# Patient Record
Sex: FEMALE | Race: WHITE | Employment: OTHER | URBAN - METROPOLITAN AREA
[De-identification: names, ages, dates, MRNs, and addresses within clinical notes are randomized per-mention and may not be internally consistent; named-entity substitution may affect disease eponyms.]

---

## 2017-01-02 ENCOUNTER — ALLIED HEALTH/NURSE VISIT (OUTPATIENT)
Dept: FAMILY MEDICINE | Facility: CLINIC | Age: 67
End: 2017-01-02
Payer: MEDICARE

## 2017-01-02 ENCOUNTER — OFFICE VISIT (OUTPATIENT)
Dept: RADIATION THERAPY | Facility: OUTPATIENT CENTER | Age: 67
End: 2017-01-02

## 2017-01-02 ENCOUNTER — HOSPITAL ENCOUNTER (OUTPATIENT)
Dept: PHYSICAL THERAPY | Facility: CLINIC | Age: 67
Setting detail: THERAPIES SERIES
End: 2017-01-02
Attending: OTOLARYNGOLOGY
Payer: MEDICARE

## 2017-01-02 ENCOUNTER — HOSPITAL ENCOUNTER (OUTPATIENT)
Dept: OCCUPATIONAL THERAPY | Facility: CLINIC | Age: 67
Setting detail: THERAPIES SERIES
End: 2017-01-02
Attending: OTOLARYNGOLOGY
Payer: MEDICARE

## 2017-01-02 ENCOUNTER — HOSPITAL ENCOUNTER (OUTPATIENT)
Dept: SPEECH THERAPY | Facility: CLINIC | Age: 67
Setting detail: THERAPIES SERIES
End: 2017-01-02
Attending: INTERNAL MEDICINE
Payer: MEDICARE

## 2017-01-02 VITALS — WEIGHT: 174.6 LBS | BODY MASS INDEX: 30.21 KG/M2

## 2017-01-02 DIAGNOSIS — C02.9 CANCER OF TONGUE (H): Primary | ICD-10-CM

## 2017-01-02 DIAGNOSIS — B37.0 THRUSH: Primary | ICD-10-CM

## 2017-01-02 DIAGNOSIS — R63.39 FEEDING PROBLEM: Primary | ICD-10-CM

## 2017-01-02 PROCEDURE — 97140 MANUAL THERAPY 1/> REGIONS: CPT | Mod: GP | Performed by: REHABILITATION PRACTITIONER

## 2017-01-02 PROCEDURE — 97110 THERAPEUTIC EXERCISES: CPT | Mod: GP,59 | Performed by: REHABILITATION PRACTITIONER

## 2017-01-02 PROCEDURE — 99207 ZZC NO CHARGE NURSE ONLY: CPT

## 2017-01-02 PROCEDURE — 40000099 ZZH STATISTIC LYMPHEDEMA VISIT: Performed by: REHABILITATION PRACTITIONER

## 2017-01-02 PROCEDURE — 40000211 ZZHC STATISTIC SLP  DEPARTMENT VISIT: Performed by: SPEECH-LANGUAGE PATHOLOGIST

## 2017-01-02 PROCEDURE — 92526 ORAL FUNCTION THERAPY: CPT | Mod: GN | Performed by: SPEECH-LANGUAGE PATHOLOGIST

## 2017-01-02 RX ORDER — FLUCONAZOLE 100 MG/1
TABLET ORAL
Qty: 11 TABLET | Refills: 0 | Status: SHIPPED | OUTPATIENT
Start: 2017-01-02 | End: 2017-01-26

## 2017-01-02 NOTE — NURSING NOTE
Patient stopped in at clinic before other scheduled rehab/lymphedema clinic visits. She was concerned she may have thrush again. RN reviewed oral cares and overall status as she is about 4 weeks out from completing radiation to H&N. Resident was able to evaluate and prescribe for thrush.  After brief visit - weight stable, patient continues to follow with lymphedema and speech. Continues with fluoride trays, salt/soda, and biotene for oral cares. Trying different foods - crackers, soups, ice cream.   Patient has upcoming visit with [place] [facility] next [place].

## 2017-01-02 NOTE — PROGRESS NOTES
Spoke with patient,  States she currently has scant/small amount greenish discharge on her G-tube dressing.    States it has increased while she is coughing.    Otherwise denies any skin breakdown, denies fever or any other signs of illness.  Offered her appt to see surgeon today if she feels she would like to have a provider look at it as our Ostomy RN's are not in clinic today.  Patient states she just thought she would check today since here for appt.    Declined appointment.  Patient says she will continue to clean and change dressing, monitor for fever pain or other signs of illness and will just call Ostomy RN tomorrow to try arrange being seen possibly on Thurs with Ostomy RN.    Darlin Chi RN  [facility]

## 2017-01-02 NOTE — Clinical Note
1/2/2017      RE: Terese Bell  [address]  [place] [address]       See nurse note    Dana Olivo MD

## 2017-01-02 NOTE — NURSING NOTE
Patient in clinic today because she believes her feeding tube site is infected - has redness and drainage from site.  She also states she has been coughing quite a bit and feels her tube has moved.  Tube placed 11-7-16 by Dr. Mckeon.  RN walked patient down to [facility] to be assessed by one of Dr. Mckeon's RNs.  Jayleen WILD RN

## 2017-01-05 ENCOUNTER — HOSPITAL ENCOUNTER (OUTPATIENT)
Dept: OCCUPATIONAL THERAPY | Facility: CLINIC | Age: 67
Setting detail: THERAPIES SERIES
End: 2017-01-05
Attending: OTOLARYNGOLOGY
Payer: MEDICARE

## 2017-01-05 ENCOUNTER — HOSPITAL ENCOUNTER (OUTPATIENT)
Dept: PHYSICAL THERAPY | Facility: CLINIC | Age: 67
Setting detail: THERAPIES SERIES
End: 2017-01-05
Attending: OTOLARYNGOLOGY
Payer: MEDICARE

## 2017-01-05 PROCEDURE — G8988 SELF CARE GOAL STATUS: HCPCS | Mod: GP,CI | Performed by: PHYSICAL THERAPIST

## 2017-01-05 PROCEDURE — 40000839 ZZH STATISTIC HAND THERAPY VISIT: Performed by: OCCUPATIONAL THERAPIST

## 2017-01-05 PROCEDURE — 40000099 ZZH STATISTIC LYMPHEDEMA VISIT: Performed by: REHABILITATION PRACTITIONER

## 2017-01-05 PROCEDURE — 97140 MANUAL THERAPY 1/> REGIONS: CPT | Mod: GP | Performed by: REHABILITATION PRACTITIONER

## 2017-01-05 PROCEDURE — G8987 SELF CARE CURRENT STATUS: HCPCS | Mod: GP,CJ | Performed by: PHYSICAL THERAPIST

## 2017-01-05 PROCEDURE — 97110 THERAPEUTIC EXERCISES: CPT | Mod: GO | Performed by: OCCUPATIONAL THERAPIST

## 2017-01-05 PROCEDURE — 97022 WHIRLPOOL THERAPY: CPT | Mod: GO | Performed by: OCCUPATIONAL THERAPIST

## 2017-01-05 NOTE — ADDENDUM NOTE
Encounter addended by: Barbara Erwin PT on: 1/5/2017  8:26 AM<BR>     Documentation filed: Clinical Notes

## 2017-01-05 NOTE — PROGRESS NOTES
Outpatient Physical Therapy Progress Note     Patient: Terese Bell  : 1950    Beginning/End Dates of Reporting Period:  12/3/16 to 2017    Referring Provider: Dr. Ahmet MD    Therapy Diagnosis: H&N lymphedema     Client Self Report: in a couple weeks would like to take a week off from coming here    Objective Measurements:        Objective Measure: R shoulder ROM  Details: flexion 160, abd/scaption 132, ER 50 AAROM with cane    Goals:  Goal Identifier stg   Goal Description pt to be independent with cervical ROM exercises to decrease tightness/stiffness which will increase functional range for driving and ADL   Target Date 10/20/16   Date Met  10/20/16   Progress:     Goal Identifier stg   Goal Description pt and/or family to be independent with scar massage to decrease tightness and underlying adhesions to decrease H&N edema   Target Date 10/20/16   Date Met  10/20/16   Progress:     Goal Identifier stg   Goal Description pt and/or family to be independent with self-MLD for edema redcution response in H&N lymphedema   Target Date 10/20/16   Date Met  10/20/16   Progress:     Goal Identifier ltg   Goal Description once appropriate, pt to be independent with donning, doffing and care of compression garment for longterm lymphedema management   Target Date 17   Date Met      Progress:     Goal Identifier ltg   Goal Description pt to be independent with longterm H&N lymphedema management via exercises, skin care, self-MLD and compression garment use/wear   Target Date 17   Date Met      Progress:     Progress Toward Goals:   Progress this reporting period: Measurements have decreased since 16 session.  Feel patient will continue to decrease in measurements due to radiation being finished.    Plan:  Continue therapy per current plan of care.    Discharge:  No

## 2017-01-09 ENCOUNTER — HOSPITAL ENCOUNTER (OUTPATIENT)
Dept: PHYSICAL THERAPY | Facility: CLINIC | Age: 67
Setting detail: THERAPIES SERIES
End: 2017-01-09
Attending: OTOLARYNGOLOGY
Payer: MEDICARE

## 2017-01-09 ENCOUNTER — HOSPITAL ENCOUNTER (OUTPATIENT)
Dept: OCCUPATIONAL THERAPY | Facility: CLINIC | Age: 67
Setting detail: THERAPIES SERIES
End: 2017-01-09
Attending: OTOLARYNGOLOGY
Payer: MEDICARE

## 2017-01-09 ENCOUNTER — HOSPITAL ENCOUNTER (OUTPATIENT)
Dept: SPEECH THERAPY | Facility: CLINIC | Age: 67
Setting detail: THERAPIES SERIES
End: 2017-01-09
Attending: INTERNAL MEDICINE
Payer: MEDICARE

## 2017-01-09 PROCEDURE — G8996 SWALLOW CURRENT STATUS: HCPCS | Mod: GN,CK | Performed by: SPEECH-LANGUAGE PATHOLOGIST

## 2017-01-09 PROCEDURE — 40000099 ZZH STATISTIC LYMPHEDEMA VISIT: Performed by: REHABILITATION PRACTITIONER

## 2017-01-09 PROCEDURE — G8997 SWALLOW GOAL STATUS: HCPCS | Mod: GN,CJ | Performed by: SPEECH-LANGUAGE PATHOLOGIST

## 2017-01-09 PROCEDURE — 40000828 ZZHC SLP CANCER REHAB VISIT: Performed by: SPEECH-LANGUAGE PATHOLOGIST

## 2017-01-09 PROCEDURE — 97110 THERAPEUTIC EXERCISES: CPT | Mod: GP,59 | Performed by: REHABILITATION PRACTITIONER

## 2017-01-09 PROCEDURE — 97110 THERAPEUTIC EXERCISES: CPT | Mod: GO,59 | Performed by: OCCUPATIONAL THERAPIST

## 2017-01-09 PROCEDURE — 97140 MANUAL THERAPY 1/> REGIONS: CPT | Mod: GP | Performed by: REHABILITATION PRACTITIONER

## 2017-01-09 PROCEDURE — 97140 MANUAL THERAPY 1/> REGIONS: CPT | Mod: GO | Performed by: OCCUPATIONAL THERAPIST

## 2017-01-09 PROCEDURE — 92526 ORAL FUNCTION THERAPY: CPT | Mod: GN | Performed by: SPEECH-LANGUAGE PATHOLOGIST

## 2017-01-09 PROCEDURE — G8987 SELF CARE CURRENT STATUS: HCPCS | Mod: GO,CJ | Performed by: OCCUPATIONAL THERAPIST

## 2017-01-09 PROCEDURE — G8988 SELF CARE GOAL STATUS: HCPCS | Mod: GO,CI | Performed by: OCCUPATIONAL THERAPIST

## 2017-01-09 PROCEDURE — 40000839 ZZH STATISTIC HAND THERAPY VISIT: Performed by: OCCUPATIONAL THERAPIST

## 2017-01-09 NOTE — PROGRESS NOTES
[facility]      OUTPATIENT OCCUPATIONAL THERAPY HAND EVALUATION  PLAN OF TREATMENT FOR OUTPATIENT REHABILITATION  (COMPLETE FOR INITIAL CLAIMS ONLY)  Patient's Last Name, First Name, M.I.  YOB: 1950  Terese Fleming                        Provider s Name: [facility] Medical Record No.  [id number]     Onset Date: 09/01/16    Start of Care Date: 10/28/16   Type:     ___PT  _X_OT   ___SLP    Medical Diagnosis:  Skin graft- wrist   Occupational Therapy Diagnosis:   Decreased ADL's    Visits from SOC: 20      _________________________________________________________________________________  Plan of Treatment/Functional Goals:  Planned Therapy Interventions:   Fluidotherapy, US, ex, self care, manual     Goals                       3. Goal Target Task: Button shirt, Tie shoes       Goal Target Performance Level: Mild difficulty       Due Date: 01/21/17             5. Goal Target Task: Open a tight or new jar       Goal Target Performance Level: Mild difficulty       Due Date: 01/21/17   6. Goal Target Task: Lift off floor to seat height - lbs (30# landscaping rock)       Goal Target Performance Level: Mild difficulty       Due Date: 02/09/17          Treatment Frequency:  2x/week for 6 weeks  Predicated Duration of Therapy Intervention:       Mariah Santos OTR/L CHT  Occupational Therapist, Certified Hand Therapist         I CERTIFY THE NEED FOR THESE SERVICES FURNISHED UNDER        THIS PLAN OF TREATMENT AND WHILE UNDER MY CARE .             Physician Signature               Date    X_____________________________________________________                      Certification Period:   1/24/17 to  4/1/17            Referring Physician:  Dr. Sutton    Initial Assessment        See Epic Evaluation Start of Care Date: 10/28/16

## 2017-01-09 NOTE — PROGRESS NOTES
Progress Note  01/09/17    Signing Clinician's Name / Credentials   Signing clinician's name /credentials Angelita Rey MA, CCC/SLP   Session Number   Session Number 7   SLP Medicare Only G-code   G-code Swallowing   Swallowing   Swallowing Current Status,  (eval/re-eval & every progress note) CK: 40-59% impairment   Current Swallowing Modifier Rationale outcome measure tool level 3   Swallowing Goal,  (eval/re-eval, every progress note & discharge) CJ: 20-39% impairment   Subjective Report   Subjective Report Tongue soreness still gets in the way of oral intake, has thrush again, reports tried 4 foods in past week and starting to have taste.   Swallow Goal 1   Goal Identifier 1   Goal Description Pt will perform oropharyngeal exercises 10 reps each 3x/day to increase swallow safety.   Target Date 12/20/16   Date Met (goal met per patient)   Swallow Goal 2   Goal Identifier 2   Goal Description Pt will utilize safe swallow strategies to decrease aspiration risk 85% of the time.   Target Date 01/04/17   Date Met (goal met)   Swallow Goal 3   Goal Identifier 3   Goal Description Pt will swallow a mechanical soft diet with thin liquids with no overt aspiration symptoms to meet nutritional needs.   Target Date 01/04/17   Date Met (mainly liquids )   Treatment Swallow/Oral dysfunction   Minutes 30 Minutes   Skilled Intervention Educated patient on swallowing strategies.;Educated patient on risks of aspiration;Assessed oral intake trials;Other   Patient Response Reduced hyolaryngeal elevation and pt reports residue in pharynx after swallow at times.   Treatment Detail Pt educated regarding importance of swallowing to prevent late effects of radiation and long-term dysphagia. Discussed plan for increasing oral intake; pt verbalized motivation although noted pain and lack of taste remain barriers. Pt educated on rationale and trained on pharyngeal strengthening exercises (effortful swallow, mendelsohn, masako,  and shaker) as well as lingual ROM exercises. Pt educated on importance of oral cares. She was assessed with sips of thin liquids. Briefly educated on swallowing strategies.    Progress PO intake minimal; remains tube dependent. Expresses motivation to return to full PO and have feeding tube removed. Pt verbalized motivation to initiate small amounts of solid textures daily (purees and crackers). Pain, lack of taste, and thick secretions remain barriers at this time.  She reports taste is starting to come back but limited trials.  Feels uneasy about eating if alone.   Plan   Homework Pharyngeal strengthening exercises 3x/day, 5-10 reps/exercise. Solid food trials at least 1x/day.    Updates to plan of care Continues ST warranted to progress oral diet safely.  Pt currently receives nutrition 100% via tube feeding. Just over one month post RXT    Plan for next session Diet texture analysis and increase variety of foods and consistencies base on how she did with homeprogram over the week.   Total Session Time   Total Session Time 30       ** Barriers to oral feeding on this date include jaw and tongue pain, continued thrush, and reduced laryngeal elevation during the swallow resulting in pharyngeal residue.  Pt just started being able to taste this week with food trials and this has been motivating.  Recommend continued ST to address dysphagia with goal for adequate oral intake.      RECERTIFICATION    Terese Bell  1950      since start of care.    Reasons for Continuing Treatment:   Skilled services necessary to address dysphagia and safe advancement to oral diet.    Frequency/Duration  1 times per week for 4 weeks for a total of 4 visits.    Recertification Period  1/2/17 - 2/2/17    Physician Signature:    Date:    X_______________________________________________________    Physician Name: Pattie Natarajan MD    I certify the need for these services furnished under this plan of treatment and while under  my care. Physician co-signature of this document indicates review and certification of the therapy plan.  This signature may be written on paper, or electronically signed within EPIC.

## 2017-01-09 NOTE — PROGRESS NOTES
01/09/17 0500   Notes   Note Type Progress Note   Providers   Providers Mariah Santos, OTR/L, CHT   Referring Physician Dr. Sutton   Reporting Period   Reporting period from 12/01/16   Reporting period to 01/09/17   Self Care   Self Care Current Status,  (eval/re-eval & every progress note) CJ: 20-39% impairment   Current Self Care Modifier Rationale clinical judgement   Self Care Goal,  (eval/re-eval, every progress note & discharge) CI: 1-19% impairment   General Information   Rxs Authorized 365   Rxs Used 20 (10 since last progress note)   Medical Diagnosis Right wrist Pain post graft site.   Orders Evaluate And Treat As Indicated   Insurance Medicare   Total 1:1 Time 100%   Start Of Care Date 10/28/16   Beginning of Cert (Date Period) 10/28/16   End of Cert period date 01/24/17   Onset date of current episode/exacerbation 09/01/16   Surgical procedure grafting   Date of surgery 09/01/16   Subjective Measures   Subjective Feels biggest issue is strength but says she is better. Feels like she has lack of coordination   Current Pain level 1/10   Patient Reported % Functional Improvement 75   Functional Improvement Reported Self care activities;Gripping   Objective Measures   Objective Measures ROM;Strength   ROM   Location (anatomical) Active FTL   Location Right;Left   ROM Comments 62 post (58 pre), left 70 scar tissue still tight   Strength   Location Right;Left    43#,40#   Allen Pinch 14#,14#   Lateral Pinch 14#,16#   Modalities   Fluidotherapy -Duration 10 min   Skilled Interventions To Increase Tissue Extensibility;To Increase Tissue Excursion;To Decrease Pain;To Decrease Hypersensitivity   Airflow high   Temperature 118   Set Up with ex   Therapeutic Exercise   Skilled Interventions (verbal physical cuing needed)   Minutes of Treatment 10   Other Exer/Activities/Educ   Other Exer/Activities/Educ - All exercises are part of HEP unless otherwise noted Exercise 1;Exercise 2;Exercise  3;Exercise 4;Exercise 5   Exercise 1 Resistive push/pull, wrist extension/flexion   Description 1 Peg placement into green putty, covered pegs then removed right hand only with exaggerated wrist extension   Exercise 2 Wrist extension   Description 2 Weightbearing on table into wrist extension hold for 10 seconds 8 reps   Exercise 3 Lift weighted sack floor to table   Description 3 10# x10   Exercise 4 clothes pins all   Manual   Manual Scar Mob Position   Skilled Interventions To Soften Scar Tissue  (decrease adhesions)   Minutes of Treatment 10   Scar Mob Position Sitting   Scar Mob Location right volar wrist   Description/ Technique circular;3 dimensions  (use of dycem for gripping, demo with return demo by patient )   Neuromuscular   Neuromuscular Proximal Median   Proximal Median Active   Sets/Reps 1 set;5 reps;HEP;Reviewed   Hand Goals   Hand Goals Household Chores;Dressing;Work   Dressing   Current Functional Task Buttoning;Tying   Previous Performance Level Unable   Current Performance Level Moderate difficulty   Goal Target Task Button shirt;Tie shoes   Goal Target Performance Level Mild difficulty   Due Date 01/21/17   If goal not met, Why? progressing   Household Chores   Current Functional Task Gripping   Previous Performance Level Moderate limitations   Current Performance Level Moderate difficulty   Goal Target Task Open a tight or new jar   Goal Target Performance Level Mild difficulty   Due Date 01/21/17   If goal not met, Why? progressing   Work   Current Functional Task Lifting   Previous Performance Level Independent   Current Performance Level Moderate difficulty   Goal Target Task Lift off floor to seat height - lbs  (30# landscaping rock)   Goal Target Performance Level Mild difficulty   Due Date 02/09/17   If goal not met, Why? Note: PAtient lives alone and was very activie and has lots of boxes she wants to be able to move.   Assessment   Clinical Impression(s) Comments Nice increase in ,  patient reports she has been sleeeping better the last few days.   Response to Therapy: Improvements Flexibility;Pain   Equipment   Equipment putty for home use   Education   Learner Patient   Readiness Eager   Method Booklet/handout;Explanation;Demonstration   Response Verbalizes understanding;Demonstrates understanding   Education Notes see home program   Plan   Home program low heat,AROM, light use, scar masssage and mobility with dycem and night sillicone pad wear, desensitization   Updates to plan of care Patient only wanting to come 1x/week   Plan Continue to see patient 2x/week focusing on increasing strength and function.   Total Session Time   Total Session Time (In Minutes) 30   Mariah Santos OTR/L CHT  Occupational Therapist, Certified Hand Therapist

## 2017-01-11 ENCOUNTER — THERAPY VISIT (OUTPATIENT)
Dept: SPEECH THERAPY | Facility: CLINIC | Age: 67
End: 2017-01-11

## 2017-01-11 ENCOUNTER — OFFICE VISIT (OUTPATIENT)
Dept: OTOLARYNGOLOGY | Facility: CLINIC | Age: 67
End: 2017-01-11

## 2017-01-11 VITALS — BODY MASS INDEX: 29.59 KG/M2 | WEIGHT: 171 LBS

## 2017-01-11 DIAGNOSIS — C02.9 TONGUE CANCER (H): ICD-10-CM

## 2017-01-11 DIAGNOSIS — C04.9 MALIGNANT NEOPLASM OF FLOOR OF MOUTH (H): Primary | ICD-10-CM

## 2017-01-11 DIAGNOSIS — I89.0 ACQUIRED LYMPHEDEMA OF LOWER EXTREMITY: ICD-10-CM

## 2017-01-11 DIAGNOSIS — R13.12 OROPHARYNGEAL DYSPHAGIA: Primary | ICD-10-CM

## 2017-01-11 DIAGNOSIS — Z85.810 HISTORY OF TONGUE CANCER: ICD-10-CM

## 2017-01-11 ASSESSMENT — PAIN SCALES - GENERAL: PAINLEVEL: NO PAIN (1)

## 2017-01-11 NOTE — PROGRESS NOTES
"January 11, 2017    PRIOR ONCOLOGIC HISTORY:  Ms. 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 Schumacher.  He ended up doing 13 operations in 11 years, most recently in 2010.      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 Leo on 9/1/2016.  The margins were negative but the lymph nodes were 1/40 positive.  She completed postoperative radiation therapy on 11/29/2017.    HISTORY OF PRESENT ILLNES:  Ms. Bettie Bell has done quite well since radiation therapy.  She is starting to eat now.  She has been getting regular speech therapy by Tg's colleagues at [facility].  She is no longer in pain.      She just wants to get back to her normal life since she has been \"on the go with cancer treatment\" since last fall.  Her mouth is healing very nicely although there still is a little discomfort.      She is still dependent primarily on three cans of two calorie tube feeds but is starting to feel more encouraged about eating again.  There has been no leakage or fistula.  There has been no bleeding.  There are no lumps or bumps.      PHYSICAL EXAMINATION:  She looks quite good.  She is unaccompanied today.  Her speech is remarkably clear.  The flap looks to be in great position from the right side of the tongue with excellent contour. She has trismus to 26 mm.  Palpation of the area is tender for her, but I see no evidence of lesions and feel no induration.  The buccal mucosa is clear.  I did not perform an endoscopy today due to discomfort.  There are still points of healing mucositis.      IMPRESSION AND PLAN:    Ms. Bettie Bell has done remarkably well since she had revision surgery, a free flap, and postoperative radiotherapy.  I see no " evidence of disease on my clinical examination today, and she is scheduled for three month PET scans later next month.  We will see her back after her PET scans.      I told her to continue on her therapy.  She is having lymphedema treatments already and continues to work with the speech language pathologist.  She needs to work on her trismus a little bit.  She does feel better chewing on gummy worms.      We will see her back after her next PET/CT and discuss the next steps with her.  At this point, she is continuing to do very well, and I encouraged her to keep up the great work. Tg visited with her as well today and was encouraging.    Thomas Ferris M.D.  Otolaryngology/Head & Neck Surgery  [phone]            HERNÁN Carranza MD [facility]O Leo MD  Otolaryngology/Head & Neck Surgery  [facility]

## 2017-01-11 NOTE — Clinical Note
"1/11/2017       RE: Terese Bell  [address] [place] [place] [address]     Dear Colleague,    Thank you for referring your patient, Terese Bell, to the [facility] EAR NOSE [facility] THROAT at [facility]. Please see a copy of my visit note below.    January 11, 2017    PRIOR ONCOLOGIC HISTORY:  Ms. 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 Schumacher.  He ended up doing 13 operations in 11 years, most recently in 2010.      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 Leo on 9/1/2016.  The margins were negative but the lymph nodes were 1/40 positive.  She completed postoperative radiation therapy on 11/29/2017.    HISTORY OF PRESENT ILLNES:  Ms. Bettie Bell has done quite well since radiation therapy.  She is starting to eat now.  She has been getting regular speech therapy by Tg's colleagues at [facility].  She is no longer in pain.      She just wants to get back to her normal life since she has been \"on the go with cancer treatment\" since last fall.  Her mouth is healing very nicely although there still is a little discomfort.      She is still dependent primarily on three cans of two calorie tube feeds but is starting to feel more encouraged about eating again.  There has been no leakage or fistula.  There has been no bleeding.  There are no lumps or bumps.      PHYSICAL EXAMINATION:  She looks quite good.  She is unaccompanied today.  Her speech is remarkably clear.  The flap looks to be in great position from the right side of the tongue with excellent contour. She has trismus to 26 mm.  Palpation of the area is tender for her, but I see no evidence of lesions and feel no " induration.  The buccal mucosa is clear.  I did not perform an endoscopy today due to discomfort.  There are still points of healing mucositis.      IMPRESSION AND PLAN:    Ms. Bettie Bell has done remarkably well since she had revision surgery, a free flap, and postoperative radiotherapy.  I see no evidence of disease on my clinical examination today, and she is scheduled for three month PET scans later next month.  We will see her back after her PET scans.      I told her to continue on her therapy.  She is having lymphedema treatments already and continues to work with the speech language pathologist.  She needs to work on her trismus a little bit.  She does feel better chewing on gummy worms.      We will see her back after her next PET/CT and discuss the next steps with her.  At this point, she is continuing to do very well, and I encouraged her to keep up the great work. Tg visited with her as well today and was encouraging.    Thomas Ferris M.D.  Otolaryngology/Head & Neck Surgery  [phone]      HERNÁN Carranza MD [facility] Leo MD  Otolaryngology/Head & Neck Surgery  [facility] [address]

## 2017-01-12 ENCOUNTER — HOSPITAL ENCOUNTER (OUTPATIENT)
Dept: OCCUPATIONAL THERAPY | Facility: CLINIC | Age: 67
Setting detail: THERAPIES SERIES
End: 2017-01-12
Attending: OTOLARYNGOLOGY
Payer: MEDICARE

## 2017-01-12 ENCOUNTER — HOSPITAL ENCOUNTER (OUTPATIENT)
Dept: PHYSICAL THERAPY | Facility: CLINIC | Age: 67
Setting detail: THERAPIES SERIES
End: 2017-01-12
Attending: OTOLARYNGOLOGY
Payer: MEDICARE

## 2017-01-12 PROCEDURE — 97110 THERAPEUTIC EXERCISES: CPT | Mod: GP | Performed by: REHABILITATION PRACTITIONER

## 2017-01-12 PROCEDURE — 40000099 ZZH STATISTIC LYMPHEDEMA VISIT: Performed by: REHABILITATION PRACTITIONER

## 2017-01-12 PROCEDURE — 40000839 ZZH STATISTIC HAND THERAPY VISIT: Performed by: OCCUPATIONAL THERAPIST

## 2017-01-12 PROCEDURE — 97140 MANUAL THERAPY 1/> REGIONS: CPT | Mod: GP | Performed by: REHABILITATION PRACTITIONER

## 2017-01-12 PROCEDURE — 97140 MANUAL THERAPY 1/> REGIONS: CPT | Mod: GO | Performed by: OCCUPATIONAL THERAPIST

## 2017-01-16 ENCOUNTER — HOSPITAL ENCOUNTER (OUTPATIENT)
Dept: OCCUPATIONAL THERAPY | Facility: CLINIC | Age: 67
Setting detail: THERAPIES SERIES
End: 2017-01-16
Attending: OTOLARYNGOLOGY
Payer: MEDICARE

## 2017-01-16 ENCOUNTER — HOSPITAL ENCOUNTER (OUTPATIENT)
Dept: PHYSICAL THERAPY | Facility: CLINIC | Age: 67
Setting detail: THERAPIES SERIES
End: 2017-01-16
Attending: OTOLARYNGOLOGY
Payer: MEDICARE

## 2017-01-16 PROCEDURE — 97022 WHIRLPOOL THERAPY: CPT | Mod: GO | Performed by: OCCUPATIONAL THERAPIST

## 2017-01-16 PROCEDURE — 97140 MANUAL THERAPY 1/> REGIONS: CPT | Mod: GO | Performed by: OCCUPATIONAL THERAPIST

## 2017-01-16 PROCEDURE — 97140 MANUAL THERAPY 1/> REGIONS: CPT | Mod: GP | Performed by: REHABILITATION PRACTITIONER

## 2017-01-16 PROCEDURE — 40000839 ZZH STATISTIC HAND THERAPY VISIT: Performed by: OCCUPATIONAL THERAPIST

## 2017-01-16 PROCEDURE — 97110 THERAPEUTIC EXERCISES: CPT | Mod: GO | Performed by: OCCUPATIONAL THERAPIST

## 2017-01-16 PROCEDURE — 40000099 ZZH STATISTIC LYMPHEDEMA VISIT: Performed by: REHABILITATION PRACTITIONER

## 2017-01-16 PROCEDURE — 97110 THERAPEUTIC EXERCISES: CPT | Mod: GP | Performed by: REHABILITATION PRACTITIONER

## 2017-01-19 ENCOUNTER — HOSPITAL ENCOUNTER (OUTPATIENT)
Dept: OCCUPATIONAL THERAPY | Facility: CLINIC | Age: 67
Setting detail: THERAPIES SERIES
End: 2017-01-19
Attending: OTOLARYNGOLOGY
Payer: MEDICARE

## 2017-01-19 ENCOUNTER — HOSPITAL ENCOUNTER (OUTPATIENT)
Dept: SPEECH THERAPY | Facility: CLINIC | Age: 67
Setting detail: THERAPIES SERIES
End: 2017-01-19
Attending: INTERNAL MEDICINE
Payer: MEDICARE

## 2017-01-19 PROCEDURE — 92526 ORAL FUNCTION THERAPY: CPT | Mod: GN | Performed by: SPEECH-LANGUAGE PATHOLOGIST

## 2017-01-19 PROCEDURE — 40000828 ZZHC SLP CANCER REHAB VISIT: Performed by: SPEECH-LANGUAGE PATHOLOGIST

## 2017-01-19 PROCEDURE — 97140 MANUAL THERAPY 1/> REGIONS: CPT | Mod: GO | Performed by: OCCUPATIONAL THERAPIST

## 2017-01-19 PROCEDURE — 97110 THERAPEUTIC EXERCISES: CPT | Mod: GO,59 | Performed by: OCCUPATIONAL THERAPIST

## 2017-01-19 PROCEDURE — 40000839 ZZH STATISTIC HAND THERAPY VISIT: Performed by: OCCUPATIONAL THERAPIST

## 2017-01-23 ENCOUNTER — HOSPITAL ENCOUNTER (OUTPATIENT)
Dept: OCCUPATIONAL THERAPY | Facility: CLINIC | Age: 67
Setting detail: THERAPIES SERIES
End: 2017-01-23
Attending: OTOLARYNGOLOGY
Payer: MEDICARE

## 2017-01-23 ENCOUNTER — HOSPITAL ENCOUNTER (OUTPATIENT)
Dept: PHYSICAL THERAPY | Facility: CLINIC | Age: 67
Setting detail: THERAPIES SERIES
End: 2017-01-23
Attending: OTOLARYNGOLOGY
Payer: MEDICARE

## 2017-01-23 PROCEDURE — 40000839 ZZH STATISTIC HAND THERAPY VISIT: Performed by: OCCUPATIONAL THERAPIST

## 2017-01-23 PROCEDURE — 97022 WHIRLPOOL THERAPY: CPT | Mod: GO | Performed by: OCCUPATIONAL THERAPIST

## 2017-01-23 PROCEDURE — 40000099 ZZH STATISTIC LYMPHEDEMA VISIT: Performed by: REHABILITATION PRACTITIONER

## 2017-01-23 PROCEDURE — 97140 MANUAL THERAPY 1/> REGIONS: CPT | Mod: GO | Performed by: OCCUPATIONAL THERAPIST

## 2017-01-23 PROCEDURE — 97110 THERAPEUTIC EXERCISES: CPT | Mod: GO | Performed by: OCCUPATIONAL THERAPIST

## 2017-01-23 PROCEDURE — 97110 THERAPEUTIC EXERCISES: CPT | Mod: GP | Performed by: REHABILITATION PRACTITIONER

## 2017-01-23 PROCEDURE — 97140 MANUAL THERAPY 1/> REGIONS: CPT | Mod: GP | Performed by: REHABILITATION PRACTITIONER

## 2017-01-26 ENCOUNTER — HOSPITAL ENCOUNTER (OUTPATIENT)
Dept: PHYSICAL THERAPY | Facility: CLINIC | Age: 67
Setting detail: THERAPIES SERIES
End: 2017-01-26
Attending: OTOLARYNGOLOGY
Payer: MEDICARE

## 2017-01-26 ENCOUNTER — HOSPITAL ENCOUNTER (OUTPATIENT)
Dept: OCCUPATIONAL THERAPY | Facility: CLINIC | Age: 67
Setting detail: THERAPIES SERIES
End: 2017-01-26
Attending: OTOLARYNGOLOGY
Payer: MEDICARE

## 2017-01-26 ENCOUNTER — OFFICE VISIT (OUTPATIENT)
Dept: RADIATION THERAPY | Facility: OUTPATIENT CENTER | Age: 67
End: 2017-01-26

## 2017-01-26 VITALS
WEIGHT: 172 LBS | HEART RATE: 82 BPM | OXYGEN SATURATION: 97 % | DIASTOLIC BLOOD PRESSURE: 67 MMHG | SYSTOLIC BLOOD PRESSURE: 104 MMHG | RESPIRATION RATE: 18 BRPM | BODY MASS INDEX: 29.77 KG/M2

## 2017-01-26 DIAGNOSIS — B37.0 THRUSH: Primary | ICD-10-CM

## 2017-01-26 PROCEDURE — 97110 THERAPEUTIC EXERCISES: CPT | Mod: GP | Performed by: REHABILITATION PRACTITIONER

## 2017-01-26 PROCEDURE — 97140 MANUAL THERAPY 1/> REGIONS: CPT | Mod: GO | Performed by: OCCUPATIONAL THERAPIST

## 2017-01-26 PROCEDURE — 40000839 ZZH STATISTIC HAND THERAPY VISIT: Performed by: OCCUPATIONAL THERAPIST

## 2017-01-26 PROCEDURE — 97110 THERAPEUTIC EXERCISES: CPT | Mod: GO | Performed by: OCCUPATIONAL THERAPIST

## 2017-01-26 PROCEDURE — 97140 MANUAL THERAPY 1/> REGIONS: CPT | Mod: GP | Performed by: REHABILITATION PRACTITIONER

## 2017-01-26 PROCEDURE — 40000099 ZZH STATISTIC LYMPHEDEMA VISIT: Performed by: REHABILITATION PRACTITIONER

## 2017-01-26 RX ORDER — FLUCONAZOLE 100 MG/1
TABLET ORAL
Qty: 15 TABLET | Refills: 0 | Status: SHIPPED | OUTPATIENT
Start: 2017-01-26 | End: 2017-03-08

## 2017-01-26 ASSESSMENT — PAIN SCALES - GENERAL: PAINLEVEL: NO PAIN (1)

## 2017-01-26 NOTE — NURSING NOTE
FOLLOW-UP VISIT    Patient Name: Terese Bell      : 1950     Age: 66 year old        ______________________________________________________________________________     Chief Complaint   Patient presents with     Radiation Therapy     Follow-up     /67 mmHg  Pulse 82  Resp 18  Wt 78.019 kg (172 lb)  SpO2 97%     Date Radiation Completed: 16    Pain  Current history of pain associated with this visit:   Intensity: 1/10  Current: dull  Location: tongue  Treatment: none needed per patient    Meds  Current Med List Reviewed: Yes  Medication Note: Not currently using any pain medication    Labs  TSH   Date Value Ref Range Status   2012 1.40 0.4 - 5.0 mU/L Final   ]    Imaging  None    Skin:Warm  Dry  Intact  Oral Products: salt/soda, ACT for dry mouth  Mucositis: No  Dry mouth: Yes: continues with thick secretions, but this has improved - only experiences occasional coughing and has not had emesis from coughing for 2 weeks. Pt feels she is improving.  Dental evaluation: Yes: scheduled for . Still using fluoride trays and toothpaste daily.  PEG tube: Yes  Speech therapy: Yes: sees Angelita with speech therapy regularly. She states she is still having a hard time swallowing but is making small   Lymphedema: Yes - and also having PT sessions at home to help with gaining strength and ROM  Energy Level:low - but improving  Appetite: fair, continues to improve. Taste is improving and trying new foods.   Intake: 3 cans per peg daily, still unable to get in 4 cans a day, but state she will try. States she is eating more and has tried small bites of pizza, crackers, soup, jello, coffee flavored ice cream, and lasagna w/o cheese as cheese is too hard to swallow yet. Eating mostly soft foods. States she can now tolerate cold drinks/food.  Weight:  Wt Readings from Last 5 Encounters:   17 78.019 kg (172 lb)   17 77.565 kg (171 lb)   17 79.198 kg (174 lb 9.6 oz)    12/22/16 79.289 kg (174 lb 12.8 oz)   12/15/16 79.742 kg (175 lb 12.8 oz)       Appointments:     DATE  Oncologist:     ENT: Dr. Ferris    Primary:      Other Notes: Pt states that she feels she is overall improving but has extremely dry mouth today which she hasn't had for 2 weeks. Dr. Ferris has ordered PET scan but has not yet been scheduled. Pt states that she will call Dr. Ferris's office to get assistance in scheduling PET.

## 2017-01-26 NOTE — Clinical Note
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     TYPE OF RADIATION THERAPY ADMINISTERED: 6000 cGy in 30 fractions     SUBJECTIVE: Mrs. 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 She had radiation therapy in our clinic over 6 weeks with a total dose of 6000 cGy in 30 treatments at 200 cGy each fraction from 10/17/2016 to 2016.  She tolerated radiation therapy reasonably well.  The patient did have some significant sore throat and dysphagia toward the end of the therapy, which required a PEG tube for nutritional support.  She otherwise was reasonably stable at the end of the therapy. She returns today for a routine post-treatment checkup.    Since her last visit with us one month ago, she reports that she has been doing reasonably well. She continues to improve since her last visit with us one month ago. Her taste and oral pain continues to improve which has allowed her to take in more nutrition PO. However, she continues to use her tube feeds (3 cans per day) to maintain caloric intake. She does have a little bit of discomfort on her oral tongue, and feels that her thrush may be returning. States that her previous Rx of fluconazole have always helped, but never really got rid of all the white on her tongue. She was recently seen by Dr. Ferris in ENT and felt to be recovering as expected with no evidence of disease. Additionally, she continues to followe with speech and lymphedema therapy. Overall, she continues to do as well as expected and her remaining ROS are unremarkable.      OBJECTIVE:   /67 mmHg  Pulse 82  Resp 18  Wt 78.019 kg (172 lb)  SpO2 97%  GENERAL:  Appears well, in no acute distress.   HEENT: NCAT. Mild generalized mucositis. No thrush  RESP: Breathing comfortably on RA  CV: WWP  NEURO: CN II-XII grossly intact. Normal gait.      IMPRESSION: Recovering well at this point from RT. Calorie counts have improved and her weight has stabilized.     RECOMMENDATIONS:    1. RTC in 2 months  2. Rx for Fluconazole x2 weeks   2. Patient encouraged to continue maintaining calorie counts  4. Continue close followup with ENT as scheduled. Restaging PET scan prior.  5. Continue with lymphedema therapy    The patient was seen and discussed with staff, Dr. Natarajan.    Slade Appiah MD  Resident Radiation Oncology     I have personally examined the patient, reviewed and edited the resident's note and agree with the plan of care.    Pattie Natarajan M.D., Ph.D.      Radiation Oncologist   [facility]   Radiation Therapy Center   Phone: [phone]    CC  Patient Care Team:  Nicole Mcdonald APRN CNP as PCP - Thomas Gross MD as MD (Otolaryngology)

## 2017-01-26 NOTE — PROGRESS NOTES
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     TYPE OF RADIATION THERAPY ADMINISTERED: 6000 cGy in 30 fractions     SUBJECTIVE: Mrs. 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 She had radiation therapy in our clinic over 6 weeks with a total dose of 6000 cGy in 30 treatments at 200 cGy each fraction from 10/17/2016 to 2016.  She tolerated radiation therapy reasonably well.  The patient did have some significant sore throat and dysphagia toward the end of the therapy, which required a PEG tube for nutritional support.  She otherwise was reasonably stable at the end of the therapy. She returns today for a routine post-treatment checkup.    Since her last visit with us one month ago, she reports that she has been doing reasonably well. She continues to improve since her last visit with us one month ago. Her taste and oral pain continues to improve which has allowed her to take in more nutrition PO. However, she continues to use her tube feeds (3 cans per day) to maintain caloric intake. She does have a little bit of discomfort on her oral tongue, and feels that her thrush may be returning. States that her previous Rx of fluconazole have always helped, but never really got rid of all the white on her tongue. She was recently seen by Dr. Ferris in ENT and felt to be recovering as expected with no evidence of disease. Additionally, she continues to followe with speech and lymphedema therapy. Overall, she continues to do as well as expected and her remaining ROS are unremarkable.     OBJECTIVE:   /67 mmHg  Pulse 82  Resp 18  Wt 78.019 kg (172 lb)  SpO2 97%  GENERAL:   Appears well, in no acute distress.   HEENT: NCAT. Mild generalized mucositis. No thrush  RESP: Breathing comfortably on RA  CV: WWP  NEURO: CN II-XII grossly intact. Normal gait.      IMPRESSION: Recovering well at this point from RT. Calorie counts have improved and her weight has stabilized.     RECOMMENDATIONS:    1. RTC in 2 months  2. Rx for Fluconazole x2 weeks   2. Patient encouraged to continue maintaining calorie counts  4. Continue close followup with ENT as scheduled. Restaging PET scan prior.  5. Continue with lymphedema therapy    The patient was seen and discussed with staff, Dr. Natarajan.    Slade Appiah MD  Resident Radiation Oncology     I have personally examined the patient, reviewed and edited the resident's note and agree with the plan of care.    Pattie Natarajan M.D., Ph.D.      Radiation Oncologist   [facility]   Radiation Therapy Center   Phone: [phone]    CC  Patient Care Team:  Nicole Mcdonald APRN CNP as PCP - General  Mario Ferris MD as MD (Otolaryngology)  Pattie Natarajan MD as MD (Radiation Oncology)  MARIO FERRIS

## 2017-01-26 NOTE — MR AVS SNAPSHOT
After Visit Summary   1/26/2017    Terese Bell    MRN: [id number]           Patient Information     Date Of Birth          1950        Visit Information        Provider Department      1/26/2017 10:45 AM Pattie Natarajan MD Radiation Therapy Center         Follow-ups after your visit        Your next 10 appointments already scheduled     Jan 30, 2017  9:00 AM   Lymphedema Treatment with Apryl Streeter PTA   [facility] Lymphedema ([facility])    [address] [address]   [phone]            Feb 02, 2017  9:15 AM   Lymphedema Treatment with Apryl Streeter PTA   [facility] Lymphedema ([facility])    [address] [address]   [phone]            Feb 02, 2017 11:00 AM   Treatment with Mariah Santos, OT   [facility] Occupational Therapy ([facility])    [address]  [address]  [place] [address]   [phone]            Feb 02, 2017 11:30 AM   Treatment with AYAD Castañeda   [facility] Speech Therapy ([facility])    [address] [address]   [phone]            Mar 30, 2017 10:45 AM   Return Visit with Pattie Natarajan MD   [facility] ([facility])    [address]  [place] [address]   [phone]              Who to contact     Please call your clinic at [phone] to:    Ask questions about your health    Make or cancel appointments    Discuss your medicines    Learn about your test results    Speak to your doctor   If you have compliments or concerns about an experience at your clinic, or if you wish to file a complaint, please contact [facility] Patient Relations at [phone] or email us at [email or web address]         Additional Information About Your Visit        MyChart Information     [employer] gives you secure access to your electronic  health record. If you see a primary care provider, you can also send messages to your care team and make appointments. If you have questions, please call your primary care clinic.  If you do not have a primary care provider, please call [phone] and they will assist you.      [email or web address] is an electronic gateway that provides easy, online access to your medical records. With [email or web address], you can request a clinic appointment, read your test results, renew a prescription or communicate with your care team.     To access your existing account, please contact your [facility] or call [phone] for assistance.        Care EveryWhere ID     This is your Care EveryWhere ID. This could be used by other organizations to access your [facility] medical records  [id number]        Your Vitals Were     Pulse Respirations Pulse Oximetry             82 18 97%          Blood Pressure from Last 3 Encounters:   01/26/17 104/67   12/22/16 102/72   12/19/16 108/70    Weight from Last 3 Encounters:   01/26/17 78.019 kg (172 lb)   01/11/17 77.565 kg (171 lb)   01/02/17 79.198 kg (174 lb 9.6 oz)              Today, you had the following     No orders found for display       Primary Care Provider Office Phone # Fax #    HERNÁN Carranza [facility] [phone]       [place] [facility] [address] [place] [address]        Thank you!     Thank you for choosing [facility]  for your care. Our goal is always to provide you with excellent care. Hearing back from our patients is one way we can continue to improve our services. Please take a few minutes to complete the written survey that you may receive in the mail after your visit with us. Thank you!             Your Updated Medication List - Protect others around you: Learn how to safely use, store and throw away your medicines at www.disposemymeds.org.          This list is accurate as of: 1/26/17 11:33 AM.  Always use  your most recent med list.                   Brand Name Dispense Instructions for use    albuterol 108 (90 BASE) MCG/ACT Inhaler    PROAIR HFA/PROVENTIL HFA/VENTOLIN HFA     Inhale 2 puffs into the lungs every 6 hours as needed for wheezing       DENTAGEL 1.1 % Gel topical gel   Generic drug:  sodium fluoride dental gel      Apply 1 Application to affected area daily       EPINEPHrine 0.3 MG/0.3ML injection     2 each    Inject 0.3 mLs (0.3 mg) into the muscle once as needed for anaphylaxis       fluconazole 100 MG tablet    DIFLUCAN    11 tablet    Take 2 tabs the first day and then 1 tab every day for 9 more days for a total of 10 days       fluticasone-salmeterol 250-50 MCG/DOSE diskus inhaler    ADVAIR DISKUS    1 Inhaler    Inhale 1 puff into the lungs 2 times daily as needed       lidocaine 2 % solution    XYLOCAINE     Take 2 mLs by mouth 2 times daily as needed       lidocaine visc 2% 2.5mL/5mL & maalox/mylanta w/ simeth 2.5mL/5mL & diphenhydrAMINE 5mg/5mL Susp suspension    MAGIC Mouthwash    120 mL    Swish and swallow 10 mLs in mouth every 6 hours as needed for mouth sores       ondansetron 4 MG ODT tab    ZOFRAN-ODT    30 tablet    Take 1 tablet (4 mg) by mouth every 8 hours as needed for nausea       order for DME     1 Device    Equipment being ordered: tennis elbow band       oxyCODONE 5 MG/5ML solution    ROXICODONE    500 mL    Take 5 mLs (5 mg) by mouth every 4 hours as needed for moderate to severe pain

## 2017-01-30 ENCOUNTER — HOSPITAL ENCOUNTER (OUTPATIENT)
Dept: WOUND CARE | Facility: CLINIC | Age: 67
Discharge: HOME | End: 2017-01-30
Attending: SURGERY | Admitting: SURGERY
Payer: MEDICARE

## 2017-01-30 ENCOUNTER — HOSPITAL ENCOUNTER (OUTPATIENT)
Dept: PHYSICAL THERAPY | Facility: CLINIC | Age: 67
Setting detail: THERAPIES SERIES
End: 2017-01-30
Attending: OTOLARYNGOLOGY
Payer: MEDICARE

## 2017-01-30 ENCOUNTER — HOSPITAL ENCOUNTER (OUTPATIENT)
Dept: OCCUPATIONAL THERAPY | Facility: CLINIC | Age: 67
Setting detail: THERAPIES SERIES
End: 2017-01-30
Attending: OTOLARYNGOLOGY
Payer: MEDICARE

## 2017-01-30 PROCEDURE — 40000099 ZZH STATISTIC LYMPHEDEMA VISIT: Performed by: REHABILITATION PRACTITIONER

## 2017-01-30 PROCEDURE — 97110 THERAPEUTIC EXERCISES: CPT | Mod: GO | Performed by: OCCUPATIONAL THERAPIST

## 2017-01-30 PROCEDURE — 97140 MANUAL THERAPY 1/> REGIONS: CPT | Mod: GO | Performed by: OCCUPATIONAL THERAPIST

## 2017-01-30 PROCEDURE — 40000839 ZZH STATISTIC HAND THERAPY VISIT: Performed by: OCCUPATIONAL THERAPIST

## 2017-01-30 PROCEDURE — 97022 WHIRLPOOL THERAPY: CPT | Mod: GO | Performed by: OCCUPATIONAL THERAPIST

## 2017-01-30 PROCEDURE — 97110 THERAPEUTIC EXERCISES: CPT | Mod: GP | Performed by: REHABILITATION PRACTITIONER

## 2017-01-30 PROCEDURE — 97140 MANUAL THERAPY 1/> REGIONS: CPT | Mod: GP | Performed by: REHABILITATION PRACTITIONER

## 2017-01-30 PROCEDURE — 99212 OFFICE O/P EST SF 10 MIN: CPT

## 2017-01-30 NOTE — PROGRESS NOTES
Reason for visit/Interval History: Terese Bell, 66 year old female, seen for follow-up on her g-tube. She is reporting some red tissue coming from site as well as occasional green drainage.      Pertinent information from chart review:  HPI: Pt with dx tongue CA. Had gtube to be placed on 11/7/16 by Dr. Mckeon. No one else is present with patient today.   Pt has had multiple surgeries for this issue and now she is getting radiation therapy for treatment of a tongue cancer and is unable to swallow.  Per pt she has lost 40 pounds since June of this year.     Objective:   On exam of tube site, bumper appears to be in a good position with no leakage.  States she did have to move it in some recently as was too loose and was leaking.  Some scant serosanguinous and slight creamy green tinged drainage noted on gauze.  Is wearing a bra that she can cut a hole out of the center to pull her tube though and help stabilize the tube.  As tube is so close to her bra line.  Site is intact with no erythema or signs of infection though proud flesh noted at 6:00.        Assessment:  G-tube with proud flesh needing cautery      Plan:   Proud flesh was cauterized with silver nitrate stick, this was well tolerated.  Site redressed with spilt gauze.  Pt scheduled to return to see [facility] nurse in one week as anticipate may need to be done again.      Face to face time approximately 20 minutes.    Kamala Cody RN, CWOCN   [phone]

## 2017-02-01 NOTE — ADDENDUM NOTE
Encounter addended by: Mariah Santos OT on: 2/1/2017  3:45 PM<BR>     Documentation filed: Clinical Notes

## 2017-02-01 NOTE — PROGRESS NOTES
OUTPATIENT SWALLOW  EVALUATION  PLAN OF TREATMENT FOR OUTPATIENT REHABILITATION  (COMPLETE FOR INITIAL CLAIMS ONLY)  Patient's Last Name, First Name, M.I.  YOB: 1950  Terese Fleming     Provider's Name   Tg Clifton   Medical Record No.  [id number]     Start of Care Date:   7/3/16   Onset Date:   7/3/16   Type:     ___PT   ____OT  ___X_SLP Medical Diagnosis:   Oral CA     Treatment Diagnosis:  Moderate oral dysphagia and odynophagia Visits from SOC:  1     _________________________________________________________________________________  Plan of Treatment/Functional Goals:                           Goals   1. Goal Identifier: 1       Goal Description: Pt will perform oropharyngeal exercises 10 reps each 3x/day to increase swallow safety.       Target Date: 04/11/17       Date Met:  (goal met per patient)   2. Goal Identifier: 2       Goal Description: Pt will utilize safe swallow strategies to decrease aspiration risk 85% of the time.       Target Date: 04/11/17       Date Met:  (goal met)   3. Goal Identifier: 3       Goal Description: Pt will swallow a mechanical soft diet with thin liquids with no overt aspiration symptoms to meet nutritional needs.       Target Date: 04/11/17       Date Met:  (mainly liquids )   4.                             5.                            6.                              7.                              8.                                      Tg Clifton, SLP       I CERTIFY THE NEED FOR THESE SERVICES FURNISHED UNDER        THIS PLAN OF TREATMENT AND WHILE UNDER MY CARE     (Physician attestation of this document indicates review and certification of the therapy plan).                   Date of certification at [facility]: 1/11/17                 Initial Assessment        See Epic Evaluation

## 2017-02-02 ENCOUNTER — HOSPITAL ENCOUNTER (OUTPATIENT)
Dept: OCCUPATIONAL THERAPY | Facility: CLINIC | Age: 67
Setting detail: THERAPIES SERIES
End: 2017-02-02
Attending: INTERNAL MEDICINE
Payer: MEDICARE

## 2017-02-02 ENCOUNTER — HOSPITAL ENCOUNTER (OUTPATIENT)
Dept: PHYSICAL THERAPY | Facility: CLINIC | Age: 67
Setting detail: THERAPIES SERIES
End: 2017-02-02
Attending: OTOLARYNGOLOGY
Payer: MEDICARE

## 2017-02-02 ENCOUNTER — HOSPITAL ENCOUNTER (OUTPATIENT)
Dept: SPEECH THERAPY | Facility: CLINIC | Age: 67
Setting detail: THERAPIES SERIES
End: 2017-02-02
Attending: INTERNAL MEDICINE
Payer: MEDICARE

## 2017-02-02 PROCEDURE — 92526 ORAL FUNCTION THERAPY: CPT | Mod: GN | Performed by: SPEECH-LANGUAGE PATHOLOGIST

## 2017-02-02 PROCEDURE — 97140 MANUAL THERAPY 1/> REGIONS: CPT | Mod: GO | Performed by: OCCUPATIONAL THERAPIST

## 2017-02-02 PROCEDURE — 97110 THERAPEUTIC EXERCISES: CPT | Mod: GO | Performed by: OCCUPATIONAL THERAPIST

## 2017-02-02 PROCEDURE — 40000099 ZZH STATISTIC LYMPHEDEMA VISIT: Performed by: REHABILITATION PRACTITIONER

## 2017-02-02 PROCEDURE — 97022 WHIRLPOOL THERAPY: CPT | Mod: GO | Performed by: OCCUPATIONAL THERAPIST

## 2017-02-02 PROCEDURE — 97140 MANUAL THERAPY 1/> REGIONS: CPT | Mod: GP | Performed by: REHABILITATION PRACTITIONER

## 2017-02-02 PROCEDURE — 40000828 ZZHC SLP CANCER REHAB VISIT: Performed by: SPEECH-LANGUAGE PATHOLOGIST

## 2017-02-02 PROCEDURE — 40000839 ZZH STATISTIC HAND THERAPY VISIT: Performed by: OCCUPATIONAL THERAPIST

## 2017-02-02 PROCEDURE — 97110 THERAPEUTIC EXERCISES: CPT | Mod: GP | Performed by: REHABILITATION PRACTITIONER

## 2017-02-06 ENCOUNTER — HOSPITAL ENCOUNTER (OUTPATIENT)
Dept: PHYSICAL THERAPY | Facility: CLINIC | Age: 67
Setting detail: THERAPIES SERIES
End: 2017-02-06
Attending: OTOLARYNGOLOGY
Payer: MEDICARE

## 2017-02-06 ENCOUNTER — HOSPITAL ENCOUNTER (OUTPATIENT)
Dept: WOUND CARE | Facility: CLINIC | Age: 67
Discharge: HOME | End: 2017-02-06
Attending: SURGERY | Admitting: SURGERY
Payer: MEDICARE

## 2017-02-06 ENCOUNTER — HOSPITAL ENCOUNTER (OUTPATIENT)
Dept: OCCUPATIONAL THERAPY | Facility: CLINIC | Age: 67
Setting detail: THERAPIES SERIES
End: 2017-02-06
Attending: OTOLARYNGOLOGY
Payer: MEDICARE

## 2017-02-06 PROCEDURE — 97140 MANUAL THERAPY 1/> REGIONS: CPT | Mod: GP | Performed by: REHABILITATION PRACTITIONER

## 2017-02-06 PROCEDURE — 97140 MANUAL THERAPY 1/> REGIONS: CPT | Mod: GO | Performed by: OCCUPATIONAL THERAPIST

## 2017-02-06 PROCEDURE — G8988 SELF CARE GOAL STATUS: HCPCS | Mod: GP,CI | Performed by: PHYSICAL THERAPIST

## 2017-02-06 PROCEDURE — 40000839 ZZH STATISTIC HAND THERAPY VISIT: Performed by: OCCUPATIONAL THERAPIST

## 2017-02-06 PROCEDURE — 97110 THERAPEUTIC EXERCISES: CPT | Mod: GP | Performed by: REHABILITATION PRACTITIONER

## 2017-02-06 PROCEDURE — 40000099 ZZH STATISTIC LYMPHEDEMA VISIT: Performed by: REHABILITATION PRACTITIONER

## 2017-02-06 PROCEDURE — 99212 OFFICE O/P EST SF 10 MIN: CPT

## 2017-02-06 PROCEDURE — G8987 SELF CARE CURRENT STATUS: HCPCS | Mod: GP,CJ | Performed by: PHYSICAL THERAPIST

## 2017-02-06 PROCEDURE — 97110 THERAPEUTIC EXERCISES: CPT | Mod: GO | Performed by: OCCUPATIONAL THERAPIST

## 2017-02-06 NOTE — ADDENDUM NOTE
Encounter addended by: Mariah Santos OT on: 2/6/2017  8:36 AM<BR>     Documentation filed: Inpatient Document Flowsheet

## 2017-02-06 NOTE — ADDENDUM NOTE
Encounter addended by: Barbara Erwin PT on: 2/6/2017  4:23 PM<BR>     Documentation filed: Inpatient Document Flowsheet

## 2017-02-06 NOTE — PROGRESS NOTES
Reason for visit/Interval History: Terese Bell, 66 year old female, seen for follow-up on her g-tube to treat hyperplasia.    Pertinent information from chart review:  HPI: Pt with dx tongue CA. Had gtube to be placed on 11/7/16 by Dr. Mckeon. No one else is present with patient today.   Pt has had multiple surgeries for this issue and now she is getting radiation therapy for treatment of a tongue cancer and is unable to swallow.  Per pt she has lost 40 pounds since June of this year.     Objective:   On exam of tube site, bumper appears to be in a good position with no leakage.   Some scant serosanguinous and slight creamy green tinged drainage noted on gauze.  Is wearing a bra that she can cut a hole out of the center to pull her tube though and help stabilize the tube.  As tube is so close to her bra line.  Site is intact with no erythema or signs of infection though proud flesh noted at 6:00.        Assessment:  G-tube with proud flesh needing cautery      Plan:   Proud flesh was cauterized with silver nitrate stick, this was well tolerated.  Site redressed with spilt gauze.  Pt scheduled to return to see [facility] nurse in one week as anticipate may need to be done again.      Face to face time approximately 20 minutes.    Rachael Lane RN CWOCN    [phone]

## 2017-02-06 NOTE — ADDENDUM NOTE
Encounter addended by: Mariah Santos OT on: 2/6/2017  9:20 AM<BR>     Documentation filed: Inpatient Document Flowsheet

## 2017-02-07 NOTE — PROGRESS NOTES
RECERTIFICATION    Terese Bell  1950    Session Number: 31 H&N/Ahmet/Medicare & [employer]BS since start of care.    Reasons for Continuing Treatment:   Progress this reporting period: Patient feels that the kinesio tape helps decrease the swelling and helps her eat, however we have to move the tape frequently due to a skin reaction.  Patient has demonstrated ups and downs in H&N edema, however is showing improvement in kinesio tape and therapy and would benefit from continued lymphedema services to have further reductions in edema.    Frequency/Duration  2 times per week for 8 weeks for a total of 16 visits.    Recertification Period  1/30/17 - 3/27/17    Physician Signature:    Date:    X_______________________________________________________    Physician Name: Dr. Ahmet MD    I certify the need for these services furnished under this plan of treatment and while under my care. Physician co-signature of this document indicates review and certification of the therapy plan.  This signature may be written on paper, or electronically signed within EPIC.

## 2017-02-07 NOTE — PROGRESS NOTES
Outpatient Physical Therapy Progress Note     Patient: Terese Bell  : 1950    Beginning/End Dates of Reporting Period:  2017 to 2017    Referring Provider: Dr. Ahmet MD    Therapy Diagnosis: H&N lymphedema     Client Self Report: feels both cheeks are swollen    Objective Measurements:  17 Girth Measurements  Superior: 44.5 cm  Middle 35.5 cm  Inferior 34.9    Goals:  Goal Identifier stg   Goal Description pt to be independent with cervical ROM exercises to decrease tightness/stiffness which will increase functional range for driving and ADL   Target Date 10/20/16   Date Met  10/20/16   Progress:     Goal Identifier stg   Goal Description pt and/or family to be independent with scar massage to decrease tightness and underlying adhesions to decrease H&N edema   Target Date 10/20/16   Date Met  10/20/16   Progress:     Goal Identifier stg   Goal Description pt and/or family to be independent with self-MLD for edema redcution response in H&N lymphedema   Target Date 10/20/16   Date Met  10/20/16   Progress:     Goal Identifier ltg   Goal Description once appropriate, pt to be independent with donning, doffing and care of compression garment for longterm lymphedema management   Target Date 17   Date Met      Progress:     Goal Identifier ltg   Goal Description pt to be independent with longterm H&N lymphedema management via exercises, skin care, self-MLD and compression garment use/wear   Target Date 17   Date Met      Progress:     Progress Toward Goals:   Progress this reporting period: Patient feels that the kinesio tape helps decrease the swelling and helps her eat, however we have to move the tape frequently due to a skin reaction.  Patient has demonstrated ups and downs in H&N edema, however is showing improvement in kinesio tape and therapy and would benefit from continued lymphedema services to have further reductions in edema.    Plan:  Continue therapy per  current plan of care.    Discharge:  No

## 2017-02-07 NOTE — ADDENDUM NOTE
Encounter addended by: Barbara Erwin PT on: 2/7/2017  8:26 AM<BR>     Documentation filed: Clinical Notes

## 2017-02-07 NOTE — ADDENDUM NOTE
Encounter addended by: Barbara Erwin, PT on: 2/7/2017  8:28 AM<BR>     Documentation filed: Charges VN, Inpatient Document Flowsheet

## 2017-02-07 NOTE — ADDENDUM NOTE
Encounter addended by: Barbara Erwin PT on: 2/7/2017  8:18 AM<BR>     Documentation filed: Clinical Notes

## 2017-02-09 ENCOUNTER — HOSPITAL ENCOUNTER (OUTPATIENT)
Dept: PHYSICAL THERAPY | Facility: CLINIC | Age: 67
Setting detail: THERAPIES SERIES
End: 2017-02-09
Attending: OTOLARYNGOLOGY
Payer: MEDICARE

## 2017-02-09 ENCOUNTER — HOSPITAL ENCOUNTER (OUTPATIENT)
Dept: OCCUPATIONAL THERAPY | Facility: CLINIC | Age: 67
Setting detail: THERAPIES SERIES
End: 2017-02-09
Attending: OTOLARYNGOLOGY
Payer: MEDICARE

## 2017-02-09 PROCEDURE — 97022 WHIRLPOOL THERAPY: CPT | Mod: GO | Performed by: OCCUPATIONAL THERAPIST

## 2017-02-09 PROCEDURE — 97140 MANUAL THERAPY 1/> REGIONS: CPT | Mod: GP,KX | Performed by: REHABILITATION PRACTITIONER

## 2017-02-09 PROCEDURE — 97140 MANUAL THERAPY 1/> REGIONS: CPT | Mod: GO | Performed by: OCCUPATIONAL THERAPIST

## 2017-02-09 PROCEDURE — 97110 THERAPEUTIC EXERCISES: CPT | Mod: GO | Performed by: OCCUPATIONAL THERAPIST

## 2017-02-09 PROCEDURE — 40000099 ZZH STATISTIC LYMPHEDEMA VISIT: Performed by: REHABILITATION PRACTITIONER

## 2017-02-09 PROCEDURE — 40000839 ZZH STATISTIC HAND THERAPY VISIT: Performed by: OCCUPATIONAL THERAPIST

## 2017-02-15 ENCOUNTER — HOSPITAL ENCOUNTER (OUTPATIENT)
Dept: SPEECH THERAPY | Facility: CLINIC | Age: 67
Setting detail: THERAPIES SERIES
End: 2017-02-15
Attending: INTERNAL MEDICINE
Payer: MEDICARE

## 2017-02-15 ENCOUNTER — HOSPITAL ENCOUNTER (OUTPATIENT)
Dept: PHYSICAL THERAPY | Facility: CLINIC | Age: 67
Setting detail: THERAPIES SERIES
End: 2017-02-15
Attending: OTOLARYNGOLOGY
Payer: MEDICARE

## 2017-02-15 ENCOUNTER — HOSPITAL ENCOUNTER (OUTPATIENT)
Dept: OCCUPATIONAL THERAPY | Facility: CLINIC | Age: 67
Setting detail: THERAPIES SERIES
End: 2017-02-15
Attending: OTOLARYNGOLOGY
Payer: MEDICARE

## 2017-02-15 DIAGNOSIS — C02.9 SQUAMOUS CELL CARCINOMA OF TONGUE (H): Primary | ICD-10-CM

## 2017-02-15 PROCEDURE — G8987 SELF CARE CURRENT STATUS: HCPCS | Mod: GO,CI | Performed by: OCCUPATIONAL THERAPIST

## 2017-02-15 PROCEDURE — G8997 SWALLOW GOAL STATUS: HCPCS | Mod: GN,CJ | Performed by: SPEECH-LANGUAGE PATHOLOGIST

## 2017-02-15 PROCEDURE — 40000828 ZZHC SLP CANCER REHAB VISIT: Performed by: SPEECH-LANGUAGE PATHOLOGIST

## 2017-02-15 PROCEDURE — G8988 SELF CARE GOAL STATUS: HCPCS | Mod: GO,CI | Performed by: OCCUPATIONAL THERAPIST

## 2017-02-15 PROCEDURE — 97110 THERAPEUTIC EXERCISES: CPT | Mod: GO | Performed by: OCCUPATIONAL THERAPIST

## 2017-02-15 PROCEDURE — 92526 ORAL FUNCTION THERAPY: CPT | Mod: GN,KX | Performed by: SPEECH-LANGUAGE PATHOLOGIST

## 2017-02-15 PROCEDURE — 40000099 ZZH STATISTIC LYMPHEDEMA VISIT: Performed by: REHABILITATION PRACTITIONER

## 2017-02-15 PROCEDURE — 40000839 ZZH STATISTIC HAND THERAPY VISIT: Performed by: OCCUPATIONAL THERAPIST

## 2017-02-15 PROCEDURE — G8996 SWALLOW CURRENT STATUS: HCPCS | Mod: GN,CK | Performed by: SPEECH-LANGUAGE PATHOLOGIST

## 2017-02-15 PROCEDURE — 97140 MANUAL THERAPY 1/> REGIONS: CPT | Mod: GO | Performed by: OCCUPATIONAL THERAPIST

## 2017-02-15 PROCEDURE — 97140 MANUAL THERAPY 1/> REGIONS: CPT | Mod: GP,KX | Performed by: REHABILITATION PRACTITIONER

## 2017-02-15 PROCEDURE — 97110 THERAPEUTIC EXERCISES: CPT | Mod: GP,KX,59 | Performed by: REHABILITATION PRACTITIONER

## 2017-02-15 NOTE — PROGRESS NOTES
02/15/17    Notes   Note Type Progress Note   Providers   Providers Mariah Santos, OTR/L, CHT   Referring Physician Dr. Sutton   Reporting Period   Reporting period from 01/09/16   Reporting period to 02/15/17   Self Care   Self Care Current Status,  (eval/re-eval & every progress note) CI: 1-19% impairment   Current Self Care Modifier Rationale clinical judgment   Self Care Goal,  (eval/re-eval, every progress note & discharge) CI: 1-19% impairment   General Information   Rxs Authorized 365   Rxs Used 30 (10 since last progress note)   Medical Diagnosis Right wrist Pain post graft site.   Orders Evaluate And Treat As Indicated   Insurance Medicare   Start Of Care Date 10/28/16   Beginning of Cert (Date Period) 10/28/16   End of Cert period date 04/01/17   Onset date of current episode/exacerbation 09/01/16   Surgical procedure grafting   Date of surgery 09/01/16   Subjective Measures   Patient Reported % Functional Improvement 80%   Functional Improvement Reported Self care activities;Gripping   Objective Measures   Objective Measures ROM;Strength;Objective Measure 1   Edema   Edema Comments mild at scar area   ROM   Location (anatomical) Active FTL   Location Right;Left   ROM Comments 62 post (58 pre), left 70 scar tissue still tight   Strength   Location Right;Left    43,43## ( early morning appointment and cold),40#   Allen Pinch 14#,14#   Lateral Pinch 16#,16#   Objective Measure 1   Objective Measure palpable nodule at A1 pully of left middle finger   Therapeutic Exercise   Skilled Interventions (verbal physical cuing needed)   Minutes of Treatment 10   Other Exer/Activities/Educ   Other Exer/Activities/Educ - All exercises are part of HEP unless otherwise noted Exercise 1;Exercise 2;Exercise 3;Exercise 4;Exercise 5   Exercise 1 lift gallen jug and move around on counter   Description 1 x10   Exercise 2 Red digiflex   Description 2 3 pounds x 30   Exercise 3 metal  gripper   Description 3  weight wrist extension   Exercise 4 2#x 3  jose angel   Description 4 latoya tweezer activity   Exercise 5 5 min   Manual   Manual Scar Mob Position   Skilled Interventions To Soften Scar Tissue  (decrease adhesions)   Minutes of Treatment 13   Scar Mob Position Sitting   Scar Mob Location right volar wrist   Description/ Technique circular;3 dimensions  (use of dycem for gripping, demo with return demo by patient )   Neuromuscular   Neuromuscular Proximal Median   Proximal Median Active   Sets/Reps 1 set;5 reps;HEP;Reviewed   Hand Goals   Hand Goals Household Chores;Dressing;Work   Dressing   Current Functional Task Buttoning;Tying   Previous Performance Level Moderate limitations   Current Performance Level Moderate difficulty   Goal Target Task Button shirt;Tie shoes   Goal Target Performance Level Mild difficulty   Due Date 02/21/17   Date Goal Met 02/15/17   Household Chores   Current Functional Task Gripping   Previous Performance Level Moderate limitations   Current Performance Level Moderate difficulty   Goal Target Task Open a tight or new jar   Goal Target Performance Level Mild difficulty   Due Date 02/21/17   If goal not met, Why? progressing   Work   Current Functional Task Lifting   Previous Performance Level Moderate limitations   Current Performance Level Mild difficulty   Goal Target Task Lift off floor to seat height - lbs  (30# landscaping rock)   Goal Target Performance Level Mild difficulty   Due Date 03/09/17   Date Goal Met 02/15/17   Assessment   Clinical Impression(s) Comments Patient has met nearly all her goals. Has plateaued over the past month and can do most of her ADL's  She is ready for discharge however wants to return in a month for follow up to make sure she is doing good on her own which I think is reasonalbe.   Response to Therapy: Improvements Flexibility;Pain   Equipment   Equipment K- tape tried on scar   Education   Learner Patient   Readiness Eager   Method  Booklet/handout;Explanation;Demonstration   Response Verbalizes understanding;Demonstrates understanding   Education Notes see home program   Plan   Home program low heat,AROM, light use, scar masssage and mobility with dycem and night sillicone pad wear, desensitization   Updates to plan of care Patient only wanting to come 1x/week   Plan see back in one month for recheck and most likely discharge at that time.   Total Session Time   Mariah Santos OTR/L CHT  Occupational Therapist, Certified Hand Therapist

## 2017-02-27 ENCOUNTER — HOSPITAL ENCOUNTER (OUTPATIENT)
Dept: PHYSICAL THERAPY | Facility: CLINIC | Age: 67
Setting detail: THERAPIES SERIES
End: 2017-02-27
Attending: OTOLARYNGOLOGY
Payer: MEDICARE

## 2017-02-27 PROCEDURE — 97140 MANUAL THERAPY 1/> REGIONS: CPT | Mod: GP,KX | Performed by: REHABILITATION PRACTITIONER

## 2017-02-27 PROCEDURE — 40000099 ZZH STATISTIC LYMPHEDEMA VISIT: Performed by: REHABILITATION PRACTITIONER

## 2017-02-28 ENCOUNTER — TELEPHONE (OUTPATIENT)
Dept: OTOLARYNGOLOGY | Facility: CLINIC | Age: 67
End: 2017-02-28

## 2017-02-28 NOTE — TELEPHONE ENCOUNTER
RTC 3/10/17 at 12:10 PM Dr ferris  Spoke with [facility] for Dr Ferris- she will call Melania with PET results on Friday. 3/3 and discuss.

## 2017-02-28 NOTE — TELEPHONE ENCOUNTER
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 Leo on 9/1/2016.   The margins were negative but the lymph nodes were 1/40 positive.   She completed postoperative radiation therapy on 11/29/2017.    Call today: patient feels she has a lump in her throat ( left side, treatment /surgery on Right) which is affecting swallowing. She is being treated for lymphedema and would like to see someone today, however, her ENT MDs are not in clinic today.  She has PET/ CT  tomorrow, then  swallow study scheduled 3/2.  Will look for next available appt with Dr Ferris, consider tumor conference?    Last seen here in October by Dr Leo.    She will stop by and ask for me.

## 2017-03-01 ENCOUNTER — HOSPITAL ENCOUNTER (OUTPATIENT)
Dept: PET IMAGING | Facility: CLINIC | Age: 67
End: 2017-03-01
Attending: OTOLARYNGOLOGY
Payer: MEDICARE

## 2017-03-01 ENCOUNTER — HOSPITAL ENCOUNTER (OUTPATIENT)
Dept: PET IMAGING | Facility: CLINIC | Age: 67
Discharge: HOME | End: 2017-03-01
Attending: OTOLARYNGOLOGY | Admitting: OTOLARYNGOLOGY
Payer: MEDICARE

## 2017-03-01 DIAGNOSIS — C04.9 MALIGNANT NEOPLASM OF FLOOR OF MOUTH (H): ICD-10-CM

## 2017-03-01 DIAGNOSIS — C06.9: ICD-10-CM

## 2017-03-01 PROCEDURE — 70490 CT SOFT TISSUE NECK W/O DYE: CPT | Mod: XU

## 2017-03-01 PROCEDURE — 34300033 ZZH RX 343: Performed by: OTOLARYNGOLOGY

## 2017-03-01 PROCEDURE — A9552 F18 FDG: HCPCS | Performed by: OTOLARYNGOLOGY

## 2017-03-01 PROCEDURE — 78815 PET IMAGE W/CT SKULL-THIGH: CPT | Mod: PS

## 2017-03-01 PROCEDURE — 74176 CT ABD & PELVIS W/O CONTRAST: CPT | Mod: XU

## 2017-03-01 RX ADMIN — FLUDEOXYGLUCOSE F-18 13.65 MCI.: 500 INJECTION, SOLUTION INTRAVENOUS at 10:28

## 2017-03-02 ENCOUNTER — HOSPITAL ENCOUNTER (OUTPATIENT)
Dept: GENERAL RADIOLOGY | Facility: CLINIC | Age: 67
Discharge: HOME | End: 2017-03-02
Attending: RADIOLOGY | Admitting: RADIOLOGY
Payer: MEDICARE

## 2017-03-02 DIAGNOSIS — R13.12 OROPHARYNGEAL DYSPHAGIA: ICD-10-CM

## 2017-03-06 ENCOUNTER — CARE COORDINATION (OUTPATIENT)
Dept: OTOLARYNGOLOGY | Facility: CLINIC | Age: 67
End: 2017-03-06

## 2017-03-06 NOTE — PROGRESS NOTES
Called patient with PET/CT results. Plan to follow up in clinic with Dr. Ferris on 3/8/2017.    Antonia Alberts RN, BSN.  #[phone]  3/6/2017 10:01 AM

## 2017-03-07 ENCOUNTER — HOSPITAL ENCOUNTER (OUTPATIENT)
Dept: PHYSICAL THERAPY | Facility: CLINIC | Age: 67
Setting detail: THERAPIES SERIES
End: 2017-03-07
Attending: OTOLARYNGOLOGY
Payer: MEDICARE

## 2017-03-07 PROCEDURE — G8988 SELF CARE GOAL STATUS: HCPCS | Mod: GP,CI | Performed by: PHYSICAL THERAPIST

## 2017-03-07 PROCEDURE — G8987 SELF CARE CURRENT STATUS: HCPCS | Mod: GP,CJ | Performed by: PHYSICAL THERAPIST

## 2017-03-07 PROCEDURE — 40000099 ZZH STATISTIC LYMPHEDEMA VISIT: Performed by: REHABILITATION PRACTITIONER

## 2017-03-07 PROCEDURE — 97140 MANUAL THERAPY 1/> REGIONS: CPT | Mod: GP,KX | Performed by: REHABILITATION PRACTITIONER

## 2017-03-08 ENCOUNTER — OFFICE VISIT (OUTPATIENT)
Dept: OTOLARYNGOLOGY | Facility: CLINIC | Age: 67
End: 2017-03-08

## 2017-03-08 ENCOUNTER — THERAPY VISIT (OUTPATIENT)
Dept: SPEECH THERAPY | Facility: CLINIC | Age: 67
End: 2017-03-08

## 2017-03-08 VITALS — HEIGHT: 64 IN | WEIGHT: 166 LBS | BODY MASS INDEX: 28.34 KG/M2

## 2017-03-08 DIAGNOSIS — C06.9 ORAL CANCER (H): Primary | ICD-10-CM

## 2017-03-08 DIAGNOSIS — C02.9 MALIGNANT NEOPLASM OF TONGUE (H): ICD-10-CM

## 2017-03-08 DIAGNOSIS — Z85.810 HISTORY OF TONGUE CANCER: ICD-10-CM

## 2017-03-08 DIAGNOSIS — R13.12 OROPHARYNGEAL DYSPHAGIA: Primary | ICD-10-CM

## 2017-03-08 DIAGNOSIS — I89.0 ACQUIRED LYMPHEDEMA OF LOWER EXTREMITY: ICD-10-CM

## 2017-03-08 ASSESSMENT — PAIN SCALES - GENERAL: PAINLEVEL: NO PAIN (1)

## 2017-03-08 NOTE — MR AVS SNAPSHOT
After Visit Summary   3/8/2017    Terese Bell    MRN: [id number]           Patient Information     Date Of Birth          1950        Visit Information        Provider Department      3/8/2017 12:10 PM Thomas Ferris MD [facility] Ear Nose and Throat        Today's Diagnoses     Oral cancer (H)    -  1      Care Instructions    -Follow up in 8 weeks with Dr. Ferris. Next scan will be ordered in 6 months.        Follow-ups after your visit        Additional Services     PHYSICAL THERAPY REFERRAL       *This therapy referral will be filtered to a centralized scheduling office at [facility] and the patient will receive a call to schedule an appointment at a [facility] location most convenient for them. *     [facility] provides Physical Therapy evaluation and treatment and many specialty services across the [facility] system.  If requesting a specialty program, please choose from the list below.    If you have not heard from the scheduling office within 2 business days, please call [phone] for all locations, with the exception of [place], please call [phone].  Treatment: Evaluation & Treatment  Special Instructions/Modalities: cancer rehab  Special Programs: Cancer Rehabilitation (PT and OT Evaluate & Treat)    Please be aware that coverage of these services is subject to the terms and limitations of your health insurance plan.  Call member services at your health plan with any benefit or coverage questions.      **Note to Provider:  If you are referring outside of [facility] for the therapy appointment, please list the name of the location in the  special instructions  above, print the referral and give to the patient to schedule the appointment.                  Your next 10 appointments already scheduled     Mar 23, 2017  8:30 AM CDT   Treatment with Mariah Santos OT   [facility] Occupational Therapy ([facility])    [address]  [address]  [place] [address]   [phone]            Mar 23, 2017  9:30 AM CDT   Treatment 60 with Apryl Streeter PTA   [facility] Lymphedema ([facility])    [address] [address]   [phone]            Mar 30, 2017 10:45 AM CDT   Return Visit with Pattie Natarajan MD   [facility] ([facility])    [address], [address]  [place] [address]   [phone]            Mar 30, 2017 11:30 AM CDT   Treatment with AYAD Castañeda   [facility] Speech Therapy ([facility])    [address] [address]   [phone]            May 03, 2017  9:10 AM CDT   (Arrive by 8:55 AM)   Return Visit with Thomas Ferris MD   [facility] Ear Nose and Throat ([facility])    [address] [place] [address] [phone]              Who to contact     Please call your clinic at [phone] to:    Ask questions about your health    Make or cancel appointments    Discuss your medicines    Learn about your test results    Speak to your doctor   If you have compliments or concerns about an experience at your clinic, or if you wish to file a complaint, please contact [facility] Patient Relations at [phone] or email us at [email or web address]         Additional Information About Your Visit        [employer] Information     [employer] gives you secure access to your electronic health record. If you see a primary care provider, you can also send messages to your care team and make appointments. If you have questions, please call your primary care clinic.  If you do not have a primary care provider, please call [phone] and they will assist you.      [employer] is an electronic gateway that provides easy, online access to your medical records. With [employer], you can request a clinic appointment, read your test results, renew a  "prescription or communicate with your care team.     To access your existing account, please contact your [facility] or call [phone] for assistance.        Care EveryWhere ID     This is your Care EveryWhere ID. This could be used by other organizations to access your [place] medical records  [id number]        Your Vitals Were     Height BMI (Body Mass Index)                1.62 m (5' 3.78\") 28.69 kg/m2           Blood Pressure from Last 3 Encounters:   01/26/17 104/67   12/22/16 102/72   12/19/16 108/70    Weight from Last 3 Encounters:   03/08/17 75.3 kg (166 lb)   01/26/17 78 kg (172 lb)   01/11/17 77.6 kg (171 lb)              We Performed the Following     PHYSICAL THERAPY REFERRAL          Today's Medication Changes          These changes are accurate as of: 3/8/17 12:57 PM.  If you have any questions, ask your nurse or doctor.               Stop taking these medicines if you haven't already. Please contact your care team if you have questions.     lidocaine 2 % solution   Commonly known as:  XYLOCAINE   Stopped by:  Thomas Ferris MD                    Primary Care Provider Office Phone # Fax #    Nicole Evelin HERNÁN [facility] [phone]       [facility] [address]   [facility] [address]        Thank you!     Thank you for choosing [facility] EAR NOSE AND THROAT  for your care. Our goal is always to provide you with excellent care. Hearing back from our patients is one way we can continue to improve our services. Please take a few minutes to complete the written survey that you may receive in the mail after your visit with us. Thank you!             Your Updated Medication List - Protect others around you: Learn how to safely use, store and throw away your medicines at www.disposemymeds.org.          This list is accurate as of: 3/8/17 12:57 PM.  Always use your most recent med list.                   Brand Name Dispense Instructions for " use    albuterol 108 (90 BASE) MCG/ACT Inhaler    PROAIR HFA/PROVENTIL HFA/VENTOLIN HFA     Inhale 2 puffs into the lungs every 6 hours as needed for wheezing       DENTAGEL 1.1 % Gel topical gel   Generic drug:  sodium fluoride dental gel      Apply 1 Application to affected area daily       EPINEPHrine 0.3 MG/0.3ML injection     2 each    Inject 0.3 mLs (0.3 mg) into the muscle once as needed for anaphylaxis       fluticasone-salmeterol 250-50 MCG/DOSE diskus inhaler    ADVAIR DISKUS    1 Inhaler    Inhale 1 puff into the lungs 2 times daily as needed       order for DME     1 Device    Equipment being ordered: tennis elbow band

## 2017-03-08 NOTE — PROGRESS NOTES
March 8, 2017      PRIOR ONCOLOGIC HISTORY:  Ms. 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 Schumacher.  He ended up doing 13 operations in 11 years, most recently in 2010.      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 Leo on 9/1/2016.  The margins were negative but the lymph nodes were 1/40 positive.  She completed postoperative radiation therapy on 11/29/2017.    HISTORY OF PRESENT ILLNESS:  Ms. Bettie Bell is doing well.  She has a little bit of trouble swallowing and is looking forward to seeing Tg.  She says that she sounds a little hoarse and wants to make sure that there is not a tumor in that area.      The other complaints are that she is having a little bit of discomfort whenever she palpates a certain area in the left JACQUI area.  She also feels like there is a loose piece of bone on the right side of her neck incision.  Otherwise, she is in good spirits and feels well.      PHYSICAL EXAMINATION:  She looks terrific.  She does not seem to have a whole lot of edema on the outside, but she does have trismus that limits her mouth, and Tg is going to work with her on that.  She does have quite a bit of induration in the submental area which is probably from undrained fluid.  I can palpate the right incision, and it feels like there is a little metal clip in the area underneath her right incision, but it is certainly benign.  What is prominent and a little tender on the left side in the JCAQUI area is the hyoid bone, and I explained that to her.  This should get better over time.  Her speech is remarkably clear and articulate.      On examination, the flap looks to be in good position, and the contour is improving.  The sulci are clear  as well.  There are no palpable lesions or ulcers present.  The rest of the mouth looks very healthy.  The buccal mucosa and palate are clear.  I cannot see the larynx.     FIBEROPTIC ENDOSCOPY:  Due to the need to evaluate the larynx, fiberoptic laryngoscopy was indicated. The nose was topically decongested and anesthetized. The fiberoptic laryngoscope was passed under endoscopic vision. The turbinates were normal.  The inferior and middle meati were clear bilaterally without purulence, masses, or polyps.  The nasopharynx was clear.  The eustachian tubes were clear. The soft palate appeared normal with good mobility.  There was 1+ edema in the false cords, aryepiglottic folds, arytenoids and the epiglottis.  The cords were fully mobile.      IMPRESSION AND PLAN:  Ms. Bettie Bell is at three months and her PET scan was WILMA.  Her examination  is WILMA as well and I congratulated her.  She needs to continue her lymphedema therapy and work with Tg to work on both the swallowing as well as the trismus.  I will see her back in about eight weeks' time.     Thomas Ferris M.D.  Otolaryngology/Head & Neck Surgery  [phone]            HERNÁN Carranza MD [facility]     Moreno Leo MD  Otolaryngology/Head & Neck Surgery  [place] [address]

## 2017-03-08 NOTE — LETTER
3/8/2017       RE: Terese Bell  [address] [place] [address]     Dear Colleague,    Thank you for referring your patient, Terese Bell, to the [facility] EAR NOSE AND THROAT at [facility]. Please see a copy of my visit note below.    March 8, 2017      PRIOR ONCOLOGIC HISTORY:  Ms. 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 Schumacher.  He ended up doing 13 operations in 11 years, most recently in 2010.      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 Leo on 9/1/2016.  The margins were negative but the lymph nodes were 1/40 positive.  She completed postoperative radiation therapy on 11/29/2017.    HISTORY OF PRESENT ILLNESS:  Ms. Bettie Bell is doing well.  She has a little bit of trouble swallowing and is looking forward to seeing Tg.  She says that she sounds a little hoarse and wants to make sure that there is not a tumor in that area.      The other complaints are that she is having a little bit of discomfort whenever she palpates a certain area in the left JACQUI area.  She also feels like there is a loose piece of bone on the right side of her neck incision.  Otherwise, she is in good spirits and feels well.      PHYSICAL EXAMINATION:  She looks terrific.  She does not seem to have a whole lot of edema on the outside, but she does have trismus that limits her mouth, and Gt is going to work with her on that.  She does have quite a bit of induration in the submental area which is probably from undrained fluid.  I can palpate the right incision, and it feels like there is a little metal clip in the area underneath her right incision, but it is certainly benign.   What is prominent and a little tender on the left side in the JACQUI area is the hyoid bone, and I explained that to her.  This should get better over time.  Her speech is remarkably clear and articulate.      On examination, the flap looks to be in good position, and the contour is improving.  The sulci are clear as well.  There are no palpable lesions or ulcers present.  The rest of the mouth looks very healthy.  The buccal mucosa and palate are clear.  I cannot see the larynx.     FIBEROPTIC ENDOSCOPY:  Due to the need to evaluate the larynx, fiberoptic laryngoscopy was indicated. The nose was topically decongested and anesthetized. The fiberoptic laryngoscope was passed under endoscopic vision. The turbinates were normal.  The inferior and middle meati were clear bilaterally without purulence, masses, or polyps.  The nasopharynx was clear.  The eustachian tubes were clear. The soft palate appeared normal with good mobility.  There was 1+ edema in the false cords, aryepiglottic folds, arytenoids and the epiglottis.  The cords were fully mobile.      IMPRESSION AND PLAN:  Ms. Bettie Bell is at three months and her PET scan was WILMA.  Her examination  is WILMA as well and I congratulated her.  She needs to continue her lymphedema therapy and work with Tg to work on both the swallowing as well as the trismus.  I will see her back in about eight weeks' time.     Thomas Ferris M.D.  Otolaryngology/Head & Neck Surgery  [phone]        HERNÁN Carranza MD [facility]MIGUELITO Leo MD  Otolaryngology/Head & Neck Surgery  [place] [address]

## 2017-03-08 NOTE — PROGRESS NOTES
Outpatient Physical Therapy Progress Note     Patient: Terese Bell  : 1950    Beginning/End Dates of Reporting Period:  2017 to 2017    Referring Provider: Dr. Ahmet MD    Therapy Diagnosis: facial and neck lymphedema      Client Self Report: on L side of neck where pt has complained of there being a pocket where food gets stuck when swallowing and has has mild pn pt c/o increased pn and pt states being able to feel something which tends to soften and decrease in size when pt pushes on that area, increased hoarsness and swelling    Objective Measurements:  Objective Measure: cervical girth  Details: since initial evaluation superior neck +2.5cm, middle neck -2.1cm and lower neck -1.7cm    Objective Measure: L side neck  Details: therapist palpated area of question and unable to feel anything hard, feels like scar tissue and edema        Goals:  Goal Identifier stg   Goal Description pt to be independent with cervical ROM exercises to decrease tightness/stiffness which will increase functional range for driving and ADL   Target Date 10/20/16   Date Met  10/20/16   Progress:     Goal Identifier stg   Goal Description pt and/or family to be independent with scar massage to decrease tightness and underlying adhesions to decrease H&N edema   Target Date 10/20/16   Date Met  10/20/16   Progress:     Goal Identifier stg   Goal Description pt and/or family to be independent with self-MLD for edema redcution response in H&N lymphedema   Target Date 10/20/16   Date Met  10/20/16   Progress:     Goal Identifier ltg   Goal Description once appropriate, pt to be independent with donning, doffing and care of compression garment for longterm lymphedema management   Target Date 17   Date Met  17   Progress:     Goal Identifier ltg   Goal Description pt to be independent with longterm H&N lymphedema management via exercises, skin care, self-MLD and compression garment use/wear    Target Date 04/30/17   Date Met      Progress:       Progress Toward Goals:   Pt's lymphedema continues to fluctuate, overall softening in fibrosis which requires continued treatment of compression, MLD, STM/MFR and kinesiotaping as edema is directly affecting swallowing      Plan:  Continue therapy per current plan of care.    Discharge:  No

## 2017-03-13 ENCOUNTER — MYC MEDICAL ADVICE (OUTPATIENT)
Dept: FAMILY MEDICINE | Facility: CLINIC | Age: 67
End: 2017-03-13

## 2017-03-15 NOTE — PROGRESS NOTES
[facility]      OUTPATIENT OCCUPATIONAL THERAPY HAND EVALUATION  PLAN OF TREATMENT FOR OUTPATIENT REHABILITATION  (COMPLETE FOR INITIAL CLAIMS ONLY)  Patient's Last Name, First Name, M.I.  YOB: 1950  Terese Fleming                        Provider s Name: [facility] Medical Record No.  [id number]     Onset Date: 09/01/16    Start of Care Date: 10/28/16   Type:     ___PT  _X_OT   ___SLP    Medical Diagnosis:  Skin Graft wrist   Occupational Therapy Diagnosis:       Visits from SOC: 20      _________________________________________________________________________________  Plan of Treatment/Functional Goals:  Planned Therapy Interventions:    Fluidotherapy, US, self care, ex ,manual     Goals                3. Goal Target Task: Button shirt, Tie shoes       Goal Target Performance Level: Mild difficulty       Due Date: 01/21/17                     5. Goal Target Task: Open a tight or new jar       Goal Target Performance Level: Mild difficulty       Due Date: 01/21/17   6. Goal Target Task: Lift off floor to seat height - lbs (30# landscaping rock)       Goal Target Performance Level: Mild difficulty       Due Date: 02/09/17   7.              Treatment Frequency:  2x/week   Predicated Duration of Therapy Intervention:   6 weeks    Mariah Santos OT         I CERTIFY THE NEED FOR THESE SERVICES FURNISHED UNDER        THIS PLAN OF TREATMENT AND WHILE UNDER MY CARE     (Physician co-signature of this document indicates review and certification of the therapy plan).                Certification Period:   1/24/2017 to  4/1/2017            Referring Physician:  Dr. Sutton    Initial Assessment        See Epic Evaluation Start of Care Date: 10/28/16

## 2017-03-20 ENCOUNTER — HOSPITAL ENCOUNTER (OUTPATIENT)
Dept: PHYSICAL THERAPY | Facility: CLINIC | Age: 67
Setting detail: THERAPIES SERIES
End: 2017-03-20
Attending: OTOLARYNGOLOGY
Payer: MEDICARE

## 2017-03-20 PROCEDURE — 94620 ZZHC SIX MINUTE WALK TEST: CPT | Performed by: PHYSICAL THERAPIST

## 2017-03-20 PROCEDURE — 97162 PT EVAL MOD COMPLEX 30 MIN: CPT | Mod: GP | Performed by: PHYSICAL THERAPIST

## 2017-03-20 PROCEDURE — 40000360 ZZHC STATISTIC PT CANCER REHAB VISIT: Performed by: PHYSICAL THERAPIST

## 2017-03-20 PROCEDURE — G8978 MOBILITY CURRENT STATUS: HCPCS | Mod: GP,CL | Performed by: PHYSICAL THERAPIST

## 2017-03-20 PROCEDURE — 97535 SELF CARE MNGMENT TRAINING: CPT | Mod: GP,KX | Performed by: PHYSICAL THERAPIST

## 2017-03-20 PROCEDURE — 97110 THERAPEUTIC EXERCISES: CPT | Mod: GP,KX | Performed by: PHYSICAL THERAPIST

## 2017-03-20 PROCEDURE — G8979 MOBILITY GOAL STATUS: HCPCS | Mod: GP,CI | Performed by: PHYSICAL THERAPIST

## 2017-03-20 ASSESSMENT — 6 MINUTE WALK TEST (6MWT): TOTAL DISTANCE WALKED (METERS): 1375

## 2017-03-20 NOTE — PROGRESS NOTES
[facility]          OUTPATIENT PHYSICAL THERAPY ORTHOPEDIC EVALUATION  PLAN OF TREATMENT FOR OUTPATIENT REHABILITATION  (COMPLETE FOR INITIAL CLAIMS ONLY)  Patient's Last Name, First Name, M.I.  YOB: 1950  Bettie BellTerese TAPIA    Provider s Name:  [facility]   Medical Record No.  [id number]   Start of Care Date:  03/20/17   Onset Date:  09/01/16   Type:     _X__PT   ___OT   ___SLP Medical Diagnosis:  Oral CA     PT Diagnosis:  CRF d/t Recent Radiation and Surgery for Oral CA.    Visits from SOC:  1      _________________________________________________________________________________  Plan of Treatment/Functional Goals:     Aerobic, Strengthening, Flexibility, Education         Goals  Goal Identifier: 1  Goal Description: STG:  Improve FACIT to 35 for better endurance to do HH duties   Target Date: 04/20/17    Goal Identifier: 2  Goal Description: STG: Pt will be able to get up off toilet 2/5   Target Date: 04/20/17    Goal Identifier: 3  Goal Description: STG: Pt will be able to  cast iron frying pan from lower cupboards 3/5  Target Date: 04/20/17    Goal Identifier: 4  Goal Description: STG: Pt will be able to take cans out of box w/ R U/E 3/5   Target Date: 04/20/17    Goal Identifier: 5  Goal Description:  LTG: Pt will be independent w/ principals of [facility], and be independent w/ HEP,   Target Date: 06/01/17    Therapy Frequency:  2 times/Week  Predicted Duration of Therapy Intervention:  1 month, then reassess, expect longer than 1 month for therapy.     Melania Nicholas, PT, MTC                  I CERTIFY THE NEED FOR THESE SERVICES FURNISHED UNDER        THIS PLAN OF TREATMENT AND WHILE UNDER MY CARE     (Physician co-signature of this document indicates review and certification of the therapy plan).                       Certification Date From:  03/20/17   Certification Date To:  04/20/17    Referring Provider:  Thomas Mendez  Assessment        See Epic Evaluation Start of Care Date: 03/20/17

## 2017-03-20 NOTE — PROGRESS NOTES
CA Ex Flow Sheet    Date/Exercise   3/20/17        Bike   UBE          Treadmill   6' walk test         U/E Strengthening   Chair push ups,                  L/E Strengthening   Sit to Stand,                  Stretches for Flexibility.                                                                BP Limits: Systolic <90 or >200 / Diastolic < 65 or > 120 mm /Hg   HR Limits:  BPS.   O2 <88%    -    Glucose between 70 to 250.   WBC normal 5000-38424.  > 5000 ok for light ex progress to resistive.  < 5000 if has fever no ex unless cleared by MD  Hemoglobin:  10-12 low impact / low intesity aerobics/ activity  8-10 Monitor vitals.  Can do ROM, no aerobic activity  < 8 Red flag--discuss cont w/ MD  Platelets: no theraband if platelets variable.  Norm: 130,000 to 400,000  > 95373 resistive ex ok, light wts  30,000-50,000 walking, bike. No prolonged stretching  20,000 -30,000 AROM only, walking as tolerated  < 20,0000 AAROM, AROM (no reistive ex or antigravity)

## 2017-03-20 NOTE — PROGRESS NOTES
03/20/17 1400   General Information   Type of Visit Initial OP Ortho PT Evaluation   Start of Care Date 03/20/17   Referring Physician Thomas Mendez    Patient/Family Goals Statement Getting strength back, Lifting things off floor, Get up off toilet easeir, Taking cans out of box w/R U/E.    Orders Evaluate and Treat   Orders Comment CA REhab    Date of Order 03/08/17   Insurance Type Medicare;[employer]   Insurance Comments/Visits Authorized Auth   Medical Diagnosis Oral CA   Surgical/Medical history reviewed (Tongue surg 9/2/16; Asthma, Menop, OA, Previous smok, Multi )   Precautions/Limitations no known precautions/limitations   Body Part(s)   Body Part(s) Lumbar Spine/SI   Presentation and Etiology   Pertinent history of current problem (include personal factors and/or comorbidities that impact the POC) Surgery 9/1/16 used Mm from R forearm and created a tongue. Was in hospital for 5 weeks. Discharged to home. Has had 15 surgeries which started in 1998. STarted radiation on October 10th and finished November 28th. Started speech therapy in October. Has been seeing Hand therapist, but may be done this week.    Impairments F. Decreased strength and endurance;M. Locking or catching  (Catching in B knees. )   Functional Limitations perform required work activities   Symptom Location Legs feel weak, Arm feels weak R>L.   How/Where did it occur Other  (Surgery)   Onset date of current episode/exacerbation 09/01/16   Chronicity Chronic   Pain rating (0-10 point scale) (1/10 )   Pain quality B. Dull;C. Aching   Frequency of pain/symptoms (Always weak. )   Pain/symptoms are: Worse during the night   Pain/symptoms eased by C. Rest;K. Other   Pain eased by comment moving    Progression of symptoms since onset: Improved   Prior Level of Function   Functional Level Prior Comment Independent w/ADL's.    Current Level of Function   Current Community Support Family/friend caregiver   Patient role/employment history F.  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 )   Living environment House[facility]   Home/community accessibility 3 flights within house, 2 steps into house.    Current equipment-ADL Shower/tub chair;Toilet safety frame   Fall Risk Screen   Fall screen completed by PT   Per patient - Fall 2 or more times in past year? No   Per patient - Fall with injury in past year? No   Is patient a fall risk? No   System Outcome Measures   Outcome Measures (FACIT 30 )   Functional Scales   Functional Scales Patient Specific Functional Scale   Patient Specific Functional Scale Q1:;Q2:;Q3:   Q1: Getting up off toilet 3/5    Q2: Picking cast iron denny pan up from lower cupboard 4/5    Q3: Taking cans out of box w/ R U/E 4/5    Vital Signs   Vital Signs Pulse;BP;SpO2   Pulse 70   BP 91/64   SpO2 97 %   Lumbar Spine/SI Objective Findings   Observation No acute distress    Integumentary Has Tube in place for feeding.    Posture Head forward.    Gait/Locomotion 6' Walk test 1375'   Balance/Proprioception (Single Leg Stance) 10\" B    Lumbar/SI Special Tests Comments  Strength R 45, L 47 (L hand dominant)    Planned Therapy Interventions   Planned Therapy Interventions Comment Aerobic, Strengthening, Flexibility, Education    Clinical Impression   Criteria for Skilled Therapeutic Interventions Met yes, treatment indicated   PT Diagnosis CRF d/t Recent Radiation and Surgery for Oral CA.    Influenced by the following impairments Decreased strength,    Functional limitations due to impairments HH duties, Mobility at home   Clinical Presentation Evolving/Changing   Clinical Presentation Rationale FACIT, 6' walk test,  Strength, Low BP during Rx session today, OA of knees B.    Clinical Decision Making (Complexity) Moderate complexity   Therapy Frequency 2 times/Week   Predicted Duration of Therapy Intervention (days/wks) 1 month, then reassess, expect longer than 1 month for therapy.    Risk & Benefits of therapy have been explained Yes "   Patient, Family & other staff in agreement with plan of care Yes   Clinical Impression Comments CRF d/t Recent Radiation and Surgery for Oral CA.    Education Assessment   Preferred Learning Style Listening;Demonstration;Pictures/video   Barriers to Learning No barriers   ORTHO GOALS   PT Ortho Eval Goals 1;2;3;4;5   Ortho Goal 1   Goal Identifier 1   Goal Description STG:  Improve FACIT to 35 for better endurance to do HH duties    Target Date 04/20/17   Ortho Goal 2   Goal Identifier 2   Goal Description STG: Pt will be able to get up off toilet 2/5    Target Date 04/20/17   Ortho Goal 3   Goal Identifier 3   Goal Description STG: Pt will be able to  cast iron frying pan from lower cupboards 3/5   Target Date 04/20/17   Ortho Goal 4   Goal Identifier 4   Goal Description STG: Pt will be able to take cans out of box w/ R U/E 3/5    Target Date 04/20/17   Ortho Goal 5   Goal Identifier 5   Goal Description LTG: Pt will be independent w/ principals of [facility] Re[facility], and be independent w/ HEP,    Target Date 06/01/17   Total Evaluation Time   Total Evaluation Time 65 Total (Eval x 40, Ex x 10, Self Cares x 15)    Therapy Certification   Certification date from 03/20/17   Certification date to 04/20/17   Medical Diagnosis Oral CA   Melania Nicholas, PT, MTC (#[id number])  [facility]           [phone]  Fax          [phone]  Appt #      [phone]

## 2017-03-23 ENCOUNTER — HOSPITAL ENCOUNTER (OUTPATIENT)
Dept: PHYSICAL THERAPY | Facility: CLINIC | Age: 67
Setting detail: THERAPIES SERIES
End: 2017-03-23
Attending: OTOLARYNGOLOGY
Payer: MEDICARE

## 2017-03-23 ENCOUNTER — HOSPITAL ENCOUNTER (OUTPATIENT)
Dept: OCCUPATIONAL THERAPY | Facility: CLINIC | Age: 67
Setting detail: THERAPIES SERIES
End: 2017-03-23
Attending: OTOLARYNGOLOGY
Payer: MEDICARE

## 2017-03-23 PROCEDURE — G8989 SELF CARE D/C STATUS: HCPCS | Mod: GO,CI | Performed by: OCCUPATIONAL THERAPIST

## 2017-03-23 PROCEDURE — 40000360 ZZHC STATISTIC PT CANCER REHAB VISIT: Performed by: PHYSICAL THERAPIST

## 2017-03-23 PROCEDURE — G8988 SELF CARE GOAL STATUS: HCPCS | Mod: GO,CI | Performed by: OCCUPATIONAL THERAPIST

## 2017-03-23 PROCEDURE — 97140 MANUAL THERAPY 1/> REGIONS: CPT | Mod: GP,KX | Performed by: REHABILITATION PRACTITIONER

## 2017-03-23 PROCEDURE — 97140 MANUAL THERAPY 1/> REGIONS: CPT | Mod: GO | Performed by: OCCUPATIONAL THERAPIST

## 2017-03-23 PROCEDURE — 40000839 ZZH STATISTIC HAND THERAPY VISIT: Performed by: OCCUPATIONAL THERAPIST

## 2017-03-23 PROCEDURE — 97110 THERAPEUTIC EXERCISES: CPT | Mod: GO | Performed by: OCCUPATIONAL THERAPIST

## 2017-03-23 PROCEDURE — 97110 THERAPEUTIC EXERCISES: CPT | Mod: GP,KX | Performed by: PHYSICAL THERAPIST

## 2017-03-23 PROCEDURE — 40000099 ZZH STATISTIC LYMPHEDEMA VISIT: Performed by: REHABILITATION PRACTITIONER

## 2017-03-23 ASSESSMENT — 6 MINUTE WALK TEST (6MWT): TOTAL DISTANCE WALKED (METERS): 1375

## 2017-03-23 NOTE — PROGRESS NOTES
03/23/17 0500   Notes   Note Type Progress Note;Discharge Summary   Providers   Providers Mariah Santos, JUAN DANIELR/L, CHT   Referring Physician Dr. Sutton   Reporting Period   Reporting period from 02/15/16   Reporting period to 03/23/17   Self Care   Current Self Care Modifier Rationale clinical judgment   Self Care Goal,  (eval/re-eval, every progress note & discharge) CI: 1-19% impairment   Self Care Discharge Status,  (discharge) CI: 1-19% impairment   Discharge Self Care Modifier Rationale clinical judgement   General Information   Rxs Authorized 365   Rxs Used 31 ( 1 since last progress note)   Medical Diagnosis Right wrist Pain post graft site.   Orders Evaluate And Treat As Indicated   Insurance Medicare   Total 1:1 Time 100%   Start Of Care Date 10/28/16   Beginning of Cert (Date Period) 10/28/16   End of Cert period date 04/01/17   Onset date of current episode/exacerbation 09/01/16   Surgical procedure grafting   Date of surgery 09/01/16   Subjective Measures   Subjective Patient relates she has been doing fairly well over the past month   Current Pain level 1/10   Patient Reported % Functional Improvement 90%   Functional Improvement Reported Self care activities;Gripping   Objective Measures   Objective Measures ROM;Strength;Objective Measure 1   Edema   Edema Comments mild at scar area   ROM   Location (anatomical) Active FTL   Location Right;Left   ROM Comments 62 post (), left 70 scar tissue still tight   Strength   Location Right;Left    49, ( early morning appointment and cold),40#   Allen Pinch 14#,14#   Lateral Pinch 16#,16#   Objective Measure 1   Objective Measure palpable nodule at A1 pully of left middle finger   Details Same as last month   Therapeutic Exercise   Skilled Interventions (verbal physical cuing needed)   Minutes of Treatment 13   Other Exer/Activities/Educ   Other Exer/Activities/Educ - All exercises are part of HEP unless otherwise noted Exercise 1;Exercise  2;Exercise 3;Exercise 4;Exercise 5   Exercise 1 lift gallen jug and move around on counter   Description 1 x10   Exercise 2 Red digiflex   Description 2 3 pounds x 30   Exercise 3 metal  gripper   Description 3 weight wrist extension   Exercise 4 2#x 3  jose angel   Description 4 ltaoya tweezer activity   Exercise 5 5 min   Description 5 review home program   Manual   Manual Scar Mob Position   Skilled Interventions To Soften Scar Tissue  (decrease adhesions)   Minutes of Treatment 13   Scar Mob Position Sitting   Scar Mob Location right volar wrist   Description/ Technique circular;3 dimensions  (use of dycem for gripping, demo with return demo by patient )   Neuromuscular   Neuromuscular Proximal Median   Proximal Median Active   Sets/Reps 1 set;5 reps;HEP;Reviewed   Hand Goals   Hand Goals Household Chores;Dressing;Work   Dressing   Current Functional Task Buttoning;Tying   Previous Performance Level Moderate limitations   Current Performance Level Moderate difficulty   Goal Target Task Button shirt;Tie shoes   Goal Target Performance Level Mild difficulty   Due Date 02/21/17   Date Goal Met 02/15/17   Household Chores   Current Functional Task Gripping   Previous Performance Level Moderate limitations   Current Performance Level Moderate difficulty   Goal Target Task Open a tight or new jar   Goal Target Performance Level Mild difficulty   Due Date 02/21/17   If goal not met, Why? progressing   Work   Current Functional Task Lifting   Previous Performance Level Moderate limitations   Current Performance Level Mild difficulty   Goal Target Task Lift off floor to seat height - lbs  (30# landscaping rock)   Goal Target Performance Level Mild difficulty   Due Date 03/09/17   Date Goal Met 02/15/17   Assessment   Clinical Impression(s) Comments Patient has maintained over last month and should continue to do fine on her own. No more hand therapy recommended at this time.   Response to Therapy: Improvements Flexibility;Pain    Equipment   Equipment K- tape tried on scar   Education   Learner Patient   Readiness Eager   Method Booklet/handout;Explanation;Demonstration   Response Verbalizes understanding;Demonstrates understanding   Education Notes see home program   Plan   Home program low heat,AROM, light use, scar masssage and mobility with dycem and night sillicone pad wear, desensitization   Updates to plan of care Patient only wanting to come 1x/week   Plan D/C to home program.   Total Session Time   Total Session Time (In Minutes) 26   Mariah Santos OTR/L CHT  Occupational Therapist, Certified Hand Therapist

## 2017-03-28 NOTE — PROGRESS NOTES
Outpatient Physical Therapy Progress Note     Patient: Terese Bell  : 1950    Beginning/End Dates of Reporting Period:  2017 to 3/27/2017    Referring Provider: Dr. Ahmet MD    Therapy Diagnosis: H&N lymphedema with fibrosis     Client Self Report: wearing jaw bra about 2hrs a day and is massaging daily, had f/u with Dr. Mendez may have broken Hyoid bone which could be the swelling and trouble swallowing issue occuring on the L side of pt's throat, and she may have a displaced staple on R side of throat, pt has another f/u with Dr. Mendez in May pt may need to have surgery to correct these issues    Objective Measurements:  Objective Measure: girth  Details: since initial evaluation: superior neck +2.7cm, middle neck - 1.4cm and lower neck -1.7cm       Goals:  Goal Identifier stg   Goal Description pt to be independent with cervical ROM exercises to decrease tightness/stiffness which will increase functional range for driving and ADL   Target Date 10/20/16   Date Met  10/20/16   Progress:     Goal Identifier stg   Goal Description pt and/or family to be independent with scar massage to decrease tightness and underlying adhesions to decrease H&N edema   Target Date 10/20/16   Date Met  10/20/16   Progress:     Goal Identifier stg   Goal Description pt and/or family to be independent with self-MLD for edema redcution response in H&N lymphedema   Target Date 10/20/16   Date Met  10/20/16   Progress:     Goal Identifier ltg   Goal Description once appropriate, pt to be independent with donning, doffing and care of compression garment for longterm lymphedema management   Target Date 17   Date Met  17   Progress:     Goal Identifier ltg   Goal Description pt to be independent with longterm H&N lymphedema management via exercises, skin care, self-MLD and compression garment use/wear   Target Date 17   Date Met      Progress:       Progress Toward Goals:   Patient is making  good progress toward goals and has reached a plateau in measurements; has had significant decrease in fibrosis since initial evaluation and skin fully intact, healed and no dryness.  Patient was last seen on 3/23/17 and will follow-up with primary therapist next on 4/20/17 to ensure that current compression garment, wear schedule and home program are adequate in achieving longterm edema management for maintenance - anticipate discharge at that time.       Plan:  Continue therapy per current plan of care.    Discharge:  No      RECERTIFICATION    Terese Bell  1950    Session Number: 38 H&N/Ahmet/Medicare & [facility] since start of care.    Reasons for Continuing Treatment:   Goals not yet met - see above for details. Anticipate one final session.    Frequency/Duration  1 additional visit on 4/20/17    Recertification Period  3/27/17 - 4/20/17    Physician Signature:    Date:    X_______________________________________________________    Physician Name: Dr. Moreno Leo MD    I certify the need for these services furnished under this plan of treatment and while under my care. Physician co-signature of this document indicates review and certification of the therapy plan.  This signature may be written on paper, or electronically signed within EPIC.

## 2017-03-28 NOTE — ADDENDUM NOTE
Encounter addended by: Rachel Richey, PT on: 3/28/2017  9:18 AM<BR>     Actions taken: Flowsheet accepted, Sign clinical note, Document created

## 2017-03-29 ENCOUNTER — HOSPITAL ENCOUNTER (OUTPATIENT)
Dept: PHYSICAL THERAPY | Facility: CLINIC | Age: 67
Setting detail: THERAPIES SERIES
End: 2017-03-29
Attending: OTOLARYNGOLOGY
Payer: MEDICARE

## 2017-03-29 PROCEDURE — 97110 THERAPEUTIC EXERCISES: CPT | Mod: GP,KX | Performed by: PHYSICAL THERAPIST

## 2017-03-29 PROCEDURE — 40000360 ZZHC STATISTIC PT CANCER REHAB VISIT: Performed by: PHYSICAL THERAPIST

## 2017-03-29 ASSESSMENT — 6 MINUTE WALK TEST (6MWT): TOTAL DISTANCE WALKED (METERS): 1375

## 2017-03-30 ENCOUNTER — OFFICE VISIT (OUTPATIENT)
Dept: RADIATION THERAPY | Facility: OUTPATIENT CENTER | Age: 67
End: 2017-03-30

## 2017-03-30 ENCOUNTER — HOSPITAL ENCOUNTER (OUTPATIENT)
Dept: SPEECH THERAPY | Facility: CLINIC | Age: 67
Setting detail: THERAPIES SERIES
End: 2017-03-30
Attending: RADIOLOGY
Payer: MEDICARE

## 2017-03-30 VITALS
BODY MASS INDEX: 28.17 KG/M2 | WEIGHT: 163 LBS | HEART RATE: 68 BPM | SYSTOLIC BLOOD PRESSURE: 100 MMHG | DIASTOLIC BLOOD PRESSURE: 62 MMHG

## 2017-03-30 DIAGNOSIS — R13.12 OROPHARYNGEAL DYSPHAGIA: Primary | ICD-10-CM

## 2017-03-30 PROCEDURE — G8998 SWALLOW D/C STATUS: HCPCS | Mod: GN,CL | Performed by: SPEECH-LANGUAGE PATHOLOGIST

## 2017-03-30 PROCEDURE — 40000828 ZZHC SLP CANCER REHAB VISIT: Performed by: SPEECH-LANGUAGE PATHOLOGIST

## 2017-03-30 PROCEDURE — 92526 ORAL FUNCTION THERAPY: CPT | Mod: GN,KX | Performed by: SPEECH-LANGUAGE PATHOLOGIST

## 2017-03-30 PROCEDURE — G8997 SWALLOW GOAL STATUS: HCPCS | Mod: GN,CJ | Performed by: SPEECH-LANGUAGE PATHOLOGIST

## 2017-03-30 ASSESSMENT — PAIN SCALES - GENERAL: PAINLEVEL: NO PAIN (0)

## 2017-03-30 NOTE — MR AVS SNAPSHOT
After Visit Summary   3/30/2017    Terese Bell    MRN: [id number]           Patient Information     Date Of Birth          1950        Visit Information        Provider Department      3/30/2017 10:45 AM Pattie Natarajan MD [facility]         Follow-ups after your visit        Your next 10 appointments already scheduled     Mar 30, 2017 11:30 AM CDT   Treatment with Angelita Rey, SLP   [facility] ([facility])    [address] [address]   [phone]            Apr 06, 2017 10:00 AM CDT   Treatment 60 with Melania Nicholas, PT   [facility] ([facility])    [address]  [address]  [place] [facility] [place] [address]   [phone]            Apr 13, 2017 10:00 AM CDT   Treatment 60 with Melania Nicholas, PT   [facility] ([facility])    [address]  [address]  [place] [facility] [place] [address]   [phone]            Apr 20, 2017 10:00 AM CDT   Treatment 60 with Rachel Richey, PT   [facility] Lymphedema ([facility])    [address] [address]   [phone]            Apr 20, 2017 10:30 AM CDT   Treatment 60 with Melania Nicholas, PT   [facility] ([facility])    [address]  [address]  [place] [facility] [place] [address]   [phone]            May 03, 2017  9:10 AM CDT   (Arrive by 8:55 AM)   Return Visit with Thomas Ferris MD   [facility] ([facility])    [address] [place] [address] [phone]            Aug 03, 2017  2:30 PM CDT   Return Visit with Pattie Natarajan MD   [facility] ([facility])    [address], [address]  [place] [facility] [place] [address]   [phone]              Who to contact     Please call your clinic at [phone] to:    Ask questions about your health    Make or cancel  appointments    Discuss your medicines    Learn about your test results    Speak to your doctor   If you have compliments or concerns about an experience at your clinic, or if you wish to file a complaint, please contact [facility] Patient Relations at [phone] or email us at [email or web address]         Additional Information About Your Visit        MyChart Information     [employer] gives you secure access to your electronic health record. If you see a primary care provider, you can also send messages to your care team and make appointments. If you have questions, please call your primary care clinic.  If you do not have a primary care provider, please call [phone] and they will assist you.      [employer] is an electronic gateway that provides easy, online access to your medical records. With [employer], you can request a clinic appointment, read your test results, renew a prescription or communicate with your care team.     To access your existing account, please contact your [facility] or call [phone] for assistance.        Care EveryWhere ID     This is your Care EveryWhere ID. This could be used by other organizations to access your [place] medical records  [id number]        Your Vitals Were     Pulse BMI (Body Mass Index)                68 28.17 kg/m2           Blood Pressure from Last 3 Encounters:   03/30/17 100/62   01/26/17 104/67   12/22/16 102/72    Weight from Last 3 Encounters:   03/30/17 73.9 kg (163 lb)   03/08/17 75.3 kg (166 lb)   01/26/17 78 kg (172 lb)              Today, you had the following     No orders found for display       Primary Care Provider Office Phone # Fax #    HERNÁN Carranza [facility] [phone]       [facility] [address] [place] [address]        Thank you!     Thank you for choosing [facility]  for your care. Our goal is always to provide you  with excellent care. Hearing back from our patients is one way we can continue to improve our services. Please take a few minutes to complete the written survey that you may receive in the mail after your visit with us. Thank you!             Your Updated Medication List - Protect others around you: Learn how to safely use, store and throw away your medicines at www.disposemymeds.org.          This list is accurate as of: 3/30/17 11:27 AM.  Always use your most recent med list.                   Brand Name Dispense Instructions for use    albuterol 108 (90 BASE) MCG/ACT Inhaler    PROAIR HFA/PROVENTIL HFA/VENTOLIN HFA     Inhale 2 puffs into the lungs every 6 hours as needed for wheezing       DENTAGEL 1.1 % Gel topical gel   Generic drug:  sodium fluoride dental gel      Apply 1 Application to affected area daily       EPINEPHrine 0.3 MG/0.3ML injection     2 each    Inject 0.3 mLs (0.3 mg) into the muscle once as needed for anaphylaxis       fluticasone-salmeterol 250-50 MCG/DOSE diskus inhaler    ADVAIR DISKUS    1 Inhaler    Inhale 1 puff into the lungs 2 times daily as needed       order for DME     1 Device    Equipment being ordered: tennis elbow band

## 2017-03-30 NOTE — LETTER
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     TYPE OF RADIATION THERAPY ADMINISTERED: 6000 cGy in 30 fractions     SUBJECTIVE: Mrs. 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 She had radiation therapy in our clinic over 6 weeks with a total dose of 6000 cGy in 30 treatments at 200 cGy each fraction from 10/17/2016 to 2016.  She tolerated radiation therapy reasonably well.  The patient did have some significant sore throat and dysphagia toward the end of the therapy, which required a PEG tube for nutritional support.  She otherwise was reasonably stable at the end of the therapy. She returns today for a routine post-treatment checkup.    Since her last visit with us, she reports that she has been doing reasonably well. She continues to improve since her last visit with us one month ago. Her taste and oral pain continues to improve which has allowed her to take in more nutrition PO. However, she continues to use her tube feeds (2 cans per day) to maintain caloric intake. She does have a little bit of discomfort on her oral tongue, and feels that her thrush may be returning.  She was recently seen by Dr. Ferris in ENT and felt to be recovering as expected with no evidence of disease. Additionally, she continues to followe with speech and lymphedema therapy. Overall, she continues to do as well as expected and her remaining ROS are unremarkable.     OBJECTIVE:   /62 (Cuff Size: Adult Regular)  Pulse 68  Wt 73.9 kg (163 lb)  BMI  28.17 kg/m2  GENERAL:  Appears well, in no acute distress.   HEENT: NCAT. Mild generalized mucositis. No thrush  RESP: Breathing comfortably on RA  CV: WWP  NEURO: CN II-XII grossly intact. Normal gait.      PET/CT SKULL BASE TO MID THIGH LOCALIZATION WITHOUT IV CONTRAST 3/1/2017  IMPRESSION:  1. Postop changes within the neck without evidence of abnormal  hypermetabolism to suggest recurrent disease. No hypermetabolic  adenopathy or evidence of metastatic disease in the neck, chest,  abdomen or pelvis.   2. Gastrostomy tube appears in good position. No evidence of bowel  obstruction or diverticulitis.  3. Multiple tiny indeterminate lung nodules, unchanged since prior  exam. None of these measures larger than 0.3 cm. Continued  surveillance as clinically warranted.     IMPRESSION: Recovering well at this point from RT. Calorie counts have improved and her weight has stabilized.     RECOMMENDATIONS:    1. RTC in 4 months  2. Patient encouraged to continue maintaining calorie counts  3. Continue close followup with ENT as scheduled. Restaging PET scan prior.  4. Continue with lymphedema therapy.    I spent approximately 30 minutes today with the patient and 80% time was used for counseling.         Pattie Natarajan M.D., Ph.D.      Radiation Oncologist   [facility]   Radiation Therapy Center   Phone: [phone]    CC  Patient Care Team:  Nicole Mcdonald APRN CNP as PCP - Thomas Gross MD as MD (Otolaryngology)

## 2017-03-30 NOTE — PROGRESS NOTES
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     TYPE OF RADIATION THERAPY ADMINISTERED: 6000 cGy in 30 fractions     SUBJECTIVE: Mrs. 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 She had radiation therapy in our clinic over 6 weeks with a total dose of 6000 cGy in 30 treatments at 200 cGy each fraction from 10/17/2016 to 2016.  She tolerated radiation therapy reasonably well.  The patient did have some significant sore throat and dysphagia toward the end of the therapy, which required a PEG tube for nutritional support.  She otherwise was reasonably stable at the end of the therapy. She returns today for a routine post-treatment checkup.    Since her last visit with us, she reports that she has been doing reasonably well. She continues to improve since her last visit with us one month ago. Her taste and oral pain continues to improve which has allowed her to take in more nutrition PO. However, she continues to use her tube feeds (2 cans per day) to maintain caloric intake. She does have a little bit of discomfort on her oral tongue, and feels that her thrush may be returning.  She was recently seen by Dr. Ferris in ENT and felt to be recovering as expected with no evidence of disease. Additionally, she continues to followe with speech and lymphedema therapy. Overall, she continues to do as well as expected and her remaining ROS are unremarkable.     OBJECTIVE:   /62 (Cuff Size: Adult Regular)  Pulse 68  Wt 73.9 kg (163 lb)  BMI 28.17 kg/m2  GENERAL:  Appears well, in no acute distress.   HEENT: NCAT. Mild generalized  mucositis. No thrush  RESP: Breathing comfortably on RA  CV: WWP  NEURO: CN II-XII grossly intact. Normal gait.      PET/CT SKULL BASE TO MID THIGH LOCALIZATION WITHOUT IV CONTRAST 3/1/2017  IMPRESSION:  1. Postop changes within the neck without evidence of abnormal  hypermetabolism to suggest recurrent disease. No hypermetabolic  adenopathy or evidence of metastatic disease in the neck, chest,  abdomen or pelvis.   2. Gastrostomy tube appears in good position. No evidence of bowel  obstruction or diverticulitis.  3. Multiple tiny indeterminate lung nodules, unchanged since prior  exam. None of these measures larger than 0.3 cm. Continued  surveillance as clinically warranted.     IMPRESSION: Recovering well at this point from RT. Calorie counts have improved and her weight has stabilized.     RECOMMENDATIONS:    1. RTC in 4 months  2. Patient encouraged to continue maintaining calorie counts  3. Continue close followup with ENT as scheduled. Restaging PET scan prior.  4. Continue with lymphedema therapy.    I spent approximately 30 minutes today with the patient and 80% time was used for counseling.         Pattie Natarajan M.D., Ph.D.      Radiation Oncologist   [facility]   Radiation Therapy Center   Phone: [phone]    CC  Patient Care Team:  Nicole Mcdonald APRN CNP as PCP - General  Mario Ferris MD as MD (Otolaryngology)  Pattie Natarajan MD as MD (Radiation Oncology)  MARIO EFRRIS

## 2017-03-30 NOTE — NURSING NOTE
FOLLOW-UP VISIT    Patient Name: Terese Bell      : 1950     Age: 66 year old        ______________________________________________________________________________     Chief Complaint   Patient presents with     Radiation Therapy     Follow up visit, head and neck cancer     /62 (Cuff Size: Adult Regular)  Pulse 68  Wt 73.9 kg (163 lb)  BMI 28.17 kg/m2     Pain  Denies    Meds  Current Med List Reviewed: Yes    Labs  TSH   Date Value Ref Range Status   2012 1.40 0.4 - 5.0 mU/L Final   ]    Imaging  PET 3-1-17    Skin:Warm  Dry  Intact  Oral Products: OTC  Mucositis: No  Dry mouth: Yes: constant, but getting better  Dental evaluation: No  PEG tube: Yes:   Speech therapy: Yes: sees regularly, doing exercises  Lymphedema: Yes: sees regularly. Has some swelling, has garments and doing exercises  Energy Level:getting better  Appetite: fairly good  Intake: able to eat most foods, including meat and potatoes, eats very slowly  Weight:  Wt Readings from Last 5 Encounters:   17 73.9 kg (163 lb)   17 75.3 kg (166 lb)   17 78 kg (172 lb)   17 77.6 kg (171 lb)   17 79.2 kg (174 lb 9.6 oz)       Appointments:     DATE  Oncologist: n/a    ENT: Dr. Ferris Seen 3-8-17, sees again 5-3-17   Primary:      Other Notes:  Also in cancer rehab program

## 2017-04-05 NOTE — PROGRESS NOTES
Outpatient Speech Language Pathology Discharge Note     Patient: Terese Bell  : 1950    Beginning/End Dates of Reporting Period:  10/6/16 to 2017    Pt seen for 13 therapy visits    Referring Provider: Dr. Natarajan    Therapy Diagnosis: Dysphagia    Client Self Report: PT reports she is eating more solid food in small amounts, but continuing to take 3 cans/day via tube.  _PT reports weight is stable and energy is improving, however, she feels very exhausted if she tries to expend too much energy on a day.   Oral intake is mainly for taste and trials.  Nutritional intake remains via tube feeding.                        Goals:  Goal Identifier 1   Goal Description Pt will perform oropharyngeal exercises 10 reps each 3x/day to increase swallow safety.   Target Date 17   Date Met  17 (stated only doing ex on and off-   educated to 3x/day (10 re)   Progress:     Goal Identifier 2   Goal Description Pt will utilize safe swallow strategies to decrease aspiration risk 85% of the time.   Target Date 17   Date Met  17 (alternating liquids to clear pharyngeal residue)   Progress:     Goal Identifier 3   Goal Description Pt will swallow a mechanical soft diet with thin liquids with no overt aspiration symptoms to meet nutritional needs.   Target Date 17   Date Met   (safe swallow with soft foods- pt takes up to 4 bites)   Progress:     Progress Toward Goals:    Progress this reporting period: Melania has had a plateau with progress over the past 6 weeks.  She is diligent with her home program for oropharyngeal exercises but still reports difficulty swallowing solid and thick textures.  Pt has poor epiglottic movement from RXT.  No further tx indicated from this setting.  She will follow up with ENT clinic and SLP there in April.    Plan:  Discharge from therapy.    Discharge:    Reason for Discharge: No further expectation of progress.        Discharge Plan: Patient to  continue home program.

## 2017-04-05 NOTE — ADDENDUM NOTE
Encounter addended by: Angelita Rey, SLP on: 4/5/2017  1:57 PM<BR>     Actions taken: Flowsheet accepted, Pend clinical note

## 2017-04-05 NOTE — ADDENDUM NOTE
Encounter addended by: Angelita Rey, SLP on: 4/5/2017  2:09 PM<BR>     Actions taken: Sign clinical note, Flowsheet accepted

## 2017-04-06 ENCOUNTER — HOSPITAL ENCOUNTER (OUTPATIENT)
Dept: PHYSICAL THERAPY | Facility: CLINIC | Age: 67
Setting detail: THERAPIES SERIES
End: 2017-04-06
Attending: OTOLARYNGOLOGY
Payer: MEDICARE

## 2017-04-06 PROCEDURE — 97110 THERAPEUTIC EXERCISES: CPT | Mod: GP,KX | Performed by: PHYSICAL THERAPIST

## 2017-04-06 PROCEDURE — 40000718 ZZHC STATISTIC PT DEPARTMENT ORTHO VISIT: Performed by: PHYSICAL THERAPIST

## 2017-04-06 PROCEDURE — 40000360 ZZHC STATISTIC PT CANCER REHAB VISIT: Performed by: PHYSICAL THERAPIST

## 2017-04-06 ASSESSMENT — 6 MINUTE WALK TEST (6MWT): TOTAL DISTANCE WALKED (METERS): 1375

## 2017-04-13 ENCOUNTER — HOSPITAL ENCOUNTER (OUTPATIENT)
Dept: PHYSICAL THERAPY | Facility: CLINIC | Age: 67
Setting detail: THERAPIES SERIES
End: 2017-04-13
Attending: OTOLARYNGOLOGY
Payer: MEDICARE

## 2017-04-13 PROCEDURE — 40000360 ZZHC STATISTIC PT CANCER REHAB VISIT: Performed by: PHYSICAL THERAPIST

## 2017-04-13 PROCEDURE — 97110 THERAPEUTIC EXERCISES: CPT | Mod: GP | Performed by: PHYSICAL THERAPIST

## 2017-04-13 ASSESSMENT — 6 MINUTE WALK TEST (6MWT): TOTAL DISTANCE WALKED (METERS): 1375

## 2017-04-20 ENCOUNTER — HOSPITAL ENCOUNTER (OUTPATIENT)
Dept: PHYSICAL THERAPY | Facility: CLINIC | Age: 67
Setting detail: THERAPIES SERIES
End: 2017-04-20
Attending: OTOLARYNGOLOGY
Payer: MEDICARE

## 2017-04-20 PROCEDURE — G8979 MOBILITY GOAL STATUS: HCPCS | Mod: GP,CI | Performed by: PHYSICAL THERAPIST

## 2017-04-20 PROCEDURE — 94620 ZZHC SIX MINUTE WALK TEST: CPT | Performed by: PHYSICAL THERAPIST

## 2017-04-20 PROCEDURE — 40000099 ZZH STATISTIC LYMPHEDEMA VISIT: Performed by: PHYSICAL THERAPIST

## 2017-04-20 PROCEDURE — G8978 MOBILITY CURRENT STATUS: HCPCS | Mod: GP,CJ | Performed by: PHYSICAL THERAPIST

## 2017-04-20 PROCEDURE — G8988 SELF CARE GOAL STATUS: HCPCS | Mod: GP,CI | Performed by: PHYSICAL THERAPIST

## 2017-04-20 PROCEDURE — 40000360 ZZHC STATISTIC PT CANCER REHAB VISIT: Performed by: PHYSICAL THERAPIST

## 2017-04-20 PROCEDURE — G8989 SELF CARE D/C STATUS: HCPCS | Mod: GP,CI | Performed by: PHYSICAL THERAPIST

## 2017-04-20 PROCEDURE — 97140 MANUAL THERAPY 1/> REGIONS: CPT | Mod: GP,KX | Performed by: PHYSICAL THERAPIST

## 2017-04-20 PROCEDURE — 97110 THERAPEUTIC EXERCISES: CPT | Mod: GP | Performed by: PHYSICAL THERAPIST

## 2017-04-20 ASSESSMENT — 6 MINUTE WALK TEST (6MWT): TOTAL DISTANCE WALKED (METERS): 1446

## 2017-04-20 NOTE — PROGRESS NOTES
Outpatient Physical Therapy Discharge Note     Patient: Terese Bell  : 1950    Beginning/End Dates of Reporting Period:  3/23/2017 to 2017    Referring Provider: Dr. Ahmet MD    Therapy Diagnosis: H&N lymphedema with fibrosis     Client Self Report: I know what needs to be done longterm    Objective Measurements:  Objective Measure: cervial girth  Details: since initial evaluation superior neck -0.8cm, middle neck -1.3cm and lower neck -1.8cm    Objective Measure: skin  Details: non-pitting edema remainsunder chin and anterior superior neck waddle and above scar, but with significant decreased fibrosis since initially starting treatment     Goals:  Goal Identifier stg   Goal Description pt to be independent with cervical ROM exercises to decrease tightness/stiffness which will increase functional range for driving and ADL   Target Date 10/20/16   Date Met  10/20/16   Progress:     Goal Identifier stg   Goal Description pt and/or family to be independent with scar massage to decrease tightness and underlying adhesions to decrease H&N edema   Target Date 10/20/16   Date Met  10/20/16   Progress:     Goal Identifier stg   Goal Description pt and/or family to be independent with self-MLD for edema redcution response in H&N lymphedema   Target Date 10/20/16   Date Met  10/20/16   Progress:     Goal Identifier ltg   Goal Description once appropriate, pt to be independent with donning, doffing and care of compression garment for longterm lymphedema management   Target Date 17   Date Met  17   Progress:     Goal Identifier ltg   Goal Description pt to be independent with longterm H&N lymphedema management via exercises, skin care, self-MLD and compression garment use/wear   Target Date 17   Date Met  17   Progress:       Progress Toward Goals:   Goals met      Plan:  Discharge from therapy.    Discharge:    Reason for Discharge: Patient has met all goals.    Equipment  Issued: none; pt had jaw bra compression from surgery     Discharge Plan: Patient to continue home program.

## 2017-04-20 NOTE — PROGRESS NOTES
Outpatient Physical Therapy Progress Note     Patient: Terese Bell  : 1950    Beginning/End Dates of Reporting Period:  3/20/17 to 2017.  Total # of Rx sessions: 5    Referring Provider: Thomas Mendez Diagnosis: Oral CA      Client Self Report: Worked hard over the weekend and is pretty wiped out today.  Still want to work on strengthening.     Objective Measurements:  Objective Measure: FACIT   Details: 17 Score 39.   INITIALLY: 30    Objective Measure:  STrength  Details: 17 R 40, L 45.   INITIALLY: R 45, L 47    Objective Measure: Vitals   Details: Before Ex: Small cuff on L arm, BP 83/60, HR 74, O2 99%. After Ex: BP 92/69, , O2 98%  .   Objective Measure: Parameters  Details: HR max 135, O2 88%.      Outcome Measures (most recent score):  FACIT Fatigue Subscale (score out of 52). The higher the score, the better the QOL.: 39  Six Minute Walk (meters). An increase of 70 or more meters indicates statistically significant change.: 1446    Goals:  Goal Identifier 1   Goal Description STG:  Improve FACIT to 35 for better endurance to do HH duties  17 Score 39  - MET   Target Date 17   Date Met  17   Progress:     Goal Identifier 2   Goal Description STG: Pt will be able to get up off toilet 2/5  17 Minimal diffiuclty 2/5.    Target Date 17   Date Met  17   Progress:     Goal Identifier 3   Goal Description STG: Pt will be able to  cast iron frying pan from lower cupboards 3/5 On 17 Rated 2/5 minimal difficulty.    Target Date 17   Date Met  17   Progress:     Goal Identifier 4   Goal Description STG: Pt will be able to take cans out of box w/ R U/E 3/5  On 17 Rated as minimal difficulty. 2/5    Target Date 17   Date Met  17   Progress:     Goal Identifier 5   Goal Description  LTG: Pt will be independent w/ principals of CA Rehab, and be independent w/ HEP,    Target Date 17   Date  Met      Progress:     Progress Toward Goals:   Progress this reporting period: Pt has met STG's.   Would like to continue to work on strengthening.      Plan:  Continue therapy per current plan of care.  NEW STG: Improve  strength by 5# each hand.    Discharge:  No  RECERTIFICATION    Terese Bell  1950    Session Number: 6/9 MC/BCBS since start of care.    Reasons for Continuing Treatment:   Wants to continue to work on strengthening.     Frequency/Duration  1 times per week for 4 weeks for a total of 5 additional visits.    Recertification Period  4/20/17  - 5/22/17    Physician Signature:    Date:    X_______________________________________________________    Physician Name: Thomas Ferris     I certify the need for these services furnished under this plan of treatment and while under my care. Physician co-signature of this document indicates review and certification of the therapy plan.  This signature may be written on paper, or electronically signed within [facility].     Melania Nicholas, PT, MTC (#[id number])  [facility]           [phone]  Fax          [phone]  Appt #      [phone]

## 2017-04-21 ENCOUNTER — TELEPHONE (OUTPATIENT)
Dept: NUTRITION | Facility: CLINIC | Age: 67
End: 2017-04-21

## 2017-04-21 NOTE — TELEPHONE ENCOUNTER
"Nutrition Note:  Spoke with patient today re: high-calorie, high-protein foods to incorporate into eating patterns. RD was following dietitian back in October when she had PEG tube placed. Patient is getting bulk of nutrition from enteral feedings. She aims for 3 cans of TwoCal HN daily, but admits that there have been several days the past few weeks where she only had 2 cans. TwoCal HN x 3 cans = 1425 kcals, 60 gm protein. Patient continues to experience unintentional weight loss. She eats small amounts orally, less than ~2 cups of food total daily. She has been d/c from SLP, which recommended a mechanical soft diet with thin liquids. Patient is very aware of foods that she can/cannot tolerate. She keeps a detailed food journal on a daily basis and she feels this helps her to stay on track. She consumes adequate fluid, sometimes up to 8 (16oz) glasses daily. At times she drinks ONS, but she expresses that she doesn't \"love\" them.    Educated patient on foods that are high-calorie/high-protein. Brainstormed several small snacks/meals that are dense in calories/protein. Recommended some protein powders to patient to mix in with pancakes, puddings, yogurts, etc. Encouraged patient to really take time to sit down and focus on eating small amounts at least three times day (she's only ate 1 - 2 times in the past). Discussed trying to incorporate ONS if possible, but will continue to address in the future.    Recommendations:   1. Continue with 3 cans of TwoCal HN daily. Reiterated the importance of this to prevent further weight loss.  2. Aim for at least 2000 kcal daily, try to eat ~600 calories orally.   3. To meet ~600 calories orally, must try to eat small amounts more frequently. Goal is to aim for at least 3 times daily.    Sera Sarmiento RDN, LD  Clinical Dietitian  [phone]    Time Spent: 60 minutes  "

## 2017-05-01 ENCOUNTER — HOSPITAL ENCOUNTER (OUTPATIENT)
Dept: PHYSICAL THERAPY | Facility: CLINIC | Age: 67
Setting detail: THERAPIES SERIES
End: 2017-05-01
Attending: OTOLARYNGOLOGY
Payer: MEDICARE

## 2017-05-01 PROCEDURE — 40000360 ZZHC STATISTIC PT CANCER REHAB VISIT: Performed by: PHYSICAL THERAPIST

## 2017-05-01 PROCEDURE — 97110 THERAPEUTIC EXERCISES: CPT | Mod: GP | Performed by: PHYSICAL THERAPIST

## 2017-05-01 ASSESSMENT — 6 MINUTE WALK TEST (6MWT): TOTAL DISTANCE WALKED (METERS): 1446

## 2017-05-03 ENCOUNTER — THERAPY VISIT (OUTPATIENT)
Dept: SPEECH THERAPY | Facility: CLINIC | Age: 67
End: 2017-05-03

## 2017-05-03 ENCOUNTER — OFFICE VISIT (OUTPATIENT)
Dept: OTOLARYNGOLOGY | Facility: CLINIC | Age: 67
End: 2017-05-03

## 2017-05-03 VITALS — BODY MASS INDEX: 26.62 KG/M2 | WEIGHT: 154 LBS

## 2017-05-03 DIAGNOSIS — Z85.819 HX OF MALIGNANT NEOPLASM OF FLOOR OF MOUTH: ICD-10-CM

## 2017-05-03 DIAGNOSIS — C02.9 TONGUE CANCER (H): ICD-10-CM

## 2017-05-03 DIAGNOSIS — R13.12 OROPHARYNGEAL DYSPHAGIA: Primary | ICD-10-CM

## 2017-05-03 DIAGNOSIS — I89.0 LYMPHEDEMA OF FACE: Primary | ICD-10-CM

## 2017-05-03 ASSESSMENT — PAIN SCALES - GENERAL: PAINLEVEL: MILD PAIN (2)

## 2017-05-03 NOTE — PROGRESS NOTES
May 3, 2017    PRIOR ONCOLOGIC HISTORY:  Ms. 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 Schumacher.  He ended up doing 13 operations in 11 years, most recently in 2010.      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 Leo on 9/1/2016.  The margins were negative but the lymph nodes were 1/40 positive.  She completed postoperative radiation therapy on 11/29/2017.    HISTORY OF PRESENT ILLNESS:    Ms. Bell is doing very well.  She is working very hard on her trismus as well as her swallowing exercises.  She has visited with Tg at length today, and they were talking about a new swallowing program that could enroll her in.  She is still taking about 3 cans of tube feeds every day and is going to start to transition and taking these by mouth.  She is taking about 300-500 calories by mouth right now.  She wants very much to get rid of her PEG tube.      She continues to work hard on her trismus.  At her last visit, she was 20 mm of interincisal opening.  There has been no bleeding.  No hemoptysis.  No lumps or bumps.  She has no pain.      PHYSICAL EXAMINATION:  She looks great.  She is unaccompanied today.  Her incision on the right has healed up very well, but there is still a little bit of edema superiorly.  There is also a little bit of early fibrosis in the upper flap.  The oral cavity and oropharynx look great.  There is some tethering of her tongue anteriorly.  The trismus is at 26 mm today.  Palpation of the floor of mouth and lateral tongue, however, shows that despite it being a little tethered, it is soft.  The base of tongue is clear.  The neck has no masses except perhaps in the right neck.         FIBEROPTIC ENDOSCOPY:     Consent for  fiberoptic laryngoscopy was obtained, and we confirmed correctness of procedure and identity of patient.  Fiberoptic laryngoscopy was indicated due to the need to fully evaluate her mouth with trismus.  The nose was topically decongested and anesthetized.  The fiberoptic laryngoscope was passed under endoscopic vision.  The turbinates were normal.  The inferior and middle meati were clear bilaterally without purulence, masses, or polyps.  The nasopharynx was clear.  The Eustachian tubes were clear.  The soft palate appeared normal with good mobility.  The epiglottis was sharp and the visualized portion of the vallecula was clear.  There is still prominent edema in the false cords, aryepiglottic folds, arytenoids and the epiglottis and is about 2+ today.  The last time, it was about 1+.      IMPRESSION:  Ms. Bettie Bell continues to be WILMA based on my examination now at 8 months' time.      She would like to consider having her tongue released, and Tg is going to give her some good tongue exercises.  In the meantime, she will be following up with Dr. Leo in 2 months' time, and either he or I can do the release, if he feels it is safe.      She will continue to work on her trismus exercises, and I would like her to get above 30-35. She has improved.  Tg will enroll her in the new swallowing efforts.      She will follow up with Dr. Leo in 2 months, and I will see her back 2 months after that at her 9 month anniversary with a CT scan.     Thomas Ferris M.D.  Otolaryngology/Head & Neck Surgery  [phone]            HERNÁN Carranza MD Paparella H&MIGUELITO Leo MD  Otolaryngology/Head & Neck Surgery  [facility] [address]

## 2017-05-03 NOTE — LETTER
5/3/2017       RE: Terese Bell  [address] [facility] [address]     Dear Colleague,    Thank you for referring your patient, Terese Bell, to the [facility] EAR NOSE AND THROAT at [facility]. Please see a copy of my visit note below.    May 3, 2017    PRIOR ONCOLOGIC HISTORY:  Ms. 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 Schumacher.  He ended up doing 13 operations in 11 years, most recently in 2010.      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 Leo on 9/1/2016.  The margins were negative but the lymph nodes were 1/40 positive.  She completed postoperative radiation therapy on 11/29/2017.    HISTORY OF PRESENT ILLNESS:    Ms. Bell is doing very well.  She is working very hard on her trismus as well as her swallowing exercises.  She has visited with Tg at length today, and they were talking about a new swallowing program that could enroll her in.  She is still taking about 3 cans of tube feeds every day and is going to start to transition and taking these by mouth.  She is taking about 300-500 calories by mouth right now.  She wants very much to get rid of her PEG tube.      She continues to work hard on her trismus.  At her last visit, she was 20 mm of interincisal opening.  There has been no bleeding.  No hemoptysis.  No lumps or bumps.  She has no pain.      PHYSICAL EXAMINATION:  She looks great.  She is unaccompanied today.  Her incision on the right has healed up very well, but there is still a little bit of edema superiorly.  There is also a little bit of early fibrosis in the upper flap.  The oral cavity and oropharynx look great.  There is some tethering of her  tongue anteriorly.  The trismus is at 26 mm today.  Palpation of the floor of mouth and lateral tongue, however, shows that despite it being a little tethered, it is soft.  The base of tongue is clear.  The neck has no masses except perhaps in the right neck.         FIBEROPTIC ENDOSCOPY:     Consent for fiberoptic laryngoscopy was obtained, and we confirmed correctness of procedure and identity of patient.  Fiberoptic laryngoscopy was indicated due to the need to fully evaluate her mouth with trismus.  The nose was topically decongested and anesthetized.  The fiberoptic laryngoscope was passed under endoscopic vision.  The turbinates were normal.  The inferior and middle meati were clear bilaterally without purulence, masses, or polyps.  The nasopharynx was clear.  The Eustachian tubes were clear.  The soft palate appeared normal with good mobility.  The epiglottis was sharp and the visualized portion of the vallecula was clear.  There is still prominent edema in the false cords, aryepiglottic folds, arytenoids and the epiglottis and is about 2+ today.  The last time, it was about 1+.      IMPRESSION:  Ms. Bettie Bell continues to be WILMA based on my examination now at 8 months' time.      She would like to consider having her tongue released, and Tg is going to give her some good tongue exercises.  In the meantime, she will be following up with Dr. Leo in 2 months' time, and either he or I can do the release, if he feels it is safe.      She will continue to work on her trismus exercises, and I would like her to get above 30-35. She has improved.  Tg will enroll her in the new swallowing efforts.      She will follow up with Dr. Leo in 2 months, and I will see her back 2 months after that at her 9 month anniversary with a CT scan.     Thomas Ferris M.D.  Otolaryngology/Head & Neck Surgery  [phone]            HERNÁN Carranza MD Papareoctaviano TONG&MIGUELITO Mayer  MD Ahmet  Otolaryngology/Head & Neck Surgery  [facility] 396

## 2017-05-03 NOTE — PATIENT INSTRUCTIONS
1.  You were seen in the ENT Clinic today by Dr. Ferris.  If you have any questions or concerns after your appointment, please call [phone].  Press option #1 for scheduling related needs.  Press option #3 for Nurse advice.  2.  Plan is to schedule CT scans in September and follow up with Dr. Ferris after the scans are complete.      Kayla MADERAN, RN  [facility] ENT   Head & Neck Surgery

## 2017-05-03 NOTE — MR AVS SNAPSHOT
After Visit Summary   5/3/2017    Terese Bell    MRN: [id number]           Patient Information     Date Of Birth          1950        Visit Information        Provider Department      5/3/2017 9:10 AM Thomas Ferris MD [facility] Ear Nose and Throat        Today's Diagnoses     Lymphedema of face    -  1    Hx of malignant neoplasm of floor of mouth          Care Instructions    1.  You were seen in the ENT Clinic today by Dr. Ferris.  If you have any questions or concerns after your appointment, please call [phone].  Press option #1 for scheduling related needs.  Press option #3 for Nurse advice.  2.  Plan is to schedule CT scans in September and follow up with Dr. Ferris after the scans are complete.      Kayla GUTIERREZ, RN  [facility] ENT   Head & Neck Surgery             Follow-ups after your visit        Additional Services     LYMPHEDEMA THERAPY REFERRAL       *This therapy referral will be filtered to a centralized scheduling office at [facility] and the patient will receive a call to schedule an appointment at a [facility] [facility] location most convenient for them. *   If you have not heard from the scheduling office within 2 business days, please call [phone] for all locations, with the exception of [place], please call [phone].     Treatment: PT or OT Evaluation & Treatment  Special Instructions: Lymphedema therapy   PT/OT Treatment Diagnosis: Lymphedema internal, mucosal edema     Please be aware that coverage of these services is subject to the terms and limitations of your health insurance plan.  Call member services at your health plan with any benefit or coverage questions.      **Note to Provider:  If you are referring outside of [facility] for the therapy appointment, please list the name of the location in the  special instructions  above, print the referral and give to the patient to schedule the appointment.                  Your  next 10 appointments already scheduled     May 16, 2017  1:00 PM CDT   Treatment 60 with AYAD Cornell   [facility])    [address] [place] [address] [phone]            May 18, 2017 12:30 PM CDT   Treatment 60 with AYAD Day   [facility] ([facility])    [address] [address] [phone]            May 23, 2017  1:00 PM CDT   Treatment 60 with AYAD Cornell   [facility] ([facility])    [address] [place] [address] [phone]            May 25, 2017 11:00 AM CDT   Treatment 60 with AYAD Day   [facility] ([facility])    [address] [place] [address] [phone]            Jun 19, 2017  9:00 AM CDT   (Arrive by 8:45 AM)   Return Visit with Moreno Leo MD   [facility] Ear Nose and Throat ([facility])    [address] [place] [address] [phone]            Aug 03, 2017  2:30 PM CDT   Return Visit with Pattie Natarajan MD   [facility])    [address], [address]  [facility] [address]   [phone]              Future tests that were ordered for you today     Open Future Orders        Priority Expected Expires Ordered    CT Soft tissue neck w contrast Routine  5/3/2018 5/3/2017    CT Chest w contrast* Routine  5/3/2018 5/3/2017            Who to contact     Please call your clinic at [phone] to:    Ask questions about your health    Make or cancel appointments    Discuss your medicines    Learn about your test results    Speak to your doctor   If you have compliments or concerns about an experience at your clinic, or if you wish to file a complaint, please contact [facility] Patient Relations at  [phone] or email us at [email or web address]         Additional Information About Your Visit        [employer] Information     [employer] gives you secure access to your electronic health record. If you see a primary care provider, you can also send messages to your care team and make appointments. If you have questions, please call your primary care clinic.  If you do not have a primary care provider, please call [phone] and they will assist you.      [employer] is an electronic gateway that provides easy, online access to your medical records. With [employer], you can request a clinic appointment, read your test results, renew a prescription or communicate with your care team.     To access your existing account, please contact your [facility] or call [phone] for assistance.        Care EveryWhere ID     This is your Care EveryWhere ID. This could be used by other organizations to access your [facility] medical records  [id number]        Your Vitals Were     BMI (Body Mass Index)                   26.62 kg/m2            Blood Pressure from Last 3 Encounters:   03/30/17 100/62   01/26/17 104/67   12/22/16 102/72    Weight from Last 3 Encounters:   05/03/17 69.9 kg (154 lb)   03/30/17 73.9 kg (163 lb)   03/08/17 75.3 kg (166 lb)              We Performed the Following     LYMPHEDEMA THERAPY REFERRAL        Primary Care Provider Office Phone # Fax #    HERNÁN Carranza [facility] [phone]       [place] [facility] [address] [place] [address]        Thank you!     Thank you for choosing [facility] EAR NOSE AND THROAT  for your care. Our goal is always to provide you with excellent care. Hearing back from our patients is one way we can continue to improve our services. Please take a few minutes to complete the written survey that you may receive in the mail after your visit with us. Thank you!             Your Updated Medication List -  Protect others around you: Learn how to safely use, store and throw away your medicines at www.disposemymeds.org.          This list is accurate as of: 5/3/17 10:04 AM.  Always use your most recent med list.                   Brand Name Dispense Instructions for use    albuterol 108 (90 BASE) MCG/ACT Inhaler    PROAIR HFA/PROVENTIL HFA/VENTOLIN HFA     Inhale 2 puffs into the lungs every 6 hours as needed for wheezing       DENTAGEL 1.1 % Gel topical gel   Generic drug:  sodium fluoride dental gel      Apply 1 Application to affected area daily       EPINEPHrine 0.3 MG/0.3ML injection     2 each    Inject 0.3 mLs (0.3 mg) into the muscle once as needed for anaphylaxis       fluticasone-salmeterol 250-50 MCG/DOSE diskus inhaler    ADVAIR DISKUS    1 Inhaler    Inhale 1 puff into the lungs 2 times daily as needed       order for DME     1 Device    Equipment being ordered: tennis elbow band

## 2017-05-03 NOTE — NURSING NOTE
Chief Complaint   Patient presents with     RECHECK     8 month f/u     Linda Billy Medical Assistant

## 2017-05-04 NOTE — PROGRESS NOTES
OUTPATIENT SWALLOW  EVALUATION  PLAN OF TREATMENT FOR OUTPATIENT REHABILITATION  (COMPLETE FOR INITIAL CLAIMS ONLY)  Patient's Last Name, First Name, M.I.  YOB: 1950  NoarenaTerese Metzger     Provider's Name   Tg Clifton   Medical Record No.  [id number]     Start of Care Date:  05/03/17   Onset Date:  11/29/17   Type:     ___PT   ____OT  ___X_SLP Medical Diagnosis:  Hx of tongue CA     Treatment Diagnosis:  Moderate oral dysphagia and odynophagia Visits from SOC:  1     _________________________________________________________________________________  Plan of Treatment/Functional Goals:  Planned Therapy Interventions: Dysphagia Treatment  Dysphagia treatment: Modified diet education, Oropharyngeal exercise training, Instruction of safe swallow strategies, Compensatory strategies for swallowing                     Goals   1. Goal Identifier: Exercises       Goal Description: 1.  PT will increase tongue flexibility, strength, ROM and increase pharyngeal constriction by completing 10 reps of 10/10 exercises with minimal written cueing 3x/daily.       Target Date: 08/02/17           2. Goal Identifier: Diet       Goal Description: 2. PT will tolerate a dysphagia diet level 3 with thin liquids without overt Sx of aspiration with double swallow, liquid wash.         Target Date: 08/02/17           3.                             4.                             5.                            6.                              7.                              8.                                 Predicted Duration of Therapy Intervention (days/wks): 2x/month x 3 mos    Tg Clifton SLP       I CERTIFY THE NEED FOR THESE SERVICES FURNISHED UNDER        THIS PLAN OF TREATMENT AND WHILE UNDER MY CARE     (Physician attestation of this document indicates review and certification of the therapy plan).                  Certification  date from: 05/03/17 Certification date to: 08/01/17          Referring Physician: Dr. Thomas Ferris    Initial Assessment        See Epic Evaluation Start Of Care Date: 05/03/17

## 2017-05-04 NOTE — PROGRESS NOTES
"CLINICAL SWALLOW RE-EVALUATION       05/03/17 1100   General Information   Type Of Visit Initial   Start Of Care Date 05/03/17   Referring Physician Dr. 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 She underwent glossectomy and neck dissection with R RFFF reconstruction on 9/1/2016.  She completed postoperative radiation therapy on 11/29/2017.  She arrives today reporting continued difficulty eating solid foods.  She feels they get stuck in her throat and pile up to point where she sometimes can't breathe.  She underwent a video swallow study in March 2017, indicating moderate oropharyngeal dysphagia without penetration or aspiration.  Significant epiglottic edema and no inversion noted during that study.    Respiratory Status Room air   Prior Level Of Function Swallowing   Prior Level Of Function Comment Was eating soft and regular solid foods (chewing on the left since her tongue surgeries), but is only eating \"mush\" and mostly liquids at this time.  She reports her weight has remained stable recently, but her energy level has \"dipped way down.\"   Living Environment House/townhome   Home/community Accessibility Comments (flowsheet Row) Independent   General Observations Arrives with friends today.   Patient/family Goals PT would like to improve speech and swallowing, rid of pain.   General Information Comments PT is cooperative for evaluation and treatment.   FALL RISK SCREEN   Have you fallen 2 or more times in the last year? No   Have you fallen and had an injury in the past year? No   Is the patient a fall risk? No   Clinical Swallow Evaluation   Oral " Musculature anomalies present   Structural Abnormalities present   Dentition present and adequate   Mucosal Quality good   Mandibular Strength and Mobility intact   Oral Labial Strength and Mobility WFL   Lingual Strength and Mobility impaired protrusion;impaired anterior elevation;impaired left lateral movement;impaired right lateral movement  (Right sided posterior lateral tongue tethering)   Velar Elevation intact   Buccal Strength and Mobility intact   Laryngeal Function Cough;Throat clear;Swallow;Voicing initiated;Dry swallow palpated   Additional Documentation Yes   Additional evaluation(s) completed today No   Clinical Swallow Eval: Thin Liquid Texture Trial   Mode of Presentation, Thin Liquids cup;self-fed   Diagnostic Statement No overt Sx of aspiration observed or reported.     Volume of Liquid or Food Presented 3 oz   Oral Phase of Swallow Residue in oral cavity   Pharyngeal Phase of Swallow intact   Swallow Compensations   Swallow Compensations Reduce amounts  (left sided oral food placement)   Results Oral difficulties only;Other (see comments)  (Pain)   General Therapy Interventions   Planned Therapy Interventions Dysphagia Treatment   Dysphagia treatment Modified diet education;Oropharyngeal exercise training;Instruction of safe swallow strategies;Compensatory strategies for swallowing   Swallow Eval: Clinical Impressions   Skilled Criteria for Therapy Intervention Skilled criteria met.  Treatment indicated.   Functional Assessment Scale (FAS) 3   Dysphagia Outcome Severity Scale (GREGG) Level 3 - GREGG   Treatment Diagnosis Moderate oral dysphagia and odynophagia   Diet texture recommendations Thin liquids;Dysphagia diet level 2   Recommended Feeding/Eating Techniques maintain upright posture during/after eating for 30 mins;hard swallow w/ each bite or sip;small sips/bites;other (see comments)  (Food placement on left oral cavity)   Rehab Potential fair, will monitor progress closely   Predicted  Duration of Therapy Intervention (days/wks) 2x/month x 3 mos   Anticipated Discharge Disposition home w/ outpatient services   Risks and Benefits of Treatment have been explained. Yes   Patient, family and/or staff in agreement with Plan of Care Yes   Clinical Impression Comments PT presents with persistent, moderate oropharyngeal dysphagia characterized by limited tongue ROM, tongue to FOM tethering decreasing BOT retraction, premature pharyngeal entry, and suspected pharyngeal residue as a result of remaining edema.  Video swallow study reviewed with patient as well as endoscopy with MD indicating remaining significant edema.  PT would likely benefit from SwallowSTRONG/I-PRO therapy for tongue and BOT swallowing involvement.  Recommend continue lymphedema therapy, as well.     Swallow Goals   SLP Swallow Goals 1;2   Swallow Goal 1   Goal Identifier Exercises   Goal Description 1.  PT will increase tongue flexibility, strength, ROM and increase pharyngeal constriction by completing 10 reps of 10/10 exercises with minimal written cueing 3x/daily.   Target Date 08/02/17   Swallow Goal 2   Goal Identifier Diet   Goal Description 2. PT will tolerate a dysphagia diet level 3 with thin liquids without overt Sx of aspiration with double swallow, liquid wash.     Target Date 08/02/17   Total Session Time   Total Session Time 30   Total Evaluation Time 15   Therapy Certification   Certification date from 05/03/17   Certification date to 08/01/17   Medical Diagnosis Hx of tongue CA

## 2017-05-16 ENCOUNTER — THERAPY VISIT (OUTPATIENT)
Dept: SPEECH THERAPY | Facility: CLINIC | Age: 67
End: 2017-05-16

## 2017-05-16 DIAGNOSIS — R13.10 ODYNOPHAGIA: ICD-10-CM

## 2017-05-16 DIAGNOSIS — C02.9 TONGUE CANCER (H): Primary | ICD-10-CM

## 2017-05-16 DIAGNOSIS — R13.12 OROPHARYNGEAL DYSPHAGIA: ICD-10-CM

## 2017-05-17 ENCOUNTER — HOSPITAL ENCOUNTER (OUTPATIENT)
Dept: PHYSICAL THERAPY | Facility: CLINIC | Age: 67
Setting detail: THERAPIES SERIES
End: 2017-05-17
Attending: OTOLARYNGOLOGY
Payer: MEDICARE

## 2017-05-17 PROCEDURE — 40000099 ZZH STATISTIC LYMPHEDEMA VISIT: Performed by: PHYSICAL THERAPIST

## 2017-05-17 PROCEDURE — G8989 SELF CARE D/C STATUS: HCPCS | Mod: GP,CI | Performed by: PHYSICAL THERAPIST

## 2017-05-17 PROCEDURE — G8987 SELF CARE CURRENT STATUS: HCPCS | Mod: GP,CI | Performed by: PHYSICAL THERAPIST

## 2017-05-17 PROCEDURE — 97161 PT EVAL LOW COMPLEX 20 MIN: CPT | Mod: GP,KX | Performed by: PHYSICAL THERAPIST

## 2017-05-17 PROCEDURE — 97140 MANUAL THERAPY 1/> REGIONS: CPT | Mod: GP | Performed by: PHYSICAL THERAPIST

## 2017-05-17 PROCEDURE — G8988 SELF CARE GOAL STATUS: HCPCS | Mod: GP,CI | Performed by: PHYSICAL THERAPIST

## 2017-05-17 NOTE — PROGRESS NOTES
[facility]        OUTPATIENT PHYSICAL THERAPY EDEMA EVALUATION  PLAN OF TREATMENT FOR OUTPATIENT REHABILITATION  (COMPLETE FOR INITIAL CLAIMS ONLY)  Patient's Last Name, First Name, ANTONIOEnrriqueJONAEnrrique  Noarenamariama Terese Bell                           Provider s Name:   [facility] Medical Record No.  [id number]     Start of Care Date:  05/17/17   Onset Date:   5/3/17 - date of referral    Type:      Medical Diagnosis:      Therapy Diagnosis:  H&N lymphedema Visits from SOC:  1                                     __________________________________________________________________________________   Plan of Treatment/Functional Goals:    Education, Home management program development, Skin care / precautions        GOALS  1. Goal description: pt to be independent with longterm lymphedema and scar management via: exercises, MLD, MFR, compression garment wear/use and desensitization techniques       Target date: 06/21/17            Treatment frequency: Other (1x today eval and treat and f/u in ~1 month)   Treatment duration: 5 weeks (5/17/17 - 6/21/17)    Rachel Richey, PT, DPT, CLT                                    I CERTIFY THE NEED FOR THESE SERVICES FURNISHED UNDER        THIS PLAN OF TREATMENT AND WHILE UNDER MY CARE     (Physician co-signature of this document indicates review and certification of the therapy plan).                                Referring physician: Dr. Josy MD   Initial Assessment  See Epic Evaluation- Start of care: 05/17/17

## 2017-05-17 NOTE — PROGRESS NOTES
"  LYMPHEDEMA / EDEMA REHAB EVALUATION  [facility]     Patient: Terese Bell  : 1950  Referring Provider: Dr. Josy MD    Visit Type  Type of visit: Initial Edema Evaluation    Discipline PT          present: No       General Information   Start of care: 17   Orders: Evaluate and treat as indicated    Order date: 17   Medical diagnosis: H&N lymphedema   Onset of illness / date of surgery: 16       Affected body parts: Head / Neck   Edema etiology: Cancer with lymph node dissection, Radiation, Chemo, Surgery   Location - Cancer with lymph node dissection:  positive nodes       Radiation comments: completed   Chemotherapy comments: completed   Edema etiology comments: s/p right superficial parotidectomy, right hemiglossectomy, right neck dissection and right RFFF on 2016;  surgical site dehisced and developed wound/fistula ; has had numerous H&N surgeries related to cancer   Pertinent history of current problem (PT: include personal factors and/or comorbidities that impact the POC; OT: include additional occupational profile info): pt was seen at OP lymphedema clinic from 10/6/16 - 17 and at time of discharge was wearing compression garment and performing self MLD daily;  since patient was last seen in clinic she reports being very compliant with home program including MLD, compression wear and exercises daily   Surgical / medical history reviewed: Yes   Edema special tests:  (no history of DVTs)   Prior level of functional mobility: Independent with daily functional mobility/ambulation, no AD;  greatest complain is swallowing and states \"I just can't get some things down\"   Prior treatment: Complete decongestive therapy, Compression garments, Exercise, MLD       Patient role / employment history: Retired       Living environment: House / townhome   Living environment comments: with great nephews (adult and high schooler)   " "Current assistive devices:  (no AD for ambulation)          Fall Screening  Fall screen completed by: PT  Per patient, fall 2 or more times in past year?: No  Per patient, fall with injury in past year?: No     Is patient a fall risk?: No     Precautions   Precautions comments: no known precautions; pt has completed cancer treatments, now just routine follow-ups    Patient / Family Goals  Patient / family goals statement: on health history form \"increase ability to swallow and better oral food intake\"       Pain   denies pain today at rest       Vital Signs  Vital signs: Pulse, SPO2  Pulse: 70bpm   SPO2: 97%       Cognitive Status  Orientation: Orientation to person, place and time  Level of consciousness: Alert  Follows commands and answers questions: 100% of the time  Personal safety and judgement: Intact  Memory: Intact       Edema Exam / Assessment  Skin condition: Intact, Other  Skin condition comments: skin at neck all intact, no dryness, tightness distal to scar and mild fibrosis superior to R-neck scar from anterior neck (under chin) along entire length of scar        Scar: Yes  Location: R-neck  Mobility: fair  Scar comments: closed/healed  Ulceration: No        Girth Measurements  Girth Measurements: Refer to separate girth measurement flowsheet    Range of Motion   ROM comments: functional cervical range with tightness felt at end-ranges for L-rotation and extension    Strength   Strength comments: functional    Posture  Posture: Forward head position       Palpation  Palpation: denies sensitivities    Activities of Daily Living       Bed Mobility  Bed mobility: Independent    Transfers  Transfers: Independent    Gait / Locomotion  Gait / Locomotion: Independent no AD for ambulation    Sensory  Sensory perception: Light touch  Light touch: Intact    Coordination  Coordination: Gross motor coordination appropriate       Muscle Tone  Muscle tone: No deficits were identified       Planned Edema " Interventions  Planned edema interventions: Education, Home management program development, Skin care / precautions       Clinical Impression  Criteria for skilled therapeutic intervention met: Yes  Therapy diagnosis: H&N lymphedema     Influenced by the following impairments / conditions: Stage 2, Phlebolymphedema           Treatment frequency: Other (1x today eval and treat and f/u in ~1 month)  Treatment duration: 5 weeks  Patient / family and/or staff in agreement with plan of care: Yes  Risks and benefits of therapy have been explained: Yes       Education Assessment  Preferred learning style: Listening  Barriers to learning: No barriers    Edema Goals  Goal 1  Goal identifier: ltg  Goal description: pt to be independent with longterm lymphedema and scar management via: exercises, MLD, MFR, compression garment wear/use and desensitization techniques  Target date: 06/21/17       Total Evaluation Time  Total evaluation time: 10    Therapy Certification

## 2017-05-18 ENCOUNTER — THERAPY VISIT (OUTPATIENT)
Dept: SPEECH THERAPY | Facility: CLINIC | Age: 67
End: 2017-05-18

## 2017-05-18 DIAGNOSIS — R13.12 OROPHARYNGEAL DYSPHAGIA: Primary | ICD-10-CM

## 2017-05-18 DIAGNOSIS — C02.9 TONGUE CANCER (H): ICD-10-CM

## 2017-05-19 ENCOUNTER — TELEPHONE (OUTPATIENT)
Dept: NUTRITION | Facility: CLINIC | Age: 67
End: 2017-05-19

## 2017-05-19 NOTE — TELEPHONE ENCOUNTER
"Nutrition Note:   Spoke with patient on 5/19/17 to follow-up on tube feeding, caloric intake, and eating patterns. See previous notes for full hx. Patient no longer taking any TwoCal HN through FT; instead she is mixing the formula with coffee-flavored ice cream and drinking that 3x/days. This provides >1500 kcal daily. Patient continues to perform water flushes several times a day. She's been tolerating drinking the shakes made with formula, but had a little bit of acid reflux the other night. She attributes this to drinking the shake too close to bedtime and laying down <2 hrs of drinking the shake. In addition, patient is eating several small meals/snacks a day; she reports her total intake totaling <2 cups daily. She continues to try different foods to see what she can tolerate with her swallowing. She complains of some constipation the past few weeks since starting to drink the formula vs bolus through the tube. This could be related to the increased amount of dairy. See recommendations below. She shares that she doesn't want to use FT because to her that means she is \"going backwards\". Explained to patient that it is OK to do the formula down the FT if she feels like she cannot get her calorie needs orally. Patient is very motivated and positive toward her recovery process.     Recommendations:   1. Continue with current eating patterns, utilize FT if cannot get all calories orally.  2. Try mixing ground flaxseed into shakes to see if some added fiber helps with the constipation.   3. Schedule f/u with dietitian as needed.     Sera Sarmiento RDN, LD  Clinical Dietitian  [phone]    Time Spent: 30 minutes  "

## 2017-05-22 ENCOUNTER — THERAPY VISIT (OUTPATIENT)
Dept: SPEECH THERAPY | Facility: CLINIC | Age: 67
End: 2017-05-22

## 2017-05-22 DIAGNOSIS — R13.10 ODYNOPHAGIA: ICD-10-CM

## 2017-05-22 DIAGNOSIS — R13.12 OROPHARYNGEAL DYSPHAGIA: ICD-10-CM

## 2017-05-22 DIAGNOSIS — C02.9 TONGUE CANCER (H): Primary | ICD-10-CM

## 2017-05-25 ENCOUNTER — THERAPY VISIT (OUTPATIENT)
Dept: SPEECH THERAPY | Facility: CLINIC | Age: 67
End: 2017-05-25

## 2017-05-25 DIAGNOSIS — C02.9 TONGUE CANCER (H): ICD-10-CM

## 2017-05-25 DIAGNOSIS — R13.12 OROPHARYNGEAL DYSPHAGIA: Primary | ICD-10-CM

## 2017-05-30 ENCOUNTER — THERAPY VISIT (OUTPATIENT)
Dept: SPEECH THERAPY | Facility: CLINIC | Age: 67
End: 2017-05-30

## 2017-05-30 DIAGNOSIS — C02.9 TONGUE CANCER (H): Primary | ICD-10-CM

## 2017-05-30 DIAGNOSIS — R13.12 OROPHARYNGEAL DYSPHAGIA: ICD-10-CM

## 2017-05-31 ENCOUNTER — MYC MEDICAL ADVICE (OUTPATIENT)
Dept: FAMILY MEDICINE | Facility: CLINIC | Age: 67
End: 2017-05-31

## 2017-05-31 ENCOUNTER — TELEPHONE (OUTPATIENT)
Dept: RADIATION THERAPY | Facility: OUTPATIENT CENTER | Age: 67
End: 2017-05-31

## 2017-05-31 ENCOUNTER — HOSPITAL ENCOUNTER (OUTPATIENT)
Dept: PHYSICAL THERAPY | Facility: CLINIC | Age: 67
Setting detail: THERAPIES SERIES
End: 2017-05-31
Attending: OTOLARYNGOLOGY
Payer: MEDICARE

## 2017-05-31 PROCEDURE — G8980 MOBILITY D/C STATUS: HCPCS | Mod: GP,CI | Performed by: PHYSICAL THERAPIST

## 2017-05-31 PROCEDURE — 97164 PT RE-EVAL EST PLAN CARE: CPT | Mod: GP | Performed by: PHYSICAL THERAPIST

## 2017-05-31 PROCEDURE — 40000360 ZZHC STATISTIC PT CANCER REHAB VISIT: Performed by: PHYSICAL THERAPIST

## 2017-05-31 PROCEDURE — G8979 MOBILITY GOAL STATUS: HCPCS | Mod: GP,CI | Performed by: PHYSICAL THERAPIST

## 2017-05-31 PROCEDURE — 97110 THERAPEUTIC EXERCISES: CPT | Mod: GP,KX | Performed by: PHYSICAL THERAPIST

## 2017-05-31 PROCEDURE — 94620 ZZHC SIX MINUTE WALK TEST: CPT | Performed by: PHYSICAL THERAPIST

## 2017-05-31 ASSESSMENT — 6 MINUTE WALK TEST (6MWT): TOTAL DISTANCE WALKED (METERS): 1574

## 2017-05-31 NOTE — TELEPHONE ENCOUNTER
Call received from Melania on 5/30/17. She was wondering the process for having g-tube removed. She reported she is not using tube (except for water flush bid) and hasn't since 5/3/17. She is using Ensure 3x/day and 3 small meals per day averaging between 300-700 calories with food on top of calories from Ensure.  RN updated Dr. Natarajan - he recommends a full month with no weight loss. RN asked Melania to stop in to check wt on clinic scale.  Wt on 5/31/17 was 155.6 lb. Last weight on 5/3/17 was 154lb.  RN gave Melania # for surgery clinic to set up appt around mid June. She is planning on going out of town and wants to wait until after this in the chance of any issues.  Dr. Natarajan is ok with tube removal as wt is stable.

## 2017-05-31 NOTE — PROGRESS NOTES
Outpatient Physical Therapy Discharge Note     Patient: Terese Bell  : 1950    Beginning/End Dates of Reporting Period:  3/20/17  to 2017    Referring Provider: Thomas Mendez Diagnosis: Oral CA     [place]. HR has been staying pretty steady. No P except arthritis.     Objective Measurements:  Objective Measure: FACIT   Details: 17 Score 39.   17 Score 39.   INITIALLY: 30    Objective Measure:  STrength  Details: 17 R 51, L 50 #'s.   17 R 40, L 45.   INITIALLY: R 45, L 47    Objective Measure: Vitals   Details: Before Ex: Small cuff on L arm, BP 88/60, HR 71, O2 98%. After Ex: /66, , O2 98%.    Objective Measure: Parameters  Details: HR max 135, O2 88%.      Outcome Measures (most recent score):  FACIT Fatigue Subscale (score out of 52). The higher the score, the better the QOL.: 39  Six Minute Walk (meters). An increase of 70 or more meters indicates statistically significant change.: 1574    Goals:  Goal Identifier 1   Goal Description STG:  Improve FACIT to 35 for better endurance to do HH duties  17 Score 39  - MET   Target Date 17   Date Met  17   Progress:     Goal Identifier 2   Goal Description STG: Pt will be able to get up off toilet 2/5  17 Minimal diffiuclty 2/5.    Target Date 17   Date Met  17   Progress:     Goal Identifier 3   Goal Description STG: Pt will be able to  cast iron frying pan from lower cupboards 3/5 On 17 Rated 2/5 minimal difficulty.    Target Date 17   Date Met  17   Progress:     Goal Identifier 4   Goal Description STG: Pt will be able to take cans out of box w/ R U/E 3/5  On 17 Rated as minimal difficulty. 1/5    Target Date 17   Date Met  17   Progress:     Goal Identifier 5   Goal Description  LTG: Pt will be independent w/ principals of [facility], and be independent w/  HEP,  5/31/17 MET    Target Date 06/01/17   Date Met  05/31/17   Progress:     Progress Toward Goals:   Progress this reporting period: Pt has met all goals.     Plan:  Discharge from therapy.    Discharge:    Reason for Discharge: Patient has met all goals.  Patient chooses to discontinue therapy.    Equipment Issued:      Discharge Plan: Patient to continue home program.  Pt to follow up w/MD as appropriate.   Melania Nicholas, PT, [facility] (#[id number])  [facility]           [phone]  Fax          [phone]  Appt #      [phone]

## 2017-06-06 ENCOUNTER — TELEPHONE (OUTPATIENT)
Dept: SURGERY | Facility: CLINIC | Age: 67
End: 2017-06-06

## 2017-06-06 NOTE — TELEPHONE ENCOUNTER
Reason for Call:  Patient is calling in states that she is experiencing some pain and a little bleeding from her feeding tube and would like someone to take a look at it    Detailed comments: please advise above    Phone Number Patient can be reached at: Home number on file [phone] (home)    Best Time: any    Can we leave a detailed message on this number? YES    Call taken on 6/6/2017 at 1:54 PM by Riana Rojas

## 2017-06-07 NOTE — TELEPHONE ENCOUNTER
Called and confirmed that patient see's Ostomy nurses for granulation.  Has it again and makes the site very tender.  Says she generally makes an appointment with them but did not know their names.  Advised they are out of office the rest of this week, and if feels she can wait;  To schedule an appt with Ostomy or Wound Care nurse for Monday.  Patient stated understanding.  Darlin Chi RN  [facility]

## 2017-06-08 ENCOUNTER — HOSPITAL ENCOUNTER (OUTPATIENT)
Dept: WOUND CARE | Facility: CLINIC | Age: 67
Discharge: HOME | End: 2017-06-08
Attending: SURGERY | Admitting: SURGERY
Payer: MEDICARE

## 2017-06-08 PROCEDURE — 99212 OFFICE O/P EST SF 10 MIN: CPT

## 2017-06-12 NOTE — ADDENDUM NOTE
Encounter addended by: Kamala Cody on: 6/12/2017  4:41 PM<BR>     Actions taken: Chief Complaint modified, Episode edited, Sign clinical note, Charge Capture section accepted

## 2017-06-12 NOTE — PROGRESS NOTES
Reason for visit/Interval History: Terese Bell, 66 year old female, seen for follow-up at her request for g-tube to treat possible hyperplasia. Has noticed more drainage/bleeding and discomfort again.  Pt was last seen for this in February.  She is actually hoping to get her tube out soon as is eating and drinking again.  Currently getting therapy to help with her swallowing.    Pertinent information from chart review:  HPI from previous visits: Pt with dx tongue CA. Had gtube placed on 11/7/16 by Dr. Mckeon.   Pt has had multiple surgeries for this issue and now she is getting radiation therapy for treatment of a tongue cancer and is unable to swallow.  Per pt she has lost 40 pounds since June of this year.     Objective:   On exam of tube site, bumper appears to be in a good position with no leakage.   Some scant serosanguinous and slight creamy tinged drainage noted on gauze.  Is wearing a bra that appears to be possibly placing downward pressure on the tube, (pushing it against her skin distally,) she was previously wearing a bra with a hole out of the center to pull her tube though and help stabilize the tube as tube is so close to her bra line.  Site is intact with no erythema or signs of infection though proud flesh noted at 6:00.        Assessment:  G-tube with proud flesh needing cautery      Plan:   Proud flesh was cauterized with silver nitrate stick, this was well tolerated.  Site redressed with spilt gauze.  Pt has been using Aquaphor around the site, advised her to use zinc barrier cream which is more protective, did provide some.  Pt may need site re-cauterized and is to return to see [facility] nurse as needed for this.  Anticipate this will be a recurring issue due to location and advised that she go back to previous bra with hole cut out so she is supporting tube from all sides, not just above so that is pushed downward.  Pt pt asking questions about tube removal, this was addressed and  urged pt to be sure she is able to take in good amounts so nutritional status is optimal prior to doing so site has best chance to heal.  Pt verbalized understanding and will schedule consult with surgery clinic for removal when ready to discuss.      Face to face time approximately 20 minutes.    Kamala Cody RN, CWOCN     [phone]

## 2017-06-19 ENCOUNTER — THERAPY VISIT (OUTPATIENT)
Dept: SPEECH THERAPY | Facility: CLINIC | Age: 67
End: 2017-06-19

## 2017-06-19 ENCOUNTER — OFFICE VISIT (OUTPATIENT)
Dept: OTOLARYNGOLOGY | Facility: CLINIC | Age: 67
End: 2017-06-19

## 2017-06-19 VITALS — HEIGHT: 63 IN | BODY MASS INDEX: 27.11 KG/M2 | WEIGHT: 153 LBS

## 2017-06-19 DIAGNOSIS — Z11.59 NEED FOR HEPATITIS C SCREENING TEST: Primary | ICD-10-CM

## 2017-06-19 DIAGNOSIS — R13.12 OROPHARYNGEAL DYSPHAGIA: ICD-10-CM

## 2017-06-19 DIAGNOSIS — C02.9 TONGUE CANCER (H): Primary | ICD-10-CM

## 2017-06-19 PROCEDURE — G0472 HEP C SCREEN HIGH RISK/OTHER: HCPCS | Performed by: NURSE PRACTITIONER

## 2017-06-19 PROCEDURE — 86803 HEPATITIS C AB TEST: CPT | Performed by: NURSE PRACTITIONER

## 2017-06-19 PROCEDURE — 36415 COLL VENOUS BLD VENIPUNCTURE: CPT | Performed by: NURSE PRACTITIONER

## 2017-06-19 ASSESSMENT — PAIN SCALES - GENERAL: PAINLEVEL: NO PAIN (0)

## 2017-06-19 NOTE — LETTER
6/19/2017     RE: Terese Bell  [address] [place] [address]     Dear Colleague,    Thank you for referring your patient, Terese Bell, to the [facility] at [facility]. Please see a copy of my visit note below.    HISTORY OF PRESENT ILLNESS:  Ms. Bettie Bell is about nine months out from a right partial glossectomy that Dr. Ferris performed and a radial forearm free tissue reconstruction of the right lateral tongue.  She did complete postoperative radiation therapy.  Her pathologic stage was T2 N1 M0.  In general, she has been doing fairly well.  She had a fair amount of scar contracture and scarring of the free flap, but she has been using the Swallow Strong system with Janay Rojas and now is able to get her tongue out of her mouth which she was unable to do prior to starting this therapy.  Therefore, she is very happy with it and would like to continue it.      PHYSICAL EXAMINATION:  Today, the free flap has healed in well.  It is a little bit firm anteriorly and a little bit scarred anteriorly towards the inner table of the mandible.  Visual inspection and palpation of the oral cavity does not reveal any concerning lesions.  Examination of the neck reveals no mass or lymphadenopathy.      IMPRESSION:  No evidence of disease.      PLAN:   1.  We discussed that we would hold off on any surgical intervention to try to increase mobility in her mouth as I am not confident how much incising the anterior scar would help.  Also, I worry that it would continue to scar and it seems like the Swallow Strong apparatus is getting more results the more that she uses it.  She seems quite happy with this.  We will continue with this for now.  She will follow up with Dr. Ferris in two months and see me after that.        Again, thank you for allowing me to participate in the care of your patient.      Sincerely,  Moreno Leo,  MD  cc: MD [facility]           [address] [place] [address], [address]           [place]   [address]                     Nicole Mcdonald NP           [facility]           [address] [place] [address]   [id number]                     Thomas Ferris MD          [facility]

## 2017-06-19 NOTE — PROGRESS NOTES
Outpatient Physical Therapy Progress Note     Patient: Terese Bell  : 1950    Beginning/End Dates of Reporting Period:  2017 to 2017    Referring Provider: Dr. Josy MD    Therapy Diagnosis: H&N lymphedema      Client Self Report: I thought I was actually doing ok in regards to the swelling, I was surprised my doctor sent me back    Objective Measurements:  Objective Measure: cervical girth  Details: from when patient was last seen at edema clinic and discharged on 17: superior neck -0.8cm, middle neck +0.1cm and lower neck -0.8cm       Goals:  Goal Identifier ltg   Goal Description pt to be independent with longterm lymphedema and scar management via: exercises, MLD, MFR, compression garment wear/use and desensitization techniques   Target Date 17   Date Met      Progress:       Progress Toward Goals:   Patient was seen in clinic for a new evaluation on 16 which was ~1month after she had been discharged per MD's request to be seen again. Per girth measurements (see above) all had actually decreased from when last discharged and pt continues to be independent and compliant in home program which she was instructed to continue doing.  Pt to follow-up one additional time (on 17) for final re-check and anticipate to discharge patient at that time.      Plan:  Continue therapy per current plan of care.    Discharge:  No

## 2017-06-19 NOTE — PROGRESS NOTES
HISTORY OF PRESENT ILLNESS:  Ms. Bettie Bell is about nine months out from a right partial glossectomy that Dr. Ferris performed and a radial forearm free tissue reconstruction of the right lateral tongue.  She did complete postoperative radiation therapy.  Her pathologic stage was T2 N1 M0.  In general, she has been doing fairly well.  She had a fair amount of scar contracture and scarring of the free flap, but she has been using the Swallow Strong system with Janay Rojas and now is able to get her tongue out of her mouth which she was unable to do prior to starting this therapy.  Therefore, she is very happy with it and would like to continue it.      PHYSICAL EXAMINATION:  Today, the free flap has healed in well.  It is a little bit firm anteriorly and a little bit scarred anteriorly towards the inner table of the mandible.  Visual inspection and palpation of the oral cavity does not reveal any concerning lesions.  Examination of the neck reveals no mass or lymphadenopathy.      IMPRESSION:  No evidence of disease.      PLAN:   1.  We discussed that we would hold off on any surgical intervention to try to increase mobility in her mouth as I am not confident how much incising the anterior scar would help.  Also, I worry that it would continue to scar and it seems like the Swallow Strong apparatus is getting more results the more that she uses it.  She seems quite happy with this.  We will continue with this for now.  She will follow up with Dr. Ferris in two months and see me after that.         cc: Luis Antonio Schumacher MD    [facility]          [address] [place] [address], [address]          [place]   [address]                    Nicole Mcdonald NP          [address]   [address]                    Thomas Ferris MD          [place] [address]

## 2017-06-19 NOTE — MR AVS SNAPSHOT
After Visit Summary   6/19/2017    Terese Bell    MRN: [id number]           Patient Information     Date Of Birth          1950        Visit Information        Provider Department      6/19/2017 9:00 AM Moreno Leo MD [facility]        Today's Diagnoses     Tongue cancer (H)    -  1      Care Instructions    Follow up with Dr. Ferris in 2-3 months  Call the ENT clinic if ?'s arise [phone]          Follow-ups after your visit        Your next 10 appointments already scheduled     Jun 21, 2017  8:45 AM CDT   [facility] Sterling [facility]on MD   [facility]    [address] [facility] [address]   [phone]            Jun 21, 2017 10:00 AM CDT   Lymphedema Treatment with Rachel Richey PT   [facility] ([facility])    [address] [address] [phone]            Aug 03, 2017  2:30 PM CDT   Return Visit with Pattie Natarajan MD   [facility] ([facility])    [address], [address]  [facility] [address]   [phone]              Who to contact     Please call your clinic at [phone] to:    Ask questions about your health    Make or cancel appointments    Discuss your medicines    Learn about your test results    Speak to your doctor   If you have compliments or concerns about an experience at your clinic, or if you wish to file a complaint, please contact [facility] Patient Relations at [phone] or email us at [email or web address]         Additional Information About Your Visit        MyChart Information     [employer] gives you secure access to your electronic health record. If you see a primary care provider, you can also send messages to your care team and make appointments. If you have questions, please call your primary care clinic.  If you do not have a primary care  "provider, please call [phone] and they will assist you.      [employer] is an electronic gateway that provides easy, online access to your medical records. With [employer], you can request a clinic appointment, read your test results, renew a prescription or communicate with your care team.     To access your existing account, please contact your [facility] or call [phone] for assistance.        Care EveryWhere ID     This is your Care EveryWhere ID. This could be used by other organizations to access your [facility] medical records  [id number]        Your Vitals Were     Height BMI (Body Mass Index)                1.61 m (5' 3.39\") 26.77 kg/m2           Blood Pressure from Last 3 Encounters:   03/30/17 100/62   01/26/17 104/67   12/22/16 102/72    Weight from Last 3 Encounters:   06/19/17 69.4 kg (153 lb)   05/03/17 69.9 kg (154 lb)   03/30/17 73.9 kg (163 lb)              Today, you had the following     No orders found for display       Primary Care Provider Office Phone # Fax #    HERNÁN Carranza [facility] [phone]       [facility] [address] [facility]   [facility] [address]        Thank you!     Thank you for choosing [facility] EAR NOSE [facility] TH[facility]AT  for your care. Our goal is always to provide you with excellent care. Hearing back from our patients is one way we can continue to improve our services. Please take a few minutes to complete the written survey that you may receive in the mail after your visit with us. Thank you!             Your Updated Medication List - Protect others around you: Learn how to safely use, store and throw away your medicines at www.disposemymeds.org.          This list is accurate as of: 6/19/17 11:59 PM.  Always use your most recent med list.                   Brand Name Dispense Instructions for use    albuterol 108 (90 BASE) MCG/ACT Inhaler    PROAIR HFA/PROVENTIL HFA/VENTOLIN HFA     Inhale 2 puffs into the lungs " every 6 hours as needed for wheezing       DENTAGEL 1.1 % Gel topical gel   Generic drug:  sodium fluoride dental gel      Apply 1 Application to affected area daily       EPINEPHrine 0.3 MG/0.3ML injection     2 each    Inject 0.3 mLs (0.3 mg) into the muscle once as needed for anaphylaxis       fluticasone-salmeterol 250-50 MCG/DOSE diskus inhaler    ADVAIR DISKUS    1 Inhaler    Inhale 1 puff into the lungs 2 times daily as needed       order for DME     1 Device    Equipment being ordered: tennis elbow band

## 2017-06-19 NOTE — ADDENDUM NOTE
Encounter addended by: Rachel Richey, PT on: 6/19/2017  3:28 PM<BR>     Actions taken: Flowsheet accepted, Sign clinical note

## 2017-06-20 LAB — HCV AB SERPL QL IA: NORMAL

## 2017-06-21 ENCOUNTER — HOSPITAL ENCOUNTER (OUTPATIENT)
Dept: PHYSICAL THERAPY | Facility: CLINIC | Age: 67
Setting detail: THERAPIES SERIES
End: 2017-06-21
Attending: OTOLARYNGOLOGY
Payer: MEDICARE

## 2017-06-21 ENCOUNTER — OFFICE VISIT (OUTPATIENT)
Dept: SURGERY | Facility: CLINIC | Age: 67
End: 2017-06-21
Payer: MEDICARE

## 2017-06-21 VITALS
HEIGHT: 63 IN | SYSTOLIC BLOOD PRESSURE: 90 MMHG | WEIGHT: 153 LBS | TEMPERATURE: 98.3 F | HEART RATE: 59 BPM | DIASTOLIC BLOOD PRESSURE: 56 MMHG | BODY MASS INDEX: 27.11 KG/M2

## 2017-06-21 DIAGNOSIS — C02.9 TONGUE CANCER (H): Primary | ICD-10-CM

## 2017-06-21 PROCEDURE — 97140 MANUAL THERAPY 1/> REGIONS: CPT | Mod: GP | Performed by: PHYSICAL THERAPIST

## 2017-06-21 PROCEDURE — 99212 OFFICE O/P EST SF 10 MIN: CPT | Performed by: SURGERY

## 2017-06-21 PROCEDURE — 40000099 ZZH STATISTIC LYMPHEDEMA VISIT: Performed by: PHYSICAL THERAPIST

## 2017-06-21 NOTE — PROGRESS NOTES
Outpatient Physical Therapy Discharge Note     Patient: Terese Bell  : 1950    Beginning/End Dates of Reporting Period:  2017 to 2017    Referring Provider: Dr. Josy MD    Therapy Diagnosis: R-side head and neck lymphedema      Client Self Report: I've noticed that I've been swallowing and eatting better and my kids say I sound better    Objective Measurements:  Objective Measure: cervical girth  Details: since last session on 17: superior neck -0.5cm, middle neck -1.8cm and lower neck -0.2cm     Goals:  Goal Identifier ltg   Goal Description pt to be independent with longterm lymphedema and scar management via: exercises, MLD, MFR, compression garment wear/use and desensitization techniques   Target Date 17   Date Met  17   Progress:       Progress Toward Goals:   Goals met - pt independent with CDT home program      Plan:  Discharge from therapy.    Discharge:    Reason for Discharge: Patient has met all goals.    Equipment Issued: none this episode of care    Discharge Plan: Patient to continue home program.

## 2017-06-21 NOTE — PROGRESS NOTES
66-year-old female here for feeding tube removal. Patient has not used her tube and over 1 month and is able to swallow without difficulty.      Procedure: PEG gently removed using traction countertraction method. We'll cover with dry gauze and tape.      Assessment and plan: Status post removal of feeding tube. Advised patient to keep wound covered with a dry gauze until it stops leaking and then wash daily with soap and water. Follow-up as necessary.    Sterling Mckeon MD

## 2017-06-21 NOTE — MR AVS SNAPSHOT
After Visit Summary   6/21/2017    Terese Bell    MRN: [id number]           Patient Information     Date Of Birth          1950        Visit Information        Provider Department      6/21/2017 8:45 AM Sterling Mckeon MD [facility]        Today's Diagnoses     Tongue cancer (H)    -  1      Care Instructions    Per Dr. Mckeon's instructions          Follow-ups after your visit        Your next 10 appointments already scheduled     Jun 21, 2017 10:00 AM CDT   Lymphedema Treatment with Rachel Richey PT   [facility]ymp[facility]ema ([facility])    [address] [address]   [phone]            Aug 03, 2017  2:30 PM CDT   Return Visit with Pattie Natarajan MD   [facility] ([facility])    [address], [address]  [place] [facility] [place] [address]   [phone]              Who to contact     If you have questions or need follow up information about today's clinic visit or your schedule please contact [facility] directly at [phone].  Normal or non-critical lab and imaging results will be communicated to you by [employer], letter or phone within 4 business days after the clinic has received the results. If you do not hear from us within 7 days, please contact the clinic through [employer] or phone. If you have a critical or abnormal lab result, we will notify you by phone as soon as possible.  Submit refill requests through [employer] or call your pharmacy and they will forward the refill request to us. Please allow 3 business days for your refill to be completed.          Additional Information About Your Visit        MyChart Information     [employer] gives you secure access to your electronic health record. If you see a primary care provider, you can also send messages to your care team and make appointments. If you have questions, please call your primary care clinic.  If you do not have a primary care  "provider, please call [phone] and they will assist you.        Care EveryWhere ID     This is your Care EveryWhere ID. This could be used by other organizations to access your [facility] medical records  [id number]        Your Vitals Were     Pulse Temperature Height BMI (Body Mass Index)          59 98.3  F (36.8  C) (Oral) 1.6 m (5' 3\") 27.1 kg/m2         Blood Pressure from Last 3 Encounters:   06/21/17 90/56   03/30/17 100/62   01/26/17 104/67    Weight from Last 3 Encounters:   06/21/17 69.4 kg (153 lb)   06/19/17 69.4 kg (153 lb)   05/03/17 69.9 kg (154 lb)              Today, you had the following     No orders found for display       Primary Care Provider Office Phone # Fax #    HERNÁN Carranza [phone]       [place] [facility] [address] [place] [address]        Equal Access to Services     BLANCA ALMANZA : Hadii angel ku hadasho Soomaali, waaxda luqadaha, qaybta kaalmada adeegyada, luis adame . So [facility] [phone].    ATENCIÓN: Si habla español, tiene a thompson disposición servicios gratuitos de asistencia lingüística. Llame al [phone].    We comply with applicable federal civil rights laws and [place] laws. We do not discriminate on the basis of race, color, national origin, age, disability sex, sexual orientation or gender identity.            Thank you!     Thank you for choosing [facility]  for your care. Our goal is always to provide you with excellent care. Hearing back from our patients is one way we can continue to improve our services. Please take a few minutes to complete the written survey that you may receive in the mail after your visit with us. Thank you!             Your Updated Medication List - Protect others around you: Learn how to safely use, store and throw away your medicines at www.disposemymeds.org.          This list is accurate as of: 6/21/17  8:57 AM.  Always use your most recent med " list.                   Brand Name Dispense Instructions for use Diagnosis    albuterol 108 (90 BASE) MCG/ACT Inhaler    PROAIR HFA/PROVENTIL HFA/VENTOLIN HFA     Inhale 2 puffs into the lungs every 6 hours as needed for wheezing    Moderate persistent asthma without complication       DENTAGEL 1.1 % Gel topical gel   Generic drug:  sodium fluoride dental gel      Apply 1 Application to affected area daily        EPINEPHrine 0.3 MG/0.3ML injection     2 each    Inject 0.3 mLs (0.3 mg) into the muscle once as needed for anaphylaxis    Anaphylactic reaction, subsequent encounter       fluticasone-salmeterol 250-50 MCG/DOSE diskus inhaler    ADVAIR DISKUS    1 Inhaler    Inhale 1 puff into the lungs 2 times daily as needed    SOB (shortness of breath)       order for DME     1 Device    Equipment being ordered: tennis elbow band    Right tennis elbow

## 2017-06-21 NOTE — NURSING NOTE
"Initial BP 90/56 (BP Location: Right arm, Patient Position: Chair, Cuff Size: Adult Regular)  Pulse 59  Temp 98.3  F (36.8  C) (Oral)  Ht 1.6 m (5' 3\")  Wt 69.4 kg (153 lb)  BMI 27.1 kg/m2 Estimated body mass index is 27.1 kg/(m^2) as calculated from the following:    Height as of this encounter: 1.6 m (5' 3\").    Weight as of this encounter: 69.4 kg (153 lb). .    Rachel Garland MA    "

## 2017-06-29 ENCOUNTER — THERAPY VISIT (OUTPATIENT)
Dept: SPEECH THERAPY | Facility: CLINIC | Age: 67
End: 2017-06-29

## 2017-06-29 DIAGNOSIS — Z85.810 HISTORY OF TONGUE CANCER: ICD-10-CM

## 2017-06-29 DIAGNOSIS — R13.12 OROPHARYNGEAL DYSPHAGIA: Primary | ICD-10-CM

## 2017-07-20 ENCOUNTER — THERAPY VISIT (OUTPATIENT)
Dept: SPEECH THERAPY | Facility: CLINIC | Age: 67
End: 2017-07-20

## 2017-07-20 DIAGNOSIS — Z85.810 HISTORY OF TONGUE CANCER: ICD-10-CM

## 2017-07-20 DIAGNOSIS — R13.12 OROPHARYNGEAL DYSPHAGIA: Primary | ICD-10-CM

## 2017-08-03 ENCOUNTER — OFFICE VISIT (OUTPATIENT)
Dept: RADIATION THERAPY | Facility: OUTPATIENT CENTER | Age: 67
End: 2017-08-03

## 2017-08-03 VITALS
BODY MASS INDEX: 26.64 KG/M2 | SYSTOLIC BLOOD PRESSURE: 110 MMHG | WEIGHT: 150.4 LBS | HEART RATE: 54 BPM | DIASTOLIC BLOOD PRESSURE: 64 MMHG

## 2017-08-03 DIAGNOSIS — C02.9 SQUAMOUS CELL CARCINOMA OF TONGUE (H): Primary | ICD-10-CM

## 2017-08-03 ASSESSMENT — PAIN SCALES - GENERAL: PAINLEVEL: NO PAIN (0)

## 2017-08-03 NOTE — NURSING NOTE
FOLLOW-UP VISIT    Patient Name: Terese Bell      : 1950     Age: 66 year old        ______________________________________________________________________________     Chief Complaint   Patient presents with     Radiation Therapy     Follow up appointment     /64 (Patient Position: Left side, Cuff Size: Adult Large)  Pulse 54  Wt 68.2 kg (150 lb 6.4 oz)  BMI 26.64 kg/m2       Pain  Denies    Meds  Current Med List Reviewed: Yes  Medication Note:     Imaging  None    Oral Products: OTC  Mucositis: No  Dry mouth: some  Dental evaluation: Yes, sees every 3-4 months  PEG tube: No  [facility], doing [facility]  Lymphedema: wears compression garmets  Energy Level:normal  Appetite: getting better  Intake: mainly normal  Weight:    Wt Readings from Last 5 Encounters:   17 68.2 kg (150 lb 6.4 oz)   17 69.4 kg (153 lb)   17 69.4 kg (153 lb)   17 69.9 kg (154 lb)   17 73.9 kg (163 lb)       Appointments:     DATE  Oncologist: n/a    ENT:  See Sharon This    Primary:      Other Notes:

## 2017-08-03 NOTE — LETTER
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     TYPE OF RADIATION THERAPY ADMINISTERED: 6000 cGy in 30 fractions         SUBJECTIVE: Mrs. 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 She had radiation therapy in our clinic to a total dose of 6000 cGy in 30 treatments at 200 cGy each fraction from 10/17/2016 to 2016.  She tolerated radiation therapy reasonably well.  The patient did have some significant sore throat and dysphagia toward the end of the therapy, which required a PEG tube for nutritional support.  She otherwise was reasonably stable at the end of the therapy. She returns today for a routine post-treatment checkup.      Since her last visit with us, she reports that she has been doing reasonably well. She continues to improve since her last visit in our clinic.     #Nutrition   Her taste and oral pain continues to improve which has allowed her to take in more nutrition PO.  She has had her G tube removed since last follow-up.  She continues to press oral nutrition.  She is capable of eating many foods such as corn and sticky rice and yogurt.  She takes calorie/ nutrition supplements 2-3x.      #Xerostomia  Persistent xerostomia.  She uses hydration and biotene.  She has tried several other medications to no effect.  She does get some relief from gum.     #Autalgia  Pt reports she has persistent left ear pain since the time of surgery.  It is unchanged and constant.      # Follow-up with other providers.  She was recently seen by  Dr. Ferris/ Dr. Leo in ENT and felt to be recovering as expected with no evidence of disease. Additionally, she continues to followe with speech and lymphedema therapy. She sees dental q3 months.     #Thyroid  The patient has some risk for radiation induced hypothyoridism. Most recent TSH was 1.40.  She reports some minor fatigue.  No significant weight loss recently.  She also does not have significant cold intolerance.     Overall, she continues to do as well as expected and her remaining ROS are unremarkable.      OBJECTIVE:   Vital Signs: /64 (Patient Position: Left side, Cuff Size: Adult Large)  Pulse 54  Wt 68.2 kg (150 lb 6.4 oz)  BMI 26.64 kg/m2; Last weight documented 155.6 lb on 5/31/17.   GENERAL:  Appears well, in no acute distress.   HEENT: NCAT. No thrush, no mucositis.  Small nodule in the right upper neck which pt reports has been stable as assessed by ENT.  Similarly there is a small nodule in the left upper neck which pt reports has been stable as assessed by ENT.  Oral cavity exam shows grafted tissue in place.  Mucous membranes are dry.   RESP: Breathing comfortably on RA  CV: WWP  NEURO: Normal gait.      IMAGING:   XR VIDEO SWALLOW W/O ESOPHAGRAM 3/2/2017      IMPRESSION: Swallowing mechanism findings, as described above. No  aspiration.    PET/CT  3/1/2017  IMPRESSION:  1. Postop changes within the neck without evidence of abnormal  hypermetabolism to suggest recurrent disease. No hypermetabolic  adenopathy or evidence of metastatic disease in the neck, chest,  abdomen or pelvis.   2. Gastrostomy tube appears in good position. No evidence of bowel  obstruction or diverticulitis.  3. Multiple tiny indeterminate lung nodules, unchanged since prior  exam. None of these measures larger than 0.3 cm. Continued  surveillance as clinically warranted.     IMPRESSION:   Ms. 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  She is well recovered from the acute toxicities of treatment.  We will continue to follow her for recurrence and management of side effects.       RECOMMENDATIONS:      1. RTC in 6 mo  2. Continue oral nutrition  3. Continue close follow up with ENT as scheduled.   4. Continue with lymphedema therapy.  5. TSH monitoring for possibility of radiation induced hypothyroidism at care of PCP    Efrain Kendrick MD  Radiation Oncology Resident, PGY-3  [facility]  Phone: [phone]    I have personally examined the patient, reviewed and edited the resident's note and agree with the plan of care.    Pattie Natarajan M.D., Ph.D.   Adjunct    Radiation Oncologist   [facility]   Radiation Therapy Center   Phone: [phone]    CC  Patient Care Team:  Nicole Mcdonald APRN CNP as PCP - General  Thomas Ferris MD as MD (Otolaryngology)

## 2017-08-03 NOTE — MR AVS SNAPSHOT
After Visit Summary   8/3/2017    Terese Bell    MRN: [id number]           Patient Information     Date Of Birth          1950        Visit Information        Provider Department      8/3/2017 2:30 PM Pattie Natarajan MD [facility]         Follow-ups after your visit        Your next 10 appointments already scheduled     Aug 10, 2017 12:30 PM CDT   Treatment 60 with AYAD Day   [facility] ([facility])    [address]  [address]  [place] [address] [phone]            Feb 06, 2018 11:15 AM CST   Return Visit with Pattie Natarajan MD   Rad[facility] Therapy Center ([facility])    [address], [address]  [facility] [place] [facility] [place] [facility] [place] [address] [phone]              Who to contact     Please call your clinic at [phone] to:    Ask questions about your health    Make or cancel appointments    Discuss your medicines    Learn about your test results    Speak to your doctor   If you have compliments or concerns about an experience at your clinic, or if you wish to file a complaint, please contact [facility] Patient Relations at [phone] or email us at [email or web address]         Additional Information About Your Visit        MyChart Information     [employer] gives you secure access to your electronic health record. If you see a primary care provider, you can also send messages to your care team and make appointments. If you have questions, please call your primary care clinic.  If you do not have a primary care provider, please call [phone] and they will assist you.      [employer] is an electronic gateway that provides easy, online access to your medical records. With [employer], you can request a clinic appointment, read your test results, renew a prescription or communicate with your care team.     To access your existing account, please contact your [facility] or call [phone] for assistance.        Care EveryWhere ID     This is your Care EveryWhere ID. This could be used by other organizations to access your [facility] medical records  [id number]        Your Vitals Were     Pulse BMI (Body Mass Index)                54 26.64 kg/m2           Blood Pressure from Last 3 Encounters:   08/03/17 110/64   06/21/17 90/56   03/30/17 100/62    Weight from Last 3 Encounters:   08/03/17 68.2 kg (150 lb 6.4 oz)   06/21/17 69.4 kg (153 lb)   06/19/17 69.4 kg (153 lb)              Today, you had the following     No orders found for display       Primary Care Provider Office Phone # Fax #    Nicoleartem Mcdonald, APRN [phone]       [place] [facility] [address] [place] [address]        Equal Access to Services     TREMAINE ALMANZA : Hadii aad ku hadasho Soomaali, waaxda luqadaha, qaybta kaalmada adeegyada, waxay laurenin hayyanin richie adame . So [facility] [phone].    ATENCIÓN: Si habla español, tiene a thompson disposición servicios gratuitos de asistencia lingüística. Tigistame al [phone].    We comply with applicable federal civil rights laws and [place] laws. We do not discriminate on the basis of race, color, national origin, age, disability sex, sexual orientation or gender identity.            Thank you!     Thank you for choosing [facility]  for your care. Our goal is always to provide you with excellent care. Hearing back from our patients is one way we can continue to improve our services. Please take a few minutes to complete the written survey that you may receive in the mail after your visit with us. Thank you!             Your Updated Medication List - Protect others around you: Learn how to safely use, store and throw away your medicines at www.disposemymeds.org.          This list is accurate as of: 8/3/17  3:18 PM.  Always use your most recent med list.                   Brand Name Dispense  Instructions for use Diagnosis    albuterol 108 (90 BASE) MCG/ACT Inhaler    PROAIR HFA/PROVENTIL HFA/VENTOLIN HFA     Inhale 2 puffs into the lungs every 6 hours as needed for wheezing    Moderate persistent asthma without complication       DENTAGEL 1.1 % Gel topical gel   Generic drug:  sodium fluoride dental gel      Apply 1 Application to affected area daily        EPINEPHrine 0.3 MG/0.3ML injection 2-pack    EPIPEN/ADRENACLICK/or ANY BX GENERIC EQUIV    2 each    Inject 0.3 mLs (0.3 mg) into the muscle once as needed for anaphylaxis    Anaphylactic reaction, subsequent encounter       fluticasone-salmeterol 250-50 MCG/DOSE diskus inhaler    ADVAIR DISKUS    1 Inhaler    Inhale 1 puff into the lungs 2 times daily as needed    SOB (shortness of breath)       order for DME     1 Device    Equipment being ordered: tennis elbow band    Right tennis elbow

## 2017-08-03 NOTE — PROGRESS NOTES
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     TYPE OF RADIATION THERAPY ADMINISTERED: 6000 cGy in 30 fractions         SUBJECTIVE: Mrs. 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 She had radiation therapy in our clinic to a total dose of 6000 cGy in 30 treatments at 200 cGy each fraction from 10/17/2016 to 2016.  She tolerated radiation therapy reasonably well.  The patient did have some significant sore throat and dysphagia toward the end of the therapy, which required a PEG tube for nutritional support.  She otherwise was reasonably stable at the end of the therapy. She returns today for a routine post-treatment checkup.      Since her last visit with us, she reports that she has been doing reasonably well. She continues to improve since her last visit in our clinic.     #Nutrition   Her taste and oral pain continues to improve which has allowed her to take in more nutrition PO.  She has had her G tube removed since last follow-up.  She continues to press oral nutrition.  She is capable of eating many foods such as corn and sticky rice and yogurt.  She takes calorie/ nutrition supplements 2-3x.      #Xerostomia  Persistent xerostomia.  She uses hydration and biotene.  She has tried several other medications to no effect.  She does get some relief from gum.     #Autalgia  Pt reports she has persistent left ear pain since the time of surgery.  It is unchanged and constant.      # Follow-up with other providers.  She was recently seen by Dr. Ferris/ Dr. Leo in ENT and felt to be recovering as expected with no evidence of  disease. Additionally, she continues to followe with speech and lymphedema therapy. She sees dental q3 months.     #Thyroid  The patient has some risk for radiation induced hypothyoridism. Most recent TSH was 1.40.  She reports some minor fatigue.  No significant weight loss recently.  She also does not have significant cold intolerance.     Overall, she continues to do as well as expected and her remaining ROS are unremarkable.      OBJECTIVE:   Vital Signs: /64 (Patient Position: Left side, Cuff Size: Adult Large)  Pulse 54  Wt 68.2 kg (150 lb 6.4 oz)  BMI 26.64 kg/m2; Last weight documented 155.6 lb on 5/31/17.   GENERAL:  Appears well, in no acute distress.   HEENT: NCAT. No thrush, no mucositis.  Small nodule in the right upper neck which pt reports has been stable as assessed by ENT.  Similarly there is a small nodule in the left upper neck which pt reports has been stable as assessed by ENT.  Oral cavity exam shows grafted tissue in place.  Mucous membranes are dry.   RESP: Breathing comfortably on RA  CV: WWP  NEURO: Normal gait.      IMAGING:   XR VIDEO SWALLOW W/O ESOPHAGRAM 3/2/2017      IMPRESSION: Swallowing mechanism findings, as described above. No  aspiration.    PET/CT  3/1/2017  IMPRESSION:  1. Postop changes within the neck without evidence of abnormal  hypermetabolism to suggest recurrent disease. No hypermetabolic  adenopathy or evidence of metastatic disease in the neck, chest,  abdomen or pelvis.   2. Gastrostomy tube appears in good position. No evidence of bowel  obstruction or diverticulitis.  3. Multiple tiny indeterminate lung nodules, unchanged since prior  exam. None of these measures larger than 0.3 cm. Continued  surveillance as clinically warranted.     IMPRESSION:   Ms. 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  She is well recovered from  the acute toxicities of treatment.  We will continue to follow her for recurrence and management of side effects.       RECOMMENDATIONS:      1. RTC in 6 mo  2. Continue oral nutrition  3. Continue close follow up with ENT as scheduled.   4. Continue with lymphedema therapy.  5. TSH monitoring for possibility of radiation induced hypothyroidism at care of PCP    Efrain Kendrick MD  Radiation Oncology Resident, PGY-3  [facility]  Phone: [phone]    I have personally examined the patient, reviewed and edited the resident's note and agree with the plan of care.    Pattie Natarajan M.D., Ph.D.   Adjunct    Radiation Oncologist   [facility]   Radiation Therapy [place]   Phone: [phone]    CC  Patient Care Team:  Nicole Mcdonald APRN CNP as PCP - General  Thomas Ferris MD as MD (Otolaryngology)  Pattie Natarajan MD as MD (Radiation Oncology)

## 2017-08-17 ENCOUNTER — THERAPY VISIT (OUTPATIENT)
Dept: SPEECH THERAPY | Facility: CLINIC | Age: 67
End: 2017-08-17

## 2017-08-17 DIAGNOSIS — C02.9 TONGUE CANCER (H): ICD-10-CM

## 2017-08-17 DIAGNOSIS — Z13.29 SCREENING FOR THYROID DISORDER: Primary | ICD-10-CM

## 2017-08-17 DIAGNOSIS — R13.12 OROPHARYNGEAL DYSPHAGIA: Primary | ICD-10-CM

## 2017-08-17 DIAGNOSIS — C02.9 MALIGNANT NEOPLASM OF TONGUE (H): ICD-10-CM

## 2017-08-17 PROCEDURE — 36415 COLL VENOUS BLD VENIPUNCTURE: CPT | Performed by: NURSE PRACTITIONER

## 2017-08-17 PROCEDURE — 84443 ASSAY THYROID STIM HORMONE: CPT | Performed by: NURSE PRACTITIONER

## 2017-08-17 NOTE — MR AVS SNAPSHOT
After Visit Summary   8/17/2017    Terese Bell    MRN: [id number]           Patient Information     Date Of Birth          1950        Visit Information        Provider Department      8/17/2017 12:30 PM Tg Clifton SLP [facility]        Today's Diagnoses     Oropharyngeal dysphagia    -  1    Tongue cancer (H)           Follow-ups after your visit        Additional Services     Speech Therapy Referral       *This order will print in the [facility] Central Scheduling Office*    [facility] provides Speech Therapy evaluation and treatment and many specialty services across the [place] system.  If requesting a specialty program, please choose from the list below.    Call [phone] to schedule [place] [facility] at all locations, with the exception of [facility], please call [phone].     Treatment: Evaluation & Treatment  Speech Treatment Diagnosis: Dysphagia  Special Instructions: Video swallow study to be completed at [facility], possibly week of 8/21-8/25.  Special Programs: Video Swallow Study    Please be aware that coverage of these services is subject to the terms and limitations of your health insurance plan.  Call member services at your health plan with any benefit or coverage questions.      **Note to Provider** To refer patients to therapy outside of the location list, change the order class to External Referral in the order composer.                  Your next 10 appointments already scheduled     Aug 17, 2017  2:45 PM CDT   LAB with [facility] LAB   [facility] ([facility])    [address] [address] [phone]           Patient must bring picture ID. Patient should be prepared to give a urine specimen  Please do not eat 10-12 hours before your appointment if you are coming in fasting for labs on lipids, cholesterol, or glucose (sugar).  Pregnant women should follow their Care Team instructions. Water with medications is okay. Do not drink coffee or other fluids. If you have concerns about taking  your medications, please ask at office or if scheduling via [facility], send a message by clicking on Secure Messaging, Message Your Care Team.            Feb 06, 2018 11:15 AM CST   Return Visit with Pattie Natarajan MD   [facility])    [address], [address]  [place] [address]   [phone]              Future tests that were ordered for you today     Open Future Orders        Priority Expected Expires Ordered    XR Video Swallow w/o Esophagram - Order with Speech Therapy Referral Routine 8/17/2017 8/17/2018 8/17/2017            Who to contact     Please call your clinic at [phone] to:    Ask questions about your health    Make or cancel appointments    Discuss your medicines    Learn about your test results    Speak to your doctor   If you have compliments or concerns about an experience at your clinic, or if you wish to file a complaint, please contact [facility] Patient Relations at [phone] or email us at [email or web address]         Additional Information About Your Visit        [facility] Information     [facility] gives you secure access to your electronic health record. If you see a primary care provider, you can also send messages to your care team and make appointments. If you have questions, please call your primary care clinic.  If you do not have a primary care provider, please call [phone] and they will assist you.      [facility] is an electronic gateway that provides easy, online access to your medical records. With [facility], you can request a clinic appointment, read your test results, renew a prescription or communicate with your care team.     To access your existing account, please contact your [facility] or call  [phone] for assistance.        Care EveryWhere ID     This is your Care EveryWhere ID. This could be used by other organizations to access your [place] medical records  [id number]         Blood Pressure from Last 3 Encounters:   08/03/17 110/64   06/21/17 90/56   03/30/17 100/62    Weight from Last 3 Encounters:   08/03/17 68.2 kg (150 lb 6.4 oz)   06/21/17 69.4 kg (153 lb)   06/19/17 69.4 kg (153 lb)              We Performed the Following     Speech Therapy Referral        Primary Care Provider Office Phone # Fax #    Nicole Waters Bayron Mcdonald, HERNÁN [facility] [phone] [address] [place] [address]        Equal Access to Services     TREMAINE ALMANZA : Hadii aad ku hadasho Soomaali, waaxda luqadaha, qaybta kaalmada adeegyada, waxay idiin hayaan richie adame . So [facility] [phone].    ATENCIÓN: Si habla español, tiene a thompson disposición servicios gratuitos de asistencia lingüística. Llame al [phone].    We comply with applicable federal civil rights laws and [place] laws. We do not discriminate on the basis of race, color, national origin, age, disability sex, sexual orientation or gender identity.            Thank you!     Thank you for choosing [facility]  for your care. Our goal is always to provide you with excellent care. Hearing back from our patients is one way we can continue to improve our services. Please take a few minutes to complete the written survey that you may receive in the mail after your visit with us. Thank you!             Your Updated Medication List - Protect others around you: Learn how to safely use, store and throw away your medicines at www.disposemymeds.org.          This list is accurate as of: 8/17/17  1:40 PM.  Always use your most recent med list.                   Brand Name Dispense Instructions for use Diagnosis    albuterol 108 (90 BASE) MCG/ACT Inhaler    PROAIR HFA/PROVENTIL HFA/VENTOLIN HFA     Inhale 2 puffs into the lungs every 6  hours as needed for wheezing    Moderate persistent asthma without complication       DENTAGEL 1.1 % Gel topical gel   Generic drug:  sodium fluoride dental gel      Apply 1 Application to affected area daily        EPINEPHrine 0.3 MG/0.3ML injection 2-pack    EPIPEN/ADRENACLICK/or ANY BX GENERIC EQUIV    2 each    Inject 0.3 mLs (0.3 mg) into the muscle once as needed for anaphylaxis    Anaphylactic reaction, subsequent encounter       fluticasone-salmeterol 250-50 MCG/DOSE diskus inhaler    ADVAIR DISKUS    1 Inhaler    Inhale 1 puff into the lungs 2 times daily as needed    SOB (shortness of breath)       order for DME     1 Device    Equipment being ordered: tennis elbow band    Right tennis elbow

## 2017-08-17 NOTE — PROGRESS NOTES
[facility]      OUTPATIENT SPEECH LANGUAGE PATHOLOGY  PLAN OF TREATMENT FOR OUTPATIENT REHABILITATION    Patient's Last Name, First Name, M.I.                YOB: 1950  Terese Fleming                        Provider's Name  [facility] Medical Record No.  [id number]                               Onset Date: 7/13/16   Start of Care Date: 7/13/16   Type:     ___PT   ___OT   _X_SLP Medical Diagnosis: Tongue CA                       SLP Diagnosis: Oropharyngeal dysphagia      _________________________________________________________________________________  Plan of Treatment:    Frequency/Duration: 2x/month x 3 mos     Goals:  Goal Identifier Exercises   Goal Description 1.  PT will increase tongue flexibility, strength, ROM and increase pharyngeal constriction by completing 10 reps of 10/10 exercises with minimal written cueing 3x/daily.   Target Date 11/15/17   Date Met      Progress:     Goal Identifier Diet   Goal Description 2. PT will tolerate a dysphagia diet level 3 with thin liquids without overt Sx of aspiration with double swallow, liquid wash.     Target Date 11/15/17   Date Met      Progress:     Goal Identifier I-PRO   Goal Description 3. Pt will increase maximal lingual pressures by 20% to 96.9 hPa at the anterior sensor, 126.36 hPa at the left sensor, 36.36 hPa at the back sensor, and 12.24 hPa at the right sensor to improve bolus management.   Target Date 11/15/17   Date Met      Progress:     Goal Identifier     Goal Description     Target Date     Date Met      Progress:     Goal Identifier     Goal Description     Target Date     Date Met      Progress:     Goal Identifier     Goal Description     Target Date     Date Met      Progress:     Goal Identifier     Goal Description     Target Date     Date Met      Progress:     Goal Identifier     Goal  Description     Target Date     Date Met      Progress:     Progress Toward Goals:   Progress this reporting period: Good, subjective and quality of life gains. Continued therapy is warranted toward these goals to allow progress toward safe oral intake and improved quality of life.                   Certification date from 8/2/17 to 11/1/17.    Tg Clifton, SLP          I CERTIFY THE NEED FOR THESE SERVICES FURNISHED UNDER        THIS PLAN OF TREATMENT AND WHILE UNDER MY CARE     (Physician attestation of this document indicates review and certification of the therapy plan).                  Referring Provider: Dr. Thomas Ferris

## 2017-08-18 LAB — TSH SERPL DL<=0.005 MIU/L-ACNC: 1.44 MU/L (ref 0.4–4)

## 2017-08-24 ENCOUNTER — HOSPITAL ENCOUNTER (OUTPATIENT)
Dept: SPEECH THERAPY | Facility: CLINIC | Age: 67
Setting detail: THERAPIES SERIES
End: 2017-08-24
Attending: SPEECH-LANGUAGE PATHOLOGIST
Payer: MEDICARE

## 2017-08-24 ENCOUNTER — HOSPITAL ENCOUNTER (OUTPATIENT)
Dept: GENERAL RADIOLOGY | Facility: CLINIC | Age: 67
Discharge: HOME | End: 2017-08-24
Attending: OTOLARYNGOLOGY | Admitting: OTOLARYNGOLOGY
Payer: MEDICARE

## 2017-08-24 DIAGNOSIS — R13.12 OROPHARYNGEAL DYSPHAGIA: ICD-10-CM

## 2017-08-24 DIAGNOSIS — C02.9 TONGUE CANCER (H): ICD-10-CM

## 2017-08-24 PROCEDURE — 40000828 ZZHC SLP CANCER REHAB VISIT: Performed by: SPEECH-LANGUAGE PATHOLOGIST

## 2017-08-24 PROCEDURE — G8998 SWALLOW D/C STATUS: HCPCS | Mod: GN,CJ | Performed by: SPEECH-LANGUAGE PATHOLOGIST

## 2017-08-24 PROCEDURE — 92611 MOTION FLUOROSCOPY/SWALLOW: CPT | Mod: GN,KX | Performed by: SPEECH-LANGUAGE PATHOLOGIST

## 2017-08-24 PROCEDURE — G8997 SWALLOW GOAL STATUS: HCPCS | Mod: GN,CJ | Performed by: SPEECH-LANGUAGE PATHOLOGIST

## 2017-08-24 PROCEDURE — G8996 SWALLOW CURRENT STATUS: HCPCS | Mod: GN,CJ | Performed by: SPEECH-LANGUAGE PATHOLOGIST

## 2017-08-24 RX ORDER — BARIUM SULFATE 400 MG/ML
50 SUSPENSION ORAL ONCE
Status: COMPLETED | OUTPATIENT
Start: 2017-08-24 | End: 2017-08-24

## 2017-08-24 RX ADMIN — BARIUM SULFATE 30 ML: 400 SUSPENSION ORAL at 10:28

## 2017-08-24 RX ADMIN — BARIUM SULFATE 50 ML: 400 SUSPENSION ORAL at 10:30

## 2017-09-10 ENCOUNTER — HOSPITAL ENCOUNTER (EMERGENCY)
Facility: CLINIC | Age: 67
Discharge: HOME | End: 2017-09-10
Attending: EMERGENCY MEDICINE | Admitting: EMERGENCY MEDICINE
Payer: MEDICARE

## 2017-09-10 VITALS
OXYGEN SATURATION: 99 % | HEART RATE: 86 BPM | DIASTOLIC BLOOD PRESSURE: 78 MMHG | TEMPERATURE: 99.2 F | SYSTOLIC BLOOD PRESSURE: 123 MMHG | RESPIRATION RATE: 18 BRPM

## 2017-09-10 DIAGNOSIS — R21 RASH AND NONSPECIFIC SKIN ERUPTION: ICD-10-CM

## 2017-09-10 DIAGNOSIS — B02.9 HERPES ZOSTER WITHOUT COMPLICATION: ICD-10-CM

## 2017-09-10 PROCEDURE — 99283 EMERGENCY DEPT VISIT LOW MDM: CPT | Performed by: EMERGENCY MEDICINE

## 2017-09-10 PROCEDURE — 99282 EMERGENCY DEPT VISIT SF MDM: CPT

## 2017-09-10 RX ORDER — HYDROMORPHONE HYDROCHLORIDE 2 MG/1
2 TABLET ORAL EVERY 4 HOURS PRN
Qty: 12 TABLET | Refills: 0 | Status: SHIPPED | OUTPATIENT
Start: 2017-09-10 | End: 2017-11-02

## 2017-09-10 RX ORDER — VALACYCLOVIR HYDROCHLORIDE 1 G/1
1000 TABLET, FILM COATED ORAL 3 TIMES DAILY
Qty: 21 TABLET | Refills: 0 | Status: ON HOLD | OUTPATIENT
Start: 2017-09-10 | End: 2017-12-14

## 2017-09-10 NOTE — ED PROVIDER NOTES
History     Chief Complaint   Patient presents with     Shingles     HPI  Terese Bell is a 67 year old female who resents to the emergency department complaining of rash to her left side of her parietal and occipital scalp as well as lateral neck region on the left.  This is a painful pustular rash which has been draining.  She states symptoms started a few days ago pain and now the rash has developed.  It is very uncomfortable for especially when combing her hair.  She denies any visual changes or ear pain.  She has no oral lesions.  She has not had any chest pain or shortness of breath.  She denies any abdominal pain or back pain.  She has not had any focal numbness weakness in any extremity.  Early rates her pain as 6 out of 10.  Past Medical History:   Diagnosis Date     Arthritis     1990     Complication of anesthesia     wakes up slow     Difficult intubation     needs a 'child's sized tube     Hoarseness      Mild major depression (H) 4/6/2009     Moderate persistent asthma      Obesity, unspecified      Pure hypercholesterolemia      Rosacea 3/3/2010     Symptomatic menopausal or female climacteric states      Thrombosis of leg     left leg 30 yrs ago     Tinnitus      Tongue cancer (H)        I have reviewed the Medications, Allergies, Past Medical and Surgical History, and Social History in the Epic system.    Allergies:   Allergies   Allergen Reactions     Banana Swelling and Anaphylaxis     Tongue and lips swell and get itchy     Bee Pollen Anaphylaxis     Bee Venom Anaphylaxis     Blueberry Anaphylaxis and Swelling     Iodine Anaphylaxis     NOT dietary related.     Penicillins Anaphylaxis     Sulfa Drugs Anaphylaxis     Erythromycin Nausea     Latex      Rash from bandages/wraps/pressure tapes     Pravastatin Sodium      muscle spasam     Simvastatin Cough     Strawberry Hives     Tetracycline GI Disturbance     And tingling             Current Facility-Administered Medications on  File Prior to Encounter:  Lidocaine 1% injection 1 mL   lidocaine 4 % (LMX4) cream   sodium chloride (PF) 0.9% PF flush 3 mL   sodium chloride (PF) 0.9% PF flush 3 mL     Current Outpatient Prescriptions on File Prior to Encounter:  DENTAGEL 1.1 % GEL Apply 1 Application to affected area daily   albuterol (PROAIR HFA, PROVENTIL HFA, VENTOLIN HFA) 108 (90 BASE) MCG/ACT inhaler Inhale 2 puffs into the lungs every 6 hours as needed for wheezing   fluticasone-salmeterol (ADVAIR DISKUS) 250-50 MCG/DOSE diskus inhaler Inhale 1 puff into the lungs 2 times daily as needed   ORDER FOR DME, SET TO LOCAL PRINT, Equipment being ordered: tennis elbow band   EPINEPHrine (EPIPEN) 0.3 MG/0.3ML injection Inject 0.3 mLs (0.3 mg) into the muscle once as needed for anaphylaxis       Patient Active Problem List   Diagnosis     Absence of menstruation     Generalized osteoarthrosis, unspecified site     Moderate persistent asthma     FAMILY HX GI MALIGNANCY brother colon ca.      Rosacea     HYPERLIPIDEMIA LDL GOAL <130     Tear Meniscus Knee  repaired     24 hour info given     Advanced directives,HC information sent 3/27/2012     RUQ abdominal pain     Myalgia     Obese     History of tongue cancer     Tongue irritation     Tongue cancer (H)     History of recent ear, nose, and throat (ENT) procedure     Malignant neoplasm of tongue (H)     Fistula     Cervical cancer screening       Past Surgical History:   Procedure Laterality Date     ADENOIDECTOMY      1970     C NONSPECIFIC PROCEDURE      CHOLECYSTECTOMY     C NONSPECIFIC PROCEDURE      SINUS MASS REMOVED     C NONSPECIFIC PROCEDURE      R KNEE SURGERY     CHOLECYSTECTOMY       COLONOSCOPY  11/26/2012    Procedure: COLONOSCOPY;  Colonoscopy;  Surgeon: Yony Garcia MD;  Location: [facility]     EXAM UNDER ANESTHESIA MOUTH N/A 8/15/2016    Procedure: EXAM UNDER ANESTHESIA MOUTH;  Surgeon: Thomas Ferris MD;  Location: [facility]     GLOSSECTOMY Right 9/1/2016    Procedure:  "GLOSSECTOMY;  Surgeon: Thomas Ferris MD;  [facility]     GLOSSECTOMY PARTIAL       GRAFT FREE VASCULARIZED (LOCATION) Left 9/1/2016    Procedure: GRAFT FREE VASCULARIZED (LOCATION);  Surgeon: Moreno Leo MD;  [facility]: [facility]     GRAFT SKIN SPLIT THICKNESS FROM EXTREMITY Left 9/1/2016    Procedure: GRAFT SKIN SPLIT THICKNESS FROM EXTREMITY;  Surgeon: Moreno Leo MD;  Location: [facility]     [facility] ENDOSCOPY W PLACEMENT GASTROSTOMY TUBE PERCUT N/A 11/7/2016    Procedure: COMBINED ESOPHAGOSCOPY, GASTROSCOPY, DUODENOSCOPY (EGD), PLACE PERCUTANEOUS ENDOSCOPIC GASTROSTOMY TUBE;  Surgeon: Sterling Mckeon MD;  Location: [facility]     HEAD & NECK SURGERY       KNEE SURGERY      bilat     LARYNGOSCOPY WITH BIOPSY(IES) N/A 8/15/2016    Procedure: LARYNGOSCOPY WITH BIOPSY(IES);  Surgeon: Thomas Ferris MD;  [facility]     NASAL/SINUS POLYPECTOMY      1990 approx     PAROTIDECTOMY, RADICAL NECK DISSECTION Right 9/1/2016    Procedure: PAROTIDECTOMY, RADICAL NECK DISSECTION;  Surgeon: Thomas Ferris MD;  [facility]     TONSILLECTOMY      1970     TRACHEOSTOMY Right 9/1/2016    Procedure: TRACHEOSTOMY;  Surgeon: Thomas Ferris MD;  [facility]       Social History   Substance Use Topics     Smoking status: Former Smoker     Packs/day: 3.00     Years: 7.00     Types: Cigarettes     Quit date: 1/1/1982     Smokeless tobacco: Never Used     Alcohol use Yes      Comment: 4 drinks per year       Most Recent Immunizations   Administered Date(s) Administered     Influenza (High Dose) 3 valent vaccine 09/30/2016     Influenza (IIV3) 11/05/2012     Influenza Vaccine IM 3yrs+ 4 Valent IIV4 09/29/2014     Mantoux 09/18/2016     Pneumococcal (PCV 13) 10/28/2015     Pneumococcal 23 valent 10/04/2016     TD (ADULT, 7+) 12/21/2000     TDAP Vaccine (Adacel) 11/05/2012       BMI: Estimated body mass index is 26.64 kg/(m^2) as calculated from the following:    Height as of 6/21/17: 1.6 m (5' 3\").    Weight as of 8/3/17: 68.2 kg " (150 lb 6.4 oz).      Review of Systems   Constitutional: Negative for chills and fever.   HENT: Negative for congestion, ear pain and hearing loss.    Respiratory: Negative for chest tightness and shortness of breath.    Cardiovascular: Negative for chest pain and leg swelling.   Gastrointestinal: Negative for abdominal pain, nausea and vomiting.   Genitourinary: Negative for dysuria.   Musculoskeletal: Positive for neck pain. Negative for back pain, myalgias and neck stiffness.   Skin: Positive for rash.   Neurological: Negative for dizziness, weakness, light-headedness, numbness and headaches.   Psychiatric/Behavioral: Negative for confusion.       Physical Exam   BP: 123/78  Pulse: 86  Temp: 99.2  F (37.3  C)  Resp: 18  SpO2: 99 %  Physical Exam   Constitutional: She is oriented to person, place, and time. She appears well-developed and well-nourished. No distress.   HENT:   Head: Normocephalic.   Right Ear: External ear normal.   Left Ear: External ear normal.   Nose: Nose normal.   Mouth/Throat: Oropharynx is clear and moist.   Mild areas of erythematous/pustular rash noted to the patients L posterior parietal and occipital scalp these areas are tender to palpation. TM's are clear B without external canal lesions noted.    Eyes: Conjunctivae are normal.   Neck: Normal range of motion. Neck supple.   There are areas of erythema/pustule on the L  posterior neck which are tender to palpation . There are no lesions on the R side. No midline neck pain is present.    Cardiovascular: Normal rate, regular rhythm, normal heart sounds and intact distal pulses.    No murmur heard.  Pulmonary/Chest: Effort normal and breath sounds normal. She has no wheezes. She has no rales.   Musculoskeletal: Normal range of motion. She exhibits no tenderness.   Neurological: She is alert and oriented to person, place, and time. She exhibits normal muscle tone.   Skin: Skin is warm and dry. Rash noted.   Psychiatric: She has a normal  mood and affect.   Nursing note and vitals reviewed.      ED Course     ED Course     Procedures             Critical Care time:  none                   Assessments & Plan (with Medical Decision Making)this appears to be consistent with a herpes zoster rash. Patient is not on any chemotherapy agent at this time. She will be treated with anti-virals and pain meds. She should return if symptoms worsen or new symptoms develop. She was especially to of returning immediately with any eye or ear involvement . She is in agreement with this plan.      I have reviewed the nursing notes.    I have reviewed the findings, diagnosis, plan and need for follow up with the patient.       Discharge Medication List as of 9/10/2017 11:22 AM      START taking these medications    Details   valACYclovir (VALTREX) 1000 mg tablet Take 1 tablet (1,000 mg) by mouth 3 times daily, Disp-21 tablet, R-0, Local Print      HYDROmorphone (DILAUDID) 2 MG tablet Take 1 tablet (2 mg) by mouth every 4 hours as needed, Disp-12 tablet, R-0, Local Print             Final diagnoses:   Rash and nonspecific skin eruption   Herpes zoster without complication       9/10/2017   [facility] EMERGENCY DEPARTMENT     Stas Brewer MD  09/11/17 [id number]

## 2017-09-10 NOTE — DISCHARGE INSTRUCTIONS
Take medications as directed.  If increased rash or any pain near her eye or ear or any worsening symptoms please return for recheck.  Drink plenty of fluids.  Shingles  Shingles is a viral infection caused by the same virus as chicken pox. Anyone who has had chicken pox may get shingles later in life. The virus stays in the body, but remains dormant (asleep). Shingles often occurs in older persons or persons with lowered immunity. But it can affect anyone at any age.  Shingles starts as a tingling patch of skin on one side of the body. Small, painful blisters may then appear. The rash does not spread to the rest of the body.  Exposure to shingles cannot cause shingles. However, it can cause chicken pox in anyone who has not had chicken pox or has not been vaccinated. The contagious period ends when all blisters have crusted over (generally about 2 weeks after the illness begins).  After the blisters heal, the affected skin may be sensitive or painful for months (neuralgia). This often gradually goes away.  A shingles vaccine is available. This can help prevent shingles or make it less painful. It is generally recommended for adults over the age of 60 who have had chicken pox in the past, but who have never had shingles. Adults over 60 who have had neither chicken pox nor shingles can prevent both diseases with the chicken pox vaccine. Ask your healthcare provider about these vaccines.  Home care    Medicines may be prescribed to help relieve pain. Take these medicines as directed. Ask your healthcare provider or pharmacist before using over-the-counter medicines for helping treat pain and itching.    In certain cases, antiviral medicines may be prescribed to reduce pain, shorten the illness, and prevent neuralgia. Take these medicines as directed.    Compresses made from a solution of cool water mixed with cornstarch or baking soda may help relieve pain and itching.     Gently wash skin daily with soap and water  to help prevent infection.  Be certain to rinse off all of the soap, which can be irritating.    Trim fingernails and try not to scratch. Scratching the sores may leave scars.    Stay home from work or school until all blisters have formed a crust and you are no longer contagious.  Follow-up care  Follow up with your healthcare provider or as directed by our staff.  When to seek medical advice    Fever of 100.4 F (38 C) or higher, or as directed by your healthcare provider    Affected skin is on the face or neck and any of the following occur:    Headache    Eye pain    Changes in vision    Sores near the eye    Weakness of facial muscles    Pain, redness, or swelling of a joint    Signs of skin infection: colored drainage from the sores, warmth, increasing redness, or increasing pain  Date Last Reviewed: 9/25/2015 2000-2017 [employer]. [address] [place] [address]. All rights reserved. This information is not intended as a substitute for professional medical care. Always follow your healthcare professional's instructions.

## 2017-09-10 NOTE — ED AVS SNAPSHOT
[facility]    [address] [facility] [address]    Phone:  [phone]    Fax:  [phone]                                       Terese Bell   MRN: [id number]    Department:  [facility]   Date of Visit:  9/10/2017           Patient Information     Date Of Birth          1950        Your diagnoses for this visit were:     Rash and nonspecific skin eruption     Herpes zoster without complication        You were seen by Stas Brewer MD.      Follow-up Information     Follow up with Nicole Mcdonald, APRN CNP.    Specialty:  Family Practice    Why:  As needed    Contact information:    [address] [facility]   [place] [address]  [phone]          Follow up with [facility].    Specialty:  EMERGENCY MEDICINE    Why:  If symptoms worsen    Contact information:    [address] [place] [id number] [phone]    Additional information:    The medical center is located at   [address] [place] [address]. (between [address] in [place], [place] miles north   of [place]).        Discharge Instructions         Take medications as directed.  If increased rash or any pain near her eye or ear or any worsening symptoms please return for recheck.  Drink plenty of fluids.  Shingles  Shingles is a viral infection caused by the same virus as chicken pox. Anyone who has had chicken pox may get shingles later in life. The virus stays in the body, but remains dormant (asleep). Shingles often occurs in older persons or persons with lowered immunity. But it can affect anyone at any age.  Shingles starts as a tingling patch of skin on one side of the body. Small, painful blisters may then appear. The rash does not spread to the rest of the body.  Exposure to shingles cannot cause shingles. However, it can cause chicken pox in anyone who has not had chicken pox or has not been vaccinated. The contagious  period ends when all blisters have crusted over (generally about 2 weeks after the illness begins).  After the blisters heal, the affected skin may be sensitive or painful for months (neuralgia). This often gradually goes away.  A shingles vaccine is available. This can help prevent shingles or make it less painful. It is generally recommended for adults over the age of 60 who have had chicken pox in the past, but who have never had shingles. Adults over 60 who have had neither chicken pox nor shingles can prevent both diseases with the chicken pox vaccine. Ask your healthcare provider about these vaccines.  Home care    Medicines may be prescribed to help relieve pain. Take these medicines as directed. Ask your healthcare provider or pharmacist before using over-the-counter medicines for helping treat pain and itching.    In certain cases, antiviral medicines may be prescribed to reduce pain, shorten the illness, and prevent neuralgia. Take these medicines as directed.    Compresses made from a solution of cool water mixed with cornstarch or baking soda may help relieve pain and itching.     Gently wash skin daily with soap and water to help prevent infection.  Be certain to rinse off all of the soap, which can be irritating.    Trim fingernails and try not to scratch. Scratching the sores may leave scars.    Stay home from work or school until all blisters have formed a crust and you are no longer contagious.  Follow-up care  Follow up with your healthcare provider or as directed by our staff.  When to seek medical advice    Fever of 100.4 F (38 C) or higher, or as directed by your healthcare provider    Affected skin is on the face or neck and any of the following occur:    Headache    Eye pain    Changes in vision    Sores near the eye    Weakness of facial muscles    Pain, redness, or swelling of a joint    Signs of skin infection: colored drainage from the sores, warmth, increasing redness, or increasing  pain  Date Last Reviewed: 9/25/2015 2000-2017 [employer]. [address] [place] [address], [place] [address]. All rights reserved. This information is not intended as a substitute for professional medical care. Always follow your healthcare professional's instructions.          Future Appointments        Provider Department Dept Phone Center    2/6/2018 11:15 AM Pattie Natarajan MD [facility] [phone] [facility] Owned      24 Hour Appointment Hotline       To make an appointment at any [facility], call [phone] ([phone]). If you don't have a family doctor or clinic, we will help you find one. [facility] clinics are conveniently located to serve the needs of you and your family.             Review of your medicines      START taking        Dose / Directions Last dose taken    HYDROmorphone 2 MG tablet   Commonly known as:  DILAUDID   Dose:  2 mg   Quantity:  12 tablet        Take 1 tablet (2 mg) by mouth every 4 hours as needed   Refills:  0        valACYclovir 1000 mg tablet   Commonly known as:  VALTREX   Dose:  1000 mg   Quantity:  21 tablet        Take 1 tablet (1,000 mg) by mouth 3 times daily   Refills:  0          Our records show that you are taking the medicines listed below. If these are incorrect, please call your family doctor or clinic.        Dose / Directions Last dose taken    albuterol 108 (90 BASE) MCG/ACT Inhaler   Commonly known as:  PROAIR HFA/PROVENTIL HFA/VENTOLIN HFA   Dose:  2 puff        Inhale 2 puffs into the lungs every 6 hours as needed for wheezing   Refills:  0        DENTAGEL 1.1 % Gel topical gel   Dose:  1 Application   Generic drug:  sodium fluoride dental gel        Apply 1 Application to affected area daily   Refills:  11        EPINEPHrine 0.3 MG/0.3ML injection 2-pack   Commonly known as:  EPIPEN/ADRENACLICK/or ANY BX GENERIC EQUIV   Dose:  0.3 mg   Quantity:  2 each        Inject 0.3 mLs (0.3 mg) into the muscle once as needed for anaphylaxis    Refills:  4        fluticasone-salmeterol 250-50 MCG/DOSE diskus inhaler   Commonly known as:  ADVAIR DISKUS   Dose:  1 puff   Quantity:  1 Inhaler        Inhale 1 puff into the lungs 2 times daily as needed   Refills:  12        order for DME   Quantity:  1 Device        Equipment being ordered: tennis elbow band   Refills:  0                Prescriptions were sent or printed at these locations (2 Prescriptions)                   Other Prescriptions                Printed at Department/Unit printer (2 of 2)         valACYclovir (VALTREX) 1000 mg tablet               HYDROmorphone (DILAUDID) 2 MG tablet                Orders Needing Specimen Collection     None      Pending Results     No orders found from 9/8/2017 to 9/11/2017.            Pending Culture Results     No orders found from 9/8/2017 to 9/11/2017.            Pending Results Instructions     If you had any lab results that were not finalized at the time of your Discharge, you can call the ED Lab Result RN at [phone]. You will be contacted by this team for any positive Lab results or changes in treatment. The nurses are available 7 days a week from 10A to 6:30P.  You can leave a message 24 hours per day and they will return your call.        Test Results From Your Hospital Stay               Thank you for choosing [facility]       Thank you for choosing [facility] for your care. Our goal is always to provide you with excellent care. Hearing back from our patients is one way we can continue to improve our services. Please take a few minutes to complete the written survey that you may receive in the mail after you visit with us. Thank you!        [employer] Information     [employer] gives you secure access to your electronic health record. If you see a primary care provider, you can also send messages to your care team and make appointments. If you have questions, please call your primary care clinic.  If you do not have a primary care provider, please call  [phone] and they will assist you.        Care EveryWhere ID     This is your Care EveryWhere ID. This could be used by other organizations to access your [facility] medical records  [id number]        Equal Access to Services     TREMAINE ALMANZA : Srikanth Aguilar, waluca magaña, qasalta kaalmakandace regan, luis clayton. So [facility] [phone].    ATENCIÓN: Si habla español, tiene a thompson disposición servicios gratuitos de asistencia lingüística. Llame al [phone].    We comply with applicable federal civil rights laws and [place] laws. We do not discriminate on the basis of race, color, national origin, age, disability sex, sexual orientation or gender identity.            After Visit Summary       This is your record. Keep this with you and show to your community pharmacist(s) and doctor(s) at your next visit.

## 2017-09-10 NOTE — ED AVS SNAPSHOT
[facility]    [address] [address]    Phone:  [phone]    Fax:  [phone]                                       Terese Bell   MRN: [id number]    Department:  [facility]   Date of Visit:  9/10/2017           After Visit Summary Signature Page     I have received my discharge instructions, and my questions have been answered. I have discussed any challenges I see with this plan with the nurse or doctor.    ..........................................................................................................................................  Patient/Patient Representative Signature      ..........................................................................................................................................  Patient Representative Print Name and Relationship to Patient    ..................................................               ................................................  Date                                            Time    ..........................................................................................................................................  Reviewed by Signature/Title    ...................................................              ..............................................  Date                                                            Time

## 2017-09-11 ASSESSMENT — ENCOUNTER SYMPTOMS
BACK PAIN: 0
DYSURIA: 0
CHEST TIGHTNESS: 0
NAUSEA: 0
NECK PAIN: 1
DIZZINESS: 0
WEAKNESS: 0
ABDOMINAL PAIN: 0
NECK STIFFNESS: 0
CONFUSION: 0
MYALGIAS: 0
FEVER: 0
LIGHT-HEADEDNESS: 0
NUMBNESS: 0
CHILLS: 0
SHORTNESS OF BREATH: 0
VOMITING: 0
HEADACHES: 0

## 2017-09-12 NOTE — ADDENDUM NOTE
Encounter addended by: Angelita Rey, SLP on: 9/12/2017 11:23 AM<BR>     Actions taken: Flowsheet accepted

## 2017-09-13 NOTE — PROGRESS NOTES
Video Swallow Study  08/24/17    General Information   Type Of Visit Initial   Start Of Care Date 08/24/17   Referring Physician Thomas Ferris MD   Orders Evaluate And Treat   Orders Comment video swallow study   Medical Diagnosis Oropharyngeal dysphagia; Tongue cancer   Onset Of Illness/injury Or Date Of Surgery 08/17/17  (order date)   Precautions/limitations Swallowing Precautions   Hearing WFL for exam   Pertinent History of Current Problem/OT: Additional Occupational Profile Info Pt is s/p RXT and multiple oral surgeries for tongue cancer.  Partial Glossectomy. She recently completed two months of swallow strong therapy and reports improvement with being able to manipulate more thick and solid bolus   Respiratory Status Room air   Prior Level Of Function Swallowing   Prior Level Of Function Comment Pt with history of tongue cancer, post surgery, RXT and 12 swallow strong sessions.   Patient Role/employment History Retired   Living Environment [place]   Patient/family Goals To assess progress with therapy and diet texture recommendations.   General Information Comments Pt very excited over progress she reports subjectively and was able to eat fried rice with chicken yesterday.   Pain Assessment   Pain Reported No   FALL RISK SCREEN   Have you fallen 2 or more times in the last year? No   Have you fallen and had an injury in the past year? No   Is the patient a fall risk? No   VFSS Eval: Radiology   Radiologist Dr. Gill   Views Taken left lateral   Physical Location of Procedure [facility]   VFSS Eval: Thin Liquid Texture Trial   Mode of Presentation, Thin Liquid cup;self-fed   Order of Presentation 1   Preparatory Phase WFL   Oral Phase, Thin Liquid WFL   Pharyngeal Phase, Thin Liquid WFL   Rosenbek's Penetration Aspiration Scale: Thin Liquid Trial Results 1 - no aspiration, contrast does not enter airway   Diagnostic Statement WFL.  Transient penetration noted.   VFSS Eval: Nectar Thick  Liquid Texture Trial   Mode of Presentation, Nectar cup;self-fed   Order of Presentation 2,3,4   Preparatory Phase WFL   Oral Phase, Nectar WFL   Pharyngeal Phase, Garza-Salinas II WFL   Rosenbek's Penetration Aspiration Scale: Nectar-Thick Liquid Trial Results 1 - no aspiration, contrast does not enter airway   Diagnostic Statement WFL   VFSS Eval: Honey Thick Texture Trial   Mode of Presentation, Honey spoon;self-fed   Order of Presentation 5   Preparatory Phase WFL   Oral Phase, Honey WFL;Premature pharyngeal entry   Pharyngeal Phase, Honey WFL;Delayed swallow reflex   Rosenbek's Penetration Aspiration Scale: Honey Trial Results 1 - no aspiration, contrast does not enter airway   Diagnostic Statement WFL.  Mild swallow delay. Pharynx clears well.   VFSS Eval: Pudding Thick Liquid Texture Trial   Mode of Presentation, Pudding spoon;fed by clinician   Order of Presentation 6   Preparatory Phase WFL   Oral Phase, Pudding WFL   Pharyngeal Phase, Pudding WFL;Residue in valleculae   Rosenbek's Penetration Aspiration Scale: Pudding-Thick Liquid Trial Results 1 - no aspiration, contrast does not enter airway   Diagnostic Statement WFL.  Mild vallacular residue.   VFSS Eval: Solid Food Texture Trial   Mode of Presentation, Solid self-fed   Order of Presentation 7   Preparatory Phase WFL  (decreased bolus control due to anatomy)   Oral Phase, Solid WFL   Pharyngeal Phase, Solid WFL;Delayed swallow reflex   Rosenbek's Penetration Aspiration Scale: Solid Food Trial Results 1 - no aspiration, contrast does not enter airway   Diagnostic Statement WFL.  Prolonged mastication/oral bolus control with mild swallow delay and mild pharyngeal residue.  Cleared independently with alternating thin liquid.   Swallow Compensations   Swallow Compensations Multiple swallow;Reduce amounts;Pacing;Alternate viscosity of consistencies   Results (WFL)   Educational Assessment   Barriers to Learning No barriers   Preferred Learning Style Listening    Esophageal Phase of Swallow   Patient reports or presents with symptoms of esophageal dysphagia No   Esophageal sweep performed during today s vidofluoroscopic exam  No   Swallow Eval: Clinical Impressions   Skilled Criteria for Therapy Intervention No problems identified which require skilled intervention   Treatment Diagnosis Moderate oral dysphagia and odynophagia   Diet texture recommendations Dysphagia diet level 2;Thin liquids   Recommended Feeding/Eating Techniques alternate between small bites and sips of food/liquid;hard swallow w/ each bite or sip;maintain upright posture during/after eating for 30 mins   Clinical Impression Comments Pt presents with mild oropharyngeal dysphagia.  Decreased lingual skills for bolus manipulation resulting in mild-moderately prolonged oral phase.  Pt states this has improved since Swallow strong therapy.   There is mild post swallow residue with thicker consistencies. Pharyngeal phase characterized by poor epiglottic movement.  No penetration or aspiration across consistencies.  Result look mildly improved from an x-ray stand point with less flash penetration during the swallow  and better clearing of the pharynx with thinner consistencies.  Pt reports less feeling of residue and more success with oral intake for swallowing softer foods.   Total Session Time   Total Session Time 30   Total Evaluation Time 30   Swallowing   Swallowing:  Current Status , Goal , Discharge -Ysbi Only-Modifier the same for all G-codes CJ: 20-39% impairment   Swallowing: Current  & Discharge Modifier Rationale-Eval Only outcome measure tool level 5

## 2017-09-13 NOTE — ADDENDUM NOTE
Encounter addended by: Angelita Rey, SLP on: 9/13/2017  4:24 PM<BR>     Actions taken: Flowsheet data copied forward, Sign clinical note, Flowsheet accepted

## 2017-11-02 ENCOUNTER — OFFICE VISIT (OUTPATIENT)
Dept: FAMILY MEDICINE | Facility: CLINIC | Age: 67
End: 2017-11-02
Payer: MEDICARE

## 2017-11-02 VITALS
DIASTOLIC BLOOD PRESSURE: 52 MMHG | WEIGHT: 144 LBS | TEMPERATURE: 98.4 F | SYSTOLIC BLOOD PRESSURE: 98 MMHG | HEART RATE: 56 BPM | BODY MASS INDEX: 25.51 KG/M2

## 2017-11-02 DIAGNOSIS — E78.5 HYPERLIPIDEMIA LDL GOAL <130: ICD-10-CM

## 2017-11-02 DIAGNOSIS — Z12.11 SPECIAL SCREENING FOR MALIGNANT NEOPLASMS, COLON: ICD-10-CM

## 2017-11-02 DIAGNOSIS — C02.9 MALIGNANT NEOPLASM OF TONGUE (H): ICD-10-CM

## 2017-11-02 DIAGNOSIS — T78.2XXD ANAPHYLACTIC REACTION, SUBSEQUENT ENCOUNTER: ICD-10-CM

## 2017-11-02 DIAGNOSIS — M10.071 ACUTE IDIOPATHIC GOUT OF RIGHT FOOT: ICD-10-CM

## 2017-11-02 DIAGNOSIS — Z12.31 ENCOUNTER FOR SCREENING MAMMOGRAM FOR MALIGNANT NEOPLASM OF BREAST: Primary | ICD-10-CM

## 2017-11-02 PROBLEM — Z71.89 ADVANCED DIRECTIVES, COUNSELING/DISCUSSION: Status: ACTIVE | Noted: 2017-11-02

## 2017-11-02 PROCEDURE — 83735 ASSAY OF MAGNESIUM: CPT | Performed by: RADIOLOGY

## 2017-11-02 PROCEDURE — 99213 OFFICE O/P EST LOW 20 MIN: CPT | Performed by: NURSE PRACTITIONER

## 2017-11-02 PROCEDURE — 36415 COLL VENOUS BLD VENIPUNCTURE: CPT | Performed by: RADIOLOGY

## 2017-11-02 PROCEDURE — 80053 COMPREHEN METABOLIC PANEL: CPT | Performed by: RADIOLOGY

## 2017-11-02 PROCEDURE — 84100 ASSAY OF PHOSPHORUS: CPT | Performed by: RADIOLOGY

## 2017-11-02 RX ORDER — EPINEPHRINE 0.3 MG/.3ML
0.3 INJECTION SUBCUTANEOUS
Qty: 0.6 ML | Refills: 3 | Status: SHIPPED | OUTPATIENT
Start: 2017-11-02 | End: 2019-09-23

## 2017-11-02 RX ORDER — INDOMETHACIN 25 MG/1
25 CAPSULE ORAL 2 TIMES DAILY WITH MEALS
Qty: 42 CAPSULE | Refills: 1 | Status: SHIPPED | OUTPATIENT
Start: 2017-11-02 | End: 2019-03-08

## 2017-11-02 NOTE — PATIENT INSTRUCTIONS
You are doing well.     Give it one more week before seeing your family  At the hospital       Keep doing your therapy

## 2017-11-02 NOTE — NURSING NOTE
"Chief Complaint   Patient presents with     RECHECK     Bloodwork, discuss health       Initial BP 98/52 (BP Location: Left arm, Patient Position: Chair, Cuff Size: Adult Regular)  Pulse 56  Temp 98.4  F (36.9  C) (Tympanic)  Wt 144 lb (65.3 kg)  BMI 25.51 kg/m2 Estimated body mass index is 25.51 kg/(m^2) as calculated from the following:    Height as of 6/21/17: 5' 3\" (1.6 m).    Weight as of this encounter: 144 lb (65.3 kg).  Medication Reconciliation: complete     Natalie King CMA (AAMA)      "

## 2017-11-02 NOTE — LETTER
My Asthma Action Plan  Name: Terese Bell   YOB: 1950  Date: 11/2/2017   My doctor: PANCHO EPSTEIN NP, APRN CNP   My clinic: [facility]        My Control Medicine: Fluticasone + salmeterol (Advair) -  Diskus 250/50 mcg    My Rescue Medicine: Albuterol (Proair/Ventolin/Proventil) inhaler     My Asthma Severity: moderate persistent  Avoid your asthma triggers: Please see attached list of possible triggers           GREEN ZONE   Good Control    I feel good    No cough or wheeze    Can work, sleep and play without asthma symptoms       Take your asthma control medicine every day.     1. If exercise triggers your asthma, take your rescue medication    15 minutes before exercise or sports, and    During exercise if you have asthma symptoms  2. Spacer to use with inhaler: If you have a spacer, make sure to use it with your inhaler             YELLOW ZONE Getting Worse  I have ANY of these:    I do not feel good    Cough or wheeze    Chest feels tight    Wake up at night   1. Keep taking your Green Zone medications  2. Start taking your rescue medicine:    every 20 minutes for up to 1 hour. Then every 4 hours for 24-48 hours.  3. If you stay in the Yellow Zone for more than 12-24 hours, contact your doctor.  4. If you do not return to the Green Zone in 12-24 hours or you get worse, start taking your oral steroid medicine if prescribed by your provider.           RED ZONE Medical Alert - Get Help  I have ANY of these:    I feel awful    Medicine is not helping    Breathing getting harder    Trouble walking or talking    Nose opens wide to breathe       1. Take your rescue medicine NOW  2. If your provider has prescribed an oral steroid medicine, start taking it NOW  3. Call your doctor NOW  4. If you are still in the Red Zone after 20 minutes and you have not reached your doctor:    Take your rescue medicine again and    Call 911 or go to the emergency room right away    See your  regular doctor within 2 weeks of an Emergency Room or Urgent Care visit for follow-up treatment.        Electronically signed by: Natalie King, November 2, 2017    Annual Reminders:  Meet with Asthma Educator,  Flu Shot in the Fall, consider Pneumonia Vaccination for patients with asthma (aged 19 and older).    Pharmacy:    [id number] - [place], [place] - [address]  [facility] [place], [place] - [address] [facility] [place], [place] - [address]  [facility] [place] - [place], [place] - [address] [facility]                    Asthma Triggers  How To Control Things That Make Your Asthma Worse    Triggers are things that make your asthma worse.  Look at the list below to help you find your triggers and what you can do about them.  You can help prevent asthma flare-ups by staying away from your triggers.      Trigger                                                          What you can do   Cigarette Smoke  Tobacco smoke can make asthma worse. Do not allow smoking in your home, car or around you.  Be sure no one smokes at a child s day care or school.  If you smoke, ask your health care provider for ways to help you quit.  Ask family members to quit too.  Ask your health care provider for a referral to Quit Plan to help you quit smoking, or call 2-026-266-PLAN.     Colds, Flu, Bronchitis  These are common triggers of asthma. Wash your hands often.  Don t touch your eyes, nose or mouth.  Get a flu shot every year.     Dust Mites  These are tiny bugs that live in cloth or carpet. They are too small to see. Wash sheets and blankets in hot water every week.   Encase pillows and mattress in dust mite proof covers.  Avoid having carpet if you can. If you have carpet, vacuum weekly.   Use a dust mask and HEPA vacuum.   Pollen and Outdoor Mold  Some people are allergic to trees, grass, or weed pollen, or molds. Try to keep your windows  closed.  Limit time out doors when pollen count is high.   Ask you health care provider about taking medicine during allergy season.     Animal Dander  Some people are allergic to skin flakes, urine or saliva from pets with fur or feathers. Keep pets with fur or feathers out of your home.    If you can t keep the pet outdoors, then keep the pet out of your bedroom.  Keep the bedroom door closed.  Keep pets off cloth furniture and away from stuffed toys.     Mice, Rats, and Cockroaches  Some people are allergic to the waste from these pests.   Cover food and garbage.  Clean up spills and food crumbs.  Store grease in the refrigerator.   Keep food out of the bedroom.   Indoor Mold  This can be a trigger if your home has high moisture. Fix leaking faucets, pipes, or other sources of water.   Clean moldy surfaces.  Dehumidify basement if it is damp and smelly.   Smoke, Strong Odors, and Sprays  These can reduce air quality. Stay away from strong odors and sprays, such as perfume, powder, hair spray, paints, smoke incense, paint, cleaning products, candles and new carpet.   Exercise or Sports  Some people with asthma have this trigger. Be active!  Ask your doctor about taking medicine before sports or exercise to prevent symptoms.    Warm up for 5-10 minutes before and after sports or exercise.     Other Triggers of Asthma  Cold air:  Cover your nose and mouth with a scarf.  Sometimes laughing or crying can be a trigger.  Some medicines and food can trigger asthma.

## 2017-11-02 NOTE — PROGRESS NOTES
"  SUBJECTIVE:   Terese Bell is a 67 year old female who presents to clinic today for the following health issues:    HM - Mammo and colonoscopy ordered, patient is interested in getting these done but not at [place] in [place]. AAP printed, ACT give to patient. PHQ2 and FRA completed.    Shingles - Has been dealing with shingles for about 9 weeks, states that it has gotten better but is still dealing with some pain. It is on her scalp and neck, is wanting to get her hair done but is wondering if it is okay to do that with the active shingles.     Gout - Patient would like an rx for gout medication, has not dealt with it in a while but has been feeling some \"twinging\" in her toes and would like to be prepared if she does have a flare.    General Health - Would like some baseline blood work and discussions.         Does still have blisters on her scalp.  Did have shingles on her scalp.  9 weeks ago.      Problem list and histories reviewed & adjusted, as indicated.  Additional history: as documented    Patient Active Problem List   Diagnosis     Absence of menstruation     Generalized osteoarthrosis, unspecified site     Moderate persistent asthma     FAMILY HX GI MALIGNANCY brother colon ca.      Rosacea     HYPERLIPIDEMIA LDL GOAL <130     Tear Meniscus Knee  repaired     24 hour info given     RUQ abdominal pain     Myalgia     History of tongue cancer     Tongue irritation     Tongue cancer (H)     History of recent ear, nose, and throat (ENT) procedure     Malignant neoplasm of tongue (H)     Fistula     Cervical cancer screening     Advanced directives, counseling/discussion     Past Surgical History:   Procedure Laterality Date     ADENOIDECTOMY      1970     C NONSPECIFIC PROCEDURE      CHOLECYSTECTOMY     C NONSPECIFIC PROCEDURE      SINUS MASS REMOVED     C NONSPECIFIC PROCEDURE      R KNEE SURGERY     CHOLECYSTECTOMY       COLONOSCOPY  11/26/2012    Procedure: COLONOSCOPY;  Colonoscopy;  " Surgeon: Yony Garcia MD;  [facility]     EXAM UNDER ANESTHESIA MOUTH N/A 8/15/2016    Procedure: EXAM UNDER ANESTHESIA MOUTH;  Surgeon: Thomas Ferris MD;  [facility]     GLOSSECTOMY Right 9/1/2016    Procedure: GLOSSECTOMY;  Surgeon: Thomas Ferris MD;  [facility]     GLOSSECTOMY PARTIAL       GRAFT FREE VASCULARIZED (LOCATION) Left 9/1/2016    Procedure: GRAFT FREE VASCULARIZED (LOCATION);  Surgeon: Moreno Leo MD;  [facility]     GRAFT SKIN SPLIT THICKNESS FROM EXTREMITY Left 9/1/2016    Procedure: GRAFT SKIN SPLIT THICKNESS FROM EXTREMITY;  Surgeon: Moreno Leo MD;  [facility]     [facility] U[facility] ENDOSCOPY W PLACEMENT GASTROSTOMY TUBE PERCUT N/A 11/7/2016    Procedure: COMBINED ESOPHAGOSCOPY, GASTROSCOPY, DUODENOSCOPY (EGD), PLACE PERCUTANEOUS ENDOSCOPIC GASTROSTOMY TUBE;  Surgeon: Sterling Mckeon MD;  [facility]     HEAD & NECK SURGERY       KNEE SURGERY      bilat     LARYNGOSCOPY WITH BIOPSY(IES) N/A 8/15/2016    Procedure: LARYNGOSCOPY WITH BIOPSY(IES);  Surgeon: Thomas Ferris MD;  [facility]     NASAL/SINUS POLYPECTOMY      1990 approx     PAROTIDECTOMY, RADICAL NECK DISSECTION Right 9/1/2016    Procedure: PAROTIDECTOMY, RADICAL NECK DISSECTION;  Surgeon: Thomas Ferris MD;  [facility]     TONSILLECTOMY      1970     TRACHEOSTOMY Right 9/1/2016    Procedure: TRACHEOSTOMY;  Surgeon: Thomas Ferris MD;  [facility]       Social History   Substance Use Topics     Smoking status: Former Smoker     Packs/day: 3.00     Years: 7.00     Types: Cigarettes     Quit date: 1/1/1982     Smokeless tobacco: Never Used     Alcohol use Yes      Comment: 4 drinks per year     Family History   Problem Relation Age of Onset     Neurologic Disorder Brother      migranes     Hypertension Mother      Arthritis Mother      Hypertension Father      Prostate Cancer Father      CANCER Father      Alzheimer Disease Paternal Grandmother      Hypertension Brother      Arthritis Brother       Respiratory Brother      emphysema     Cancer - colorectal Brother      C.A.D. Maternal Uncle      CANCER Brother      DIABETES No family hx of      Breast Cancer No family hx of          Current Outpatient Prescriptions   Medication Sig Dispense Refill     EPINEPHrine (EPIPEN/ADRENACLICK/OR ANY BX GENERIC EQUIV) 0.3 MG/0.3ML injection 2-pack Inject 0.3 mLs (0.3 mg) into the muscle once as needed for anaphylaxis 0.6 mL 3     indomethacin (INDOCIN) 25 MG capsule Take 1 capsule (25 mg) by mouth 2 times daily (with meals) 42 capsule 1     DENTAGEL 1.1 % GEL Apply 1 Application to affected area daily  11     valACYclovir (VALTREX) 1000 mg tablet Take 1 tablet (1,000 mg) by mouth 3 times daily (Patient not taking: Reported on 11/2/2017) 21 tablet 0     NITROGLYCERIN TD Cut in quarter and apply to elbow and knee for pain       albuterol (PROAIR HFA, PROVENTIL HFA, VENTOLIN HFA) 108 (90 BASE) MCG/ACT inhaler Inhale 2 puffs into the lungs every 6 hours as needed for wheezing (Patient not taking: Reported on 11/2/2017)       fluticasone-salmeterol (ADVAIR DISKUS) 250-50 MCG/DOSE diskus inhaler Inhale 1 puff into the lungs 2 times daily as needed (Patient not taking: Reported on 11/2/2017) 1 Inhaler 12     ORDER FOR DME, SET TO LOCAL PRINT, Equipment being ordered: tennis elbow band (Patient not taking: Reported on 11/2/2017) 1 Device 0     Allergies   Allergen Reactions     Banana Swelling and Anaphylaxis     Tongue and lips swell and get itchy     Bee Pollen Anaphylaxis     Bee Venom Anaphylaxis     Blueberry Anaphylaxis and Swelling     Iodine Anaphylaxis     NOT dietary related.     Penicillins Anaphylaxis     Sulfa Drugs Anaphylaxis     Erythromycin Nausea     Pravastatin Sodium Other (See Comments)     muscle spasam     Simvastatin Cough     Strawberry Hives     Tetracycline GI Disturbance     And tingling         Latex Rash     Rash from bandages/wraps/pressure tapes     BP Readings from Last 3 Encounters:    11/02/17 98/52   09/10/17 123/78   08/03/17 110/64    Wt Readings from Last 3 Encounters:   11/02/17 144 lb (65.3 kg)   08/03/17 150 lb 6.4 oz (68.2 kg)   06/21/17 153 lb (69.4 kg)                        Reviewed and updated as needed this visit by clinical staffAllergies       Reviewed and updated as needed this visit by Provider         ROS:  C: NEGATIVE for fever, chills, POSITIVE for  change in weight, is eating,  HNQ pal 475 calories   ENT/MOUTH: POSITIVE for oral cancer , did go trough surgery and has been having problems swallowing .  Has lost weight. Is now eating better.   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     R: NEGATIVE for significant cough or SOB  CV: NEGATIVE for chest pain, palpitations or peripheral edema  GI: POSITIVE for is due for colonoscopy    NEURO: NEGATIVE for weakness, dizziness or paresthesias and POSITIVE for is dealing with facial  Shingles on the left side of the face.  It has been 9 weeks.  But does still have some facial pain .   PSYCHIATRIC: NEGATIVE for changes in mood or affect    OBJECTIVE:     BP 98/52 (BP Location: Left arm, Patient Position: Chair, Cuff Size: Adult Regular)  Pulse 56  Temp 98.4  F (36.9  C) (Tympanic)  Wt 144 lb (65.3 kg)  BMI 25.51 kg/m2  Body mass index is 25.51 kg/(m^2).   GENERAL: healthy, alert and no distress  HENT: right ear: normal: no effusions, no erythema, normal landmarks, left ear: normal: no effusions, no erythema, normal landmarks, nose and mouth without ulcers or lesions, sinuses: not tender and does have left facial tenderness   Tongue is scarred with  The graft  Does have good movement with the tongue   RESP: lungs clear to auscultation - no rales, rhonchi or wheezes  CV: regular rate and rhythm, normal S1 S2, no S3 or S4, no murmur, click or rub, no peripheral edema and peripheral pulses strong  SKIN: does still have lesions on the back of the scalp.  No drainage  The to of the left ear is still sensitive      Diagnostic  Test Results:  Pending     ASSESSMENT/PLAN:        ASSESSMENT/PLAN:      ICD-10-CM    1. Encounter for screening mammogram for malignant neoplasm of breast Z12.31 MA Screening Digital Bilateral   2. Special screening for malignant neoplasms, colon Z12.11 GASTROENTEROLOGY ADULT REF PROCEDURE ONLY   3. Anaphylactic reaction, subsequent encounter T78.2XXD Asthma Action Plan (AAP)     EPINEPHrine (EPIPEN/ADRENACLICK/OR ANY BX GENERIC EQUIV) 0.3 MG/0.3ML injection 2-pack   4. Acute idiopathic gout of right foot M10.071 indomethacin (INDOCIN) 25 MG capsule   5. Malignant neoplasm of tongue (H) C02.9 Comprehensive metabolic panel     CANCELED: Magnesium     CANCELED: Phosphorus     CANCELED: Magnesium     CANCELED: Phosphorus   6. Hyperlipidemia LDL goal <130 E78.5 Comprehensive metabolic panel       Patient Instructions   You are doing well.     Give it one more week before seeing your family  At the hospital     Keep doing your therapy                  MEDICATIONS:  Continue current medications without change  See Patient Instructions    PANCHO EPSTEIN NP, APRN CNP  [facility]

## 2017-11-02 NOTE — MR AVS SNAPSHOT
After Visit Summary   11/2/2017    Terese Bell    MRN: [id number]           Patient Information     Date Of Birth          1950        Visit Information        Provider Department      11/2/2017 12:20 PM Nicole Mcdonald APRN [facility]        Today's Diagnoses     Encounter for screening mammogram for malignant neoplasm of breast    -  1    Special screening for malignant neoplasms, colon        Anaphylactic reaction, subsequent encounter        Acute idiopathic gout of right foot        Malignant neoplasm of tongue (H)        Hyperlipidemia LDL goal <130          Care Instructions    You are doing well.     Give it one more week before seeing your family  At the hospital                 Follow-ups after your visit        Additional Services     GASTROENTEROLOGY ADULT REF PROCEDURE ONLY       Last Lab Result: Creatinine (mg/dL)       Date                     Value                 11/14/2016               0.53             ----------  There is no height or weight on file to calculate BMI.     Needed:  No  Language:  English    Patient will be contacted to schedule procedure.     Please be aware that coverage of these services is subject to the terms and limitations of your health insurance plan.  Call member services at your health plan with any benefit or coverage questions.  Any procedures must be performed at a [facility] facility OR coordinated by your clinic's referral office.    Please bring the following with you to your appointment:    (1) Any X-Rays, CTs or MRIs which have been performed.  Contact the facility where they were done to arrange for  prior to your scheduled appointment.    (2) List of current medications   (3) This referral request   (4) Any documents/labs given to you for this referral                  Your next 10 appointments already scheduled     Feb 06, 2018 11:15 AM CST   Return Visit with Pattie Natarajan MD    [facility]    [address], [address]  [facility] [address]   [phone]              Future tests that were ordered for you today     Open Future Orders        Priority Expected Expires Ordered    MA Screening Digital Bilateral Routine  11/3/2018 11/2/2017            Who to contact     Normal or non-critical lab and imaging results will be communicated to you by [employer], letter or phone within 4 business days after the clinic has received the results. If you do not hear from us within 7 days, please contact the clinic through [employer] or phone. If you have a critical or abnormal lab result, we will notify you by phone as soon as possible.  Submit refill requests through [employer] or call your pharmacy and they will forward the refill request to us. Please allow 3 business days for your refill to be completed.          If you need to speak with a  for additional information , please call: [phone]           Additional Information About Your Visit        [employer] Information     [employer] gives you secure access to your electronic health record. If you see a primary care provider, you can also send messages to your care team and make appointments. If you have questions, please call your primary care clinic.  If you do not have a primary care provider, please call [phone] and they will assist you.        Care EveryWhere ID     This is your Care EveryWhere ID. This could be used by other organizations to access your [facility] medical records  [id number]        Your Vitals Were     Pulse Temperature BMI (Body Mass Index)             56 98.4  F (36.9  C) (Tympanic) 25.51 kg/m2          Blood Pressure from Last 3 Encounters:   11/02/17 98/52   09/10/17 123/78   08/03/17 110/64    Weight from Last 3 Encounters:   11/02/17 144 lb (65.3 kg)   08/03/17 150 lb 6.4 oz (68.2 kg)   06/21/17 153 lb (69.4 kg)              We Performed the Following     Asthma  Action Plan (AAP)     Comprehensive metabolic panel     GASTROENTEROLOGY ADULT REF PROCEDURE ONLY          Today's Medication Changes          These changes are accurate as of: 11/2/17  1:09 PM.  If you have any questions, ask your nurse or doctor.               Start taking these medicines.        Dose/Directions    indomethacin 25 MG capsule   Commonly known as:  INDOCIN   Used for:  Acute idiopathic gout of right foot   Started by:  Nicole Mcdonald APRN CNP        Dose:  25 mg   Take 1 capsule (25 mg) by mouth 2 times daily (with meals)   Quantity:  42 capsule   Refills:  1         Stop taking these medicines if you haven't already. Please contact your care team if you have questions.     HYDROmorphone 2 MG tablet   Commonly known as:  DILAUDID   Stopped by:  Nicole Mcdonald APRN CNP                Where to get your medicines      These medications were sent to [id number] - [place], [place] - [address]  [address] [address]     Phone:  [phone]     EPINEPHrine 0.3 MG/0.3ML injection 2-pack    indomethacin 25 MG capsule                Primary Care Provider Office Phone # Fax #    HERNÁN Carranza [phone] [address] [place] [address]        Equal Access to Services     TREMAINE ALMANZA AH: Hadii angel gallaghero Soalberto, waaxda luqadaha, qaybta kaalmada adeegyada, luis clayton. So [facility] [phone].    ATENCIÓN: Si habla español, tiene a thompson disposición servicios gratuitos de asistencia lingüística. Llame al [phone].    We comply with applicable federal civil rights laws and [place] laws. We do not discriminate on the basis of race, color, national origin, age, disability, sex, sexual orientation, or gender identity.            Thank you!     Thank you for choosing [facility] [place]  for your care. Our goal is always to provide you with excellent care. Hearing back from  our patients is one way we can continue to improve our services. Please take a few minutes to complete the written survey that you may receive in the mail after your visit with us. Thank you!             Your Updated Medication List - Protect others around you: Learn how to safely use, store and throw away your medicines at www.disposemymeds.org.          This list is accurate as of: 11/2/17  1:09 PM.  Always use your most recent med list.                   Brand Name Dispense Instructions for use Diagnosis    albuterol 108 (90 BASE) MCG/ACT Inhaler    PROAIR HFA/PROVENTIL HFA/VENTOLIN HFA     Inhale 2 puffs into the lungs every 6 hours as needed for wheezing    Moderate persistent asthma without complication       DENTAGEL 1.1 % Gel topical gel   Generic drug:  sodium fluoride dental gel      Apply 1 Application to affected area daily        EPINEPHrine 0.3 MG/0.3ML injection 2-pack    EPIPEN/ADRENACLICK/or ANY BX GENERIC EQUIV    0.6 mL    Inject 0.3 mLs (0.3 mg) into the muscle once as needed for anaphylaxis    Anaphylactic reaction, subsequent encounter       fluticasone-salmeterol 250-50 MCG/DOSE diskus inhaler    ADVAIR DISKUS    1 Inhaler    Inhale 1 puff into the lungs 2 times daily as needed    SOB (shortness of breath)       indomethacin 25 MG capsule    INDOCIN    42 capsule    Take 1 capsule (25 mg) by mouth 2 times daily (with meals)    Acute idiopathic gout of right foot       NITROGLYCERIN TD      Cut in quarter and apply to elbow and knee for pain        order for DME     1 Device    Equipment being ordered: tennis elbow band    Right tennis elbow       valACYclovir 1000 mg tablet    VALTREX    21 tablet    Take 1 tablet (1,000 mg) by mouth 3 times daily

## 2017-11-03 LAB
ALBUMIN SERPL-MCNC: 3.9 G/DL (ref 3.4–5)
ALP SERPL-CCNC: 76 U/L (ref 40–150)
ALT SERPL W P-5'-P-CCNC: 26 U/L (ref 0–50)
ANION GAP SERPL CALCULATED.3IONS-SCNC: 6 MMOL/L (ref 3–14)
AST SERPL W P-5'-P-CCNC: 22 U/L (ref 0–45)
BILIRUB SERPL-MCNC: 0.6 MG/DL (ref 0.2–1.3)
BUN SERPL-MCNC: 20 MG/DL (ref 7–30)
CALCIUM SERPL-MCNC: 9 MG/DL (ref 8.5–10.1)
CHLORIDE SERPL-SCNC: 103 MMOL/L (ref 94–109)
CO2 SERPL-SCNC: 28 MMOL/L (ref 20–32)
CREAT SERPL-MCNC: 0.54 MG/DL (ref 0.52–1.04)
GFR SERPL CREATININE-BSD FRML MDRD: >90 ML/MIN/1.7M2
GLUCOSE SERPL-MCNC: 90 MG/DL (ref 70–99)
MAGNESIUM SERPL-MCNC: 2.5 MG/DL (ref 1.6–2.3)
PHOSPHATE SERPL-MCNC: 3.7 MG/DL (ref 2.5–4.5)
POTASSIUM SERPL-SCNC: 4.2 MMOL/L (ref 3.4–5.3)
PROT SERPL-MCNC: 7.4 G/DL (ref 6.8–8.8)
SODIUM SERPL-SCNC: 137 MMOL/L (ref 133–144)

## 2017-11-03 ASSESSMENT — ASTHMA QUESTIONNAIRES: ACT_TOTALSCORE: 25

## 2017-11-07 ENCOUNTER — HOSPITAL ENCOUNTER (OUTPATIENT)
Dept: MAMMOGRAPHY | Facility: CLINIC | Age: 67
Discharge: HOME | End: 2017-11-07
Attending: NURSE PRACTITIONER | Admitting: NURSE PRACTITIONER
Payer: MEDICARE

## 2017-11-07 DIAGNOSIS — Z12.31 ENCOUNTER FOR SCREENING MAMMOGRAM FOR MALIGNANT NEOPLASM OF BREAST: ICD-10-CM

## 2017-11-07 PROCEDURE — G0202 SCR MAMMO BI INCL CAD: HCPCS

## 2017-11-07 PROCEDURE — 77063 BREAST TOMOSYNTHESIS BI: CPT

## 2017-12-11 ENCOUNTER — ANESTHESIA EVENT (OUTPATIENT)
Dept: GASTROENTEROLOGY | Facility: CLINIC | Age: 67
End: 2017-12-11
Payer: MEDICARE

## 2017-12-11 ASSESSMENT — LIFESTYLE VARIABLES: TOBACCO_USE: 1

## 2017-12-11 NOTE — ANESTHESIA PREPROCEDURE EVALUATION
Anesthesia Evaluation     . Pt has had prior anesthetic. Type: General and MAC    History of anesthetic complications   - difficult intubation        ROS/MED HX    ENT/Pulmonary:     (+)tobacco use, Past use Moderate Persistent asthma , . Other pulmonary disease History of tracheostomy.    Neurologic:     (+)other neuro Tinnitis; myalgia    Cardiovascular:     (+) Dyslipidemia, ----. : . . . :. . Previous cardiac testing date:results:date: results:ECG reviewed date:9/2/16 results:Sinus bradycardia date: results:          METS/Exercise Tolerance:     Hematologic:     (+) History of blood clots -      Musculoskeletal:   (+) arthritis, , , -       GI/Hepatic:  - neg GI/hepatic ROS       Renal/Genitourinary:  - ROS Renal section negative       Endo:         Psychiatric:     (+) psychiatric history depression      Infectious Disease:  - neg infectious disease ROS       Malignancy:   (+) Malignancy History of Other  Other CA Tongue cancer Remission status post Surgery and Chemo         Other:                     Physical Exam  Normal systems: cardiovascular, pulmonary and dental    Airway   Mallampati: IV  TM distance: >3 FB  Neck ROM: limited    Dental     Cardiovascular       Pulmonary                     Anesthesia Plan      History & Physical Review  History and physical reviewed and following examination; no interval change.    ASA Status:  3 .    NPO Status:  > 6 hours    Plan for MAC Reason for MAC:  Deep or markedly invasive procedure (G8)         Postoperative Care      Consents  Anesthetic plan, risks, benefits and alternatives discussed with:  Patient..                          .

## 2017-12-14 ENCOUNTER — HOSPITAL ENCOUNTER (OUTPATIENT)
Facility: CLINIC | Age: 67
Discharge: HOME | End: 2017-12-14
Attending: SURGERY | Admitting: SURGERY
Payer: MEDICARE

## 2017-12-14 ENCOUNTER — SURGERY (OUTPATIENT)
Age: 67
End: 2017-12-14

## 2017-12-14 ENCOUNTER — ANESTHESIA (OUTPATIENT)
Dept: GASTROENTEROLOGY | Facility: CLINIC | Age: 67
End: 2017-12-14
Payer: MEDICARE

## 2017-12-14 VITALS
RESPIRATION RATE: 16 BRPM | DIASTOLIC BLOOD PRESSURE: 49 MMHG | HEART RATE: 67 BPM | BODY MASS INDEX: 24.45 KG/M2 | WEIGHT: 138 LBS | TEMPERATURE: 98.4 F | HEIGHT: 63 IN | SYSTOLIC BLOOD PRESSURE: 96 MMHG | OXYGEN SATURATION: 100 %

## 2017-12-14 LAB — COLONOSCOPY: NORMAL

## 2017-12-14 PROCEDURE — 25000128 H RX IP 250 OP 636: Performed by: SURGERY

## 2017-12-14 PROCEDURE — 37000008 ZZH ANESTHESIA TECHNICAL FEE, 1ST 30 MIN: Performed by: SURGERY

## 2017-12-14 PROCEDURE — 25000125 ZZHC RX 250: Performed by: NURSE ANESTHETIST, CERTIFIED REGISTERED

## 2017-12-14 PROCEDURE — 45378 DIAGNOSTIC COLONOSCOPY: CPT | Performed by: SURGERY

## 2017-12-14 PROCEDURE — G0121 COLON CA SCRN NOT HI RSK IND: HCPCS | Performed by: SURGERY

## 2017-12-14 PROCEDURE — 25000125 ZZHC RX 250: Performed by: SURGERY

## 2017-12-14 PROCEDURE — 25000128 H RX IP 250 OP 636: Performed by: NURSE ANESTHETIST, CERTIFIED REGISTERED

## 2017-12-14 RX ORDER — PROPOFOL 10 MG/ML
INJECTION, EMULSION INTRAVENOUS CONTINUOUS PRN
Status: DISCONTINUED | OUTPATIENT
Start: 2017-12-14 | End: 2017-12-14

## 2017-12-14 RX ORDER — LIDOCAINE HYDROCHLORIDE 10 MG/ML
INJECTION, SOLUTION INFILTRATION; PERINEURAL PRN
Status: DISCONTINUED | OUTPATIENT
Start: 2017-12-14 | End: 2017-12-14

## 2017-12-14 RX ORDER — EPHEDRINE SULFATE 50 MG/ML
INJECTION, SOLUTION INTRAMUSCULAR; INTRAVENOUS; SUBCUTANEOUS PRN
Status: DISCONTINUED | OUTPATIENT
Start: 2017-12-14 | End: 2017-12-14

## 2017-12-14 RX ORDER — PROPOFOL 10 MG/ML
INJECTION, EMULSION INTRAVENOUS PRN
Status: DISCONTINUED | OUTPATIENT
Start: 2017-12-14 | End: 2017-12-14

## 2017-12-14 RX ORDER — LIDOCAINE 40 MG/G
CREAM TOPICAL
Status: DISCONTINUED | OUTPATIENT
Start: 2017-12-14 | End: 2017-12-14 | Stop reason: HOSPADM

## 2017-12-14 RX ORDER — ONDANSETRON 2 MG/ML
4 INJECTION INTRAMUSCULAR; INTRAVENOUS
Status: DISCONTINUED | OUTPATIENT
Start: 2017-12-14 | End: 2017-12-14 | Stop reason: HOSPADM

## 2017-12-14 RX ORDER — SODIUM CHLORIDE, SODIUM LACTATE, POTASSIUM CHLORIDE, CALCIUM CHLORIDE 600; 310; 30; 20 MG/100ML; MG/100ML; MG/100ML; MG/100ML
INJECTION, SOLUTION INTRAVENOUS CONTINUOUS
Status: DISCONTINUED | OUTPATIENT
Start: 2017-12-14 | End: 2017-12-14 | Stop reason: HOSPADM

## 2017-12-14 RX ADMIN — PROPOFOL 150 MCG/KG/MIN: 10 INJECTION, EMULSION INTRAVENOUS at 14:24

## 2017-12-14 RX ADMIN — EPHEDRINE SULFATE 10 MG: 50 INJECTION INTRAMUSCULAR; INTRAVENOUS; SUBCUTANEOUS at 14:31

## 2017-12-14 RX ADMIN — LIDOCAINE HYDROCHLORIDE 1 ML: 10 INJECTION, SOLUTION EPIDURAL; INFILTRATION; INTRACAUDAL; PERINEURAL at 13:29

## 2017-12-14 RX ADMIN — EPHEDRINE SULFATE 10 MG: 50 INJECTION INTRAMUSCULAR; INTRAVENOUS; SUBCUTANEOUS at 14:28

## 2017-12-14 RX ADMIN — LIDOCAINE HYDROCHLORIDE 50 MG: 10 INJECTION, SOLUTION INFILTRATION; PERINEURAL at 14:23

## 2017-12-14 RX ADMIN — SODIUM CHLORIDE, POTASSIUM CHLORIDE, SODIUM LACTATE AND CALCIUM CHLORIDE: 600; 310; 30; 20 INJECTION, SOLUTION INTRAVENOUS at 13:29

## 2017-12-14 RX ADMIN — PROPOFOL 100 MG: 10 INJECTION, EMULSION INTRAVENOUS at 14:23

## 2017-12-14 NOTE — PLAN OF CARE
Report given to Marni TONG RN . Pt feels better. Abdominal pain from gas has lessened. She passed gas earlier but still had burping, nausea and abdominal pain. Hasn;t been able to take a sip of water yet. Walked her in the halls also.

## 2017-12-14 NOTE — H&P
67 year old year old female here for colonoscopy for screening.    Patient Active Problem List   Diagnosis     Absence of menstruation     Generalized osteoarthrosis, unspecified site     Moderate persistent asthma     FAMILY HX GI MALIGNANCY brother colon ca.      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     Moderate persistent asthma      Obesity, unspecified      Pure hypercholesterolemia      Rosacea 3/3/2010     Symptomatic menopausal or female climacteric states      Thrombosis of leg     left leg 30 yrs ago     Tinnitus      Tongue cancer (H)        Past Surgical History:   Procedure Laterality Date     ADENOIDECTOMY      1970     C NONSPECIFIC PROCEDURE      CHOLECYSTECTOMY     C NONSPECIFIC PROCEDURE      SINUS MASS REMOVED     C NONSPECIFIC PROCEDURE      R KNEE SURGERY     CHOLECYSTECTOMY       COLONOSCOPY  11/26/2012    Procedure: COLONOSCOPY;  Colonoscopy;  Surgeon: Yony Garcia MD;  [facility]     EXAM UNDER ANESTHESIA MOUTH N/A 8/15/2016    Procedure: EXAM UNDER ANESTHESIA MOUTH;  Surgeon: Thomas Ferris MD;  [facility]     GLOSSECTOMY Right 9/1/2016    Procedure: GLOSSECTOMY;  Surgeon: Thomas Ferris MD;  [facility]     GLOSSECTOMY PARTIAL       GRAFT FREE VASCULARIZED (LOCATION) Left 9/1/2016    Procedure: GRAFT FREE VASCULARIZED (LOCATION);  Surgeon: Moreno Leo MD;  [facility]     GRAFT SKIN SPLIT THICKNESS FROM  "EXTREMITY Left 9/1/2016    Procedure: GRAFT SKIN SPLIT THICKNESS FROM EXTREMITY;  Surgeon: Moreno Leo MD;  [facility]      UG[facility] ENDOSCOPY W PLACEMENT GASTROSTOMY TUBE PERCUT N/A 11/7/2016    Procedure: COMBINED ESOPHAGOSCOPY, GASTROSCOPY, DUODENOSCOPY (EGD), PLACE PERCUTANEOUS ENDOSCOPIC GASTROSTOMY TUBE;  Surgeon: Sterling Mckeon MD;  [facility]     HEAD & NECK SURGERY       KNEE SURGERY      bilat     LARYNGOSCOPY WITH BIOPSY(IES) N/A 8/15/2016    Procedure: LARYNGOSCOPY WITH BIOPSY(IES);  Surgeon: Thomas Ferris MD;  [facility]     NASAL/SINUS POLYPECTOMY      1990 approx     PAROTIDECTOMY, RADICAL NECK DISSECTION Right 9/1/2016    Procedure: PAROTIDECTOMY, RADICAL NECK DISSECTION;  Surgeon: Thomas Ferris MD;  [facility]     TONSILLECTOMY      1970     TRACHEOSTOMY Right 9/1/2016    Procedure: TRACHEOSTOMY;  Surgeon: Thomas Ferris MD;  Location:  OR       @[facility]@     current outpatient prescriptions on file.       Allergies   Allergen Reactions     Banana Swelling and Anaphylaxis     Tongue and lips swell and get itchy     Bee Pollen Anaphylaxis     Bee Venom Anaphylaxis     Blueberry Anaphylaxis and Swelling     Iodine Anaphylaxis     NOT dietary related.     Penicillins Anaphylaxis     Sulfa Drugs Anaphylaxis     Erythromycin Nausea     Pravastatin Sodium Other (See Comments)     muscle spasam     Simvastatin Cough     Strawberry Hives     Tetracycline GI Disturbance     And tingling         Latex Rash     Rash from bandages/wraps/pressure tapes       Pt reports that she quit smoking about 35 years ago. Her smoking use included Cigarettes. She has a 21.00 pack-year smoking history. She has never used smokeless tobacco. She reports that she drinks alcohol. She reports that she does not use illicit drugs.    Exam:  /79 (BP Location: Left arm)  Pulse 84  Temp 98.4  F (36.9  C) (Oral)  Resp 18  Ht 1.6 m (5' 3\")  Wt 62.6 kg (138 lb)  SpO2 97%  BMI 24.45 kg/m2    Awake, Alert " OX3  Lungs - CTA bilaterally  CV - RRR, no murmurs, distal pulses intact  Abd - soft, non-distended, non-tender, +BS  Extr - No cyanosis or edema    A/P 67 year old year old female in need of colonoscopy for screening. Risks, benefits, alternatives, and complications were discussed including the possibility of perforation and the patient agreed to proceed    Sterling Mckeon MD

## 2017-12-14 NOTE — ANESTHESIA CARE TRANSFER NOTE
Patient: Terese Bell    Procedure(s):  colonoscopy - Wound Class: II-Clean Contaminated    Diagnosis: chronic narcotic  use    screening  Diagnosis Additional Information: No value filed.    Anesthesia Type:   MAC     Note:  Airway :Room Air  Patient transferred to:Phase II  Handoff Report: Identifed the Patient, Identified the Reponsible Provider, Reviewed the pertinent medical history, Discussed the surgical course, Reviewed Intra-OP anesthesia mangement and issues during anesthesia, Set expectations for post-procedure period and Allowed opportunity for questions and acknowledgement of understanding      Vitals: (Last set prior to Anesthesia Care Transfer)    CRNA VITALS  12/14/2017 1409 - 12/14/2017 1440      12/14/2017             Pulse: 67    Ht Rate: 64    SpO2: 100 %                Electronically Signed By: HERNÁN Cordero CRNA  December 14, 2017  2:40 PM

## 2017-12-14 NOTE — ANESTHESIA POSTPROCEDURE EVALUATION
Patient: Terese Bell    Procedure(s):  colonoscopy - Wound Class: II-Clean Contaminated    Diagnosis:chronic narcotic  use    screening  Diagnosis Additional Information: No value filed.    Anesthesia Type:  MAC    Note:  Anesthesia Post Evaluation    Patient location during evaluation: Phase 2  Patient participation: Able to fully participate in evaluation  Level of consciousness: awake  Pain management: adequate  Airway patency: patent  Cardiovascular status: acceptable  Respiratory status: acceptable  Hydration status: acceptable  PONV: none     Anesthetic complications: None          Last vitals:  Vitals:    12/14/17 1241   BP: 100/79   Pulse: 84   Resp: 18   Temp: 36.9  C (98.4  F)   SpO2: 97%         Electronically Signed By: HERNÁN Cordero CRNA  December 14, 2017  2:40 PM

## 2017-12-29 DIAGNOSIS — R47.02 DYSPHASIA: Primary | ICD-10-CM

## 2018-01-29 ENCOUNTER — OFFICE VISIT (OUTPATIENT)
Dept: RADIATION THERAPY | Facility: OUTPATIENT CENTER | Age: 68
End: 2018-01-29
Payer: MEDICARE

## 2018-01-29 VITALS
HEART RATE: 60 BPM | DIASTOLIC BLOOD PRESSURE: 60 MMHG | RESPIRATION RATE: 16 BRPM | BODY MASS INDEX: 25.19 KG/M2 | WEIGHT: 142.2 LBS | SYSTOLIC BLOOD PRESSURE: 109 MMHG

## 2018-01-29 DIAGNOSIS — C76.0 HEAD AND NECK CANCER (H): Primary | ICD-10-CM

## 2018-01-29 DIAGNOSIS — I89.0 LYMPHEDEMA: ICD-10-CM

## 2018-01-29 DIAGNOSIS — C02.9 SQUAMOUS CELL CARCINOMA OF TONGUE (H): Primary | ICD-10-CM

## 2018-01-29 ASSESSMENT — PAIN SCALES - GENERAL: PAINLEVEL: NO PAIN (0)

## 2018-01-29 NOTE — PROGRESS NOTES
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     TYPE OF RADIATION THERAPY ADMINISTERED: 6000 cGy in 30 fractions         SUBJECTIVE: Mrs. 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 She had radiation therapy in our clinic to a total dose of 6000 cGy in 30 treatments at 200 cGy each fraction from 10/17/2016 to 2016.  She tolerated radiation therapy reasonably well.  The patient did have some significant sore throat and dysphagia toward the end of the therapy, which required a PEG tube for nutritional support.  She otherwise was reasonably stable at the end of the therapy. She returns today for a routine post-treatment checkup.      Since her last visit with us, she reports that she has been doing reasonably well. She continues to improve since her last visit in our clinic. She did have a set back with a terrible case of shingles that affected the left side of her face and she continues to have some residual pain in that area still.    #Nutrition   Her taste and oral pain continues to improve which has allowed her to take in more nutrition PO.    She continues to press oral nutrition.  She is capable of eating many foods, but not all foods. She eats very slowly.  Her taste is also still affected.   She takes calorie/ nutrition supplements 2-3x. She would like to continue to work with speech therapy to improve her swallowing.  She is using an electrical stimulation machine (ATOS?) from speech therapy to her with her swallowing.  She does feel she has benefited greatly.     #Xerostomia  Persistent xerostomia.  She uses hydration and  biotene.  She has tried several other medications to no effect.  She does get some relief from gum. She does see dental regularly.    Lymphedema:  She has some lymphedema and has a compression garment, but had trouble wearing that during the shingles outbreak.  She also feels like she has lymphedema building up behind her scapula and she would like to go back to lymphedema therapy.    #Thyroid  The patient has some risk for radiation induced hypothyoridism.TSH last was 1.44 on 8/2017.     Overall, she continues to do as well as expected and her remaining ROS are unremarkable.      OBJECTIVE:   Vital Signs: /60 (Patient Position: Left side, Cuff Size: Adult Regular)  Pulse 60  Resp 16  Wt 64.5 kg (142 lb 3.2 oz)  Breastfeeding? No  BMI 25.19 kg/m2;   GENERAL:  Appears well, in no acute distress.   HEENT: NCAT. No thrush, no mucositis.   Oral cavity exam shows grafted tissue in place.  Mucous membranes are dry. No masses or lymphadenopathy palpated. She does have a small amount of lymphedema and fibrosis.  RESP: Breathing comfortably on RA  CV: WWP  NEURO: Normal gait.      IMAGING:   XR VIDEO SWALLOW W/O ESOPHAGRAM 3/2/2017      IMPRESSION: Swallowing mechanism findings, as described above. No  aspiration.    PET/CT  3/1/2017  IMPRESSION:  1. Postop changes within the neck without evidence of abnormal  hypermetabolism to suggest recurrent disease. No hypermetabolic  adenopathy or evidence of metastatic disease in the neck, chest,  abdomen or pelvis.   2. Gastrostomy tube appears in good position. No evidence of bowel  obstruction or diverticulitis.  3. Multiple tiny indeterminate lung nodules, unchanged since prior  exam. None of these measures larger than 0.3 cm. Continued  surveillance as clinically warranted.     She does have CT scheduled for 1/31    IMPRESSION:   Ms. 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  She is well recovered from the acute toxicities of treatment.  We will continue to follow her for recurrence and management of side effects.       RECOMMENDATIONS:      1. RTC in 6 mo  2. Continue oral nutrition and continuing to work on swallowing and stretching exercises.  3. Continue close follow up with ENT- she has scans next week and will f/u with an appointment after the scans.   4. Continue with lymphedema therapy and speech therapy.  5. TSH -1.44 on 8/2017 and check yearly.        Kamala Rudolph NP  Radiation Oncology  [facility]   Radiation Therapy Center   Phone: [phone]

## 2018-01-29 NOTE — NURSING NOTE
FOLLOW-UP VISIT    Patient Name: Terese Bell      : 1950     Age: 67 year old        ______________________________________________________________________________     Chief Complaint   Patient presents with     Radiation Therapy     Follow up appointment, head and neck cancer     /60 (Patient Position: Left side, Cuff Size: Adult Regular)  Pulse 60  Resp 16  Wt 64.5 kg (142 lb 3.2 oz)  Breastfeeding? No  BMI 25.19 kg/m2     Pain  Denies    Meds  Current Med List Reviewed: Yes  Medication Note:     Labs  None recent    Imaging  None (has CT neck / chest scheduled next week, planning f/u with ENT)    Oral Products: OTC  Dry mouth: mild  Dental evaluation: Yes: sees every 3-4 months  PEG tube: No  Speech therapy: Having some issues, would like to see again  Lymphedema: uses compression garments, would like to see again  Energy Level:normal  Appetite: pretty good  Intake: still drinking 2 supplemental drinks per day, as it is sometimes difficult to eat some foods, and it takes her a long time to eat  Weight:  Wt Readings from Last 5 Encounters:   18 64.5 kg (142 lb 3.2 oz)   17 62.6 kg (138 lb)   17 65.3 kg (144 lb)   17 68.2 kg (150 lb 6.4 oz)   17 69.4 kg (153 lb)       Appointments:     DATE  Oncologist: N/A    ENT: Ahmet/Josy Will make f/u appointment   Primary:      Other Notes: Recovering from a bad case of shingles. Weight is slowly returning.  Using a devise ATOS to help swallowing, and a thera-bite to help with stretching exercises.   Will place referrals for speech and lymphedema therapy.

## 2018-01-29 NOTE — LETTER
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     TYPE OF RADIATION THERAPY ADMINISTERED: 6000 cGy in 30 fractions         SUBJECTIVE: Mrs. 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 She had radiation therapy in our clinic to a total dose of 6000 cGy in 30 treatments at 200 cGy each fraction from 10/17/2016 to 2016.  She tolerated radiation therapy reasonably well.  The patient did have some significant sore throat and dysphagia toward the end of the therapy, which required a PEG tube for nutritional support.  She otherwise was reasonably stable at the end of the therapy. She returns today for a routine post-treatment checkup.      Since her last visit with us, she reports that she has been doing reasonably well. She continues to improve since her last visit in our clinic. She did have a set back with a terrible case of shingles that affected the left side of her face and she continues to have some residual pain in that area still.    #Nutrition   Her taste and oral pain continues to improve which has allowed her to take in more nutrition PO.    She continues to press oral nutrition.  She is capable of eating many foods, but not all foods. She eats very slowly.  Her taste is also still affected.   She takes calorie/ nutrition supplements 2-3x. She would like to continue to work with speech therapy to improve her swallowing.  She is using an electrical stimulation machine (ATOS?) from speech therapy to her with her swallowing.  She does  feel she has benefited greatly.     #Xerostomia  Persistent xerostomia.  She uses hydration and biotene.  She has tried several other medications to no effect.  She does get some relief from gum. She does see dental regularly.    Lymphedema:  She has some lymphedema and has a compression garment, but had trouble wearing that during the shingles outbreak.  She also feels like she has lymphedema building up behind her scapula and she would like to go back to lymphedema therapy.    #Thyroid  The patient has some risk for radiation induced hypothyoridism.TSH last was 1.44 on 8/2017.     Overall, she continues to do as well as expected and her remaining ROS are unremarkable.      OBJECTIVE:   Vital Signs: /60 (Patient Position: Left side, Cuff Size: Adult Regular)  Pulse 60  Resp 16  Wt 64.5 kg (142 lb 3.2 oz)  Breastfeeding? No  BMI 25.19 kg/m2;   GENERAL:  Appears well, in no acute distress.   HEENT: NCAT. No thrush, no mucositis.   Oral cavity exam shows grafted tissue in place.  Mucous membranes are dry. No masses or lymphadenopathy palpated. She does have a small amount of lymphedema and fibrosis.  RESP: Breathing comfortably on RA  CV: WWP  NEURO: Normal gait.      IMAGING:   XR VIDEO SWALLOW W/O ESOPHAGRAM 3/2/2017      IMPRESSION: Swallowing mechanism findings, as described above. No  aspiration.    PET/CT  3/1/2017  IMPRESSION:  1. Postop changes within the neck without evidence of abnormal  hypermetabolism to suggest recurrent disease. No hypermetabolic  adenopathy or evidence of metastatic disease in the neck, chest,  abdomen or pelvis.   2. Gastrostomy tube appears in good position. No evidence of bowel  obstruction or diverticulitis.  3. Multiple tiny indeterminate lung nodules, unchanged since prior  exam. None of these measures larger than 0.3 cm. Continued  surveillance as clinically warranted.     She does have CT scheduled for 1/31    IMPRESSION:   Ms. 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  She is well recovered from the acute toxicities of treatment.  We will continue to follow her for recurrence and management of side effects.       RECOMMENDATIONS:      1. RTC in 6 mo  2. Continue oral nutrition and continuing to work on swallowing and stretching exercises.  3. Continue close follow up with ENT- she has scans next week and will f/u with an appointment after the scans.   4. Continue with lymphedema therapy and speech therapy.  5. TSH -1.44 on 8/2017 and check yearly.        Kamala Rudolph NP  Radiation Oncology  [facility]   Radiation Therapy Center   Phone: [phone]

## 2018-01-31 ENCOUNTER — CARE COORDINATION (OUTPATIENT)
Dept: OTOLARYNGOLOGY | Facility: CLINIC | Age: 68
End: 2018-01-31

## 2018-01-31 ENCOUNTER — HOSPITAL ENCOUNTER (OUTPATIENT)
Dept: CT IMAGING | Facility: CLINIC | Age: 68
Discharge: HOME | End: 2018-01-31
Attending: OTOLARYNGOLOGY | Admitting: OTOLARYNGOLOGY
Payer: MEDICARE

## 2018-01-31 ENCOUNTER — HOSPITAL ENCOUNTER (OUTPATIENT)
Dept: CT IMAGING | Facility: CLINIC | Age: 68
End: 2018-01-31
Attending: OTOLARYNGOLOGY
Payer: MEDICARE

## 2018-01-31 DIAGNOSIS — Z85.819 HX OF MALIGNANT NEOPLASM OF FLOOR OF MOUTH: ICD-10-CM

## 2018-01-31 PROCEDURE — 71250 CT THORAX DX C-: CPT

## 2018-01-31 PROCEDURE — 70490 CT SOFT TISSUE NECK W/O DYE: CPT

## 2018-01-31 NOTE — PROGRESS NOTES
Dr. Ferris reviewed Terese CT neck and chest   She was called and notified of the results   She will f/u with Dr. Ferris in Feb

## 2018-02-01 ENCOUNTER — MYC MEDICAL ADVICE (OUTPATIENT)
Dept: OTOLARYNGOLOGY | Facility: CLINIC | Age: 68
End: 2018-02-01

## 2018-02-01 DIAGNOSIS — E55.9 VITAMIN D DEFICIENCY: Primary | ICD-10-CM

## 2018-02-15 ENCOUNTER — HOSPITAL ENCOUNTER (OUTPATIENT)
Dept: PHYSICAL THERAPY | Facility: CLINIC | Age: 68
Setting detail: THERAPIES SERIES
End: 2018-02-15
Attending: NURSE PRACTITIONER
Payer: MEDICARE

## 2018-02-15 PROCEDURE — 97140 MANUAL THERAPY 1/> REGIONS: CPT | Mod: GP | Performed by: PHYSICAL THERAPIST

## 2018-02-15 PROCEDURE — 97161 PT EVAL LOW COMPLEX 20 MIN: CPT | Mod: GP | Performed by: PHYSICAL THERAPIST

## 2018-02-15 PROCEDURE — 40000099 ZZH STATISTIC LYMPHEDEMA VISIT: Performed by: PHYSICAL THERAPIST

## 2018-02-15 PROCEDURE — G8988 SELF CARE GOAL STATUS: HCPCS | Mod: GP,CI | Performed by: PHYSICAL THERAPIST

## 2018-02-15 PROCEDURE — 97530 THERAPEUTIC ACTIVITIES: CPT | Mod: GP | Performed by: PHYSICAL THERAPIST

## 2018-02-15 PROCEDURE — G8987 SELF CARE CURRENT STATUS: HCPCS | Mod: GP,CK | Performed by: PHYSICAL THERAPIST

## 2018-02-15 NOTE — PROGRESS NOTES
"Outpatient Lymphedema Therapy Evaluation     02/15/18 0900   Rehab Discipline   Discipline PT   Type of Visit   Type of visit Initial Edema Evaluation       present No   General Information   Start of care 02/15/18   Referring physician Kamala Rudolph NP   Orders Evaluate and treat as indicated   Order date 01/29/18   Medical diagnosis H&N lymphedema, chronic   Onset of illness / date of surgery 09/01/16   Affected body parts Head / Neck   Edema etiology Cancer with lymph node dissection;Radiation;Chemo;Surgery   Location - Cancer with lymph node dissection 1/40 positive nodes   Location - Radiation neck   Radiation comments completed 11/28/16 (a total of 30 treatments)   Chemotherapy comments completed   Edema etiology comments s/p right superficial parotidectomy, right hemiglossectomy, right neck dissection and right RFFF on 9/1/2016;  surgical site dehisced and developed wound/fistula ; has had numerous H&N surgeries related to cancer   Pertinent history of current problem (PT: include personal factors and/or comorbidities that impact the POC; OT: include additional occupational profile info) pt was seen at OP lymphedema clinic from 10/6/16 - 4/20/17 and at time of discharge was wearing compression garment and performing self MLD daily;  since patient was last seen in clinic she reports being very compliant with home program including MLD, compression wear and exercises daily; pt again seen on 5/17/17 and 6/21/17 for a \"check\" to ensure lymphedema was still being controlled by home program which it was at that time;  pt had \"a terrible case of shingles\" that affected her L-side of face and continues to have residual pain and wasn't able to wear her garment during that time (had shingles from mid-September until Decemeber); has since returned to wearing garment in the past 3 weeks and but can only keep on for 10-15min at a time a few times a day   Surgical / medical history reviewed Yes "   Edema special tests (no history of DVTs)   Prior level of functional mobility Independent with functional mobility and ADL, no AD but reports pain/discomfort and shooting pain in R-side of face, neck, shoulder and scapular region throughout the day which increases in frequency and intensity during use of RUE which pt is R-side dominant   Prior treatment Complete decongestive therapy;Compression garments;Exercise;MLD   Patient role / employment history Retired   [facility]   Living environment comments with great nephews (adult and a high schooler)   Current assistive devices (no AD for ambulation)   Fall Risk Screen   Fall screen completed by PT   Have you fallen 2 or more times in the past year? No   Have you fallen and had an injury in the past year? No   Is patient a fall risk? No   Subjective Report   Patient report of symptoms feels R-shoulder muscle is tight and R-side of neck is tight and hurts; pt reports swallowing is much better   Precautions   Precautions comments no known precautions; pt has completed cancer treatments, now just routine follow-ups   Patient / Family Goals   Patient / family goals statement decrease pain   Pain   Patient currently in pain Yes   Pain location R-side of neck and shoulder   Pain rating 2/10   Pain comments but can increase out of no where to 9-10/10   Vital Signs   Vital signs Pulse;SPO2   Pulse 68bpm   SPO2 99%   Cognitive Status   Orientation Orientation to person, place and time   Level of consciousness Alert   Follows commands and answers questions 100% of the time   Personal safety and judgement Intact   Memory Intact   Edema Exam / Assessment   Skin condition Intact   Skin condition comments skin at neck all intact, quite tight on R-side of neck   Scar Yes   Location R-neck   Mobility fair   Scar comments closed/healed   Ulceration No   Girth Measurements   Girth Measurements Refer to separate girth measurement flowsheet  (pt also reports 15#  weight loss since June)   Range of Motion   ROM comments functional cervical range with tightness felt at end-ranges for: L-rotation, extensio and SB; B shoulder flexion ~160 degrees   Strength   Strength comments functional   Posture   Posture Forward head position;Protracted shoulders;Other   Posture comments B shoulder elevation and visible trap tightness and elevation R>L   Palpation   Palpation hypersensitive at entire neck and R-upper back/scapular region; muscular tightness and muscular knots palpated on R-side: neck, shouler and scapular region   Activities of Daily Living   Activities of Daily Living Independent   Sensory   Sensory perception Light touch   Light touch Intact   Sensory perception comments hypersensitive (see above palpation)   Coordination   Coordination Gross motor coordination appropriate   Muscle Tone   Muscle tone No deficits were identified   Planned Edema Interventions   Planned edema interventions Manual lymph drainage;Exercises;Precautions to prevent infection / exacerbation;Education;Manual therapy;Skin care / precautions;Scar mobilization;Soft tissue mobilization;Myofascial release;Home management program development   Clinical Impression   Criteria for skilled therapeutic intervention met Yes   Therapy diagnosis chronic H&N lymphedema with associated muscular tightness   Influenced by the following impairments / conditions Stage 2;Phlebolymphedema   Functional limitations due to impairments / conditions any use of dominant side/R-sided activity with RUE   Clinical Presentation Stable/Uncomplicated   Clinical Presentation Rationale clinical judgement   Clinical Decision Making (Complexity) Low complexity   Treatment frequency 2 times / week   Treatment duration 12 weeks   Patient / family and/or staff in agreement with plan of care Yes   Risks and benefits of therapy have been explained Yes   Education Assessment   Preferred learning style Listening   Barriers to learning No  barriers   Goals   Edema Eval Goals 1;2;3   Goal 1   Goal identifier stg   Goal description pt to be independent with performing daily cervical stretches/exercises to decrease cervical tightness and improve pain/discomfort   Target date 03/17/18   Goal 2   Goal identifier ltg   Goal description pt to report overall decrease in pain of no more than 3-4/10 on a daily basis during functional mobility and ADL   Target date 05/16/18   Goal 3   Goal identifier ltg   Goal description pt to be independent with home program including ROM exercises, cervical stretches, heat, compression and self-MLD/massage for longterm management of lymphedema and related tightness   Target date 05/16/18   Total Evaluation Time   Total evaluation time 10

## 2018-02-15 NOTE — PROGRESS NOTES
[facility]        OUTPATIENT PHYSICAL THERAPY EDEMA EVALUATION  PLAN OF TREATMENT FOR OUTPATIENT REHABILITATION  (COMPLETE FOR INITIAL CLAIMS ONLY)  Patient's Last Name, First Name, ANTONIOTerese Pappas                           Provider s Name:   [facility] Medical Record No.  [id number]     Start of Care Date:  02/15/18   Onset Date:   1/29/18 - date of referral   Type:  PT   Medical Diagnosis:      Therapy Diagnosis:  chronic H&N lymphedema with associated muscular tightness Visits from SOC:  1                                     __________________________________________________________________________________   Plan of Treatment/Functional Goals:    Manual lymph drainage, Exercises, Precautions to prevent infection / exacerbation, Education, Manual therapy, Skin care / precautions, Scar mobilization, Soft tissue mobilization, Myofascial release, Home management program development        GOALS  1. Goal description: pt to be independent with performing daily cervical stretches/exercises to decrease cervical tightness and improve pain/discomfort       Target date: 03/17/18  2. Goal description: pt to report overall decrease in pain of no more than 3-4/10 on a daily basis during functional mobility and ADL       Target date: 05/16/18  3. Goal description: pt to be independent with home program including ROM exercises, cervical stretches, heat, compression and self-MLD/massage for longterm management of lymphedema and related tightness       Target date: 05/16/18            Treatment frequency: 2 times / week   Treatment duration: 12 weeks (2/15/18 - 5/16/18)    Rachel Richey, PT, DPT, CLT                                    I CERTIFY THE NEED FOR THESE SERVICES FURNISHED UNDER        THIS PLAN OF TREATMENT AND WHILE UNDER MY CARE     (Physician  co-signature of this document indicates review and certification of the therapy plan).                     Referring physician: Kamala Rudolph NP   Initial Assessment  See Epic Evaluation- Start of care: 02/15/18

## 2018-02-19 ENCOUNTER — HOSPITAL ENCOUNTER (OUTPATIENT)
Dept: PHYSICAL THERAPY | Facility: CLINIC | Age: 68
Setting detail: THERAPIES SERIES
End: 2018-02-19
Attending: NURSE PRACTITIONER
Payer: MEDICARE

## 2018-02-19 PROCEDURE — 40000099 ZZH STATISTIC LYMPHEDEMA VISIT: Performed by: REHABILITATION PRACTITIONER

## 2018-02-19 PROCEDURE — 97140 MANUAL THERAPY 1/> REGIONS: CPT | Mod: GP | Performed by: REHABILITATION PRACTITIONER

## 2018-02-22 ENCOUNTER — HOSPITAL ENCOUNTER (OUTPATIENT)
Dept: PHYSICAL THERAPY | Facility: CLINIC | Age: 68
Setting detail: THERAPIES SERIES
End: 2018-02-22
Attending: NURSE PRACTITIONER
Payer: MEDICARE

## 2018-02-22 PROCEDURE — 97140 MANUAL THERAPY 1/> REGIONS: CPT | Mod: GP | Performed by: REHABILITATION PRACTITIONER

## 2018-02-22 PROCEDURE — 40000099 ZZH STATISTIC LYMPHEDEMA VISIT: Performed by: REHABILITATION PRACTITIONER

## 2018-02-26 ENCOUNTER — HOSPITAL ENCOUNTER (OUTPATIENT)
Dept: PHYSICAL THERAPY | Facility: CLINIC | Age: 68
Setting detail: THERAPIES SERIES
End: 2018-02-26
Attending: NURSE PRACTITIONER
Payer: MEDICARE

## 2018-02-26 PROCEDURE — 97140 MANUAL THERAPY 1/> REGIONS: CPT | Mod: GP | Performed by: REHABILITATION PRACTITIONER

## 2018-02-26 PROCEDURE — 40000099 ZZH STATISTIC LYMPHEDEMA VISIT: Performed by: REHABILITATION PRACTITIONER

## 2018-02-28 ENCOUNTER — OFFICE VISIT (OUTPATIENT)
Dept: OTOLARYNGOLOGY | Facility: CLINIC | Age: 68
End: 2018-02-28
Payer: MEDICARE

## 2018-02-28 VITALS — WEIGHT: 144 LBS | HEIGHT: 63 IN | BODY MASS INDEX: 25.52 KG/M2

## 2018-02-28 DIAGNOSIS — C02.9 MALIGNANT NEOPLASM OF TONGUE (H): Primary | ICD-10-CM

## 2018-02-28 ASSESSMENT — PAIN SCALES - GENERAL: PAINLEVEL: MILD PAIN (2)

## 2018-02-28 NOTE — LETTER
2/28/2018       RE: Terese Bell  [address] [facility] [address]     Dear Colleague,    Thank you for referring your patient, Terese Bell, to the [facility] EAR NOSE AND THROAT at [facility]. Please see a copy of my visit note below.    February 28, 2018    PRIOR ONCOLOGIC HISTORY:  Ms. 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 Schumacher.  He ended up doing 13 operations in 11 years, most recently in 2010.      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 Leo on 9/1/2016.  The margins were negative but the lymph nodes were 1/40 positive.  She completed postoperative radiation therapy on 11/29/2017.    HISTORY OF PRESENT ILLNESS:    Ms. Bell continues to do very well.  Her 15-month CT scans in January were clear of disease.  There was no evidence of disease in her neck or her lungs.  However, the scans were done without contrast due to her allergy.     She brings in a list of concerns today to discuss:    1.  Why she developed the cancer.    2.  She developed an episode of shingles in September that lasted until January.  Unfortunately, although the skin has healed now, she has continued severe pain on the left side of her face and her head.  She wonders whether there is anything that we can do for that.   3.  She indicates that despite working with a swallowing therapist in [place], her swallowing has not progressed.  She wonders whether there is any work that she can do to continue improvement there.   4.  The patient wonders whether the allergy to iodine is real.  She says that she used to have asthma but that has gone away, and she wonders whether the asthma and iodine  "allergy have actually resolved.  She wants to make sure that there is nothing \"floating around.\"     5.  The patient says that she is bothered by a metal clip that is just underneath her incision line on the right side.  She says that this is unlike the other clips and is behaving as floating around, and she wants to have it removed.   6.  Short-term memory loss.  She says she used to be very sharp but recently has had more and more difficulty remembering short-term issues.   7.  My \"restriction\" that she could not be further away from [place] than three hours. ( I didn't remember this!)     PHYSICAL EXAMINATION:  She looks very well.  The oral cavity and oropharynx look great.  There is either a series of metal clips or possibly a venous  just underneath her skin incision on the right side that is fully mobile.  I feel no masses.  She does have trismus, and that limits my examination and palpation of the floor of mouth.  The mouth is completely clean and soft.      IMPRESSION AND PLAN:  Ms. Bell continues to be WILMA based on examination and 15-month scans.       Here are the issues we discussed, using the format above:  1.  She wondered whether her exposure to chemicals in her work, and substantial stress in her life, could have contributed to the cancer.  She smoked heavily for 7 years earlier in her life as well.  I acknowledged that all of these could have been contributory, but that we will never really know exactly what caused her problem.  She indicates that many people in her family have cancers, and I told her that there were certainly genetic predispositions to developing cancer.  I believe that she found this discussion helpful.    2, 4 & 6. We have asked her to get with Dr. Mcdonald, who is her primary care physician, on the pain control as well as any concerns about a stroke.  She indicates that she has quite a bit of short-term memory loss that she thinks has gotten worse after the surgery and " the radiation therapy.  I told her that we have memory experts here as well that could see her, but she wants to start with Dr. Mcdonald first.  We will also ask Dr Mcdonald to direct her to appropriate allergy testing to iodine.  In meantime, we can switch to MRI for monitoring.    3.  She has started physical therapy again and is regularly doing her speech language pathology exercises but wants to talk to somebody again today, and Janay will visit with her.     5.  I asked Rachel to check with Dr. Leo to see whether he wants to remove that  or staples which I think would be a relief for her psychologically.  I would be happy to do it as well if he does not have the time but does authorize it.      7.   I do not remember telling her that she cannot be more than 3 hours away from the [facility], but if I did, that was probably around the time when we were giving her radiation therapy.  She would like to travel with her boyfriend to [place] and [facility].  I have given her authorization to start trips with her boyfriend.      PLAN:    We will plan to scan her again at 24 months, and I have suggested that we use an MRI this time.  I spent approximately 40 minutes with the patient in direct face-to-face conversation, nearly all of which was spent in counseling and care coordination.      Thomas Ferris M.D.  Otolaryngology/Head & Neck Surgery  [phone]      HERNÁN Carranza MD [facility] TONG&MIGUELITO Leo MD  Otolaryngology/Head & Neck Surgery  [facility] 396

## 2018-02-28 NOTE — NURSING NOTE
"Chief Complaint   Patient presents with     RECHECK     follow up     Height 1.61 m (5' 3.39\"), weight 65.3 kg (144 lb), not currently breastfeeding.    Hilton Chong LPN    "

## 2018-02-28 NOTE — PATIENT INSTRUCTIONS
Follow up with Dr. Leo as scheduled  Follow up with your primary to discuss memory loss,allergies and chronic pain after shingles.  Call me if ?'s arise [phone]  Rachel Lopez RN

## 2018-02-28 NOTE — MR AVS SNAPSHOT
After Visit Summary   2/28/2018    Terese Bell    MRN: [id number]           Patient Information     Date Of Birth          1950        Visit Information        Provider Department      2/28/2018 9:20 AM Thomas Ferris MD [facility] Ear Nose and Throat        Today's Diagnoses     Malignant neoplasm of tongue (H)    -  1      Care Instructions    Follow up with Dr. Leo as scheduled  Follow up with your primary to discuss memory loss,allergies and chronic pain after shingles.  Call me if ?'s arise [phone]  Rachel Lopez RN            Follow-ups after your visit        Your next 10 appointments already scheduled     Mar 02, 2018  1:00 PM CST   Office Visit with HERNÁN Carranza [facility] ([facility])    [address] [place] [address] [phone]           Bring a current list of meds and any records pertaining to this visit. For Physicals, please bring immunization records and any forms needing to be filled out. Please arrive 10 minutes early to complete paperwork.            Mar 05, 2018  9:30 AM CST   Lymphedema Treatment with Apryl Streeter PTA   [facility] ([facility])    [address] [address]   [phone]            Mar 08, 2018  9:00 AM CST   Lymphedema Treatment with Apryl Streeter PTA   [facility])    [address] [address]   [phone]            Mar 12, 2018  9:00 AM CDT   Lymphedema Treatment with Apryl Streeter PTA   [facility] ([facility])    [address] [address]   [phone]            Mar 15, 2018  1:00 PM CDT   Lymphedema Treatment with Apryl Streeter PTA   [facility] ([facility])    [address] [address]   [phone]            Apr 09, 2018 10:15  "AM CDT   (Arrive by 10:00 AM)   Return Visit with Moreno Leo MD   [facility] Ear Nose and Throat ([facility])    [address]  [address]  [facility] [address] [phone]            Jul 23, 2018 11:30 AM CDT   Return Visit with [facility] Provider   [facility] ([facility])    [address], [address]  [facility] [address]   [phone]              Who to contact     Please call your clinic at [phone] to:    Ask questions about your health    Make or cancel appointments    Discuss your medicines    Learn about your test results    Speak to your doctor            Additional Information About Your Visit        Canvas Information     Canvas gives you secure access to your electronic health record. If you see a primary care provider, you can also send messages to your care team and make appointments. If you have questions, please call your primary care clinic.  If you do not have a primary care provider, please call [phone] and they will assist you.      Canvas is an electronic gateway that provides easy, online access to your medical records. With Canvas, you can request a clinic appointment, read your test results, renew a prescription or communicate with your care team.     To access your existing account, please contact your [facility] or call [phone] for assistance.        Care EveryWhere ID     This is your Care EveryWhere ID. This could be used by other organizations to access your [facility] medical records  [id number]        Your Vitals Were     Height BMI (Body Mass Index)                1.61 m (5' 3.39\") 25.2 kg/m2           Blood Pressure from Last 3 Encounters:   01/29/18 109/60   12/14/17 96/49   11/02/17 98/52    Weight from Last 3 Encounters:   02/28/18 65.3 kg (144 lb)   01/29/18 64.5 kg (142 lb 3.2 oz)   12/14/17 62.6 kg (138 lb)              We Performed the Following     " IMAGESTREAM RECORDING ORDER        Primary Care Provider Office Phone # Fax #    Nicole Mcdonald, APRN [facility] [phone] [address] [place] [address]        Equal Access to Services     TREMAINE ALMANZA : Hadii angel ku elliotto Soomaali, waaxda luqadaha, qaybta kaalmada adeegyada, waxvalencia idiin hayaan richie alex laOraliazeina clayton. So [facility] [phone].    ATENCIÓN: Si habla español, tiene a thompson disposición servicios gratuitos de asistencia lingüística. Llame al [phone].    We comply with applicable federal civil rights laws and [place] laws. We do not discriminate on the basis of race, color, national origin, age, disability, sex, sexual orientation, or gender identity.            Thank you!     Thank you for choosing [facility] EAR NOSE AND THROAT  for your care. Our goal is always to provide you with excellent care. Hearing back from our patients is one way we can continue to improve our services. Please take a few minutes to complete the written survey that you may receive in the mail after your visit with us. Thank you!             Your Updated Medication List - Protect others around you: Learn how to safely use, store and throw away your medicines at www.disposemymeds.org.          This list is accurate as of 2/28/18 11:59 PM.  Always use your most recent med list.                   Brand Name Dispense Instructions for use Diagnosis    albuterol 108 (90 BASE) MCG/ACT Inhaler    PROAIR HFA/PROVENTIL HFA/VENTOLIN HFA     Inhale 2 puffs into the lungs every 6 hours as needed for wheezing    Moderate persistent asthma without complication       DENTAGEL 1.1 % Gel topical gel   Generic drug:  sodium fluoride dental gel      Apply 1 Application to affected area daily        EPINEPHrine 0.3 MG/0.3ML injection 2-pack    EPIPEN/ADRENACLICK/or ANY BX GENERIC EQUIV    0.6 mL    Inject 0.3 mLs (0.3 mg) into the muscle once as needed for anaphylaxis    Anaphylactic reaction, subsequent encounter        fluticasone-salmeterol 250-50 MCG/DOSE diskus inhaler    ADVAIR DISKUS    1 Inhaler    Inhale 1 puff into the lungs 2 times daily as needed    SOB (shortness of breath)       indomethacin 25 MG capsule    INDOCIN    42 capsule    Take 1 capsule (25 mg) by mouth 2 times daily (with meals)    Acute idiopathic gout of right foot       NITROGLYCERIN TD      Cut in quarter and apply to elbow and knee for pain        order for DME     1 Device    Equipment being ordered: tennis elbow band    Right tennis elbow

## 2018-03-01 ENCOUNTER — HOSPITAL ENCOUNTER (OUTPATIENT)
Dept: PHYSICAL THERAPY | Facility: CLINIC | Age: 68
Setting detail: THERAPIES SERIES
End: 2018-03-01
Attending: NURSE PRACTITIONER
Payer: MEDICARE

## 2018-03-01 PROCEDURE — 40000099 ZZH STATISTIC LYMPHEDEMA VISIT: Performed by: REHABILITATION PRACTITIONER

## 2018-03-01 PROCEDURE — 97140 MANUAL THERAPY 1/> REGIONS: CPT | Mod: GP | Performed by: REHABILITATION PRACTITIONER

## 2018-03-01 PROCEDURE — 97110 THERAPEUTIC EXERCISES: CPT | Mod: GP | Performed by: REHABILITATION PRACTITIONER

## 2018-03-02 ENCOUNTER — NURSE TRIAGE (OUTPATIENT)
Dept: NURSING | Facility: CLINIC | Age: 68
End: 2018-03-02

## 2018-03-02 ENCOUNTER — OFFICE VISIT (OUTPATIENT)
Dept: FAMILY MEDICINE | Facility: CLINIC | Age: 68
End: 2018-03-02
Payer: MEDICARE

## 2018-03-02 VITALS
WEIGHT: 145 LBS | DIASTOLIC BLOOD PRESSURE: 60 MMHG | HEART RATE: 68 BPM | BODY MASS INDEX: 25.37 KG/M2 | TEMPERATURE: 98.7 F | SYSTOLIC BLOOD PRESSURE: 102 MMHG

## 2018-03-02 DIAGNOSIS — Z85.810 HISTORY OF TONGUE CANCER: ICD-10-CM

## 2018-03-02 DIAGNOSIS — M79.2 NERVE PAIN: ICD-10-CM

## 2018-03-02 DIAGNOSIS — M77.11 RIGHT TENNIS ELBOW: ICD-10-CM

## 2018-03-02 DIAGNOSIS — R41.3 MEMORY LOSS: Primary | ICD-10-CM

## 2018-03-02 DIAGNOSIS — T78.00XD ANAPHYLACTIC SHOCK DUE TO FOOD, SUBSEQUENT ENCOUNTER: ICD-10-CM

## 2018-03-02 PROCEDURE — 99214 OFFICE O/P EST MOD 30 MIN: CPT | Performed by: NURSE PRACTITIONER

## 2018-03-02 RX ORDER — NITROGLYCERIN 80 MG/1
PATCH TRANSDERMAL
Qty: 30 PATCH | Refills: 0 | Status: SHIPPED | OUTPATIENT
Start: 2018-03-02 | End: 2019-03-07

## 2018-03-02 ASSESSMENT — PAIN SCALES - GENERAL: PAINLEVEL: MODERATE PAIN (4)

## 2018-03-02 NOTE — MR AVS SNAPSHOT
After Visit Summary   3/2/2018    Terese Bell    MRN: [id number]           Patient Information     Date Of Birth          1950        Visit Information        Provider Department      3/2/2018 1:00 PM Nicole Mcdonald APRN CNP [facility]        Today's Diagnoses     Memory loss    -  1    History of tongue cancer        Anaphylactic shock due to food, subsequent encounter        Nerve pain        Right tennis elbow          Care Instructions    For the left facial /neck nerve pool see pain management     For the allergies  Did refer you back to allergist,     Ordered the MRI,     I did reorder the Nitro patch for the muscle pain .     Call back to the clinic with the name of the tube for  The skin               Follow-ups after your visit        Additional Services     ALLERGY/ASTHMA ADULT REFERRAL       Your provider has referred you to: INDIRA: [facility] [phone]  [email or web address]  FMG:  [facility] [phone] [email or web address]/  FMG: [facility] [phone] [email or web address]    Please be aware that coverage of these services is subject to the terms and limitations of your health insurance plan.  Call member services at your health plan with any benefit or coverage questions.      Please bring the following with you to your appointment:    (1) Any X-Rays, CTs or MRIs which have been performed.  Contact the facility where they were done to arrange for  prior to your scheduled appointment.    (2) List of current medications  (3) This referral request   (4) Any documents/labs given to you for this referral            PAIN MANAGEMENT REFERRAL       Your provider has referred you to: FMG: [facility] -    Reason for Referral: Evaluation for procedure- clinic evaluation to determine if  procedure is appropriate.  Procedure would be done at later date.     Please complete the following questions:    Do you have any specific questions for the pain specialist? No    Are there any red flags that may impact the assessment or management of the patient? None      What is your diagnosis for the patient's pain? On going nerve pain from shingles. On the left side of head, face   Hx of tongue cancer with the radiation at the same site       For any questions, contact the [facility] at [phone].     **ANY DIAGNOSTIC TESTS THAT ARE NOT IN EPIC SHOULD BE SENT TO THE PAIN CENTER**    REGARDING OPIOID MEDICATIONS:  The discussion of opioids management, appropriateness of therapy, and dosing will be discussed in patients being seen for evaluation.  The pain management clinics are not long-term prescribing clinics, with transition of prescribing of medications ultimately going back to the referring provider/PCP.  If prescribing is taken over at the pain clinic, it is in actively involved patients whom are appropriate for opioids, urine drug screening is completed, and long-term prescribing plan has been determined.  Therefore, we will not be automatically taking over prescribing at the patient's first visit.  Is this agreeable to you? agrees.     Please be aware that coverage of these services is subject to the terms and limitations of your health insurance plan.  Call member services at your health plan with any benefit or coverage questions.      Please bring the following with you to your appointment:    (1) Any X-Rays, CTs or MRIs which have been performed.  Contact the facility where they were done to arrange for  prior to your scheduled appointment.    (2) List of current medications   (3) This referral request   (4) Any documents/labs given to you for this referral                  Your next 10 appointments already scheduled     Mar 05, 2018  9:30 AM CST   Lymphedema  Treatment with Apryl Streeter PTA   [facility] ([facility])    [address] [address]   [phone]            Mar 08, 2018  9:00 AM CST   Lymphedema Treatment with Apryl Streeter PTA   [facility] ([facility])    [address] [address]   [phone]            Mar 12, 2018  9:00 AM CDT   Lymphedema Treatment with Apryl Streeter PTA   [facility] ([facility])    [address] [address]   [phone]            Mar 15, 2018  1:00 PM CDT   Lymphedema Treatment with Apryl Streeter PTA   [facility] ([facility])    [address] [address]   [phone]            Apr 09, 2018 10:15 AM CDT   (Arrive by 10:00 AM)   Return Visit with Moreno Leo MD   [facility] ([facility])    [address] [address] [phone]            Jul 23, 2018 11:30 AM CDT   Return Visit with [facility] Provider   [facility] ([facility])    [address], [address]  [place] [address]   [phone]              Future tests that were ordered for you today     Open Future Orders        Priority Expected Expires Ordered    MR Brain w/o Contrast Routine  3/2/2019 3/2/2018            Who to contact     Normal or non-critical lab and imaging results will be communicated to you by MyChart, letter or phone within 4 business days after the clinic has received the results. If you do not hear from us within 7 days, please contact the clinic through MyChart or phone. If you have a critical or abnormal lab result, we will notify you by phone as soon as possible.  Submit refill requests through [employer] or call your pharmacy and they will forward the refill request to us. Please allow 3 business days for your refill  to be completed.          If you need to speak with a  for additional information , please call: [phone]           Additional Information About Your Visit        [employer] Information     [employer] gives you secure access to your electronic health record. If you see a primary care provider, you can also send messages to your care team and make appointments. If you have questions, please call your primary care clinic.  If you do not have a primary care provider, please call [phone] and they will assist you.        Care EveryWhere ID     This is your Care EveryWhere ID. This could be used by other organizations to access your [facility] medical records  [id number]        Your Vitals Were     Pulse Temperature BMI (Body Mass Index)             68 98.7  F (37.1  C) (Tympanic) 25.37 kg/m2          Blood Pressure from Last 3 Encounters:   03/02/18 102/60   01/29/18 109/60   12/14/17 96/49    Weight from Last 3 Encounters:   03/02/18 145 lb (65.8 kg)   02/28/18 144 lb (65.3 kg)   01/29/18 142 lb 3.2 oz (64.5 kg)              We Performed the Following     ALLERGY/ASTHMA ADULT REFERRAL     PAIN MANAGEMENT REFERRAL          Today's Medication Changes          These changes are accurate as of 3/2/18  2:03 PM.  If you have any questions, ask your nurse or doctor.               These medicines have changed or have updated prescriptions.        Dose/Directions    * NITROGLYCERIN TD   This may have changed:  Another medication with the same name was added. Make sure you understand how and when to take each.   Changed by:  Nicole Mcdonald APRN CNP        Cut in quarter and apply to elbow and knee for pain   Refills:  0       * nitroGLYcerin 0.4 MG/HR 24 hr patch   Commonly known as:  NITRO-DUR   This may have changed:  You were already taking a medication with the same name, and this prescription was added. Make sure you understand how and when to take each.   Used for:  Right tennis elbow   Changed  by:  Nicole Mcdonald APRN CNP        Cut into 1/4 and place 1/4 over the area of pain.  Change every 24 hours.   Quantity:  30 patch   Refills:  0       * Notice:  This list has 2 medication(s) that are the same as other medications prescribed for you. Read the directions carefully, and ask your doctor or other care provider to review them with you.      Stop taking these medicines if you haven't already. Please contact your care team if you have questions.     fluticasone-salmeterol 250-50 MCG/DOSE diskus inhaler   Commonly known as:  ADVAIR DISKUS   Stopped by:  Nicole Mcdonald APRN CNP                Where to get your medicines      These medications were sent to [id number] - [place], [place] - [address]  [address] [address]     Phone:  [phone]     nitroGLYcerin 0.4 MG/HR 24 hr patch                Primary Care Provider Office Phone # Fax #    HERNÁN Carranza [phone] [address] [place] [address]        Equal Access to Services     [facility]: Hadii aad ku hadasho Somoeali, waaxda luqadaha, qaybta kaalmada adelurdes, luis clayton. So [facility] [phone].    ATENCIÓN: Si habla español, tiene a thompson disposición servicios gratuitos de asistencia lingüística. [facility] [phone].    We comply with applicable federal civil rights laws and [place] laws. We do not discriminate on the basis of race, color, national origin, age, disability, sex, sexual orientation, or gender identity.            Thank you!     [facility]  for your care. Our goal is always to provide you with excellent care. Hearing back from our patients is one way we can continue to improve our services. Please take a few minutes to complete the written survey that you may receive in the mail after your visit with us. Thank you!             Your Updated Medication List -  Protect others around you: Learn how to safely use, store and throw away your medicines at www.disposemymeds.org.          This list is accurate as of 3/2/18  2:03 PM.  Always use your most recent med list.                   Brand Name Dispense Instructions for use Diagnosis    albuterol 108 (90 BASE) MCG/ACT Inhaler    PROAIR HFA/PROVENTIL HFA/VENTOLIN HFA     Inhale 2 puffs into the lungs every 6 hours as needed for wheezing    Moderate persistent asthma without complication       DENTAGEL 1.1 % Gel topical gel   Generic drug:  sodium fluoride dental gel      Apply 1 Application to affected area daily        EPINEPHrine 0.3 MG/0.3ML injection 2-pack    EPIPEN/ADRENACLICK/or ANY BX GENERIC EQUIV    0.6 mL    Inject 0.3 mLs (0.3 mg) into the muscle once as needed for anaphylaxis    Anaphylactic reaction, subsequent encounter       indomethacin 25 MG capsule    INDOCIN    42 capsule    Take 1 capsule (25 mg) by mouth 2 times daily (with meals)    Acute idiopathic gout of right foot       * NITROGLYCERIN TD      Cut in quarter and apply to elbow and knee for pain        * nitroGLYcerin 0.4 MG/HR 24 hr patch    NITRO-DUR    30 patch    Cut into 1/4 and place 1/4 over the area of pain.  Change every 24 hours.    Right tennis elbow       order for DME     1 Device    Equipment being ordered: tennis elbow band    Right tennis elbow       * Notice:  This list has 2 medication(s) that are the same as other medications prescribed for you. Read the directions carefully, and ask your doctor or other care provider to review them with you.

## 2018-03-02 NOTE — PROGRESS NOTES
SUBJECTIVE:   Terese Bell is a 67 year old female who presents to clinic today for the following health issues:    *Short term memory - Since having surgery on her tongue has been having issues with short term memory. Will have to do some word searching, write lists, will forget names. Can remember things from years ago but half an hour ago is an issue. States that it seems to be intermittent. Wonders if it could have to do with her nutrition as well, is getting at least 1200 calories a day, is watching this. Dr. Ferris recommends sending patient for an MRI to check for a stroke but believes that the memory issues may be the result of radiation.     *Allergies - Has a long list of allergies and is wondering if there is a chance that she has outgrown some of these, wonders if she should have allergy testing again.     *Shingles Would like to know if she is a candidate for the new shingles shot after having shingles. Her shingles has gotten better but is still very tender, wondering if this will ever go away or just keep improving slowly.     See notes above.        Problem list and histories reviewed & adjusted, as indicated.  Additional history: as documented    Patient Active Problem List   Diagnosis     Absence of menstruation     Generalized osteoarthrosis, unspecified site     Moderate persistent asthma     FAMILY HX GI MALIGNANCY brother colon ca.      Rosacea     HYPERLIPIDEMIA LDL GOAL <130     Tear Meniscus Knee  repaired     24 hour info given     Myalgia     History of tongue cancer     Tongue irritation     Tongue cancer (H)     History of recent ear, nose, and throat (ENT) procedure     Malignant neoplasm of tongue (H)     Fistula     Cervical cancer screening     Advanced directives, counseling/discussion     Past Surgical History:   Procedure Laterality Date     ADENOIDECTOMY      1970     C NONSPECIFIC PROCEDURE      CHOLECYSTECTOMY     C NONSPECIFIC PROCEDURE      SINUS MASS REMOVED      C NONSPECIFIC PROCEDURE      R KNEE SURGERY     CHOLECYSTECTOMY       COLONOSCOPY  11/26/2012    Procedure: COLONOSCOPY;  Colonoscopy;  Surgeon: Yony Garcia MD;  Location: [facility]     COLONOSCOPY N/A 12/14/2017    Procedure: COLONOSCOPY;  colonoscopy;  Surgeon: Sterling Mckeon MD;  Location: [facility]     EXAM UNDER ANESTHESIA MOUTH N/A 8/15/2016    Procedure: EXAM UNDER ANESTHESIA MOUTH;  Surgeon: Thomas Ferris MD;  [facility]     GLOSSECTOMY Right 9/1/2016    Procedure: GLOSSECTOMY;  Surgeon: Thomas Ferris MD;  [facility]     GLOSSECTOMY PARTIAL       GRAFT FREE VASCULARIZED (LOCATION) Left 9/1/2016    Procedure: GRAFT FREE VASCULARIZED (LOCATION);  Surgeon: Moreno Loe MD;  [facility]     GRAFT SKIN SPLIT THICKNESS FROM EXTREMITY Left 9/1/2016    Procedure: GRAFT SKIN SPLIT THICKNESS FROM EXTREMITY;  Surgeon: Moreno Leo MD;  Location:  OR     [facility] ENDOSCOPY W PLACEMENT GASTROSTOMY TUBE PERCUT N/A 11/7/2016    Procedure: COMBINED ESOPHAGOSCOPY, GASTROSCOPY, DUODENOSCOPY (EGD), PLACE PERCUTANEOUS ENDOSCOPIC GASTROSTOMY TUBE;  Surgeon: Sterling Mckeon MD;  Location: [facility]     HEAD & NECK SURGERY       KNEE SURGERY      bilat     LARYNGOSCOPY WITH BIOPSY(IES) N/A 8/15/2016    Procedure: LARYNGOSCOPY WITH BIOPSY(IES);  Surgeon: Thomas Ferris MD;  Location:  OR     NASAL/SINUS POLYPECTOMY      1990 approx     ORTHOPEDIC SURGERY  1977 and 2010    knee repair (r) and clean out (l)     PAROTIDECTOMY, RADICAL NECK DISSECTION Right 9/1/2016    Procedure: PAROTIDECTOMY, RADICAL NECK DISSECTION;  Surgeon: Thomas Ferris MD;  [facility]     TONSILLECTOMY      1970     TRACHEOSTOMY Right 9/1/2016    Procedure: TRACHEOSTOMY;  Surgeon: Thomas Ferris MD;  Location:  OR       Social History   Substance Use Topics     Smoking status: Former Smoker     Packs/day: 2.00     Years: 7.00     Types: Cigarettes     Start date: 1/1/1970     Quit date: 1/1/1980     Smokeless tobacco: Never Used      Alcohol use Yes      Comment: 4 drinks per year     Family History   Problem Relation Age of Onset     Neurologic Disorder Brother      migranes     Hypertension Mother      Arthritis Mother      CEREBROVASCULAR DISEASE Mother      Hypertension Father      Prostate Cancer Father      CANCER Father      Alzheimer Disease Paternal Grandmother      Hypertension Brother      Arthritis Brother      Respiratory Brother      emphysema     Cancer - colorectal Brother      C.A.D. Maternal Uncle      CANCER Brother      Hypertension Brother      DIABETES No family hx of      Breast Cancer No family hx of          Current Outpatient Prescriptions   Medication Sig Dispense Refill     indomethacin (INDOCIN) 25 MG capsule Take 1 capsule (25 mg) by mouth 2 times daily (with meals) 42 capsule 1     NITROGLYCERIN TD Cut in quarter and apply to elbow and knee for pain       DENTAGEL 1.1 % GEL Apply 1 Application to affected area daily  11     EPINEPHrine (EPIPEN/ADRENACLICK/OR ANY BX GENERIC EQUIV) 0.3 MG/0.3ML injection 2-pack Inject 0.3 mLs (0.3 mg) into the muscle once as needed for anaphylaxis (Patient not taking: Reported on 3/2/2018) 0.6 mL 3     albuterol (PROAIR HFA, PROVENTIL HFA, VENTOLIN HFA) 108 (90 BASE) MCG/ACT inhaler Inhale 2 puffs into the lungs every 6 hours as needed for wheezing (Patient not taking: Reported on 3/2/2018)       ORDER FOR DME, SET TO LOCAL PRINT, Equipment being ordered: tennis elbow band (Patient not taking: Reported on 3/2/2018) 1 Device 0     Allergies   Allergen Reactions     Banana Swelling and Anaphylaxis     Tongue and lips swell and get itchy     Bee Pollen Anaphylaxis     Bee Venom Anaphylaxis     Blueberry Anaphylaxis and Swelling     Iodine Anaphylaxis     NOT dietary related.     Penicillins Anaphylaxis     Sulfa Drugs Anaphylaxis     Erythromycin Nausea     Pravastatin Sodium Other (See Comments)     muscle spasam     Simvastatin Cough     Strawberry Hives     Tetracycline GI  Disturbance     And tingling         Latex Rash     Rash from bandages/wraps/pressure tapes     BP Readings from Last 3 Encounters:   03/02/18 102/60   01/29/18 109/60   12/14/17 96/49    Wt Readings from Last 3 Encounters:   03/02/18 145 lb (65.8 kg)   02/28/18 144 lb (65.3 kg)   01/29/18 142 lb 3.2 oz (64.5 kg)                    Reviewed and updated as needed this visit by clinical staff       Reviewed and updated as needed this visit by Provider         ROS:  CONSTITUTIONAL: NEGATIVE for fever, chills, change in weight  INTEGUMENTARY/SKIN: POSITIVE for  Is still having a tingling sensation over the area of the chemo the area of the shingles and where she had her  Radiation to her head.  The skin is  Burning , itchy .  Is very bothersome   ENT/MOUTH: POSITIVE for dry mouth .   But the tongue is healing well.  Teeth are staying good.    RESP: NEGATIVE for significant cough or SOB  CV: NEGATIVE for chest pain, palpitations or peripheral edema  GI: NEGATIVE for abdominal pain  and does have good BM's  As long as she is eating well.   M: POSITIVE neck pain;is getting fascia relief at PT .   Does have right elbow pain .  Increased pain with lifting , grasping    NEURO: POSITIVE for memory problems and since the radiation for the tongue cancer - her oncologist would like her to have an MRI without contrast   PSYCHIATRIC: NEGATIVE for changes in mood or affect    OBJECTIVE:     /60 (BP Location: Left arm, Patient Position: Chair, Cuff Size: Adult Regular)  Pulse 68  Temp 98.7  F (37.1  C) (Tympanic)  Wt 145 lb (65.8 kg)  BMI 25.37 kg/m2  Body mass index is 25.37 kg/(m^2).   GENERAL: healthy, alert and no distress  HENT: normal cephalic/atraumatic, ear canals and TM's normal, oropharynx clear, oral mucous membranes moist and does have good movement with the tongue   NECK: no adenopathy, no asymmetry, masses, or scars and thyroid normal to palpation  RESP: lungs clear to auscultation - no rales, rhonchi or  wheezes  CV: regular rate and rhythm, normal S1 S2, no S3 or S4, no murmur, click or rub, no peripheral edema and peripheral pulses strong  ABDOMEN: soft, nontender, no hepatosplenomegaly, no masses and bowel sounds normal  MS: upper back muscles are very tight and knotted. Does decreased ROM with rotation and flexion   Right elbow,   , pain over the  Lateral epicondyle increased with grasping , flexion and extension    SKIN: skin on ohte  Left side of the neck, and upper back are sensitive to palpation    NEURO: sensory exam is very sensitive on the left side of neck and face , mentation intact although she is feeing she is losing her short term memory.   Did have to do some word searching   PSYCH: mentation appears normal, affect normal/bright    Diagnostic Test Results:  none     ASSESSMENT/PLAN:     ASSESSMENT/PLAN:      ICD-10-CM    1. Memory loss R41.3 MR Brain w/o Contrast   2. History of tongue cancer Z85.810    3. 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 Nerve pain M79.2 PAIN MANAGEMENT REFERRAL   5. Right tennis elbow M77.11 nitroGLYcerin (NITRO-DUR) 0.4 MG/HR 24 hr patch       Patient Instructions   For the left facial /neck nerve pool see pain management     For the allergies  Did refer you back to allergist,     Ordered the MRI,     I did reorder the Nitro patch for the muscle pain .     Call back to the clinic with the name of the tube for  The skin                      MEDICATIONS:  Continue current medications without change  CONSULTATION/REFERRAL to MRI,   Allergy and   See Patient Instructions    PANCHO EPSTEIN NP, APRN CNP  [facility]

## 2018-03-02 NOTE — TELEPHONE ENCOUNTER
Caller is pharmacist from [facility] in [place]. Said patient has prescription for Nitrostat patch. The directions are not correct for the Nitrostat patch. The pharmacist thinks the doctor meant to prescribe a pain patch for the patient.     nitroGLYcerin (NITRO-DUR) 0.4 MG/HR 24 hr patch 30 patch 0 3/2/2018  No   Sig: Cut into 1/4 and place 1/4 over the area of pain.  Change every 24 hours     4:57PM Dr Angelita Zuñiga, on call for the [facility], was paged by the Smart Web  to call this nurse back directly.   5:17PM: Dr Zuñiga was paged, again, by the Smart Web .   5:37PM Dr Zuñiga's cell phone was called by the Smart Web  and was connected to this nurse. The situation was discussed. Dr Zuñiga gave an order for: Lidocaine patch--apply appropriate size patch to painful area for 12 hours. Remove patch for 12 hours and repeat as needed. Dispense 30, no refills.  5:45PM: Dr Zuñiga's order was called to the [facility] in [place] ([phone]).  Camille Cazares RN/FNA            Reason for Disposition    Pharmacy calling with prescription questions and triager unable to answer question    Additional Information    Negative: Drug overdose and nurse unable to answer question    Negative: Caller requesting information not related to medicine    Negative: Caller requesting a prescription for Strep throat and has a positive culture result    Negative: Rash while taking a medication or within 3 days of stopping it    Negative: Immunization reaction suspected    Negative: [1] Asthma and [2] having symptoms of asthma (cough, wheezing, etc)    Negative: MORE THAN A DOUBLE DOSE of a prescription or over-the-counter (OTC) drug    Negative: [1] DOUBLE DOSE (an extra dose or lesser amount) of over-the-counter (OTC) drug AND [2] any symptoms (e.g., dizziness, nausea, pain, sleepiness)    Negative: [1] DOUBLE DOSE (an extra dose or lesser amount) of prescription drug AND [2] any symptoms (e.g.,  "dizziness, nausea, pain, sleepiness)    Negative: Took another person's prescription drug    Negative: [1] DOUBLE DOSE (an extra dose or lesser amount) of prescription drug AND [2] NO symptoms (Exception: a double dose of antibiotics)    Negative: Diabetes drug error or overdose (e.g., insulin or extra dose)    Negative: [1] Request for URGENT new prescription or refill of \"essential\" medication (i.e., likelihood of harm to patient if not taken) AND [2] triager unable to fill per unit policy    Negative: [1] Prescription not at pharmacy AND [2] was prescribed today by PCP    Protocols used: MEDICATION QUESTION CALL-ADULT-AH    "

## 2018-03-02 NOTE — NURSING NOTE
"Chief Complaint   Patient presents with     Hospital F/U       Initial /60 (BP Location: Left arm, Patient Position: Chair, Cuff Size: Adult Regular)  Pulse 68  Temp 98.7  F (37.1  C) (Tympanic)  Wt 145 lb (65.8 kg)  BMI 25.37 kg/m2 Estimated body mass index is 25.37 kg/(m^2) as calculated from the following:    Height as of 2/28/18: 5' 3.39\" (1.61 m).    Weight as of this encounter: 145 lb (65.8 kg).  Medication Reconciliation: complete     Natalie King CMA (AAMA)      "

## 2018-03-05 ENCOUNTER — HOSPITAL ENCOUNTER (OUTPATIENT)
Dept: PHYSICAL THERAPY | Facility: CLINIC | Age: 68
Setting detail: THERAPIES SERIES
End: 2018-03-05
Attending: NURSE PRACTITIONER
Payer: MEDICARE

## 2018-03-05 PROCEDURE — 40000099 ZZH STATISTIC LYMPHEDEMA VISIT: Performed by: REHABILITATION PRACTITIONER

## 2018-03-05 PROCEDURE — 97140 MANUAL THERAPY 1/> REGIONS: CPT | Mod: GP | Performed by: REHABILITATION PRACTITIONER

## 2018-03-06 ENCOUNTER — TELEPHONE (OUTPATIENT)
Dept: FAMILY MEDICINE | Facility: CLINIC | Age: 68
End: 2018-03-06

## 2018-03-06 ENCOUNTER — TELEPHONE (OUTPATIENT)
Dept: PALLIATIVE MEDICINE | Facility: CLINIC | Age: 68
End: 2018-03-06

## 2018-03-08 ENCOUNTER — TELEPHONE (OUTPATIENT)
Dept: FAMILY MEDICINE | Facility: CLINIC | Age: 68
End: 2018-03-08

## 2018-03-08 DIAGNOSIS — C02.9 MALIGNANT NEOPLASM OF TONGUE (H): ICD-10-CM

## 2018-03-08 DIAGNOSIS — Z98.890: Primary | ICD-10-CM

## 2018-03-08 NOTE — TELEPHONE ENCOUNTER
Received an PA request from [facility] for Lidocaine 5% pad (patch)    Request has Dr Velasco name scratched out and Nicole Mcdonald written in.    Medication is not on the med list, unable to submit PA without an Rx and Dx.    Stanley Rowe RT (r)  [facility]

## 2018-03-11 RX ORDER — LIDOCAINE 50 MG/G
PATCH TOPICAL
Qty: 30 PATCH | Refills: 0 | Status: SHIPPED | OUTPATIENT
Start: 2018-03-11 | End: 2019-03-08 | Stop reason: ALTCHOICE

## 2018-03-12 ENCOUNTER — HOSPITAL ENCOUNTER (OUTPATIENT)
Dept: MRI IMAGING | Facility: CLINIC | Age: 68
Discharge: HOME | End: 2018-03-12
Attending: NURSE PRACTITIONER | Admitting: NURSE PRACTITIONER
Payer: MEDICARE

## 2018-03-12 ENCOUNTER — HOSPITAL ENCOUNTER (OUTPATIENT)
Dept: PHYSICAL THERAPY | Facility: CLINIC | Age: 68
Setting detail: THERAPIES SERIES
End: 2018-03-12
Attending: NURSE PRACTITIONER
Payer: MEDICARE

## 2018-03-12 DIAGNOSIS — E55.9 VITAMIN D DEFICIENCY: Primary | ICD-10-CM

## 2018-03-12 DIAGNOSIS — R41.3 MEMORY LOSS: ICD-10-CM

## 2018-03-12 PROCEDURE — G8988 SELF CARE GOAL STATUS: HCPCS | Mod: GP,CI | Performed by: PHYSICAL THERAPIST

## 2018-03-12 PROCEDURE — 70553 MRI BRAIN STEM W/O & W/DYE: CPT

## 2018-03-12 PROCEDURE — 40000099 ZZH STATISTIC LYMPHEDEMA VISIT: Performed by: REHABILITATION PRACTITIONER

## 2018-03-12 PROCEDURE — 97140 MANUAL THERAPY 1/> REGIONS: CPT | Mod: GP | Performed by: REHABILITATION PRACTITIONER

## 2018-03-12 PROCEDURE — A9585 GADOBUTROL INJECTION: HCPCS | Performed by: NURSE PRACTITIONER

## 2018-03-12 PROCEDURE — G8987 SELF CARE CURRENT STATUS: HCPCS | Mod: GP,CJ | Performed by: PHYSICAL THERAPIST

## 2018-03-12 PROCEDURE — 25000128 H RX IP 250 OP 636: Performed by: NURSE PRACTITIONER

## 2018-03-12 RX ORDER — GADOBUTROL 604.72 MG/ML
6 INJECTION INTRAVENOUS ONCE
Status: COMPLETED | OUTPATIENT
Start: 2018-03-12 | End: 2018-03-12

## 2018-03-12 RX ADMIN — GADOBUTROL 6 ML: 604.72 INJECTION INTRAVENOUS at 12:19

## 2018-03-15 NOTE — TELEPHONE ENCOUNTER
Prior Authorization Approval    Authorization Effective Date: 12/15/2017  Authorization Expiration Date: 3/15/2019  Medication: RE: Lidocaine pad  Approved Dose/Quantity:    Reference #:     Insurance Company: Kayla Garay - Phone [phone] Fax [phone]  Expected CoPay:     Claim timed out at pharmacy   CoPay Card Available:      Foundation Assistance Needed:    Which Pharmacy is filling the prescription (Not needed for infusion/clinic administered): [id number] - [place], [place] - [address]  Pharmacy Notified: Yes  Patient Notified: Yes

## 2018-03-15 NOTE — TELEPHONE ENCOUNTER
Central Prior Authorization Team   Phone: [phone]    PA Initiation    Medication: RE: Lidocaine pad  Insurance Company: [email or web address] - Phone [phone] Fax [phone]  Pharmacy Filling the Rx: [id number] - [place], [place] - [address]  Filling Pharmacy Phone: [phone]  Filling Pharmacy Fax:    Start Date: 3/15/2018

## 2018-03-19 ENCOUNTER — TELEPHONE (OUTPATIENT)
Dept: FAMILY MEDICINE | Facility: CLINIC | Age: 68
End: 2018-03-19

## 2018-03-19 NOTE — TELEPHONE ENCOUNTER
Nicole -  Please clarify if patient is to be on the nitro patch or lidocaine patch for pain control. Please see notes from 3/2/18 office visit and nurse triage note. Thank you.  Mckenna Phipps RN

## 2018-03-19 NOTE — TELEPHONE ENCOUNTER
We had a sport med doctor who used 1/4 patch of the nitro patch for tennis elbow to get more blood flow to the tendon that worked well. So yes that is what I ordered   Nicole MORRIS    If insurance won't pain for it I can order the Lidoderm patch

## 2018-03-19 NOTE — TELEPHONE ENCOUNTER
Rk from [facility] [employer] is calling and states that he needs to talk to a nurse about this pt med's , Sarah  wants to get  Nitroglycerine patches for pain control , he states he has never seen pt take this for pain control before , the medication had been changed to lidocaine patches ,  pharmacist is confused as to what she should be taking.     nitroGLYcerin (NITRO-DUR) 0.4 MG/HR 24 hr patch 30 patch 0 3/2/2018  No   Sig: Cut into 1/4 and place 1/4 over the area of pain.  Change every 24 hours.

## 2018-03-20 NOTE — TELEPHONE ENCOUNTER
Follow up call to the pharmacy, informed approved.    Stanley Rowe RT (r)  [facility]

## 2018-03-21 ENCOUNTER — HOSPITAL ENCOUNTER (OUTPATIENT)
Dept: PHYSICAL THERAPY | Facility: CLINIC | Age: 68
Setting detail: THERAPIES SERIES
End: 2018-03-21
Attending: NURSE PRACTITIONER
Payer: MEDICARE

## 2018-03-21 PROCEDURE — 40000099 ZZH STATISTIC LYMPHEDEMA VISIT: Performed by: REHABILITATION PRACTITIONER

## 2018-03-21 PROCEDURE — 97140 MANUAL THERAPY 1/> REGIONS: CPT | Mod: GP | Performed by: REHABILITATION PRACTITIONER

## 2018-03-21 NOTE — ADDENDUM NOTE
Encounter addended by: Rachel Richey, PT on: 3/21/2018  1:31 PM<BR>     Actions taken: Flowsheet accepted, Charge Capture section accepted, Sign clinical note

## 2018-03-21 NOTE — PROGRESS NOTES
Outpatient Physical Therapy Progress Note     Patient: Terese Bell  : 1950    Beginning/End Dates of Reporting Period:  2/15/2018 to 3/17/2018    Referring Provider: Kamala Rudolph CNP    Therapy Diagnosis: H&N lymphedema with related tightness      Client Self Report: still a little tight sepecially in the back of the neck on L-side where the shingles were    Objective Measurements:   Girth: taken on 2/15/18 to establish baseline - superior and middle neck girth have decreased in girth since last seen 17 (lower neck stayed unchanged)         Goals:  Goal Identifier stg   Goal Description pt to be independent with performing daily cervical stretches/exercises to decrease cervical tightness and improve pain/discomfort   Target Date 18   Date Met  18   Progress:     Goal Identifier ltg   Goal Description pt to report overall decrease in pain of no more than 3-4/10 on a daily basis during functional mobility and ADL   Target Date 18   Date Met      Progress:     Goal Identifier ltg   Goal Description pt to be independent with home program including ROM exercises, cervical stretches, heat, compression and self-MLD/massage for longterm management of lymphedema and related tightness   Target Date 18   Date Met      Progress:     Progress Toward Goals:   Patient reports improvement with cervical tightness since initial eval, but still has tightness and occasional pain in ear which will benefit from continued MFR in clinic and stretching/exercises at home with needed education/instruction on how to progress      Plan:  Continue therapy per current plan of care.    Discharge:  No

## 2018-03-22 DIAGNOSIS — E55.9 VITAMIN D DEFICIENCY: ICD-10-CM

## 2018-03-22 PROCEDURE — 82306 VITAMIN D 25 HYDROXY: CPT | Performed by: NURSE PRACTITIONER

## 2018-03-22 PROCEDURE — 36415 COLL VENOUS BLD VENIPUNCTURE: CPT | Performed by: NURSE PRACTITIONER

## 2018-03-23 LAB — DEPRECATED CALCIDIOL+CALCIFEROL SERPL-MC: 26 UG/L (ref 20–75)

## 2018-04-09 ENCOUNTER — OFFICE VISIT (OUTPATIENT)
Dept: OTOLARYNGOLOGY | Facility: CLINIC | Age: 68
End: 2018-04-09
Payer: MEDICARE

## 2018-04-09 ENCOUNTER — THERAPY VISIT (OUTPATIENT)
Dept: SPEECH THERAPY | Facility: CLINIC | Age: 68
End: 2018-04-09
Payer: MEDICARE

## 2018-04-09 VITALS — BODY MASS INDEX: 25.37 KG/M2 | WEIGHT: 145 LBS

## 2018-04-09 DIAGNOSIS — R13.12 OROPHARYNGEAL DYSPHAGIA: ICD-10-CM

## 2018-04-09 DIAGNOSIS — C02.9 TONGUE CANCER (H): Primary | ICD-10-CM

## 2018-04-09 DIAGNOSIS — C02.9 MALIGNANT NEOPLASM OF TONGUE (H): Primary | ICD-10-CM

## 2018-04-09 ASSESSMENT — PAIN SCALES - GENERAL: PAINLEVEL: MILD PAIN (2)

## 2018-04-09 NOTE — PROGRESS NOTES
HISTORY OF PRESENT ILLNESS:  Ms. Bettie Bell is about 19 months out from a right partial glossectomy that Dr. 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  She had postoperative radiation therapy.  She is here today because she was concerned about a palpable nodule in the right neck, which I believe is microvascular .  She thinks it might be limiting her motion a little bit.  She has had no pain in her mouth or her neck.      PHYSICAL EXAMINATION:  Today, her weight is 145 pounds.  Examination of the oral cavity reveals the free flap is healthy in appearance.  She has a small amount of scar tissue at the front of her reconstruction, but I do not believe that his is limiting her too much.  Palpation of floor of mouth, tongue base, tongue and flap are negative.  There is no evidence of any recurrent tumor here.  She cannot tolerate mirror exam.  Examination of the neck reveals a well-healed neck incision.  No lymphadenopathy.  I can feel what I believe is the  just below the incision in the mid portion.      IMPRESSION:  WILMA.      PLAN:  I counseled her that I could certainly take the  out if that makes her feel better.  I do not know that it will improve her neck motion very much.  She would like to think about this a little bit and she will let us know. I would be happy to do it if she decides she wants it done.        cc: Thomas Ferris MD    Department of Otolaryngology     [facility]       Nicole Mcdonald MD    [facility]    [address] [place] [address]  [address]        Edmond Schumacher MD    [facility] Ear, Head [facility] Neck    [address] [place] [address]      SK/ms

## 2018-04-09 NOTE — PATIENT INSTRUCTIONS
1.  You were seen in the ENT Clinic today by Dr. Leo.  If you have questions or concerns after your appointment please call, [phone].  2.  Plan is to return to clinic to see Dr. Ferris in 4 months.      Rachel DURAN Care Coordinator, [phone]

## 2018-04-09 NOTE — LETTER
4/9/2018       RE: Terese Bell  [address] [place] [address]     Dear Colleague,    Thank you for referring your patient, Terese Bell, to the [facility] EAR NOSE AND THROAT at [facility]. Please see a copy of my visit note below.    HISTORY OF PRESENT ILLNESS:  Ms. Bettie Bell is about 19 months out from a right partial glossectomy that Dr. 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  She had postoperative radiation therapy.  She is here today because she was concerned about a palpable nodule in the right neck, which I believe is microvascular .  She thinks it might be limiting her motion a little bit.  She has had no pain in her mouth or her neck.      PHYSICAL EXAMINATION:  Today, her weight is 145 pounds.  Examination of the oral cavity reveals the free flap is healthy in appearance.  She has a small amount of scar tissue at the front of her reconstruction, but I do not believe that his is limiting her too much.  Palpation of floor of mouth, tongue base, tongue and flap are negative.  There is no evidence of any recurrent tumor here.  She cannot tolerate mirror exam.  Examination of the neck reveals a well-healed neck incision.  No lymphadenopathy.  I can feel what I believe is the  just below the incision in the mid portion.      IMPRESSION:  WILMA.      PLAN:  I counseled her that I could certainly take the  out if that makes her feel better.  I do not know that it will improve her neck motion very much.  She would like to think about this a little bit and she will let us know. I would be happy to do it if she decides she wants it done.       Again, thank you for allowing me to participate in the care of your patient.      Sincerely,    Moreno Leo MD         cc: Thomas Ferris MD    Department of Otolaryngology      [facility] 396       Nicole Mcdonald MD    [facility]    [address] [place], [place]  [address]        Edmond Schumacher MD    [facility], Head [facility] Neck    [address] [place] [address]  [address]      SK/ms

## 2018-04-09 NOTE — MR AVS SNAPSHOT
After Visit Summary   4/9/2018    Terese Bell    MRN: [id number]           Patient Information     Date Of Birth          1950        Visit Information        Provider Department      4/9/2018 10:15 AM Moreno Leo MD [facility] Ear Nose and Throat        Today's Diagnoses     Malignant neoplasm of tongue (H)    -  1      Care Instructions    1.  You were seen in the ENT Clinic today by Dr. Leo.  If you have questions or concerns after your appointment please call, [phone].  2.  Plan is to return to clinic to see Dr. Hernandez in 4 months.      Rachel DURAN Care Coordinator, [phone]            Follow-ups after your visit        Follow-up notes from your care team     Return in about 4 months (around 8/9/2018) for dr. hernandez.      Your next 10 appointments already scheduled     Apr 17, 2018 10:00 AM CDT   Lymphedema Treatment with Apryl Streeter PTA   [facility] Lymphedema ([facility])    [address] [facility] [address]   [phone]            Apr 19, 2018 10:00 AM CDT   Lymphedema Treatment with Apryl Streeter PTA   [facility] Lymphedema ([facility])    [address] [facility] [place] [address]   [phone]            Apr 23, 2018 10:00 AM CDT   Lymphedema Treatment with Apryl Streeter PTA   [facility] Lymphedema ([facility])    [address] [facility] [place] [address]   [phone]            Apr 24, 2018  3:30 PM CDT   PROCEDURE with Slade Wilson MD   [facility] [place] ([facility] [place])    [address]  [place] [address] [phone]            Apr 26, 2018 10:00 AM CDT   Lymphedema Treatment with Apryl Streeter PTA   [facility] Lymphedema ([facility])    [address] [facility] [place] [address]   [phone]            Apr 30, 2018 10:00 AM CDT   Lymphedema Treatment with Apryl Streeter PTA    [facility] ([facility])    [address]  [facility] [place] [address]   [phone]            May 03, 2018 10:00 AM CDT   Lymphedema Treatment with Apryl Streeter PTA   [facility] ([facility])    [address]  [facility] [place] [address]   [phone]            Jul 23, 2018 11:30 AM CDT   Return Visit with [facility] Provider   [facility] ([facility])    [address]  [facility] [place] [address]   [phone]              Who to contact     Please call your clinic at [phone] to:    Ask questions about your health    Make or cancel appointments    Discuss your medicines    Learn about your test results    Speak to your doctor            Additional Information About Your Visit        [employer] Information     [employer] gives you secure access to your electronic health record. If you see a primary care provider, you can also send messages to your care team and make appointments. If you have questions, please call your primary care clinic.  If you do not have a primary care provider, please call [phone] and they will assist you.      [employer] is an electronic gateway that provides easy, online access to your medical records. With [employer], you can request a clinic appointment, read your test results, renew a prescription or communicate with your care team.     To access your existing account, please contact your [facility] or call [phone] for assistance.        Care EveryWhere ID     This is your Care EveryWhere ID. This could be used by other organizations to access your [place] medical records  [id number]        Your Vitals Were     BMI (Body Mass Index)                   25.37 kg/m2            Blood Pressure from Last 3 Encounters:   03/02/18 102/60   01/29/18 109/60   12/14/17 96/49    Weight from Last 3 Encounters:   04/09/18 65.8 kg (145 lb)   03/02/18 65.8 kg  (145 lb)   02/28/18 65.3 kg (144 lb)              Today, you had the following     No orders found for display       Primary Care Provider Office Phone # Fax #    Nicole Waters Bayron Mcdonald APRMIGUELITO [facility] [phone] [address] [place] [address]        Equal Access to Services     [facility]: Hadii aad ku hadasho Soomaali, waaxda luqadaha, qaybta kaalmada adeegyada, waxay idiin hayaan adeeg kharash la'aan . So [facility] [phone].    ATENCIÓN: Si habla español, tiene a thompson disposición servicios gratuitos de asistencia lingüística. Llame al [phone].    We comply with applicable federal civil rights laws and [place] laws. We do not discriminate on the basis of race, color, national origin, age, disability, sex, sexual orientation, or gender identity.            Thank you!     Thank you for choosing [facility] EAR NOSE AND THROAT  for your care. Our goal is always to provide you with excellent care. Hearing back from our patients is one way we can continue to improve our services. Please take a few minutes to complete the written survey that you may receive in the mail after your visit with us. Thank you!             Your Updated Medication List - Protect others around you: Learn how to safely use, store and throw away your medicines at www.disposemymeds.org.          This list is accurate as of 4/9/18 11:59 PM.  Always use your most recent med list.                   Brand Name Dispense Instructions for use Diagnosis    albuterol 108 (90 Base) MCG/ACT Inhaler    PROAIR HFA/PROVENTIL HFA/VENTOLIN HFA     Inhale 2 puffs into the lungs every 6 hours as needed for wheezing    Moderate persistent asthma without complication       DENTAGEL 1.1 % Gel topical gel   Generic drug:  sodium fluoride dental gel      Apply 1 Application to affected area daily        EPINEPHrine 0.3 MG/0.3ML injection 2-pack    EPIPEN/ADRENACLICK/or ANY BX GENERIC EQUIV    0.6 mL    Inject 0.3 mLs (0.3 mg) into the muscle  once as needed for anaphylaxis    Anaphylactic reaction, subsequent encounter       indomethacin 25 MG capsule    INDOCIN    42 capsule    Take 1 capsule (25 mg) by mouth 2 times daily (with meals)    Acute idiopathic gout of right foot       lidocaine 5 % Patch    LIDODERM    30 patch    Apply up to 3 patches to neck at once for up to 12 h within a 24 h period.  Remove after 12 hours.    History of recent ear, nose, and throat (ENT) procedure, Malignant neoplasm of tongue (H)       * NITROGLYCERIN TD      Cut in quarter and apply to elbow and knee for pain        * nitroGLYcerin 0.4 MG/HR 24 hr patch    NITRO-DUR    30 patch    Cut into 1/4 and place 1/4 over the area of pain.  Change every 24 hours.    Right tennis elbow       order for DME     1 Device    Equipment being ordered: tennis elbow band    Right tennis elbow       * Notice:  This list has 2 medication(s) that are the same as other medications prescribed for you. Read the directions carefully, and ask your doctor or other care provider to review them with you.

## 2018-04-09 NOTE — PROGRESS NOTES
Outpatient Speech Language Pathology Discharge Note     Patient: Terese Bell  : 1950    Beginning/End Dates of Reporting Period:  16 to 2018    Referring Provider: Dr. Moreno Leo    Therapy Diagnosis: Dysphagia    Client Self Report: Pt reports continued oral intake and continued progress. She is able to eat foods she hasn't had in a long time. She continues to have some difficulty with breads and meats but is working around that factor by trying a variety of other foods. She also reports participating in physical therapy and myofacial release which she reports has been very helpful.      Objective Measurements: Jaw measurement improved from 29 mm to 41mm      Goals:  Goal Identifier Exercises   Goal Description 1.  PT will increase tongue flexibility, strength, ROM and increase pharyngeal constriction by completing 10 reps of 10/10 exercises with minimal written cueing 3x/daily.   Target Date 18   Date Met  18   Progress:     Goal Identifier Diet   Goal Description 2. PT will tolerate a dysphagia diet level 3 with thin liquids without overt Sx of aspiration with double swallow, liquid wash.     Target Date 18   Date Met  18   Progress:     Goal Identifier I-PRO   Goal Description 3. Pt will increase maximal lingual pressures by 20% to 96.9 hPa at the anterior sensor, 126.36 hPa at the left sensor, 36.36 hPa at the back sensor, and 12.24 hPa at the right sensor to improve bolus management.   Target Date 18   Date Met  18   Progress:     Progress Toward Goals:   Progress this reporting period: Pt has demonstrated continued progress toward goals. She is not able to eat a wider variety of foods and open her mouth wide enough to get appropriate bites of foods in her mouth. She is completing exercise program and therabite program at home without difficulty.      Plan:  Discharge from therapy.    Discharge:    Reason for Discharge: Patient has met  all goals.    Discharge Plan: Patient to continue home program.

## 2018-04-09 NOTE — MR AVS SNAPSHOT
After Visit Summary   4/9/2018    Terese Bell    MRN: [id number]           Patient Information     Date Of Birth          1950        Visit Information        Provider Department      4/9/2018 10:15 AM Janay Rojas SLP [facility]        Today's Diagnoses     Tongue cancer (H)    -  1    Oropharyngeal dysphagia           Follow-ups after your visit        Your next 10 appointments already scheduled     Apr 10, 2018 10:30 AM CDT   Lymphedema Treatment with Rachel Richey PT   [facility] ([facility])    [address] [address]   [phone]            Apr 24, 2018  3:30 PM CDT   PROCEDURE with Slade Wilson MD   [facility] ([facility])    [address] [id number] [phone]            Jul 23, 2018 11:30 AM CDT   Return Visit with [facility] Provider   [facility] ([facility])    [address], [address]  [place] [address]   [phone]              Who to contact     Please call your clinic at [phone] to:    Ask questions about your health    Make or cancel appointments    Discuss your medicines    Learn about your test results    Speak to your doctor            Additional Information About Your Visit        [email or web address] Information     [email or web address] gives you secure access to your electronic health record. If you see a primary care provider, you can also send messages to your care team and make appointments. If you have questions, please call your primary care clinic.  If you do not have a primary care provider, please call [phone] and they will assist you.      [email or web address] is an electronic gateway that provides easy, online access to your medical records. With [email or web address], you can request a clinic appointment, read your test results, renew a prescription or communicate with your care team.     To access your existing account,  please contact your [facility] or call [phone] for assistance.        Care EveryWhere ID     This is your Care EveryWhere ID. This could be used by other organizations to access your [place] medical records  [id number]         Blood Pressure from Last 3 Encounters:   03/02/18 102/60   01/29/18 109/60   12/14/17 96/49    Weight from Last 3 Encounters:   04/09/18 65.8 kg (145 lb)   03/02/18 65.8 kg (145 lb)   02/28/18 65.3 kg (144 lb)              Today, you had the following     No orders found for display       Primary Care Provider Office Phone # Fax #    Nicole BrasherHERNÁN Sosa [facility] [phone] [address] [place] [address]        Equal Access to Services     [facility]: Hadii aad ku hadasho Soalberto, waaxda luqadaha, qaybta kaalmada adeegyada, luis adame . So [facility] [phone].    ATENCIÓN: Si habla español, tiene a thompson disposición servicios gratuitos de asistencia lingüística. Calos al [phone].    We comply with applicable federal civil rights laws and [place] laws. We do not discriminate on the basis of race, color, national origin, age, disability, sex, sexual orientation, or gender identity.            Thank you!     Thank you for choosing [facility]  for your care. Our goal is always to provide you with excellent care. Hearing back from our patients is one way we can continue to improve our services. Please take a few minutes to complete the written survey that you may receive in the mail after your visit with us. Thank you!             Your Updated Medication List - Protect others around you: Learn how to safely use, store and throw away your medicines at www.disposemymeds.org.          This list is accurate as of 4/9/18  1:48 PM.  Always use your most recent med list.                   Brand Name Dispense Instructions for use Diagnosis    albuterol 108 (90 BASE) MCG/ACT Inhaler    PROAIR  HFA/PROVENTIL HFA/VENTOLIN HFA     Inhale 2 puffs into the lungs every 6 hours as needed for wheezing    Moderate persistent asthma without complication       DENTAGEL 1.1 % Gel topical gel   Generic drug:  sodium fluoride dental gel      Apply 1 Application to affected area daily        EPINEPHrine 0.3 MG/0.3ML injection 2-pack    EPIPEN/ADRENACLICK/or ANY BX GENERIC EQUIV    0.6 mL    Inject 0.3 mLs (0.3 mg) into the muscle once as needed for anaphylaxis    Anaphylactic reaction, subsequent encounter       indomethacin 25 MG capsule    INDOCIN    42 capsule    Take 1 capsule (25 mg) by mouth 2 times daily (with meals)    Acute idiopathic gout of right foot       lidocaine 5 % Patch    LIDODERM    30 patch    Apply up to 3 patches to neck at once for up to 12 h within a 24 h period.  Remove after 12 hours.    History of recent ear, nose, and throat (ENT) procedure, Malignant neoplasm of tongue (H)       * NITROGLYCERIN TD      Cut in quarter and apply to elbow and knee for pain        * nitroGLYcerin 0.4 MG/HR 24 hr patch    NITRO-DUR    30 patch    Cut into 1/4 and place 1/4 over the area of pain.  Change every 24 hours.    Right tennis elbow       order for DME     1 Device    Equipment being ordered: tennis elbow band    Right tennis elbow       * Notice:  This list has 2 medication(s) that are the same as other medications prescribed for you. Read the directions carefully, and ask your doctor or other care provider to review them with you.

## 2018-04-10 ENCOUNTER — HOSPITAL ENCOUNTER (OUTPATIENT)
Dept: PHYSICAL THERAPY | Facility: CLINIC | Age: 68
Setting detail: THERAPIES SERIES
End: 2018-04-10
Attending: NURSE PRACTITIONER
Payer: MEDICARE

## 2018-04-10 PROCEDURE — 40000099 ZZH STATISTIC LYMPHEDEMA VISIT: Performed by: PHYSICAL THERAPIST

## 2018-04-10 PROCEDURE — 97140 MANUAL THERAPY 1/> REGIONS: CPT | Mod: GP | Performed by: PHYSICAL THERAPIST

## 2018-04-13 ENCOUNTER — HOSPITAL ENCOUNTER (OUTPATIENT)
Dept: PHYSICAL THERAPY | Facility: CLINIC | Age: 68
Setting detail: THERAPIES SERIES
End: 2018-04-13
Attending: NURSE PRACTITIONER
Payer: MEDICARE

## 2018-04-13 PROCEDURE — 97140 MANUAL THERAPY 1/> REGIONS: CPT | Mod: GP | Performed by: REHABILITATION PRACTITIONER

## 2018-04-13 PROCEDURE — G8988 SELF CARE GOAL STATUS: HCPCS | Mod: GP,CI | Performed by: PHYSICAL THERAPIST

## 2018-04-13 PROCEDURE — G8987 SELF CARE CURRENT STATUS: HCPCS | Mod: GP,CK | Performed by: PHYSICAL THERAPIST

## 2018-04-13 PROCEDURE — 40000099 ZZH STATISTIC LYMPHEDEMA VISIT: Performed by: REHABILITATION PRACTITIONER

## 2018-04-17 ENCOUNTER — HOSPITAL ENCOUNTER (OUTPATIENT)
Dept: PHYSICAL THERAPY | Facility: CLINIC | Age: 68
Setting detail: THERAPIES SERIES
End: 2018-04-17
Attending: NURSE PRACTITIONER
Payer: MEDICARE

## 2018-04-17 PROCEDURE — 40000099 ZZH STATISTIC LYMPHEDEMA VISIT: Performed by: REHABILITATION PRACTITIONER

## 2018-04-17 PROCEDURE — 97140 MANUAL THERAPY 1/> REGIONS: CPT | Mod: GP | Performed by: REHABILITATION PRACTITIONER

## 2018-04-17 NOTE — ADDENDUM NOTE
Encounter addended by: Apryl Streeter PTA on: 4/17/2018  1:32 PM<BR>     Actions taken: Flowsheet accepted

## 2018-04-19 ENCOUNTER — HOSPITAL ENCOUNTER (OUTPATIENT)
Dept: PHYSICAL THERAPY | Facility: CLINIC | Age: 68
Setting detail: THERAPIES SERIES
End: 2018-04-19
Attending: NURSE PRACTITIONER
Payer: MEDICARE

## 2018-04-19 PROCEDURE — 97140 MANUAL THERAPY 1/> REGIONS: CPT | Mod: GP | Performed by: REHABILITATION PRACTITIONER

## 2018-04-19 PROCEDURE — 40000099 ZZH STATISTIC LYMPHEDEMA VISIT: Performed by: REHABILITATION PRACTITIONER

## 2018-04-23 ENCOUNTER — HOSPITAL ENCOUNTER (OUTPATIENT)
Dept: PHYSICAL THERAPY | Facility: CLINIC | Age: 68
Setting detail: THERAPIES SERIES
End: 2018-04-23
Attending: NURSE PRACTITIONER
Payer: MEDICARE

## 2018-04-23 PROCEDURE — 40000099 ZZH STATISTIC LYMPHEDEMA VISIT: Performed by: REHABILITATION PRACTITIONER

## 2018-04-23 PROCEDURE — 97140 MANUAL THERAPY 1/> REGIONS: CPT | Mod: GP | Performed by: REHABILITATION PRACTITIONER

## 2018-04-23 NOTE — ADDENDUM NOTE
Encounter addended by: Rachel Richey, PT on: 4/23/2018  1:16 PM<BR>     Actions taken: Flowsheet accepted

## 2018-04-23 NOTE — PROGRESS NOTES
Outpatient Physical Therapy Progress Note     Patient: Terese Bell  : 1950    Beginning/End Dates of Reporting Period:  3/17/2018 to 2018    Referring Provider: Kamala Rudolph NP    Therapy Diagnosis: cervical lymphedema, tightness and limited cervical ROM     Client Self Report: really tight in the front on both sides have been working on the [employer] and can get a spoon of yogurt with a pill in it.    Objective Measurements:  Objective Measure: girth  Details: since last session on 3/21/18: superior neck -0.2cm, middle neck -0.9cm and lower neck -1.3cm    Objective Measure: pain  Details: 2/10;  constant dull ache with occasional episodes on L-side of neck, ear and above each down back; losing sleep at night    Objective Measure: cervical rotation  Details: R rotation 35degrees and L-rotation 32degrees    Objective Measure: cervical extension and flexion   Details: 24degrees      Goals:  Goal Identifier stg   Goal Description pt to be independent with performing daily cervical stretches/exercises to decrease cervical tightness and improve pain/discomfort   Target Date 18   Date Met  18   Progress:     Goal Identifier ltg   Goal Description pt to report overall decrease in pain of no more than 3-4/10 on a daily basis during functional mobility and ADL   Target Date 18   Date Met   (2/10)   Progress:     Goal Identifier ltg   Goal Description pt to be independent with home program including ROM exercises, cervical stretches, heat, compression and self-MLD/massage for longterm management of lymphedema and related tightness   Target Date 18   Date Met      Progress:     Goal Identifier ltg   Goal Description pt to increase B cervical rotation to at least 45 degrees to increase functional range for driving and ADL   Target Date 18   Date Met      Progress:       Progress Toward Goals:   Patient returned to clinic on 4/10/18 after ~3 weeks of being on own and  performing own treatment for management, however, returned with increased cervical tightness, limited ROM and increased pain that is causing pt to lose sleep at night. Pt will benefit from ongoing treatment in the clinic 2x/week for cervical stretches and MFR.      Plan:  Continue therapy per current plan of care; 2x/week in clinic appts    Discharge:  No

## 2018-04-23 NOTE — ADDENDUM NOTE
Encounter addended by: Rachel Richey, PT on: 4/23/2018  1:15 PM<BR>     Actions taken: Charge Capture section accepted, Flowsheet accepted, Sign clinical note

## 2018-04-24 ENCOUNTER — OFFICE VISIT (OUTPATIENT)
Dept: PALLIATIVE MEDICINE | Facility: CLINIC | Age: 68
End: 2018-04-24
Payer: MEDICARE

## 2018-04-24 VITALS
DIASTOLIC BLOOD PRESSURE: 68 MMHG | BODY MASS INDEX: 25.37 KG/M2 | WEIGHT: 145 LBS | HEART RATE: 61 BPM | SYSTOLIC BLOOD PRESSURE: 110 MMHG

## 2018-04-24 DIAGNOSIS — Z85.810 HISTORY OF TONGUE CANCER: ICD-10-CM

## 2018-04-24 DIAGNOSIS — M54.2 CHRONIC NECK PAIN: ICD-10-CM

## 2018-04-24 DIAGNOSIS — B02.29 POST HERPETIC NEURALGIA: Primary | ICD-10-CM

## 2018-04-24 DIAGNOSIS — R51.9 OCCIPITAL HEADACHE: ICD-10-CM

## 2018-04-24 DIAGNOSIS — G89.29 CHRONIC NECK PAIN: ICD-10-CM

## 2018-04-24 PROCEDURE — 99205 OFFICE O/P NEW HI 60 MIN: CPT | Performed by: ANESTHESIOLOGY

## 2018-04-24 ASSESSMENT — PAIN SCALES - GENERAL: PAINLEVEL: MILD PAIN (2)

## 2018-04-24 NOTE — PROGRESS NOTES
[facility] Consultation    Date of visit: 4/24/2018    Reason for consultation:    Terese Bell is a 67 year old female who is seen in consultation today at the request of her provider, HERNÁN Orr for interventional evaluation for chronic facial pain.    Primary Care Provider is Nicole Mcdonald.  Pain medications are being prescribed by: none at this time.    Please see the [facility] health questionnaire which the patient completed and reviewed with me in detail.    Chief Complaint:    Chief Complaint   Patient presents with     Pain       Pain history:  Terese Bell is a 67 year old female who first started having problems with pain in the back of the neck, the ear, and along the left lower jaw and side of the neck in September of 2017 when she had a Shingles outbreak.  She is also s/p surgery for throat cancer with excision and reconstruction in September of 2016 and has some tightness in her neck for which she is attending physical therapy.    She had an outbreak of shingles blisters in her current area of pain at the upper neck, the left ear, left lower jaw, and the neck under her lower jaw.  She states that her pain has decreased significantly since its onset.  She finds that Lidoderm patches are very helpful with her pain.  Myofascial release treatments have been very helpful as well.    She states that she was not treated with antiviral medications because she was told it was too late.  She used Calamine lotion because she thought it was poison ivy but the pain got so severe that she went to the emergency department.  She states that she got some type of injection in the ER and was sent home with some pain medications and used some liquid oxycodone from her cancer surgery that helped with her pain.    She still has a tightness in the back of the head and upper neck on the left that has been improving with myofascial  release.  She continues with pain on the top of the left ear and can't sleep on that side due to the discomfort.  Her worst pain at this time is above the left hear and the left ear itself.  When the pain is bad she finds herself clenching her teeth.  She is only using some liquid aspirin for her pain at this time and does not like to take medications.    She complains of hypersensitivity on the left neck and can't stand having clothing or anything touching her skin on that side.  The pain is always there but it varies in intensity.  She also notes that the pain will increase with pressure in the area as well as with repeated opening/closing of the jaw.    Pain rating: intensity ranges from 2/10 to 3/10, and Averages 5/10 on a 0-10 scale.  Aggravating factors include: clothing touching the skin, wind, pressure  Relieving factors include: lidocaine patches, hot showers, aspirin, aspercreme with lidocaine  Any bowel or bladder incontinence: denies changes    Current treatments include:  Lidocaine patches  Aspercreme with Lidocaine  Aspirin  Indomethacin 25 mg BID    [place] Board of Pharmacy Data Base Reviewed:    YES; no current opioids    Previous medication treatments included:  Lidoderm patches  Tylenol  Aspirin  Celexa  Lortab  Ibuprofen  Magic mouthwash    Other treatments have included:  Terese Alexandre Ray has not been seen at a pain clinic in the past.    PT: continues with physical therapy twice a week  Chiropractic: denies  Acupuncture: tried in the past - helped  TENs Unit:  denies  Injections: denies    Past Medical History:  Past Medical History:   Diagnosis Date     Arthritis     1990     Cerebral infarction (H) .    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     Moderate persistent asthma      Obesity, unspecified      Pure hypercholesterolemia      Rosacea 3/3/2010     Symptomatic menopausal or female climacteric states      Thrombosis of leg     left leg 30 yrs ago     Tinnitus      Tongue cancer (H)      Past Surgical History:  Past Surgical History:   Procedure Laterality Date     ADENOIDECTOMY      1970     C NONSPECIFIC PROCEDURE      CHOLECYSTECTOMY     C NONSPECIFIC PROCEDURE      SINUS MASS REMOVED     C NONSPECIFIC PROCEDURE      R KNEE SURGERY     CHOLECYSTECTOMY       COLONOSCOPY  11/26/2012    Procedure: COLONOSCOPY;  Colonoscopy;  Surgeon: Yony Garcia MD;  Location: [facility]     COLONOSCOPY N/A 12/14/2017    Procedure: COLONOSCOPY;  colonoscopy;  Surgeon: Sterling Mckeon MD;  Location: [facility]     EXAM UNDER ANESTHESIA MOUTH N/A 8/15/2016    Procedure: EXAM UNDER ANESTHESIA MOUTH;  Surgeon: Thomas Ferris MD;  Location: [facility]     GLOSSECTOMY Right 9/1/2016    Procedure: GLOSSECTOMY;  Surgeon: Thomas Ferris MD;  [facility]     GLOSSECTOMY PARTIAL       GRAFT FREE VASCULARIZED (LOCATION) Left 9/1/2016    Procedure: GRAFT FREE VASCULARIZED (LOCATION);  Surgeon: Moreno Leo MD;  [facility]     GRAFT SKIN SPLIT THICKNESS FROM EXTREMITY Left 9/1/2016    Procedure: GRAFT SKIN SPLIT THICKNESS FROM EXTREMITY;  Surgeon: Moreno Leo MD;  Location: [facility]     HC UGI ENDOSCOPY W PLACEMENT GASTROSTOMY TUBE PERCUT N/A 11/7/2016    Procedure: COMBINED ESOPHAGOSCOPY, GASTROSCOPY, DUODENOSCOPY (EGD), PLACE PERCUTANEOUS ENDOSCOPIC GASTROSTOMY TUBE;  Surgeon: Sterling Mckeon MD;  Location: [facility]     HEAD & NECK SURGERY       KNEE SURGERY      bilat     LARYNGOSCOPY WITH BIOPSY(IES) N/A 8/15/2016    Procedure: LARYNGOSCOPY WITH BIOPSY(IES);  Surgeon: Thomas Ferris MD;  [facility]     NASAL/SINUS POLYPECTOMY      1990 approx     ORTHOPEDIC SURGERY  1977 and 2010    knee repair  (r) and clean out (l)     PAROTIDECTOMY, RADICAL NECK DISSECTION Right 9/1/2016    Procedure: PAROTIDECTOMY, RADICAL NECK DISSECTION;  Surgeon: Thomas Ferris MD;  [facility]     TONSILLECTOMY      1970     TRACHEOSTOMY Right 9/1/2016    Procedure: TRACHEOSTOMY;  Surgeon: Thomas Ferris MD;  [facility]     Medications:  Current Outpatient Prescriptions   Medication Sig Dispense Refill     DENTAGEL 1.1 % GEL Apply 1 Application to affected area daily  11     EPINEPHrine (EPIPEN/ADRENACLICK/OR ANY BX GENERIC EQUIV) 0.3 MG/0.3ML injection 2-pack Inject 0.3 mLs (0.3 mg) into the muscle once as needed for anaphylaxis 0.6 mL 3     lidocaine (LIDODERM) 5 % Patch Apply up to 3 patches to neck at once for up to 12 h within a 24 h period.  Remove after 12 hours. 30 patch 0     nitroGLYcerin (NITRO-DUR) 0.4 MG/HR 24 hr patch Cut into 1/4 and place 1/4 over the area of pain.  Change every 24 hours. 30 patch 0     NITROGLYCERIN TD Cut in quarter and apply to elbow and knee for pain       albuterol (PROAIR HFA, PROVENTIL HFA, VENTOLIN HFA) 108 (90 BASE) MCG/ACT inhaler Inhale 2 puffs into the lungs every 6 hours as needed for wheezing (Patient not taking: Reported on 4/24/2018)       indomethacin (INDOCIN) 25 MG capsule Take 1 capsule (25 mg) by mouth 2 times daily (with meals) (Patient not taking: Reported on 4/24/2018) 42 capsule 1     ORDER FOR DME, SET TO LOCAL PRINT, Equipment being ordered: tennis elbow band 1 Device 0     Allergies:     Allergies   Allergen Reactions     Banana Swelling and Anaphylaxis     Tongue and lips swell and get itchy     Bee Pollen Anaphylaxis     Bee Venom Anaphylaxis     Blueberry Anaphylaxis and Swelling     Iodine Anaphylaxis     NOT dietary related.     Penicillins Anaphylaxis     Sulfa Drugs Anaphylaxis     Erythromycin Nausea     Pravastatin Sodium Other (See Comments)     muscle spasam     Simvastatin Cough     Strawberry Hives     Latex Rash     Rash from bandages/wraps/pressure  tapes     Tetracycline GI Disturbance     Other reaction(s): GI Upset  And tingling         Social History:  Home situation: , lives with nephew and son  Occupation/Schooling: retired, two years of college  Tobacco use: quit 30 years ago  Alcohol use: rarely  Drug use: denies  History of chemical dependency treatment: denies    Family history:  Family History   Problem Relation Age of Onset     Neurologic Disorder Brother      migranes     Hypertension Mother      Arthritis Mother      CEREBROVASCULAR DISEASE Mother      Hypertension Father      Prostate Cancer Father      CANCER Father      Alzheimer Disease Paternal Grandmother      Hypertension Brother      Arthritis Brother      Respiratory Brother      emphysema     Cancer - colorectal Brother      C.A.D. Maternal Uncle      CANCER Brother      Hypertension Brother      DIABETES No family hx of      Breast Cancer No family hx of      Family history of headaches: n/a    Review of Systems:  Skin: negative  Eyes: negative  Ears/Nose/Throat: left ear pain, h/o throat cancer s/p resection  Respiratory: No shortness of breath, dyspnea on exertion, cough, or hemoptysis  Cardiovascular: negative  Gastrointestinal: negative  Genitourinary: negative  Musculoskeletal: neck pain  Neurologic: headaches  Psychiatric: negative  Hematologic/Lymphatic/Immunologic: h/o throat cancer  Endocrine: negative    Physical Exam:  Vitals:    04/24/18 1525   BP: 110/68   Pulse: 61   Weight: 65.8 kg (145 lb)     Exam:  Constitutional: healthy, alert, active, no distress, cooperative and smiling  Head: normocephalic. Atraumatic.   Eyes: no redness or jaundice noted   ENT: decreased oral opening, well healed anterior cervical surgical scars, decreased cervical range of motion.    Cardiovascular: no edema or JVD appreciated, palpable pulses  Respiratory: non-labored, equal expansion, no audible wheezing, speaking in full sentences  Gastrointestinal: deferred  : deferred  Skin: no  suspicious lesions or rashes  Psychiatric: mentation appears normal and affect normal/bright    Musculoskeletal exam:  Gait/Station/Posture: grossly intact an non-antalgic    Shingles scars left craniocervical jct and occiput as well as on the lateral neck on the left beneath the mastoid.  Tender with hypersensitivity at the occiput, craniocervical junction, periauricular, mastoid, and along the left mandible, and anterolateral neck on the left to the midline, in the area of the lesser occipital nerve, great auricular nerve, and the transverse cervical nerve (C2, C3)    Cervical spine: decreased range of motion in lateral rotation and extension    Myofascial tenderness:  Left cervical paraspinal muscles    Neurologic exam:  CN:  Cranial nerves 2-12 are grossly intact  Motor:  5/5 UE and LE strength  Reflexes:  Not assessed    Sensory:  (upper and lower extremities):   Light touch: grossly intact   Vibration: not assessed   Allodynia: present left anterolateral neck   Dysethesia: absent    Hyperalgesia: present left anterolateral neck, periauricular    Diagnostic tests:  MRI of the braine was completed on 3/12/18 showing:  FINDINGS:  The brain parenchyma, ventricles and subarachnoid spaces  appear normal. There are a few T2 hyperintensities in the white  matter. These are commonly seen in this age group and are felt to be  due to small vessel ischemia and part of the normal aging process. A  small choroid plexus cyst is seen in the trigone of the right lateral  ventricle. This is felt to be an incidental finding. There is no  evidence of hemorrhage, mass, acute infarct, or anomaly.  There are no  gadolinium enhancing lesions.   The facial structures appear normal.  The arteries at the base of the brain and the dural venous sinuses  appear patent.          IMPRESSION:  Negative for age. No acute pathology. No bleed, mass, or  infarcts are seen.    CT SCAN OF THE NECK WITHOUT CONTRAST  1/31/2018 12:07  FINDINGS:  Postoperative changes again noted. Surgical clips are seen  in the right submandibular region. A graft is seen just medial to the  body of the right mandible. The right submandibular gland has been  resected. A right neck dissection is again noted. Multiple surgical  clips are seen anterior to the right sternocleidomastoid muscle. Scar  tissue is again seen anterior to the right sternocleidomastoid muscle  and anterior to the right carotid sheath. No cervical adenopathy is  identified on this noncontrast study. No discrete soft tissue mass is  seen on this exam. Visualized portions of the lungs and mediastinum  are negative.. Again noted is a 1 cm nodule in the right lobe of the  thyroid. This is unchanged.        IMPRESSION:     1. Postoperative change in the right neck.  2. No mass or adenopathy is identified.  No change compared to  3/1/2017.    Other testing (labs, diagnostics):  Component Value Flag Ref Range Units Status Collected Lab   Vitamin D Deficiency screening 26  20 - 75 ug/L Final 03/22/2018 11:15 AM 51     Component Value Flag Ref Range Units Status Collected Lab   Magnesium 2.5 (H) 1.6 - 2.3 mg/dL Final 11/02/2017  1:28 PM 59     Component Value Flag Ref Range Units Status Collected Lab   Sodium 137  133 - 144 mmol/L Final 11/02/2017  1:27 PM 59   Potassium 4.2  3.4 - 5.3 mmol/L Final 11/02/2017  1:27 PM 59   Chloride 103  94 - 109 mmol/L Final 11/02/2017  1:27 PM 59   Carbon Dioxide 28  20 - 32 mmol/L Final 11/02/2017  1:27 PM 59   Anion Gap 6  3 - 14 mmol/L Final 11/02/2017  1:27 PM 59   Glucose 90  70 - 99 mg/dL Final 11/02/2017  1:27 PM 59   Comment:   Non Fasting   Urea Nitrogen 20  7 - 30 mg/dL Final 11/02/2017  1:27 PM 59   Creatinine 0.54  0.52 - 1.04 mg/dL Final 11/02/2017  1:27 PM 59   GFR Estimate >90  >60 mL/min/1.7m2 Final 11/02/2017  1:27 PM 59   Comment:   Non  GFR Calc   GFR Estimate If Black >90  >60 mL/min/1.7m2 Final 11/02/2017  1:27 PM 59   Comment:     American GFR Calc   Calcium 9.0  8.5 - 10.1 mg/dL Final 11/02/2017  1:27 PM 59   Bilirubin Total 0.6  0.2 - 1.3 mg/dL Final 11/02/2017  1:27 PM 59   Albumin 3.9  3.4 - 5.0 g/dL Final 11/02/2017  1:27 PM 59   Protein Total 7.4  6.8 - 8.8 g/dL Final 11/02/2017  1:27 PM 59   Alkaline Phosphatase 76  40 - 150 U/L Final 11/02/2017  1:27 PM 59   ALT 26  0 - 50 U/L Final 11/02/2017  1:27 PM 59   AST 22  0 - 45 U/L Final 11/02/2017  1:27 PM 59     Screening tools:  DIRE Score for ongoing opioid management is calculated as follows:    Diagnosis = 2    Intractability = 2    Risk: Psych = 3  Chem Hlth = 3  Reliability = 3  Social = 3    Efficacy = 2    Total DIRE Score = 18 (14 or higher predicts good candidate for ongoing opioid management; 13 or lower predicts poor candidate for opioid management)     Assessment:  1.  Post herpetic neuralgia  2.  Chronic pain syndrome  3.  History of shingles  4.  Chronic neck and facial pain    Terese REGINA Bell is a 67 year old female who presents with the complaints of chronic pain on the head and neck on the left in the distribution of the C2 and C3 nerves (lesser occipital, great auricular, and transverse cervical nerves) consistent with Post Herpetic Neuralgia. She states that her pain has been improving with time and conservative measures.  Treatment has been limited to date and she does not like to take a lot of medications.  She states that she was not treated with antiviral medications at the time of the shingles outbreak, which would likely have decreased the severity and duration of her symptoms.  Treatment at this time should include anti-epileptic drugs such as Gabapentin, Lyrica, or Topamax.  Selective nerve root blocks or epidural steroid injections may be helpful as well. The use of topical medications are also helpful.  My recommendations are as outlined below.    Plan:  Diagnosis reviewed, treatment option addressed, and risk/benefits discussed.  Self-care  instructions given.  I am recommending a multidisciplinary treatment plan to help this patient better manage her pain.      1. Physical Therapy: continue current therapy  2. Clinical Health Psychologist to address issues of relaxation, behavioral change, coping style, and other factors important to improvement: deferred - coping well at this time  3. Diagnostic Studies: n/a  4. Medication Management:   1. Recommend trial of Gabapentin 100 mg - start one at bedtime for 3 days, then one twice a day for 3 days, then one three times a day  2. Continue the Lidoderm Patches  3. Continue Aspercreme with Lidocaine  4. Consider medical cannabis  5. Trial of Hemp CBD oils and topicals  6. Should take antiviral medication at the first sign of a recurrent outbreak of shingles  5. Further procedures recommended:   1. Occipital nerve blocks may be helpful  2. Cervical transforaminal epidural steroid injections left C2-3 may be helpful  6. Acupuncture: may be helpful since she responded well to it before  7. Urine toxicology screen today: deferred   8. Recommendations/follow-up for PCP:  Continue current treatment and trial of recommendations as discussed above  9. Release of information: n/a  10. Follow up: with PCP as scheduled    Total time spent was 60 minutes, and more than 50% of face to face time was spent in counseling and/or coordination of care regarding diagnosis, principles of multidisciplinary care, medication management, and interventional procedures.    Slade Wilson MD  [facility]

## 2018-04-24 NOTE — PATIENT INSTRUCTIONS
Assessment:  1.  Post herpetic neuralgia  2.  Chronic pain syndrome  3.  History of shingles  4.  Chronic neck and facial pain      Plan:  Diagnosis reviewed, treatment option addressed, and risk/benefits discussed.  Self-care instructions given.  I am recommending a multidisciplinary treatment plan to help this patient better manage her pain.      1. Physical Therapy: continue current therapy  2. Clinical Health Psychologist to address issues of relaxation, behavioral change, coping style, and other factors important to improvement: deferred - coping well at this time  3. Diagnostic Studies: n/a  4. Medication Management:   1. Recommend trial of Gabapentin 100 mg - start one at bedtime for 3 days, then one twice a day for 3 days, then one three times a day  2. Continue the Lidoderm Patches  3. Continue Aspercreme with Lidocaine  4. Consider medical cannabis  5. Trial of Hemp CBD oils and topicals  6. Should take antiviral medication at the first sign of a recurrent outbreak of shingles  5. Further procedures recommended:   1. Occipital nerve blocks may be helpful  2. Cervical transforaminal epidural steroid injections left C2-3 may be helpful  6. Acupuncture: may be helpful since she responded well to it before  7. Urine toxicology screen today: deferred   8. Recommendations/follow-up for PCP:  Continue current treatment and trial of recommendations as discussed above  9. Release of information: n/a  10. Follow up: with PCP as scheduled      ----------------------------------------------------------------  Nurse Triage line:  [phone]   Call this number with any questions or concerns. You may leave a detailed message anytime. Calls are typically returned Monday through Friday between 8 AM and 4:30 PM. We usually get back to you within 2 business days depending on the issue/request.       Medication refills:    For non-narcotic medications, call your pharmacy directly to request a refill. The pharmacy will  contact the [facility] for authorization. Please allow 3-4 days for these refills to be processed.     For narcotic refills, call the nurse triage line or send a [employer] message. Please contact us 7-10 days before your refill is due. The message MUST include the name of the specific medication(s) requested and how you would like to receive the prescription(s). The options are as follows:    Pain Clinic staff can mail the prescription to your pharmacy. Please tell us the name of the pharmacy.    You may pick the prescription up at the Pain Clinic (tell us the location) or during a clinic visit with your pain provider    Pain Clinic staff can deliver the prescription to the [facility] in the clinic building. Please tell us the location.      Scheduling number: [phone].  Call this number to schedule or change appointments.    We believe regular attendance is key to your success in our program.    Any time you are unable to keep your appointment we ask that you call us at least 24 hours in advance to let us know. This will allow us to offer the appointment time to another patient.

## 2018-04-24 NOTE — MR AVS SNAPSHOT
After Visit Summary   4/24/2018    Terese Bell    MRN: [id number]           Patient Information     Date Of Birth          1950        Visit Information        Provider Department      4/24/2018 3:30 PM Slade Ulloa MD [facility]        Care Instructions    Assessment:  1.  Post herpetic neuralgia  2.  Chronic pain syndrome  3.  History of shingles  4.  Chronic neck and facial pain      Plan:  Diagnosis reviewed, treatment option addressed, and risk/benefits discussed.  Self-care instructions given.  I am recommending a multidisciplinary treatment plan to help this patient better manage her pain.      1. Physical Therapy: continue current therapy  2. Clinical Health Psychologist to address issues of relaxation, behavioral change, coping style, and other factors important to improvement: deferred - coping well at this time  3. Diagnostic Studies: n/a  4. Medication Management:   1. Recommend trial of Gabapentin 100 mg - start one at bedtime for 3 days, then one twice a day for 3 days, then one three times a day  2. Continue the Lidoderm Patches  3. Continue Aspercreme with Lidocaine  4. Consider medical cannabis  5. Trial of Hemp CBD oils and topicals  6. Should take antiviral medication at the first sign of a recurrent outbreak of shingles  5. Further procedures recommended:   1. Occipital nerve blocks may be helpful  2. Cervical transforaminal epidural steroid injections left C2-3 may be helpful  6. Acupuncture: may be helpful since she responded well to it before  7. Urine toxicology screen today: deferred   8. Recommendations/follow-up for PCP:  Continue current treatment and trial of recommendations as discussed above  9. Release of information: n/a  10. Follow up: with PCP as scheduled      ----------------------------------------------------------------  Nurse Triage line:  [phone]   Call this number with any questions or concerns. You may leave a  detailed message anytime. Calls are typically returned Monday through Friday between 8 AM and 4:30 PM. We usually get back to you within 2 business days depending on the issue/request.       Medication refills:    For non-narcotic medications, call your pharmacy directly to request a refill. The pharmacy will contact the [facility] for authorization. Please allow 3-4 days for these refills to be processed.     For narcotic refills, call the nurse triage line or send a JobAppt message. Please contact us 7-10 days before your refill is due. The message MUST include the name of the specific medication(s) requested and how you would like to receive the prescription(s). The options are as follows:    Pain Clinic staff can mail the prescription to your pharmacy. Please tell us the name of the pharmacy.    You may pick the prescription up at the Pain Clinic (tell us the location) or during a clinic visit with your pain provider    Pain Clinic staff can deliver the prescription to the [place] pharmacy in the clinic building. Please tell us the location.      Scheduling number: [phone].  Call this number to schedule or change appointments.    We believe regular attendance is key to your success in our program.    Any time you are unable to keep your appointment we ask that you call us at least 24 hours in advance to let us know. This will allow us to offer the appointment time to another patient.               Follow-ups after your visit        Your next 10 appointments already scheduled     Apr 26, 2018 10:00 AM CDT   Lymphedema Treatment with Apryl Streeter PTA   [facility]ymphedema ([facility])    [address] [address]   [phone]            Apr 30, 2018 10:00 AM CDT   Lymphedema Treatment with Apryl Streeter PTA   [facility]ema ([facility])    [address] [address]   [phone]            May  03, 2018 10:00 AM CDT   Lymphedema Treatment with Apryl Streeter PTA   [facility] Lymphedema ([facility])    [address]  [facility] [address] [phone]            Jul 23, 2018 11:30 AM CDT   Return Visit with [facility] Provider   [facility] ([facility])    [address], [address]  [facility] [address]   [phone]              Who to contact     If you have questions or need follow up information about today's clinic visit or your schedule please contact [facility] directly at [phone].  Normal or non-critical lab and imaging results will be communicated to you by MyChart, letter or phone within 4 business days after the clinic has received the results. If you do not hear from us within 7 days, please contact the clinic through [email or web address] or phone. If you have a critical or abnormal lab result, we will notify you by phone as soon as possible.  Submit refill requests through Lucibel or call your pharmacy and they will forward the refill request to us. Please allow 3 business days for your refill to be completed.          Additional Information About Your Visit        [email or web address] Information     Lucibel gives you secure access to your electronic health record. If you see a primary care provider, you can also send messages to your care team and make appointments. If you have questions, please call your primary care clinic.  If you do not have a primary care provider, please call [phone] and they will assist you.        Care EveryWhere ID     This is your Care EveryWhere ID. This could be used by other organizations to access your [facility] medical records  [id number]        Your Vitals Were     Pulse BMI (Body Mass Index)                61 25.37 kg/m2           Blood Pressure from Last 3 Encounters:   04/24/18 110/68   03/02/18 102/60   01/29/18 109/60    Weight from Last 3 Encounters:   04/24/18 65.8 kg (145 lb)   04/09/18 65.8  kg (145 lb)   03/02/18 65.8 kg (145 lb)              Today, you had the following     No orders found for display       Primary Care Provider Office Phone # Fax #    Nicole Mcdonald APRMIGUELITO VANESSA [phone] [address] [place] [address]        Equal Access to Services     [facility]: Hadii aad ku hadasho Soomaali, waaxda luqadaha, qaybta kaalmada adeegyada, waxay idiin hayaan adeeg kharash la'aan . So [facility] [phone].    ATENCIÓN: Si habla español, tiene a thompson disposición servicios gratuitos de asistencia lingüística. Tigistame al [phone].    We comply with applicable federal civil rights laws and [place] laws. We do not discriminate on the basis of race, color, national origin, age, disability, sex, sexual orientation, or gender identity.            Thank you!     Thank you for choosing [facility]  for your care. Our goal is always to provide you with excellent care. Hearing back from our patients is one way we can continue to improve our services. Please take a few minutes to complete the written survey that you may receive in the mail after your visit with us. Thank you!             Your Updated Medication List - Protect others around you: Learn how to safely use, store and throw away your medicines at www.disposemymeds.org.          This list is accurate as of 4/24/18  4:49 PM.  Always use your most recent med list.                   Brand Name Dispense Instructions for use Diagnosis    albuterol 108 (90 Base) MCG/ACT Inhaler    PROAIR HFA/PROVENTIL HFA/VENTOLIN HFA     Inhale 2 puffs into the lungs every 6 hours as needed for wheezing    Moderate persistent asthma without complication       DENTAGEL 1.1 % Gel topical gel   Generic drug:  sodium fluoride dental gel      Apply 1 Application to affected area daily        EPINEPHrine 0.3 MG/0.3ML injection 2-pack    EPIPEN/ADRENACLICK/or ANY BX GENERIC EQUIV    0.6 mL    Inject 0.3 mLs (0.3 mg) into the muscle  once as needed for anaphylaxis    Anaphylactic reaction, subsequent encounter       indomethacin 25 MG capsule    INDOCIN    42 capsule    Take 1 capsule (25 mg) by mouth 2 times daily (with meals)    Acute idiopathic gout of right foot       lidocaine 5 % Patch    LIDODERM    30 patch    Apply up to 3 patches to neck at once for up to 12 h within a 24 h period.  Remove after 12 hours.    History of recent ear, nose, and throat (ENT) procedure, Malignant neoplasm of tongue (H)       * NITROGLYCERIN TD      Cut in quarter and apply to elbow and knee for pain        * nitroGLYcerin 0.4 MG/HR 24 hr patch    NITRO-DUR    30 patch    Cut into 1/4 and place 1/4 over the area of pain.  Change every 24 hours.    Right tennis elbow       order for DME     1 Device    Equipment being ordered: tennis elbow band    Right tennis elbow       * Notice:  This list has 2 medication(s) that are the same as other medications prescribed for you. Read the directions carefully, and ask your doctor or other care provider to review them with you.

## 2018-04-26 ENCOUNTER — HOSPITAL ENCOUNTER (OUTPATIENT)
Dept: PHYSICAL THERAPY | Facility: CLINIC | Age: 68
Setting detail: THERAPIES SERIES
End: 2018-04-26
Attending: NURSE PRACTITIONER
Payer: MEDICARE

## 2018-04-26 PROCEDURE — 97140 MANUAL THERAPY 1/> REGIONS: CPT | Mod: GP | Performed by: REHABILITATION PRACTITIONER

## 2018-04-26 PROCEDURE — 40000099 ZZH STATISTIC LYMPHEDEMA VISIT: Performed by: REHABILITATION PRACTITIONER

## 2018-04-30 ENCOUNTER — HOSPITAL ENCOUNTER (OUTPATIENT)
Dept: PHYSICAL THERAPY | Facility: CLINIC | Age: 68
Setting detail: THERAPIES SERIES
End: 2018-04-30
Attending: NURSE PRACTITIONER
Payer: MEDICARE

## 2018-04-30 PROCEDURE — 97140 MANUAL THERAPY 1/> REGIONS: CPT | Mod: GP | Performed by: REHABILITATION PRACTITIONER

## 2018-04-30 PROCEDURE — 40000099 ZZH STATISTIC LYMPHEDEMA VISIT: Performed by: REHABILITATION PRACTITIONER

## 2018-05-03 ENCOUNTER — HOSPITAL ENCOUNTER (OUTPATIENT)
Dept: PHYSICAL THERAPY | Facility: CLINIC | Age: 68
Setting detail: THERAPIES SERIES
End: 2018-05-03
Attending: NURSE PRACTITIONER
Payer: MEDICARE

## 2018-05-03 PROCEDURE — 97140 MANUAL THERAPY 1/> REGIONS: CPT | Mod: GP | Performed by: REHABILITATION PRACTITIONER

## 2018-05-03 PROCEDURE — 40000099 ZZH STATISTIC LYMPHEDEMA VISIT: Performed by: REHABILITATION PRACTITIONER

## 2018-05-11 ENCOUNTER — HOSPITAL ENCOUNTER (OUTPATIENT)
Dept: PHYSICAL THERAPY | Facility: CLINIC | Age: 68
Setting detail: THERAPIES SERIES
End: 2018-05-11
Attending: NURSE PRACTITIONER
Payer: MEDICARE

## 2018-05-11 PROCEDURE — 97140 MANUAL THERAPY 1/> REGIONS: CPT | Mod: GP | Performed by: REHABILITATION PRACTITIONER

## 2018-05-11 PROCEDURE — G8988 SELF CARE GOAL STATUS: HCPCS | Mod: GP,CI | Performed by: PHYSICAL THERAPIST

## 2018-05-11 PROCEDURE — 40000099 ZZH STATISTIC LYMPHEDEMA VISIT: Performed by: REHABILITATION PRACTITIONER

## 2018-05-11 PROCEDURE — G8987 SELF CARE CURRENT STATUS: HCPCS | Mod: GP,CJ | Performed by: PHYSICAL THERAPIST

## 2018-05-17 ENCOUNTER — HOSPITAL ENCOUNTER (OUTPATIENT)
Dept: PHYSICAL THERAPY | Facility: CLINIC | Age: 68
Setting detail: THERAPIES SERIES
End: 2018-05-17
Attending: NURSE PRACTITIONER
Payer: MEDICARE

## 2018-05-17 PROCEDURE — 40000099 ZZH STATISTIC LYMPHEDEMA VISIT: Performed by: REHABILITATION PRACTITIONER

## 2018-05-17 PROCEDURE — 97140 MANUAL THERAPY 1/> REGIONS: CPT | Mod: GP | Performed by: REHABILITATION PRACTITIONER

## 2018-05-22 NOTE — PROGRESS NOTES
Outpatient Physical Therapy Progress Note     Patient: Terese Bell  : 1950    Beginning/End Dates of Reporting Period:  18 to 2018    Referring Provider: Kamala Rudolph NP    Therapy Diagnosis: H&N lymphedema      Client Self Report: working out in yard continued tightness anterior R side of neck    Objective Measurements:  Objective Measure: cervical girth  Details: from 5/3/18 to 18: superior neck +0.5cm, middle neck +0.2cm and lower neck +0.5cm     Goals:  Goal Identifier stg   Goal Description pt to be independent with performing daily cervical stretches/exercises to decrease cervical tightness and improve pain/discomfort   Target Date 18   Date Met  18   Progress:     Goal Identifier ltg   Goal Description pt to report overall decrease in pain of no more than 3-4/10 on a daily basis during functional mobility and ADL   Target Date 18   Date Met   (2/10)   Progress:     Goal Identifier ltg   Goal Description pt to be independent with home program including ROM exercises, cervical stretches, heat, compression and self-MLD/massage for longterm management of lymphedema and related tightness   Target Date 18   Date Met      Progress:     Goal Identifier ltg   Goal Description pt to increase B cervical rotation to at least 45 degrees to increase functional range for driving and ADL   Target Date 18   Date Met      Progress:     Progress Toward Goals:   Fair progress being made toward goals, in general pt reports less pain but continues to have stiffness but pt also reports being quite active in her yard which may be contributing to slight increase in cervical girth/edema measurements. Continues to benefit from 1x/week clinic appts for ongoing MFR.  Pt is also in the process of possibly getting a Flexitouch compression pump to add to her home program for longterm lymphedema management for maintenance.     Plan:  Continue therapy per current plan of  care.    Discharge:  No        RECERTIFICATION    Terese Bell  1950    Session Number: 16 H&N/[place]laud[place]/Medicare & BCBS since start of care.    Reasons for Continuing Treatment:   Ongoing pain, tightness/stiffness (see above)    Frequency/Duration  1 times per week for 8 weeks for a total of 8 visits.    Recertification Period  5/16/18 - 7/16/18    Physician Signature:    Date:    X_______________________________________________________    Physician Name: Kamala Rudolph NP    I certify the need for these services furnished under this plan of treatment and while under my care. Physician co-signature of this document indicates review and certification of the therapy plan.  This signature may be written on paper, or electronically signed within EPIC.

## 2018-05-22 NOTE — ADDENDUM NOTE
Encounter addended by: Rachel Richey, PT on: 5/22/2018 10:02 AM<BR>     Actions taken: Flowsheet accepted, Sign clinical note

## 2018-05-22 NOTE — ADDENDUM NOTE
Encounter addended by: Rachel Richey, PT on: 5/22/2018  9:31 AM<BR>     Actions taken: Flowsheet accepted, Charge Capture section accepted

## 2018-05-22 NOTE — ADDENDUM NOTE
Encounter addended by: Rachel Richey, PT on: 5/22/2018 10:07 AM<BR>     Actions taken: Sign clinical note, Document created

## 2018-05-24 ENCOUNTER — HOSPITAL ENCOUNTER (OUTPATIENT)
Dept: PHYSICAL THERAPY | Facility: CLINIC | Age: 68
Setting detail: THERAPIES SERIES
End: 2018-05-24
Attending: NURSE PRACTITIONER
Payer: MEDICARE

## 2018-05-24 PROCEDURE — 97140 MANUAL THERAPY 1/> REGIONS: CPT | Mod: GP | Performed by: REHABILITATION PRACTITIONER

## 2018-05-24 PROCEDURE — 40000099 ZZH STATISTIC LYMPHEDEMA VISIT: Performed by: REHABILITATION PRACTITIONER

## 2018-05-31 ENCOUNTER — HOSPITAL ENCOUNTER (OUTPATIENT)
Dept: PHYSICAL THERAPY | Facility: CLINIC | Age: 68
Setting detail: THERAPIES SERIES
End: 2018-05-31
Attending: NURSE PRACTITIONER
Payer: MEDICARE

## 2018-05-31 PROCEDURE — 40000099 ZZH STATISTIC LYMPHEDEMA VISIT: Performed by: PHYSICAL THERAPIST

## 2018-05-31 PROCEDURE — 97140 MANUAL THERAPY 1/> REGIONS: CPT | Mod: GP | Performed by: PHYSICAL THERAPIST

## 2018-06-07 ENCOUNTER — HOSPITAL ENCOUNTER (OUTPATIENT)
Dept: PHYSICAL THERAPY | Facility: CLINIC | Age: 68
Setting detail: THERAPIES SERIES
End: 2018-06-07
Attending: NURSE PRACTITIONER
Payer: MEDICARE

## 2018-06-07 PROCEDURE — 97140 MANUAL THERAPY 1/> REGIONS: CPT | Mod: GP | Performed by: REHABILITATION PRACTITIONER

## 2018-06-07 PROCEDURE — 40000099 ZZH STATISTIC LYMPHEDEMA VISIT: Performed by: REHABILITATION PRACTITIONER

## 2018-06-14 ENCOUNTER — HOSPITAL ENCOUNTER (OUTPATIENT)
Dept: PHYSICAL THERAPY | Facility: CLINIC | Age: 68
Setting detail: THERAPIES SERIES
End: 2018-06-14
Attending: NURSE PRACTITIONER
Payer: MEDICARE

## 2018-06-14 PROCEDURE — 97140 MANUAL THERAPY 1/> REGIONS: CPT | Mod: GP,KX | Performed by: REHABILITATION PRACTITIONER

## 2018-06-14 PROCEDURE — G8988 SELF CARE GOAL STATUS: HCPCS | Mod: GP,CI | Performed by: PHYSICAL THERAPIST

## 2018-06-14 PROCEDURE — G8987 SELF CARE CURRENT STATUS: HCPCS | Mod: GP,CJ | Performed by: PHYSICAL THERAPIST

## 2018-06-14 PROCEDURE — 40000099 ZZH STATISTIC LYMPHEDEMA VISIT: Performed by: REHABILITATION PRACTITIONER

## 2018-06-27 NOTE — PROGRESS NOTES
Outpatient Physical Therapy Progress Note     Patient: Terese Bell  : 1950    Beginning/End Dates of Reporting Period:  2018 to 2018    Referring Provider: Kamala Rudolph NP    Therapy Diagnosis: H&N lymphedema with significant fibrosis and tightening with pain and limited ROM     Client Self Report: feels tight had been working out in yard getting ready for grad party this Saturday    Objective Measurements:  Objective Measure: girth  Details: see flow sheet; since last measured on 18: superior neck +1.0cm, middle neck -0.5cm and lower neck +0.1cm    Objective Measure: pain  Details: 2/10     Goals:  Goal Identifier stg   Goal Description pt to be independent with performing daily cervical stretches/exercises to decrease cervical tightness and improve pain/discomfort   Target Date 18   Date Met  18   Progress:     Goal Identifier ltg   Goal Description pt to report overall decrease in pain of no more than 3-4/10 on a daily basis during functional mobility and ADL   Target Date 18   Date Met  18 (2/10)   Progress:     Goal Identifier ltg   Goal Description pt to be independent with home program including ROM exercises, cervical stretches, heat, compression and self-MLD/massage for longterm management of lymphedema and related tightness   Target Date 18   Date Met      Progress:     Goal Identifier ltg   Goal Description pt to increase B cervical rotation to at least 45 degrees to increase functional range for driving and ADL   Target Date 18   Date Met      Progress:     Progress Toward Goals:   Patient is making steady progress toward goals, does continue to have better days vs others regarding tightness and pain, but overall started at 9-10/10 pain at re-evaluation and as of last appointment on 18 rated pain at 2/10.  Pt doing fairly well with home program but does continue to benefit from skilled MFR treatment in clinic for reducing  pain and stiffness/tightness which directly assists in further increased cervical ROM for daily tasks and ADL.  Pt's appointments have been reduced to 1x/week to slowly wean pt from clinic appts to fully managing at home independently.     Plan:  Continue therapy per current plan of care.    Discharge:  No

## 2018-06-27 NOTE — ADDENDUM NOTE
Encounter addended by: Rachel Richey, PT on: 6/27/2018  8:58 AM<BR>     Actions taken: Charge Capture section accepted, Flowsheet accepted, Sign clinical note

## 2018-06-28 ENCOUNTER — HOSPITAL ENCOUNTER (OUTPATIENT)
Dept: PHYSICAL THERAPY | Facility: CLINIC | Age: 68
Setting detail: THERAPIES SERIES
End: 2018-06-28
Attending: NURSE PRACTITIONER
Payer: MEDICARE

## 2018-06-28 PROCEDURE — 40000099 ZZH STATISTIC LYMPHEDEMA VISIT: Performed by: REHABILITATION PRACTITIONER

## 2018-06-28 PROCEDURE — 97140 MANUAL THERAPY 1/> REGIONS: CPT | Mod: GP,KX | Performed by: REHABILITATION PRACTITIONER

## 2018-07-03 ENCOUNTER — HOSPITAL ENCOUNTER (OUTPATIENT)
Dept: PHYSICAL THERAPY | Facility: CLINIC | Age: 68
Setting detail: THERAPIES SERIES
End: 2018-07-03
Attending: NURSE PRACTITIONER
Payer: MEDICARE

## 2018-07-03 PROCEDURE — 97140 MANUAL THERAPY 1/> REGIONS: CPT | Mod: GP,KX | Performed by: REHABILITATION PRACTITIONER

## 2018-07-03 PROCEDURE — 40000099 ZZH STATISTIC LYMPHEDEMA VISIT: Performed by: REHABILITATION PRACTITIONER

## 2018-07-12 ENCOUNTER — HOSPITAL ENCOUNTER (OUTPATIENT)
Dept: PHYSICAL THERAPY | Facility: CLINIC | Age: 68
Setting detail: THERAPIES SERIES
End: 2018-07-12
Attending: NURSE PRACTITIONER
Payer: MEDICARE

## 2018-07-12 PROCEDURE — G8987 SELF CARE CURRENT STATUS: HCPCS | Mod: GP,CJ | Performed by: PHYSICAL THERAPIST

## 2018-07-12 PROCEDURE — G8988 SELF CARE GOAL STATUS: HCPCS | Mod: GP,CI | Performed by: PHYSICAL THERAPIST

## 2018-07-12 PROCEDURE — 40000099 ZZH STATISTIC LYMPHEDEMA VISIT: Performed by: REHABILITATION PRACTITIONER

## 2018-07-12 PROCEDURE — 97140 MANUAL THERAPY 1/> REGIONS: CPT | Mod: GP | Performed by: REHABILITATION PRACTITIONER

## 2018-07-23 ENCOUNTER — OFFICE VISIT (OUTPATIENT)
Dept: RADIATION THERAPY | Facility: OUTPATIENT CENTER | Age: 68
End: 2018-07-23
Payer: MEDICARE

## 2018-07-23 ENCOUNTER — HOSPITAL ENCOUNTER (OUTPATIENT)
Dept: PHYSICAL THERAPY | Facility: CLINIC | Age: 68
Setting detail: THERAPIES SERIES
End: 2018-07-23
Attending: NURSE PRACTITIONER
Payer: MEDICARE

## 2018-07-23 VITALS
BODY MASS INDEX: 25.58 KG/M2 | SYSTOLIC BLOOD PRESSURE: 125 MMHG | HEART RATE: 59 BPM | RESPIRATION RATE: 16 BRPM | DIASTOLIC BLOOD PRESSURE: 67 MMHG | WEIGHT: 146.2 LBS

## 2018-07-23 DIAGNOSIS — Z92.3 HISTORY OF RADIATION TO HEAD AND NECK REGION: Primary | ICD-10-CM

## 2018-07-23 PROCEDURE — 97140 MANUAL THERAPY 1/> REGIONS: CPT | Mod: GP,KX | Performed by: REHABILITATION PRACTITIONER

## 2018-07-23 PROCEDURE — 40000099 ZZH STATISTIC LYMPHEDEMA VISIT: Performed by: REHABILITATION PRACTITIONER

## 2018-07-23 NOTE — MR AVS SNAPSHOT
After Visit Summary   7/23/2018    Terese Bell    MRN: [id number]           Patient Information     Date Of Birth          1950        Visit Information        Provider Department      7/23/2018 11:30 AM Jen Rey APRN CNP [facility]         Follow-ups after your visit        Your next 10 appointments already scheduled     Aug 07, 2018  9:00 AM CDT   Lymphedema Treatment with Apryl Streeter PTA   [facility] ([facility])    [address] [address]   [phone]            Jan 24, 2019 11:00 AM CST   Return Visit with HERNÁN Dave CNP   [facility] ([facility])    [address], [address]  [facility] [address]   [phone]              Who to contact     Please call your clinic at [phone] to:    Ask questions about your health    Make or cancel appointments    Discuss your medicines    Learn about your test results    Speak to your doctor            Additional Information About Your Visit        MyChart Information     ChatterPlug gives you secure access to your electronic health record. If you see a primary care provider, you can also send messages to your care team and make appointments. If you have questions, please call your primary care clinic.  If you do not have a primary care provider, please call [phone] and they will assist you.      ChatterPlug is an electronic gateway that provides easy, online access to your medical records. With ChatterPlug, you can request a clinic appointment, read your test results, renew a prescription or communicate with your care team.     To access your existing account, please contact your [facility] or call [phone] for assistance.        Care EveryWhere ID     This is your Care EveryWhere ID. This could be used by other organizations to access your [place] medical records  [id number]        Your  Vitals Were     Pulse Respirations Breastfeeding? BMI (Body Mass Index)          59 16 No 25.58 kg/m2         Blood Pressure from Last 3 Encounters:   07/23/18 125/67   04/24/18 110/68   03/02/18 102/60    Weight from Last 3 Encounters:   07/23/18 66.3 kg (146 lb 3.2 oz)   04/24/18 65.8 kg (145 lb)   04/09/18 65.8 kg (145 lb)              Today, you had the following     No orders found for display       Primary Care Provider Office Phone # Fax #    Nicole Waters Bayronlianet Mcdonald, APRN [phone] [address] [place] [address]        Equal Access to Services     [facility] : Hadii angel Aguilar, walaronda luqadaha, qaybta kaalmada adeegyada, luis adame . So [facility] [phone].    ATENCIÓN: Si habla español, tiene a thompson disposición servicios gratuitos de asistencia lingüística. Llame al [phone].    We comply with applicable federal civil rights laws and [place] laws. We do not discriminate on the basis of race, color, national origin, age, disability, sex, sexual orientation, or gender identity.            Thank you!     Thank you for choosing [facility]  for your care. Our goal is always to provide you with excellent care. Hearing back from our patients is one way we can continue to improve our services. Please take a few minutes to complete the written survey that you may receive in the mail after your visit with us. Thank you!             Your Updated Medication List - Protect others around you: Learn how to safely use, store and throw away your medicines at www.disposemymeds.org.          This list is accurate as of 7/23/18  1:13 PM.  Always use your most recent med list.                   Brand Name Dispense Instructions for use Diagnosis    albuterol 108 (90 Base) MCG/ACT Inhaler    PROAIR HFA/PROVENTIL HFA/VENTOLIN HFA     Inhale 2 puffs into the lungs every 6 hours as needed for wheezing    Moderate persistent asthma without  complication       DENTAGEL 1.1 % Gel topical gel   Generic drug:  sodium fluoride dental gel      Apply 1 Application to affected area daily        EPINEPHrine 0.3 MG/0.3ML injection 2-pack    EPIPEN/ADRENACLICK/or ANY BX GENERIC EQUIV    0.6 mL    Inject 0.3 mLs (0.3 mg) into the muscle once as needed for anaphylaxis    Anaphylactic reaction, subsequent encounter       indomethacin 25 MG capsule    INDOCIN    42 capsule    Take 1 capsule (25 mg) by mouth 2 times daily (with meals)    Acute idiopathic gout of right foot       lidocaine 5 % Patch    LIDODERM    30 patch    Apply up to 3 patches to neck at once for up to 12 h within a 24 h period.  Remove after 12 hours.    History of recent ear, nose, and throat (ENT) procedure, Malignant neoplasm of tongue (H)       * NITROGLYCERIN TD      Cut in quarter and apply to elbow and knee for pain        * nitroGLYcerin 0.4 MG/HR 24 hr patch    NITRO-DUR    30 patch    Cut into 1/4 and place 1/4 over the area of pain.  Change every 24 hours.    Right tennis elbow       order for DME     1 Device    Equipment being ordered: tennis elbow band    Right tennis elbow       * Notice:  This list has 2 medication(s) that are the same as other medications prescribed for you. Read the directions carefully, and ask your doctor or other care provider to review them with you.

## 2018-07-23 NOTE — LETTER
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     TYPE OF RADIATION THERAPY ADMINISTERED: 6000 cGy in 30 fractions         Oncologic history : Mrs. 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 She had radiation therapy in our clinic to a total dose of 6000 cGy in 30 treatments at 200 cGy each fraction from 10/17/2016 to 2016.  She tolerated radiation therapy reasonably well.  The patient did have some significant sore throat and dysphagia toward the end of the therapy, which required a PEG tube for nutritional support.  She otherwise was reasonably stable at the end of the therapy.     Interval history  Patient returns today for a routine post-treatment checkup.  She reports that she has been doing well. She reports improving since last visit. The left sided face pain she was having post shingles is under better control with lidocaine patches and CBD oil to her face.  She continues to use Therabite and do her jaw and neck exercises routinely.  Her taste changes are stable. She has been eating well, supplementing with one supplemental drink daily, and maintaining her weight. She has been using Biotene and Xylitol gum for her dry mouth and feels these are effective.  She does see dental regularly. She continues to work with lymphedema management clinic. Well managed today with no lymphedema noted. She has constipation intermittently for which she takes Senokot PRN or prune juice - effective.     She does have one area of concern: left  submandibular. She reports  tenderness in focal area with pressure - she says will increase and decrease in size intermittently and is small today and not easily palpable. She first noted this area last fall. She has had imaging since that time with no concerning findings in area.     ROS:  Patient denies any of the following except if noted above: fevers, chills, difficulty with energy, vision or hearing changes, headaches,chest pain, dyspnea, abdominal pain, vomiting, diarrhea, urinary concerns, neuropathy,  issues with sleep or mood. ROS otherwise negative on a 12-system review.        OBJECTIVE:   Vital Signs: /67 (Cuff Size: Adult Regular)  Pulse 59  Resp 16  Wt 66.3 kg (146 lb 3.2 oz)  Breastfeeding? No  BMI 25.58 kg/m2;  GENERAL:  Appears well, in no acute distress.   HEENT: NCAT. No thrush, no mucositis.   Oral cavity exam shows grafted tissue in place.  Mucous membranes are dry. No masses or lymphadenopathy palpated. She does have a small amount of  fibrosis.  RESP: Breathing comfortably on RA  CV: WWP  NEURO: Normal gait.      IMAGING:   CT SCAN OF THE NECK WITHOUT CONTRAST  1/31/2018 12:07 PM      HISTORY: Iodine allergy -anaphylaxis. Hx of malignant neoplasm of  floor of mouth; Hx of malignant neoplasm of floor of mouth     TECHNIQUE:  Axial images and coronal reformations. No IV contrast.  Radiation dose for this scan was reduced using automated exposure  control, adjustment of the mA and/or kV according to patient size, or  iterative reconstruction technique.     COMPARISON: Scan dated 3/1/2017.      FINDINGS: Postoperative changes again noted. Surgical clips are seen  in the right submandibular region. A graft is seen just medial to the  body of the right mandible. The right submandibular gland has been  resected. A right neck dissection is again noted. Multiple surgical  clips are seen anterior to the right sternocleidomastoid muscle. Scar  tissue is again seen anterior to the right  sternocleidomastoid muscle  and anterior to the right carotid sheath. No cervical adenopathy is  identified on this noncontrast study. No discrete soft tissue mass is  seen on this exam. Visualized portions of the lungs and mediastinum  are negative.. Again noted is a 1 cm nodule in the right lobe of the  thyroid. This is unchanged.          IMPRESSION:     1. Postoperative change in the right neck.  2. No mass or adenopathy is identified.  No change compared to  3/1/2017.     JAGRUTI BERTRAND MD      LAB:  TSH   Date Value Ref Range Status   08/17/2017 1.44 0.40 - 4.00 mU/L Final       IMPRESSION:   Ms. 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  She is recovered from the acute toxicities of treatment.  We will continue to follow her for recurrence and management of side effects.       RECOMMENDATIONS:      1. RTC in 6 mo  2. Continue oral nutrition and continue swallowing and stretching exercises.  3. Continue close follow up with ENT Dr. Ferris- she is to call their office to schedule a follow-up appointment for August.  She is in agreement to report her one area of concern: left submandibular focal area of intermittent tenderness with pressure first noted last fall. She has had imaging since that time with no concerning findings in area (Discussed with Dr. LELO Moran and no additional imaging was recommended)  4. Continue with lymphedema therapy.  5. TSH -1.44 on 8/2017 and check yearly. Order placed for TSH. Will notify patient of results.     HERNÁN Epperson CNP, CNP

## 2018-07-23 NOTE — PROGRESS NOTES
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     TYPE OF RADIATION THERAPY ADMINISTERED: 6000 cGy in 30 fractions         Oncologic history : Mrs. 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 She had radiation therapy in our clinic to a total dose of 6000 cGy in 30 treatments at 200 cGy each fraction from 10/17/2016 to 2016.  She tolerated radiation therapy reasonably well.  The patient did have some significant sore throat and dysphagia toward the end of the therapy, which required a PEG tube for nutritional support.  She otherwise was reasonably stable at the end of the therapy.     Interval history  Patient returns today for a routine post-treatment checkup.  She reports that she has been doing well. She reports improving since last visit. The left sided face pain she was having post shingles is under better control with lidocaine patches and CBD oil to her face.  She continues to use Therabite and do her jaw and neck exercises routinely.  Her taste changes are stable. She has been eating well, supplementing with one supplemental drink daily, and maintaining her weight. She has been using Biotene and Xylitol gum for her dry mouth and feels these are effective.  She does see dental regularly. She continues to work with lymphedema management clinic. Well managed today with no lymphedema noted. She has constipation intermittently for which she takes Senokot PRN or prune juice - effective.     She does have one area of concern: left submandibular. She reports  tenderness in focal area with pressure - she says will increase and  decrease in size intermittently and is small today and not easily palpable. She first noted this area last fall. She has had imaging since that time with no concerning findings in area.     ROS:  Patient denies any of the following except if noted above: fevers, chills, difficulty with energy, vision or hearing changes, headaches,chest pain, dyspnea, abdominal pain, vomiting, diarrhea, urinary concerns, neuropathy,  issues with sleep or mood. ROS otherwise negative on a 12-system review.        OBJECTIVE:   Vital Signs: /67 (Cuff Size: Adult Regular)  Pulse 59  Resp 16  Wt 66.3 kg (146 lb 3.2 oz)  Breastfeeding? No  BMI 25.58 kg/m2;  GENERAL:  Appears well, in no acute distress.   HEENT: NCAT. No thrush, no mucositis.   Oral cavity exam shows grafted tissue in place.  Mucous membranes are dry. No masses or lymphadenopathy palpated. She does have a small amount of  fibrosis.  RESP: Breathing comfortably on RA  CV: WWP  NEURO: Normal gait.      IMAGING:   CT SCAN OF THE NECK WITHOUT CONTRAST  1/31/2018 12:07 PM      HISTORY: Iodine allergy -anaphylaxis. Hx of malignant neoplasm of  floor of mouth; Hx of malignant neoplasm of floor of mouth     TECHNIQUE:  Axial images and coronal reformations. No IV contrast.  Radiation dose for this scan was reduced using automated exposure  control, adjustment of the mA and/or kV according to patient size, or  iterative reconstruction technique.     COMPARISON: Scan dated 3/1/2017.      FINDINGS: Postoperative changes again noted. Surgical clips are seen  in the right submandibular region. A graft is seen just medial to the  body of the right mandible. The right submandibular gland has been  resected. A right neck dissection is again noted. Multiple surgical  clips are seen anterior to the right sternocleidomastoid muscle. Scar  tissue is again seen anterior to the right sternocleidomastoid muscle  and anterior to the right carotid sheath. No cervical adenopathy  is  identified on this noncontrast study. No discrete soft tissue mass is  seen on this exam. Visualized portions of the lungs and mediastinum  are negative.. Again noted is a 1 cm nodule in the right lobe of the  thyroid. This is unchanged.          IMPRESSION:     1. Postoperative change in the right neck.  2. No mass or adenopathy is identified.  No change compared to  3/1/2017.     JAGRUTI BERTRAND MD      LAB:  TSH   Date Value Ref Range Status   08/17/2017 1.44 0.40 - 4.00 mU/L Final       IMPRESSION:   Ms. 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  She is recovered from the acute toxicities of treatment.  We will continue to follow her for recurrence and management of side effects.       RECOMMENDATIONS:      1. RTC in 6 mo  2. Continue oral nutrition and continue swallowing and stretching exercises.  3. Continue close follow up with ENT Dr. Ferris- she is to call their office to schedule a follow-up appointment for August.  She is in agreement to report her one area of concern: left submandibular focal area of intermittent tenderness with pressure first noted last fall. She has had imaging since that time with no concerning findings in area (Discussed with Dr. LELO Moran and no additional imaging was recommended)  4. Continue with lymphedema therapy.  5. TSH -1.44 on 8/2017 and check yearly. Order placed for TSH. Will notify patient of results.     Jen Rey CNP

## 2018-07-23 NOTE — NURSING NOTE
FOLLOW-UP VISIT    Patient Name: Terese Bell      : 1950     Age: 67 year old        ______________________________________________________________________________     Chief Complaint   Patient presents with     Radiation Therapy     Follow up appointment     /67 (Cuff Size: Adult Regular)  Pulse 59  Resp 16  Wt 66.3 kg (146 lb 3.2 oz)  Breastfeeding? No  BMI 25.58 kg/m2     Pain  Neck and jaw pain much improved with lidocaine patches and stretching exercises    Meds  Current Med List Reviewed: Yes  Medication Note:     Labs  TSH   Date Value Ref Range Status   2017 1.44 0.40 - 4.00 mU/L Final   ]    Imaging  Performed 2018    Skin:normal  Oral Products: OTC  Mucositis: No  Dry mouth: Yes: lots of water and oral cares  Dental evaluation: Yes: sees every 3-4 months  PEG tube: No  Speech therapy: Yes: sees regularly  Lymphedema: Yes, sees regularly   Energy Level:normal  Appetite: normal  Intake: can eat most things, meat and bread remain difficult. One supplemental drink per day.  Weight:  Wt Readings from Last 5 Encounters:   18 66.3 kg (146 lb 3.2 oz)   18 65.8 kg (145 lb)   18 65.8 kg (145 lb)   18 65.8 kg (145 lb)   18 65.3 kg (144 lb)       Appointments:     DATE  Oncologist: N/A    ENT:  Ahmet/Josy Sees regularly, planning on november   Primary:      Other Notes:

## 2018-07-30 NOTE — PROGRESS NOTES
Outpatient Physical Therapy Progress Note     Patient: Terese Bell  : 1950    Beginning/End Dates of Reporting Period:  2018 to 2018    Referring Provider: Kamala Rudolph NP    Therapy Diagnosis: H&N lymphedema with associated tightness and pain     Client Self Report: always feels tight but less pn    Objective Measurements:  Objective Measure: girth  Details: from  - : superior neck -1.0cm, middle neck -0.4cm and lower neck -0.4cm    Goals:  Goal Identifier stg   Goal Description pt to be independent with performing daily cervical stretches/exercises to decrease cervical tightness and improve pain/discomfort   Target Date 18   Date Met  18   Progress:     Goal Identifier ltg   Goal Description pt to report overall decrease in pain of no more than 3-4/10 on a daily basis during functional mobility and ADL   Target Date 18   Date Met  18 (2/10)   Progress:     Goal Identifier ltg   Goal Description pt to be independent with home program including ROM exercises, cervical stretches, heat, compression and self-MLD/massage for longterm management of lymphedema and related tightness   Target Date 18   Date Met      Progress:     Goal Identifier ltg   Goal Description pt to increase B cervical rotation to at least 45 degrees to increase functional range for driving and ADL   Target Date 18   Date Met      Progress:     Progress Toward Goals:   Good progress being made toward goals, overall patient is feeling less pain but ongoing tightness continues; tightness does improve after treatment in clinic.  Pt will benefit from ongoing treatment in clinic for MRF, distraction and exercise/stretch instruction.       Plan:  Continue therapy per current plan of care.      Discharge:  No      RECERTIFICATION    Terese Bell  1950    Session Number: 22 H&N/[employer]/Medicare & [employer] since start of care.    Reasons for Continuing Treatment:    Ongoing pain and tightness - see above    Frequency/Duration  1 times per week for 8 weeks for a total of 8 visits.    Recertification Period  7/16/18 - 9/14/18    Physician Signature:    Date:    X_______________________________________________________    Physician Name: Kamala Rudolph NP    I certify the need for these services furnished under this plan of treatment and while under my care. Physician co-signature of this document indicates review and certification of the therapy plan.  This signature may be written on paper, or electronically signed within EP[employer].

## 2018-07-30 NOTE — ADDENDUM NOTE
Encounter addended by: Rachel Richey, PT on: 7/30/2018  3:14 PM<BR>     Actions taken: Flowsheet accepted

## 2018-07-30 NOTE — ADDENDUM NOTE
Encounter addended by: Rachel Richey, PT on: 7/30/2018  3:13 PM<BR>     Actions taken: Flowsheet accepted, Sign clinical note, Document created

## 2018-08-02 ENCOUNTER — THERAPY VISIT (OUTPATIENT)
Dept: SPEECH THERAPY | Facility: CLINIC | Age: 68
End: 2018-08-02

## 2018-08-02 ENCOUNTER — RADIANT APPOINTMENT (OUTPATIENT)
Dept: GENERAL RADIOLOGY | Facility: CLINIC | Age: 68
End: 2018-08-02
Attending: OTOLARYNGOLOGY
Payer: MEDICARE

## 2018-08-02 ENCOUNTER — OFFICE VISIT (OUTPATIENT)
Dept: OTOLARYNGOLOGY | Facility: CLINIC | Age: 68
End: 2018-08-02
Payer: MEDICARE

## 2018-08-02 VITALS — WEIGHT: 147 LBS | BODY MASS INDEX: 25.1 KG/M2 | HEIGHT: 64 IN

## 2018-08-02 DIAGNOSIS — R13.11 ORAL PHASE DYSPHAGIA: ICD-10-CM

## 2018-08-02 DIAGNOSIS — C06.9 ORAL CANCER (H): Primary | ICD-10-CM

## 2018-08-02 DIAGNOSIS — R09.89 CHEST CONGESTION: ICD-10-CM

## 2018-08-02 DIAGNOSIS — R13.12 OROPHARYNGEAL DYSPHAGIA: ICD-10-CM

## 2018-08-02 ASSESSMENT — PAIN SCALES - GENERAL: PAINLEVEL: MILD PAIN (2)

## 2018-08-02 NOTE — PROGRESS NOTES
08/02/18 0840   General Information   Type Of Visit Initial   Start Of Care Date 08/02/18   Referring Physician Thomas Ferris MD   Orders Evaluate And Treat   Orders Comment Clinical swallow evaluation   Medical Diagnosis Oral cancer, oropharyngeal dysphagia   Onset Of Illness/injury Or Date Of Surgery 07/13/16   Precautions/limitations Swallowing Precautions   Hearing Adequate for conversation   Pertinent History of Current Problem/OT: Additional Occupational Profile Info Terese Bell is a 67-year-old female with a PMH significant for T2N1 SCC of right mobile tongue s/p right partial glossectomy and a radial forearm free tissue transfer. She had postoperative radiation therapy which was completed 11/2016. She is well known to SLP team and was last seen by SLP in 4/2018 where she was continuing with oral intake and continuing to make progress with her swallowing. She was discharged from SLP services at that time. Upon clinical interview today, pt reported increased difficulty with swallowing since 4/2018. She stated she continues to try new foods and struggle with meats, breads, and foods with skins as she has but stated her biggest concern is an increase in choking. Reported she choked ~4x over a 10-day period. Stated she wonders if part of this is due to getting casual with her eating and not focusing on eating/using swallowing strategies. She endorsed she eats soft regular textured foods and adds sauces/butter to almost everything. Endorsed feeling of pharyngeal stasis at times but this is reduced with use of strategies including small bites, double swallow, liquid wash, and hard swallow. Stated she drinks one supplement drink per day and has been maintaining weight. She denied recent pneumonias. Stated she continues to complete her Therabite exercises and lymphedema exercises but has not been completing other swallowing exercises. She last participated in a VFSS in 8/2017.    Respiratory Status  Room air   Prior Level Of Function Swallowing   General Observations Pt pleasant and cooperative    Patient/family Goals To not choke while eating   Pain Assessment   Pain Reported No   Clinical Swallow Evaluation   Oral Musculature anomalies present   Structural Abnormalities present  (partial glossectomy)   Dentition present and adequate   Mucosal Quality adequate   Mandibular Strength and Mobility intact   Oral Labial Strength and Mobility WFL   Lingual Strength and Mobility impaired protrusion;impaired anterior elevation;impaired left lateral movement;impaired right lateral movement;impaired coordination  (pt able to protrude tongue past lips)   Velar Elevation intact   Buccal Strength and Mobility intact   Laryngeal Function Swallow;Voicing initiated   Oral Musculature Comments Reduced strength, ROM, and coordination of tongue s/p surgery.    Additional Documentation Yes   Clinical Swallow Eval: Thin Liquid Texture Trial   Mode of Presentation, Thin Liquids cup;self-fed   Volume of Liquid or Food Presented 3 oz total   Oral Phase of Swallow WFL   Pharyngeal Phase of Swallow reduction in laryngeal movement;repeated swallows   Diagnostic Statement No overt s/sx of aspiration.   Clinical Swallow Eval: Solid Food Texture Trial   Mode of Presentation, Solid self-fed   Volume of Solid Food Presented 1 roberto cracker square   Oral Phase, Solid other (see comments)  (Slow mastication)   Pharyngeal Phase, Solid reduction in laryngeal movement;feeling of something stuck in throat   Successful Strategies Trialed During Procedure, Solid other (see comments)  (liquid wash)   Diagnostic Statement Slow mastication. Endorsed feeling of pharyngeal stasis which was cleared with subsequent dry swallow or liquid wash.    Swallow Compensations   Swallow Compensations Alternate viscosity of consistencies;Effortful swallow;Multiple swallow   Educational Assessment   Barriers to Learning No barriers   Preferred Learning Style  Listening   Esophageal Phase of Swallow   Patient reports or presents with symptoms of esophageal dysphagia No   General Therapy Interventions   Planned Therapy Interventions Dysphagia Treatment   Dysphagia treatment Oropharyngeal exercise training;Modified diet education;Instruction of safe swallow strategies;Compensatory strategies for swallowing   Swallow Eval: Clinical Impressions   Skilled Criteria for Therapy Intervention Skilled criteria met.  Treatment indicated.   Functional Assessment Scale (FAS) 4   Treatment Diagnosis Mild-moderate oral and pharyngeal dysphagia   Diet texture recommendations Thin liquids  (Soft, moist regular textures)   Recommended Feeding/Eating Techniques alternate between small bites and sips of food/liquid;check mouth frequently for oral residue/pocketing;hard swallow w/ each bite or sip;maintain upright posture during/after eating for 30 mins;small sips/bites;other (see comments)  (add sauces to food, place food on left, cut food into bites)   Rehab Potential fair, will monitor progress closely   Therapy Frequency other (see comments)  (1x/month x 6 months)   Anticipated Discharge Disposition home w/ outpatient services   Risks and Benefits of Treatment have been explained. Yes   Patient, family and/or staff in agreement with Plan of Care Yes   Clinical Impression Comments Pt presents with mild-moderate oropharyngeal dysphagia in setting oral cancer s/p partial glossectomy and radiation therapy. Per pt, swallowing has worsened over the last few months and she has had an increase in choking episodes. Pt demonstrates reduced bolus manipulation d/t partial glossectomy and suspected reduced bolus propulsion. Present but mildly reduced hyolaryngeal elevation noted upon manual palpation. No overt s/sx of aspiration present with all PO trials; however, pt endorsed feeling of pharyngeal stasis with roberto cracker trials which was cleared with use of liquid wash or dry swallow. Pt remains  at risk for progressive dysphagia given late effects of radiation and will likely benefit from ongoing SLP to ensure safety with current diet, maintain swallowing function over time, and provide additional training re: swallowing strategies and exercises.    Swallow Goals   SLP Swallow Goals 1;2   Swallow Goal 1   Goal Identifier Exercises   Goal Description 1. Pt will maintain and/or increase tongue, jaw, and pharyngeal flexibility, strength, and/or ROM in order to maintain swallowing function long-term by completing 10 reps of 9/9 exercises with minimal written cueing at least daily.   Target Date 10/31/18   Swallow Goal 2   Goal Identifier Diet   Goal Description 1. Pt will tolerate soft moist regular textures and thin liquids without overt s/sx of aspiration or choking and no report of feeling of stasis in 9/10 trials with independent use of swallowing strategies.    Target Date 10/31/18   Total Session Time   Total Session Time 40   Total Evaluation Time 20   Therapy Certification   Certification date from 08/02/18   Certification date to 10/31/18   Medical Diagnosis Oral cancer, oropharyngeal dysphagia     Thank you for the referral of Terese Bell. If you have any questions about this report, please contact me using the information below.     Debbie Muñoz MA, CCC-SLP  Speech-Language Pathologist   [facility]  Department of Otolaryngology/D&T - 4th floor  Pager: [phone]  Phone: [phone]  Email: [email or web address]

## 2018-08-02 NOTE — MR AVS SNAPSHOT
"              After Visit Summary   8/2/2018    Terese Bell    MRN: [id number]           Patient Information     Date Of Birth          1950        Visit Information        Provider Department      8/2/2018 8:40 AM Debbie Muñoz SLP [facility]        Today's Diagnoses     Oral cancer (H)    -  1    Oropharyngeal dysphagia           Follow-ups after your visit        Additional Services     SPEECH THERAPY REFERRAL       *This therapy referral will be filtered to a centralized scheduling office at [facility] and the patient will receive a call to schedule an appointment at a [place] location most convenient for them. *     [facility] provides Speech Therapy evaluation and treatment and many specialty services across the [place] system.  If requesting a specialty program, please choose from the list below.  If you have not heard from the scheduling office within 2 business days, please call [phone] for all locations, with the exception of [place], please call [phone] and [facility], please call [phone]      Treatment: Evaluation & Treatment  Speech Treatment Diagnosis: Dysphagia  Special Instructions: Does not need to be scheduled.   Special Programs: Clinical Swallow Study    Please be aware that coverage of these services is subject to the terms and limitations of your health insurance plan.  Call member services at your health plan with any benefit or coverage questions.      **Note to Provider:  If you are referring outside of [place] for the therapy appointment, please list the name of the location in the \"special instructions\" above, print the referral and give to the patient to schedule the appointment.                  Your next 10 appointments already scheduled     Aug 07, 2018  9:00 AM CDT   Lymphedema Treatment with Apryl Streeter PTA   [facility] Lymphedema ([facility])    [address] [place] [address] [facility] [address]   [phone]            Nov 05, 2018  9:00 AM CST   CT SOFT TISSUE NECK W CONTRAST with WYCT1   [facility] CT ([facility])    [address] [facility] [place] [place] [address]   [phone]           Please bring any scans or X-rays taken at other hospitals, if similar tests were done. Also bring a list of your medicines, including vitamins, minerals and over-the-counter drugs. It is safest to leave personal items at home.  Be sure to tell your doctor:   If you have any allergies.   If there s any chance you are pregnant.   If you are breastfeeding.    If you have diabetes as your medication may need to be adjusted for this exam.  You will have contrast for this exam. To prepare:   Do not eat or drink for 2 hours before your exam. If you need to take medicine, you may take it with small sips of water. (We may ask you to take liquid medicine as well.)   The day before your exam, drink extra fluids at least six 8-ounce glasses (unless your doctor tells you to restrict your fluids).  Patients over 70 or patients with diabetes or kidney problems:   If you haven t had a blood test (creatinine test) within the last 30 days, the Cardiologist/Radiologist may require you to get this test prior to your exam.  Please wear loose clothing, such as a sweat suit or jogging clothes. Avoid snaps, zippers and other metal. We may ask you to undress and put on a hospital gown.  If you have any questions, please call the Imaging Department where you will have your exam.            Nov 05, 2018  9:30 AM CST   CT CHEST W CONTRAST with WYCT1   [facility] CT ([facility])    [address] [facility] [place] [address]   [phone]           Please bring any scans or X-rays taken at other hospitals, if similar tests were done. Also bring a list of your medicines, including vitamins, minerals and over-the-counter drugs. It is safest to leave personal items at home.  Be  sure to tell your doctor:   If you have any allergies.   If there s any chance you are pregnant.   If you are breastfeeding.    If you have diabetes as your medication may need to be adjusted for this exam.  You will have contrast for this exam. To prepare:   Do not eat or drink for 2 hours before your exam. If you need to take medicine, you may take it with small sips of water. (We may ask you to take liquid medicine as well.)   The day before your exam, drink extra fluids at least six 8-ounce glasses (unless your doctor tells you to restrict your fluids).  Patients over 70 or patients with diabetes or kidney problems:   If you haven t had a blood test (creatinine test) within the last 30 days, the Cardiologist/Radiologist may require you to get this test prior to your exam.  Please wear loose clothing, such as a sweat suit or jogging clothes. Avoid snaps, zippers and other metal. We may ask you to undress and put on a hospital gown.  If you have any questions, please call the Imaging Department where you will have your exam.            Nov 14, 2018  9:40 AM CST   (Arrive by 9:25 AM)   Return Visit with Thomas Ferris MD   [facility] Ear Nose and Throat ([facility])    [address] [facility] [address] [phone]            Jan 24, 2019 11:00 AM CST   Return Visit with HERNÁN Dave CNP   [facility] ([facility])    [address] [place] [address], [address] [id number] [phone]              Future tests that were ordered for you today     Open Future Orders        Priority Expected Expires Ordered    CT Soft tissue neck w contrast Routine  8/2/2019 8/2/2018    CT Chest w contrast* Routine  8/2/2019 8/2/2018            Who to contact     Please call your clinic at [phone] to:    Ask questions about your health    Make or cancel appointments    Discuss your medicines    Learn about your test results    Speak to your doctor             Additional Information About Your Visit        [employer] Information     [employer] gives you secure access to your electronic health record. If you see a primary care provider, you can also send messages to your care team and make appointments. If you have questions, please call your primary care clinic.  If you do not have a primary care provider, please call [phone] and they will assist you.      [employer] is an electronic gateway that provides easy, online access to your medical records. With [employer], you can request a clinic appointment, read your test results, renew a prescription or communicate with your care team.     To access your existing account, please contact your [facility] or call [phone] for assistance.        Care EveryWhere ID     This is your Care EveryWhere ID. This could be used by other organizations to access your [facility] medical records  [id number]         Blood Pressure from Last 3 Encounters:   07/23/18 125/67   04/24/18 110/68   03/02/18 102/60    Weight from Last 3 Encounters:   08/02/18 66.7 kg (147 lb)   07/23/18 66.3 kg (146 lb 3.2 oz)   04/24/18 65.8 kg (145 lb)              We Performed the Following     SPEECH THERAPY REFERRAL        Primary Care Provider Office Phone # Fax #    HERNÁN Carranza [facility] [phone] [address] [place] [facility] [address]        Equal Access to Services     TREMAINE ALMANZA : Hadii angel ku hadasho Soomaali, waaxda luqadaha, qaybta kaalmada adeegyada, luis adame . So [facility] [phone].    ATENCIÓN: Si habla español, tiene a [facility] disposición servicios gratuitos de asistencia lingüística. Calos al [phone].    We comply with applicable federal civil rights laws and [place] laws. We do not discriminate on the basis of race, color, national origin, age, disability, sex, sexual orientation, or gender identity.            Thank you!     Thank you for choosing [facility]  for your care. Our goal is always to provide you with excellent care. Hearing back from our patients is one way we can continue to improve our services. Please take a few minutes to complete the written survey that you may receive in the mail after your visit with us. Thank you!             Your Updated Medication List - Protect others around you: Learn how to safely use, store and throw away your medicines at www.disposemymeds.org.          This list is accurate as of 8/2/18  2:33 PM.  Always use your most recent med list.                   Brand Name Dispense Instructions for use Diagnosis    albuterol 108 (90 Base) MCG/ACT Inhaler    PROAIR HFA/PROVENTIL HFA/VENTOLIN HFA     Inhale 2 puffs into the lungs every 6 hours as needed for wheezing    Moderate persistent asthma without complication       DENTAGEL 1.1 % Gel topical gel   Generic drug:  sodium fluoride dental gel      Apply 1 Application to affected area daily        EPINEPHrine 0.3 MG/0.3ML injection 2-pack    EPIPEN/ADRENACLICK/or ANY BX GENERIC EQUIV    0.6 mL    Inject 0.3 mLs (0.3 mg) into the muscle once as needed for anaphylaxis    Anaphylactic reaction, subsequent encounter       indomethacin 25 MG capsule    INDOCIN    42 capsule    Take 1 capsule (25 mg) by mouth 2 times daily (with meals)    Acute idiopathic gout of right foot       lidocaine 5 % Patch    LIDODERM    30 patch    Apply up to 3 patches to neck at once for up to 12 h within a 24 h period.  Remove after 12 hours.    History of recent ear, nose, and throat (ENT) procedure, Malignant neoplasm of tongue (H)       * NITROGLYCERIN TD      Cut in quarter and apply to elbow and knee for pain        * nitroGLYcerin 0.4 MG/HR 24 hr patch    NITRO-DUR    30 patch    Cut into 1/4 and place 1/4 over the area of pain.  Change every 24 hours.    Right tennis elbow       order for DME     1 Device    Equipment being ordered: tennis elbow band    Right tennis elbow       * Notice:   This list has 2 medication(s) that are the same as other medications prescribed for you. Read the directions carefully, and ask your doctor or other care provider to review them with you.

## 2018-08-02 NOTE — PROGRESS NOTES
OUTPATIENT SWALLOW  EVALUATION  PLAN OF TREATMENT FOR OUTPATIENT REHABILITATION  (COMPLETE FOR INITIAL CLAIMS ONLY)  Patient's Last Name, First Name, M.I.  YOB: 1950  Bettie BellTerese     Provider's Name   Debbie Muñoz   Medical Record No.  [id number]     Start of Care Date:  08/02/18   Onset Date:  07/13/16   Type:     ___PT   ____OT  ___X_SLP Medical Diagnosis:  Oral cancer, oropharyngeal dysphagia     Treatment Diagnosis:  Mild-moderate oral and pharyngeal dysphagia Visits from SOC:  1     _________________________________________________________________________________  Plan of Treatment/Functional Goals:  Planned Therapy Interventions: Dysphagia Treatment  Dysphagia treatment: Oropharyngeal exercise training, Modified diet education, Instruction of safe swallow strategies, Compensatory strategies for swallowing                     Goals   1. Goal Identifier: Exercises       Goal Description: 1. Pt will maintain and/or increase tongue, jaw, and pharyngeal flexibility, strength, and/or ROM in order to maintain swallowing function long-term by completing 10 reps of 9/9 exercises with minimal written cueing at least daily.       Target Date: 10/31/18           2. Goal Identifier: Diet       Goal Description: 1. Pt will tolerate soft moist regular textures and thin liquids without overt s/sx of aspiration or choking and no report of feeling of stasis in 9/10 trials with independent use of swallowing strategies.        Target Date: 10/31/18           3.                             4.                             5.                            6.                              7.                              8.                              Therapy Frequency: other (see comments) (1x/month x 6 months)       Debbie Sessions, SLP       I CERTIFY THE NEED FOR THESE SERVICES FURNISHED UNDER        THIS PLAN OF TREATMENT  AND WHILE UNDER MY CARE     (Physician attestation of this document indicates review and certification of the therapy plan).                  Certification date from: 08/02/18 Certification date to: 10/31/18          Referring Physician: Thomas Ferris MD    Initial Assessment        See Epic Evaluation Start Of Care Date: 08/02/18

## 2018-08-02 NOTE — LETTER
"8/2/2018       RE: Terese Bell  [address] [facility] [facility] [address]     Dear Colleague,    Thank you for referring your patient, Terese Bell, to the [facility] EAR NOSE AND THROAT at [facility]. Please see a copy of my visit note below.    August 2, 2018      PRIOR ONCOLOGIC HISTORY:  Ms. 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 Schumacher.  He ended up doing 13 operations in 11 years, most recently in 2010.      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 Leo on 9/1/2016.  The margins were negative but the lymph nodes were 1/40 positive.  She completed postoperative radiation therapy on 11/29/2016.    HISTORY OF PRESENT ILLNES:  Ms. Alexandre is now approaching 21 months status post completion of treatment.  She has done very well and continues to do well.  She has had no worrisome symptoms recently and continues to be very rigorous with her management of the lymphedema as well as the swallowing therapy.  She does the trismus exercises with the TheraBite exactly as prescribed.  She is very pleased with how that has progressed.      Otherwise, she complains of a little \"scummy lung\" stuff today.  She has not had a recent chest x-ray.  She is otherwise doing well.  Her swallowing difficulty is stable, but not worse.     PHYSICAL EXAMINATION:  On examination, she looks good.  The tongue is healing very nicely.  She is a little bit concerned of induration but that area feels like a linear scar rather than any evidence of tumor induration.  The gutter is still a little bit tethered with a particular scar band in the posterior tongue as well as the anterior tongue.   Releasing it would " have likely slight improvement but not substantial improvement.  The neck is without adenopathy.  She is a little tender in the left JACQUI area and she wonders what is going on there.  I do not feel anything of concern.  The rest of the oral cavity and oropharynx looks great.      IMPRESSION AND PLAN:  Ms. Bell seems to be is WILMA now approaching two years.  She last had a scan at her one year anniversary and did not get her 15 month scans.  Therefore, we will get the next scans at one year in November and I will see her back with an appointment at that point.        Finally, given her chest symptoms and the lack of a recent chest CT, we will get a chest x-ray today.  I have asked her to follow up with her primary care physician who is Dr. Mcdonald.  I will send her a note as well.       ADDENDUM:  Her CXR is clear.      Thomas Ferris MD  Otolaryngology/Head & Neck Surgery  [phone]      CC    Nicole Mcdonald NP    [facility]    [address] [place], [place]  [address]       Pattie Natarajan MD    Radiation Therapy     [address]    [address]    [place], [place]  [address]     Moreno Leo MD  Otolaryngology/Head & Neck Surgery  [facility]

## 2018-08-02 NOTE — PROGRESS NOTES
"August 2, 2018      PRIOR ONCOLOGIC HISTORY:  Ms. 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 Schumacher.  He ended up doing 13 operations in 11 years, most recently in 2010.      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 Leo on 9/1/2016.  The margins were negative but the lymph nodes were 1/40 positive.  She completed postoperative radiation therapy on 11/29/2016.    HISTORY OF PRESENT ILLNES:  Ms. Alexandre is now approaching 21 months status post completion of treatment.  She has done very well and continues to do well.  She has had no worrisome symptoms recently and continues to be very rigorous with her management of the lymphedema as well as the swallowing therapy.  She does the trismus exercises with the TheraBite exactly as prescribed.  She is very pleased with how that has progressed.      Otherwise, she complains of a little \"scummy lung\" stuff today.  She has not had a recent chest x-ray.  She is otherwise doing well.  Her swallowing difficulty is stable, but not worse.     PHYSICAL EXAMINATION:  On examination, she looks good.  The tongue is healing very nicely.  She is a little bit concerned of induration but that area feels like a linear scar rather than any evidence of tumor induration.  The gutter is still a little bit tethered with a particular scar band in the posterior tongue as well as the anterior tongue.   Releasing it would have likely slight improvement but not substantial improvement.  The neck is without adenopathy.  She is a little tender in the left JACQUI area and she wonders what is going on there.  I do not feel anything of concern.  The rest of the oral cavity and oropharynx looks great.      IMPRESSION AND PLAN:  Ms. Bell " seems to be is WILMA now approaching two years.  She last had a scan at her one year anniversary and did not get her 15 month scans.  Therefore, we will get the next scans at one year in November and I will see her back with an appointment at that point.        Finally, given her chest symptoms and the lack of a recent chest CT, we will get a chest x-ray today.  I have asked her to follow up with her primary care physician who is Dr. Mcdonald.  I will send her a note as well.       ADDENDUM:  Her CXR is clear.      Thomas Ferris MD  Otolaryngology/Head & Neck Surgery  [phone]              CC    Nicole Mcdonald NP    [facility]    [address] [place], [place]  [address]       Pattie Natarajan MD    Radiation Therapy     [address]    [address]    [place], [place]  [address]     Moreno Leo MD  Otolaryngology/Head & Neck Surgery  [place] [address]

## 2018-08-02 NOTE — MR AVS SNAPSHOT
After Visit Summary   8/2/2018    Terese Bell    MRN: [id number]           Patient Information     Date Of Birth          1950        Visit Information        Provider Department      8/2/2018 8:40 AM Thomas Ferris MD [facility]        Today's Diagnoses     Oral cancer (H)    -  1    Chest congestion        Oral phase dysphagia          Care Instructions    -Follow up in 3 months with Dr. Ferris and follow up CT scans.          Follow-ups after your visit        Additional Services     NUTRITION REFERRAL       Your provider has referred you to: [facility]: [facility] [phone]   [email or web address]    Please see notes from SLP Debbie Sessions regarding dietary needs.    Please be aware that coverage of these services is subject to the terms and limitations of your health insurance plan.  Call member services at your health plan with any benefit or coverage questions.      Please bring the following with you to your appointment:    (1) This referral request  (2) Any documents given to you regarding this referral  (3) Any specific questions you have about diet and/or food choices                  Your next 10 appointments already scheduled     Aug 07, 2018  9:00 AM CDT   Lymphedema Treatment with Apryl Streeter PTA   [facility] Lymphedema ([facility])    [address] [address]   [phone]            Nov 05, 2018  9:00 AM CST   CT SOFT TISSUE NECK W CONTRAST with [email or web address]   [facility] CT ([facility])    [address] [address]   [phone]           Please bring any scans or X-rays taken at other hospitals, if similar tests were done. Also bring a list of your medicines, including vitamins, minerals and over-the-counter drugs. It is safest to leave personal items at home.  Be sure to tell your doctor:   If you have any allergies.   If  there s any chance you are pregnant.   If you are breastfeeding.    If you have diabetes as your medication may need to be adjusted for this exam.  You will have contrast for this exam. To prepare:   Do not eat or drink for 2 hours before your exam. If you need to take medicine, you may take it with small sips of water. (We may ask you to take liquid medicine as well.)   The day before your exam, drink extra fluids at least six 8-ounce glasses (unless your doctor tells you to restrict your fluids).  Patients over 70 or patients with diabetes or kidney problems:   If you haven t had a blood test (creatinine test) within the last 30 days, the Cardiologist/Radiologist may require you to get this test prior to your exam.  Please wear loose clothing, such as a sweat suit or jogging clothes. Avoid snaps, zippers and other metal. We may ask you to undress and put on a hospital gown.  If you have any questions, please call the Imaging Department where you will have your exam.            Nov 05, 2018  9:30 AM CST   CT CHEST W CONTRAST with WYCT1   [facility] CT ([facility])    [address] [address]   [phone]           Please bring any scans or X-rays taken at other hospitals, if similar tests were done. Also bring a list of your medicines, including vitamins, minerals and over-the-counter drugs. It is safest to leave personal items at home.  Be sure to tell your doctor:   If you have any allergies.   If there s any chance you are pregnant.   If you are breastfeeding.    If you have diabetes as your medication may need to be adjusted for this exam.  You will have contrast for this exam. To prepare:   Do not eat or drink for 2 hours before your exam. If you need to take medicine, you may take it with small sips of water. (We may ask you to take liquid medicine as well.)   The day before your exam, drink extra fluids at least six 8-ounce glasses (unless your doctor tells you to  restrict your fluids).  Patients over 70 or patients with diabetes or kidney problems:   If you haven t had a blood test (creatinine test) within the last 30 days, the Cardiologist/Radiologist may require you to get this test prior to your exam.  Please wear loose clothing, such as a sweat suit or jogging clothes. Avoid snaps, zippers and other metal. We may ask you to undress and put on a hospital gown.  If you have any questions, please call the Imaging Department where you will have your exam.            Nov 05, 2018 10:30 AM CST   XR CHEST 2 VIEWS with UCXR1   [facility] Xray ([facility])    [address]  1st Floor  [place] [facility] [address] [phone]           Please bring a list of your current medicines to your exam. (Include vitamins, minerals and over-thecounter medicines.) Leave your valuables at home.  Tell your doctor if there is a chance you may be pregnant.  You do not need to do anything special for this exam.            Nov 14, 2018  9:40 AM CST   (Arrive by 9:25 AM)   Return Visit with Thomas Ferris MD   [facility] Ear Nose and Throat ([facility])    [address]  4th Floor  [place] [place] [facility] [address] [phone]            Jan 24, 2019 11:00 AM CST   Return Visit with HERNÁN Dave CNP   [facility] ([facility])    [address], [address]  [place] [facility] [place] [address] [phone]              Future tests that were ordered for you today     Open Future Orders        Priority Expected Expires Ordered    X-ray Chest 2 Views Routine 8/2/2018 8/2/2019 8/2/2018    CT Soft tissue neck w contrast Routine  8/2/2019 8/2/2018    CT Chest w contrast* Routine  8/2/2019 8/2/2018            Who to contact     Please call your clinic at [phone] to:    Ask questions about your health    Make or cancel appointments    Discuss your medicines    Learn about your test results    Speak to your doctor  "           Additional Information About Your Visit        Common Curriculumhart Information     Fonemesh gives you secure access to your electronic health record. If you see a primary care provider, you can also send messages to your care team and make appointments. If you have questions, please call your primary care clinic.  If you do not have a primary care provider, please call [phone] and they will assist you.      Fonemesh is an electronic gateway that provides easy, online access to your medical records. With Fonemesh, you can request a clinic appointment, read your test results, renew a prescription or communicate with your care team.     To access your existing account, please contact your [facility] or call [phone] for assistance.        Care EveryWhere ID     This is your Care EveryWhere ID. This could be used by other organizations to access your [place] medical records  [id number]        Your Vitals Were     Height BMI (Body Mass Index)                1.626 m (5' 4\") 25.23 kg/m2           Blood Pressure from Last 3 Encounters:   07/23/18 125/67   04/24/18 110/68   03/02/18 102/60    Weight from Last 3 Encounters:   08/02/18 66.7 kg (147 lb)   07/23/18 66.3 kg (146 lb 3.2 oz)   04/24/18 65.8 kg (145 lb)              We Performed the Following     NUTRITION REFERRAL        Primary Care Provider Office Phone # Fax #    Nicole Mcdonald, APRN [facility] [phone] [address] [place] [facility] [address]        Equal Access to Services     TREMAINE ALMANZA AH: Hadii aad ku hadasho Soomaali, waaxda luqadaha, qaybta kaalmada adeegyada, waxvalencia veena hayzeina adame . So [facility] [phone].    ATENCIÓN: Si habla español, tiene a thompson disposición servicios gratuitos de asistencia lingüística. Llame al [phone].    We comply with applicable federal civil rights laws and [place] laws. We do not discriminate on the basis of race, color, national origin, age, " disability, sex, sexual orientation, or gender identity.            Thank you!     Thank you for choosing [facility] EAR NOSE AND THROAT  for your care. Our goal is always to provide you with excellent care. Hearing back from our patients is one way we can continue to improve our services. Please take a few minutes to complete the written survey that you may receive in the mail after your visit with us. Thank you!             Your Updated Medication List - Protect others around you: Learn how to safely use, store and throw away your medicines at www.disposemymeds.org.          This list is accurate as of 8/2/18 10:14 AM.  Always use your most recent med list.                   Brand Name Dispense Instructions for use Diagnosis    albuterol 108 (90 Base) MCG/ACT Inhaler    PROAIR HFA/PROVENTIL HFA/VENTOLIN HFA     Inhale 2 puffs into the lungs every 6 hours as needed for wheezing    Moderate persistent asthma without complication       DENTAGEL 1.1 % Gel topical gel   Generic drug:  sodium fluoride dental gel      Apply 1 Application to affected area daily        EPINEPHrine 0.3 MG/0.3ML injection 2-pack    EPIPEN/ADRENACLICK/or ANY BX GENERIC EQUIV    0.6 mL    Inject 0.3 mLs (0.3 mg) into the muscle once as needed for anaphylaxis    Anaphylactic reaction, subsequent encounter       indomethacin 25 MG capsule    INDOCIN    42 capsule    Take 1 capsule (25 mg) by mouth 2 times daily (with meals)    Acute idiopathic gout of right foot       lidocaine 5 % Patch    LIDODERM    30 patch    Apply up to 3 patches to neck at once for up to 12 h within a 24 h period.  Remove after 12 hours.    History of recent ear, nose, and throat (ENT) procedure, Malignant neoplasm of tongue (H)       * NITROGLYCERIN TD      Cut in quarter and apply to elbow and knee for pain        * nitroGLYcerin 0.4 MG/HR 24 hr patch    NITRO-DUR    30 patch    Cut into 1/4 and place 1/4 over the area of pain.  Change every 24 hours.    Right tennis  elbow       order for DME     1 Device    Equipment being ordered: tennis elbow band    Right tennis elbow       * Notice:  This list has 2 medication(s) that are the same as other medications prescribed for you. Read the directions carefully, and ask your doctor or other care provider to review them with you.

## 2018-08-07 ENCOUNTER — HOSPITAL ENCOUNTER (OUTPATIENT)
Dept: PHYSICAL THERAPY | Facility: CLINIC | Age: 68
Setting detail: THERAPIES SERIES
End: 2018-08-07
Attending: NURSE PRACTITIONER
Payer: MEDICARE

## 2018-08-07 DIAGNOSIS — E55.9 VITAMIN D DEFICIENCY: ICD-10-CM

## 2018-08-07 PROCEDURE — G8987 SELF CARE CURRENT STATUS: HCPCS | Mod: GP,CJ | Performed by: PHYSICAL THERAPIST

## 2018-08-07 PROCEDURE — G8988 SELF CARE GOAL STATUS: HCPCS | Mod: GP,CI | Performed by: PHYSICAL THERAPIST

## 2018-08-07 PROCEDURE — 40000099 ZZH STATISTIC LYMPHEDEMA VISIT: Performed by: PHYSICAL THERAPIST

## 2018-08-07 PROCEDURE — 36415 COLL VENOUS BLD VENIPUNCTURE: CPT | Performed by: NURSE PRACTITIONER

## 2018-08-07 PROCEDURE — 82306 VITAMIN D 25 HYDROXY: CPT | Performed by: NURSE PRACTITIONER

## 2018-08-07 PROCEDURE — 97535 SELF CARE MNGMENT TRAINING: CPT | Mod: GP | Performed by: PHYSICAL THERAPIST

## 2018-08-08 LAB — DEPRECATED CALCIDIOL+CALCIFEROL SERPL-MC: 72 UG/L (ref 20–75)

## 2018-08-15 NOTE — PROGRESS NOTES
"Outpatient Physical Therapy Progress Note     Patient: Terese Bell  : 1950    Beginning/End Dates of Reporting Period:  2018 to 8/15/2018    Referring Provider: Kamala Rudolph NP    Therapy Diagnosis: H&N lymphedema with fibrosis, associated pain and tightness      Client Self Report: my pain is always at a 2-3/10, it's constant but not debilitating anymore    Objective Measurements:  Objective Measure: girth  Details: see flowsheet; cervical edema plateaued      Goals:  Goal Identifier stg   Goal Description pt to be independent with performing daily cervical stretches/exercises to decrease cervical tightness and improve pain/discomfort   Target Date 18   Date Met  18   Progress:     Goal Identifier ltg   Goal Description pt to report overall decrease in pain of no more than 3-4/10 on a daily basis during functional mobility and ADL   Target Date 18   Date Met  18 (2/10)   Progress:     Goal Identifier ltg   Goal Description pt to be independent with home program including ROM exercises, cervical stretches, heat, compression and self-MLD/massage for longterm management of lymphedema and related tightness   Target Date 18   Date Met      Progress:     Goal Identifier ltg   Goal Description pt to increase B cervical rotation to at least 45 degrees to increase functional range for driving and ADL   Target Date 18   Date Met   (18: goal met on R = 45degrees and L = 35degrees)   Progress:       Progress Toward Goals:   Patient has made good progress on goals since initial evaluation.  On IE, pain was rated 9-10/10 consistently and now 2-3/10 which is constant but pt reports \"not nearly as debilitating\" as it was previously. Pt has really good response to in-clinic treatment via MFR so has been able to decrease frequency of appts, but pt reports at ~10days after treatment despite doing self-MFR will start to experience an increase in pain, tightness and " fibrosis.  Pt is now interested in pursing the Flexitouch compression pump and has an appointment in clinic for a trial on 8/15/18, will have pt return to clinic after trial to see her plan of purchasing OOP or not and to ensure that pump is adequately helping to maintain decreased pain, fibrosis and tightening.    Plan:  Continue therapy per current plan of care.    Discharge:  No

## 2018-09-18 NOTE — PROGRESS NOTES
Outpatient Physical Therapy Discharge Note     Patient: Terese Bell  : 1950    Beginning/End Dates of Reporting Period:  8/15/2018 to 2018    Referring Provider: Kamala Rudolph NP    Therapy Diagnosis: H&N lymphedema with associated fibrosis and pain     Client Self Report: my pain is always at a 2-3/10, it's constant but not debilitating anymore    Objective Measurements:  Objective Measure: girth  Details: see flowsheet; cervical edema plateaued      Goals:  Goal Identifier stg   Goal Description pt to be independent with performing daily cervical stretches/exercises to decrease cervical tightness and improve pain/discomfort   Target Date 18   Date Met  18   Progress:     Goal Identifier ltg   Goal Description pt to report overall decrease in pain of no more than 3-4/10 on a daily basis during functional mobility and ADL   Target Date 18   Date Met  18 (2/10)   Progress:     Goal Identifier ltg   Goal Description pt to be independent with home program including ROM exercises, cervical stretches, heat, compression and self-MLD/massage for longterm management of lymphedema and related tightness   Target Date 18   Date Met   18   Progress:     Goal Identifier ltg   Goal Description pt to increase B cervical rotation to at least 45 degrees to increase functional range for driving and ADL   Target Date 18   Date Met   (: goal met on R = 45degrees and L = 35degrees)   Progress:       Progress Toward Goals:   Goals all met with exception of last goal on L-side (see above).   Pt is independent with home program at this time and instructed to continue to perform; also in fibrosis study at the [facility]      Plan:  Discharge from therapy.    Discharge:    Reason for Discharge: Patient has met all goals - see above exception for L-sided ROM    Equipment Issued: no new equipment from previous encounters    Discharge Plan: Patient to continue home  program.  Other services: fibrosis study at [facility]

## 2018-11-05 ENCOUNTER — HOSPITAL ENCOUNTER (OUTPATIENT)
Dept: CT IMAGING | Facility: CLINIC | Age: 68
End: 2018-11-05
Attending: OTOLARYNGOLOGY
Payer: MEDICARE

## 2018-11-05 ENCOUNTER — HOSPITAL ENCOUNTER (OUTPATIENT)
Dept: CT IMAGING | Facility: CLINIC | Age: 68
Discharge: HOME | End: 2018-11-05
Attending: OTOLARYNGOLOGY | Admitting: OTOLARYNGOLOGY
Payer: MEDICARE

## 2018-11-05 DIAGNOSIS — C06.9 ORAL CANCER (H): ICD-10-CM

## 2018-11-05 PROCEDURE — 71250 CT THORAX DX C-: CPT

## 2018-11-05 PROCEDURE — 70490 CT SOFT TISSUE NECK W/O DYE: CPT

## 2018-11-09 ENCOUNTER — HOSPITAL ENCOUNTER (OUTPATIENT)
Dept: MAMMOGRAPHY | Facility: CLINIC | Age: 68
Discharge: HOME | End: 2018-11-09
Attending: NURSE PRACTITIONER | Admitting: NURSE PRACTITIONER
Payer: MEDICARE

## 2018-11-09 DIAGNOSIS — Z12.31 VISIT FOR SCREENING MAMMOGRAM: ICD-10-CM

## 2018-11-09 PROCEDURE — 77067 SCR MAMMO BI INCL CAD: CPT

## 2018-11-14 ENCOUNTER — OFFICE VISIT (OUTPATIENT)
Dept: OTOLARYNGOLOGY | Facility: CLINIC | Age: 68
End: 2018-11-14
Payer: MEDICARE

## 2018-11-14 ENCOUNTER — ALLIED HEALTH/NURSE VISIT (OUTPATIENT)
Dept: SPEECH THERAPY | Facility: CLINIC | Age: 68
End: 2018-11-14

## 2018-11-14 VITALS — BODY MASS INDEX: 25.75 KG/M2 | WEIGHT: 150 LBS

## 2018-11-14 DIAGNOSIS — T78.40XA ALLERGIC REACTION, INITIAL ENCOUNTER: ICD-10-CM

## 2018-11-14 DIAGNOSIS — R53.83 OTHER FATIGUE: ICD-10-CM

## 2018-11-14 DIAGNOSIS — C01 MALIGNANT NEOPLASM OF BASE OF TONGUE (H): Primary | ICD-10-CM

## 2018-11-14 DIAGNOSIS — R53.83 FATIGUE: ICD-10-CM

## 2018-11-14 LAB — TSH SERPL DL<=0.005 MIU/L-ACNC: 1.12 MU/L (ref 0.4–4)

## 2018-11-14 ASSESSMENT — PAIN SCALES - GENERAL: PAINLEVEL: NO PAIN (1)

## 2018-11-14 NOTE — NURSING NOTE
Chief Complaint   Patient presents with     RECHECK     follow up after CT- tongue cancer      Weight 68 kg (150 lb), not currently breastfeeding.      Hilton Chong LPN

## 2018-11-14 NOTE — MR AVS SNAPSHOT
After Visit Summary   11/14/2018    Terese Bell    MRN: [id number]           Patient Information     Date Of Birth          1950        Visit Information        Provider Department      11/14/2018 10:33 AM Janay Rojas SLP [facility]         Follow-ups after your visit        Your next 10 appointments already scheduled     Jan 24, 2019 11:00 AM CST   Return Visit with HERNÁN Dave CNP   Radiation Therapy Center ([facility])    [address], [address]  [place] [address]   [phone]              Who to contact     Please call your clinic at [phone] to:    Ask questions about your health    Make or cancel appointments    Discuss your medicines    Learn about your test results    Speak to your doctor            Additional Information About Your Visit        MyChart Information     LeadiD gives you secure access to your electronic health record. If you see a primary care provider, you can also send messages to your care team and make appointments. If you have questions, please call your primary care clinic.  If you do not have a primary care provider, please call [phone] and they will assist you.      LeadiD is an electronic gateway that provides easy, online access to your medical records. With LeadiD, you can request a clinic appointment, read your test results, renew a prescription or communicate with your care team.     To access your existing account, please contact your [facility] or call [phone] for assistance.        Care EveryWhere ID     This is your Care EveryWhere ID. This could be used by other organizations to access your [place] medical records  [id number]         Blood Pressure from Last 3 Encounters:   07/23/18 125/67   04/24/18 110/68   03/02/18 102/60    Weight from Last 3 Encounters:   11/14/18 68 kg (150 lb)   08/02/18 66.7 kg (147 lb)   07/23/18 66.3 kg (146 lb 3.2 oz)               Today, you had the following     No orders found for display       Primary Care Provider Office Phone # Fax #    Nicole Mcdonald, APRN [facility] [phone] [address] [place] [address]        Equal Access to Services     BLANCA ALMANZA : Hadii aad ku haddaneo Soomaali, waaxda luqadaha, qaybta kaalmada adeegyada, waxay idiin hayaan adeeg khveronica lamariely . So [facility] [phone].    ATENCIÓN: Si habla español, tiene a thompson disposición servicios gratuitos de asistencia lingüística. Llame al [phone].    We comply with applicable federal civil rights laws and [place] laws. We do not discriminate on the basis of race, color, national origin, age, disability, sex, sexual orientation, or gender identity.            Thank you!     Thank you for choosing [facility]  for your care. Our goal is always to provide you with excellent care. Hearing back from our patients is one way we can continue to improve our services. Please take a few minutes to complete the written survey that you may receive in the mail after your visit with us. Thank you!             Your Updated Medication List - Protect others around you: Learn how to safely use, store and throw away your medicines at www.disposemymeds.org.          This list is accurate as of 11/14/18 10:36 AM.  Always use your most recent med list.                   Brand Name Dispense Instructions for use Diagnosis    albuterol 108 (90 Base) MCG/ACT inhaler    PROAIR HFA/PROVENTIL HFA/VENTOLIN HFA     Inhale 2 puffs into the lungs every 6 hours as needed for wheezing    Moderate persistent asthma without complication       DENTAGEL 1.1 % Gel topical gel   Generic drug:  sodium fluoride dental gel      Apply 1 Application to affected area daily        EPINEPHrine 0.3 MG/0.3ML injection 2-pack    EPIPEN/ADRENACLICK/or ANY BX GENERIC EQUIV    0.6 mL    Inject 0.3 mLs (0.3 mg) into the muscle once as needed for anaphylaxis    Anaphylactic  reaction, subsequent encounter       indomethacin 25 MG capsule    INDOCIN    42 capsule    Take 1 capsule (25 mg) by mouth 2 times daily (with meals)    Acute idiopathic gout of right foot       lidocaine 5 % Patch    LIDODERM    30 patch    Apply up to 3 patches to neck at once for up to 12 h within a 24 h period.  Remove after 12 hours.    History of recent ear, nose, and throat (ENT) procedure, Malignant neoplasm of tongue (H)       * NITROGLYCERIN TD      Cut in quarter and apply to elbow and knee for pain        * nitroGLYcerin 0.4 MG/HR 24 hr patch    NITRO-DUR    30 patch    Cut into 1/4 and place 1/4 over the area of pain.  Change every 24 hours.    Right tennis elbow       order for DME     1 Device    Equipment being ordered: tennis elbow band    Right tennis elbow       * Notice:  This list has 2 medication(s) that are the same as other medications prescribed for you. Read the directions carefully, and ask your doctor or other care provider to review them with you.

## 2018-11-14 NOTE — PROGRESS NOTES
Patient seen for brief allied healthcare provider session.  Patient was discharged from speech pathology services in April 2018.  She reports she continues to do well and is maintaining as she had prior to discharge.  She has been traveling a lot and having a variety of food experiences.  She is feeling quite well although slightly concerned her weight is variable.  She continues to use her Therabite.  She is not in need of further SLP services at this time.  Time spent with patient 5 minutes.

## 2018-11-14 NOTE — PATIENT INSTRUCTIONS
-Schedule follow up with Dr. Ferris in 4 months  -TSH lab today  -Schedule appointment in Allergy Clinic for allergy testing. See Miscellaneous referral.

## 2018-11-14 NOTE — LETTER
11/14/2018       RE: Terese Bell  [address] [facility] [address]     Dear Colleague,    Thank you for referring your patient, Terese Bell, to the [facility] EAR NOSE AND THROAT at [facility]. Please see a copy of my visit note below.    November 14, 2018      PRIOR ONCOLOGIC HISTORY:  Ms. 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 Schumacher.  He ended up doing 13 operations in 11 years, most recently in 2010.      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 Leo on 9/1/2016.  The margins were negative but the lymph nodes were 1/40 positive.  She completed postoperative radiation therapy on 11/29/2016.    HISTORY OF PRESENT ILLNESS:  Ms. Bettie Bell is now two years out.  She is delighted to be back.  She is with her [place] boyfriend.  They just returned from [place] where they had a wonderful time.      Her chief complaint is that her weight has been fluctuating quite a bit.  She says that she can go up and down as much as five to six pounds every day.  She says that it is not the scale.  She wonders whether it could be the thyroid.  She has not had a thyroid test recently.      The other issue for her is that she would like to have allergy testing to make sure that she truly is allergic to iodine.  She also would like to have some fruits and vegetables that she has been told that she has been allergic to.      Otherwise, she is doing well.  She has had no difficulties.  She still coughs quite a bit on occasion, but is otherwise clear.  She wondered what the ground glass appearance was on the recent scans, and I explained that it could be a mild lung infection which made  sense to her.      PHYSICAL EXAMINATION:  She is accompanied by her boyfriend.  She still has a little bit of a lisp, but she actually looks quite good.  The tongue has healed beautifully.  There is still scar induration anteriorly, but there is no evidence of tumor induration.  The gutter is minimally tethered now.  The left tongue looks good.  Her mouth does demonstrate trismus at about 3 cm, and Janay is working with her on that.  The rest of the oral cavity, oropharynx is clear.  The neck has no evidence of adenopathy although the left hyoid bone is a little bit tender.      FIBEROPTIC ENDOSCOPY:  Due to the need for careful surveillance, fiberoptic laryngoscopy was indicated. The nose was topically decongested and anesthetized. The fiberoptic laryngoscope was passed under endoscopic vision. The turbinates were normal. The inferior and middle meati were clear bilaterally without purulence, masses, or polyps. The nasopharynx was clear. The eustachian tubes were clear. The soft palate appeared normal with good mobility. The epiglottis was sharp, and the visualized portion of the vallecula was clear.   There is edema throughout the supraglottis and hypopharynx, but there are mobile cords.  There is no evidence of tumor.      IMPRESSION AND PLAN:  Ms. Bettie Bell is two years out and WILMA.  Her scans are clear.  We  reviewed that with her today.  This was good news for her.      We will ask for an allergy consult to see whether or not she has allergies to blueberries, strawberries, and iodine.      Her weight is actually stable (see chart below), and I wonder whether the scale is not functioning well.  She will speak with Ms. Mcdonald, who is her nurse practitioner, to have this sorted out a little bit. In the meantime, to prepare for that visit, we will obtain a TSH today.  I would like to see her back in four months' time.     ENT HEAD AND NECK REVIEW Latest Ref Rng & Units 2/28/2018 3/2/2018 4/9/2018 4/24/2018  7/23/2018 8/2/2018 11/14/2018   Weight - 65.3 kg 65.8 kg 65.8 kg 65.8 kg 66.3 kg 66.7 kg 68 kg   TSH 0.40 - 4.00 mU/L - - - - - - 1.12           Thomas Ferris MD  Otolaryngology/Head & Neck Surgery  [phone]            CC    Nicole Mcdonald NP    [facility]    [address] [place] [address]  [address]       Pattie Natarajan MD    Radiation Therapy     [address]    [address]    [place], [place]  [address]      Moreno Leo MD   Otolaryngology/Head & Neck Surgery   [place] [address]

## 2018-11-14 NOTE — MR AVS SNAPSHOT
After Visit Summary   11/14/2018    Terese Bell    MRN: [id number]           Patient Information     Date Of Birth          1950        Visit Information        Provider Department      11/14/2018 9:40 AM Thomas Ferris MD [facility] Ear Nose and Throat        Today's Diagnoses     Malignant neoplasm of base of tongue (H)    -  1    Allergic reaction        Fatigue          Care Instructions    -Schedule follow up with Dr. Ferris in 4 months  -TSH lab today  -Schedule appointment in Allergy Clinic for allergy testing. See Miscellaneous referral.          Follow-ups after your visit        Additional Services     MISCELLANEOUS REFERRAL       Referral to allergy clinic for testing of iodine and blueberry allergy.                  Your next 10 appointments already scheduled     Nov 14, 2018 11:15 AM CST   LAB with  LAB   [facility] ([facility])    [address]  [address] [facility] [address] [phone]           Please do not eat 10-12 hours before your appointment if you are coming in fasting for labs on lipids, cholesterol, or glucose (sugar). This does not apply to pregnant women. Water, hot tea and black coffee (with nothing added) are okay. Do not drink other fluids, diet soda or chew gum.            Nov 30, 2018 12:20 PM CST   New Visit with Sebastien Cárdenas MD   [facility]    [address] [place] [address]   [phone]            Jan 24, 2019 11:00 AM CST   Return Visit with HERNÁN Dave CNP   [facility] ([facility])    [address]  [facility] [address]   [phone]            Mar 27, 2019  9:40 AM CDT   (Arrive by 9:25 AM)   Return Visit with Thomas Ferris MD   [facility] Ear Nose and Throat ([facility])    [address]  4th Floor  [facility] [address] [phone]              Future tests that  were ordered for you today     Open Future Orders        Priority Expected Expires Ordered    TSH with free T4 reflex Routine  11/14/2019 11/14/2018            Who to contact     Please call your clinic at [phone] to:    Ask questions about your health    Make or cancel appointments    Discuss your medicines    Learn about your test results    Speak to your doctor            Additional Information About Your Visit        [employer] Information     [employer] gives you secure access to your electronic health record. If you see a primary care provider, you can also send messages to your care team and make appointments. If you have questions, please call your primary care clinic.  If you do not have a primary care provider, please call [phone] and they will assist you.      [employer] is an electronic gateway that provides easy, online access to your medical records. With [employer], you can request a clinic appointment, read your test results, renew a prescription or communicate with your care team.     To access your existing account, please contact your [facility] or call [phone] for assistance.        Care EveryWhere ID     This is your Care EveryWhere ID. This could be used by other organizations to access your [facility] medical records  [id number]        Your Vitals Were     BMI (Body Mass Index)                   25.75 kg/m2            Blood Pressure from Last 3 Encounters:   07/23/18 125/67   04/24/18 110/68   03/02/18 102/60    Weight from Last 3 Encounters:   11/14/18 68 kg (150 lb)   08/02/18 66.7 kg (147 lb)   07/23/18 66.3 kg (146 lb 3.2 oz)              We Performed the Following     IMAGESTREAM RECORDING ORDER     MISCELLANEOUS REFERRAL        Primary Care Provider Office Phone # Fax #    HERNÁN Carranza [facility] [phone] [address] [place] [address]        Equal Access to Services     TREMAINE ALMANZA AH: Srikanth Aguilar  walaronda antoinegriffin, qaybta kajoselito regan, luis ordonezaarena ah. So [facility] [phone].    ATENCIÓN: Si vidhya malcolm, tiene a thompson disposición servicios gratuitos de asistencia lingüística. Calos al [phone].    We comply with applicable federal civil rights laws and [place] laws. We do not discriminate on the basis of race, color, national origin, age, disability, sex, sexual orientation, or gender identity.            Thank you!     Thank you for choosing [facility] EAR NOSE AND THROAT  for your care. Our goal is always to provide you with excellent care. Hearing back from our patients is one way we can continue to improve our services. Please take a few minutes to complete the written survey that you may receive in the mail after your visit with us. Thank you!             Your Updated Medication List - Protect others around you: Learn how to safely use, store and throw away your medicines at www.disposemymeds.org.          This list is accurate as of 11/14/18 10:57 AM.  Always use your most recent med list.                   Brand Name Dispense Instructions for use Diagnosis    albuterol 108 (90 Base) MCG/ACT inhaler    PROAIR HFA/PROVENTIL HFA/VENTOLIN HFA     Inhale 2 puffs into the lungs every 6 hours as needed for wheezing    Moderate persistent asthma without complication       DENTAGEL 1.1 % Gel topical gel   Generic drug:  sodium fluoride dental gel      Apply 1 Application to affected area daily        EPINEPHrine 0.3 MG/0.3ML injection 2-pack    EPIPEN/ADRENACLICK/or ANY BX GENERIC EQUIV    0.6 mL    Inject 0.3 mLs (0.3 mg) into the muscle once as needed for anaphylaxis    Anaphylactic reaction, subsequent encounter       indomethacin 25 MG capsule    INDOCIN    42 capsule    Take 1 capsule (25 mg) by mouth 2 times daily (with meals)    Acute idiopathic gout of right foot       lidocaine 5 % Patch    LIDODERM    30 patch    Apply up to 3 patches to neck at once for up to 12 h  within a 24 h period.  Remove after 12 hours.    History of recent ear, nose, and throat (ENT) procedure, Malignant neoplasm of tongue (H)       * NITROGLYCERIN TD      Cut in quarter and apply to elbow and knee for pain        * nitroGLYcerin 0.4 MG/HR 24 hr patch    NITRO-DUR    30 patch    Cut into 1/4 and place 1/4 over the area of pain.  Change every 24 hours.    Right tennis elbow       order for DME     1 Device    Equipment being ordered: tennis elbow band    Right tennis elbow       * Notice:  This list has 2 medication(s) that are the same as other medications prescribed for you. Read the directions carefully, and ask your doctor or other care provider to review them with you.

## 2018-11-14 NOTE — PROGRESS NOTES
November 14, 2018      PRIOR ONCOLOGIC HISTORY:  Ms. 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 Schumacher.  He ended up doing 13 operations in 11 years, most recently in 2010.      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 Leo on 9/1/2016.  The margins were negative but the lymph nodes were 1/40 positive.  She completed postoperative radiation therapy on 11/29/2016.    HISTORY OF PRESENT ILLNESS:  Ms. Bettie Bell is now two years out.  She is delighted to be back.  She is with her [place] boyfriend.  They just returned from [place] where they had a wonderful time.      Her chief complaint is that her weight has been fluctuating quite a bit.  She says that she can go up and down as much as five to six pounds every day.  She says that it is not the scale.  She wonders whether it could be the thyroid.  She has not had a thyroid test recently.      The other issue for her is that she would like to have allergy testing to make sure that she truly is allergic to iodine.  She also would like to have some fruits and vegetables that she has been told that she has been allergic to.      Otherwise, she is doing well.  She has had no difficulties.  She still coughs quite a bit on occasion, but is otherwise clear.  She wondered what the ground glass appearance was on the recent scans, and I explained that it could be a mild lung infection which made sense to her.      PHYSICAL EXAMINATION:  She is accompanied by her boyfriend.  She still has a little bit of a lisp, but she actually looks quite good.  The tongue has healed beautifully.  There is still scar induration anteriorly, but there is no evidence of tumor induration.  The gutter is minimally  tethered now.  The left tongue looks good.  Her mouth does demonstrate trismus at about 3 cm, and Janay is working with her on that.  The rest of the oral cavity, oropharynx is clear.  The neck has no evidence of adenopathy although the left hyoid bone is a little bit tender.      FIBEROPTIC ENDOSCOPY:  Due to the need for careful surveillance, fiberoptic laryngoscopy was indicated. The nose was topically decongested and anesthetized. The fiberoptic laryngoscope was passed under endoscopic vision. The turbinates were normal. The inferior and middle meati were clear bilaterally without purulence, masses, or polyps. The nasopharynx was clear. The eustachian tubes were clear. The soft palate appeared normal with good mobility. The epiglottis was sharp, and the visualized portion of the vallecula was clear.   There is edema throughout the supraglottis and hypopharynx, but there are mobile cords.  There is no evidence of tumor.      IMPRESSION AND PLAN:  Ms. Bettie Bell is two years out and WILMA.  Her scans are clear.  We  reviewed that with her today.  This was good news for her.      We will ask for an allergy consult to see whether or not she has allergies to blueberries, strawberries, and iodine.      Her weight is actually stable (see chart below), and I wonder whether the scale is not functioning well.  She will speak with Ms. Mcdonald, who is her nurse practitioner, to have this sorted out a little bit. In the meantime, to prepare for that visit, we will obtain a TSH today.  I would like to see her back in four months' time.     ENT HEAD AND NECK REVIEW Latest Ref Rng & Units 2/28/2018 3/2/2018 4/9/2018 4/24/2018 7/23/2018 8/2/2018 11/14/2018   Weight - 65.3 kg 65.8 kg 65.8 kg 65.8 kg 66.3 kg 66.7 kg 68 kg   TSH 0.40 - 4.00 mU/L - - - - - - 1.12           Thomas Ferris MD  Otolaryngology/Head & Neck Surgery  [phone]              CC    Nicole Mcdonald NP    [address]     [place], [place]  [address]       Pattie Natarajan MD    Radiation Therapy     [address]    [address]    [place], [place]  [address]     Moreno Leo MD  Otolaryngology/Head & Neck Surgery  [facility] [address]

## 2018-11-30 ENCOUNTER — OFFICE VISIT (OUTPATIENT)
Dept: ALLERGY | Facility: CLINIC | Age: 68
End: 2018-11-30
Payer: MEDICARE

## 2018-11-30 VITALS
DIASTOLIC BLOOD PRESSURE: 65 MMHG | SYSTOLIC BLOOD PRESSURE: 110 MMHG | HEIGHT: 64 IN | BODY MASS INDEX: 25.71 KG/M2 | WEIGHT: 150.6 LBS | TEMPERATURE: 97.7 F | HEART RATE: 63 BPM | OXYGEN SATURATION: 98 % | RESPIRATION RATE: 16 BRPM

## 2018-11-30 DIAGNOSIS — J30.89 NON-SEASONAL ALLERGIC RHINITIS DUE TO OTHER ALLERGIC TRIGGER: ICD-10-CM

## 2018-11-30 DIAGNOSIS — T63.461A TOXIC REACTION TO HORNETS, WASPS AND BEES, ACCIDENTAL OR UNINTENTIONAL, INITIAL ENCOUNTER: ICD-10-CM

## 2018-11-30 DIAGNOSIS — T63.441A TOXIC REACTION TO HORNETS, WASPS AND BEES, ACCIDENTAL OR UNINTENTIONAL, INITIAL ENCOUNTER: ICD-10-CM

## 2018-11-30 DIAGNOSIS — J45.40 MODERATE PERSISTENT ASTHMA WITHOUT COMPLICATION: ICD-10-CM

## 2018-11-30 DIAGNOSIS — T50.905A ADVERSE EFFECT OF DRUG, INITIAL ENCOUNTER: Primary | ICD-10-CM

## 2018-11-30 DIAGNOSIS — T78.1XXA REACTION TO FOOD, INITIAL ENCOUNTER: ICD-10-CM

## 2018-11-30 DIAGNOSIS — T78.02XA ANAPHYLACTIC SHOCK DUE TO CRUSTACEANS, INITIAL ENCOUNTER: ICD-10-CM

## 2018-11-30 DIAGNOSIS — T63.451A TOXIC REACTION TO HORNETS, WASPS AND BEES, ACCIDENTAL OR UNINTENTIONAL, INITIAL ENCOUNTER: ICD-10-CM

## 2018-11-30 LAB
FEF 25/75: NORMAL
FEV-1: NORMAL
FEV1/FVC: NORMAL
FVC: NORMAL
MISCELLANEOUS TEST: NORMAL

## 2018-11-30 PROCEDURE — 99205 OFFICE O/P NEW HI 60 MIN: CPT | Mod: 25 | Performed by: ALLERGY & IMMUNOLOGY

## 2018-11-30 PROCEDURE — 86003 ALLG SPEC IGE CRUDE XTRC EA: CPT | Performed by: ALLERGY & IMMUNOLOGY

## 2018-11-30 PROCEDURE — 95004 PERQ TESTS W/ALRGNC XTRCS: CPT | Performed by: ALLERGY & IMMUNOLOGY

## 2018-11-30 PROCEDURE — 36415 COLL VENOUS BLD VENIPUNCTURE: CPT | Performed by: ALLERGY & IMMUNOLOGY

## 2018-11-30 PROCEDURE — 94010 BREATHING CAPACITY TEST: CPT | Performed by: ALLERGY & IMMUNOLOGY

## 2018-11-30 PROCEDURE — 83520 IMMUNOASSAY QUANT NOS NONAB: CPT | Performed by: ALLERGY & IMMUNOLOGY

## 2018-11-30 ASSESSMENT — ENCOUNTER SYMPTOMS
COUGH: 0
JOINT SWELLING: 0
RHINORRHEA: 1
ADENOPATHY: 0
SINUS PRESSURE: 0
EYE DISCHARGE: 0
ACTIVITY CHANGE: 0
VOMITING: 0
EYE REDNESS: 0
DIARRHEA: 0
SHORTNESS OF BREATH: 0
CHILLS: 0
WHEEZING: 0
HEADACHES: 0
CHEST TIGHTNESS: 0
FACIAL SWELLING: 0
EYE ITCHING: 1
FEVER: 0
NAUSEA: 0
MYALGIAS: 0
ARTHRALGIAS: 0

## 2018-11-30 NOTE — LETTER
My Asthma Action Plan  Name: Terese Bell   YOB: 1950  Date: 11/30/2018   My doctor: Sebastien Cárdenas MD   My clinic: [facility]        My Control Medicine: None  My Rescue Medicine: Albuterol (Proair/Ventolin/Proventil) inhaler 2-4 puffs every 4 hrs as needed   My Asthma Severity: moderate persistent  Avoid your asthma triggers: smoke, upper respiratory infections, dust mites and exercise or sports               GREEN ZONE   Good Control    I feel good    No cough or wheeze    Can work, sleep and play without asthma symptoms       Take your asthma control medicine every day.     1. If exercise triggers your asthma, take your rescue medication    15 minutes before exercise or sports, and    During exercise if you have asthma symptoms  2. Spacer to use with inhaler: If you have a spacer, make sure to use it with your inhaler             YELLOW ZONE Getting Worse  I have ANY of these:    I do not feel good    Cough or wheeze    Chest feels tight    Wake up at night   1. Keep taking your Green Zone medications  2. Start taking your rescue medicine:    every 20 minutes for up to 1 hour. Then every 4 hours for 24-48 hours.  3. If you stay in the Yellow Zone for more than 24-48 hours, contact your doctor.             RED ZONE Medical Alert - Get Help  I have ANY of these:    I feel awful    Medicine is not helping    Breathing getting harder    Trouble walking or talking    Nose opens wide to breathe       1. Take your rescue medicine NOW  2. If your provider has prescribed an oral steroid medicine, start taking it NOW  3. Call your doctor NOW  4. If you are still in the Red Zone after 20 minutes and you have not reached your doctor:    Take your rescue medicine again and    Call 911 or go to the emergency room right away    See your regular doctor within 2 weeks of an Emergency Room or Urgent Care visit for follow-up treatment.          Annual Reminders:  Meet with Asthma  Educator,  Flu Shot in the Fall, consider Pneumonia Vaccination for patients with asthma (aged 19 and older).    Pharmacy:    [id number] - [place], [place] - [address] [facility] [place] [place] - [address] [facility] [place] - [address]  [facility] [place] [facility] [place], [place] - [address]                      Asthma Triggers  How To Control Things That Make Your Asthma Worse    Triggers are things that make your asthma worse.  Look at the list below to help you find your triggers and what you can do about them.  You can help prevent asthma flare-ups by staying away from your triggers.      Trigger                                                          What you can do   Cigarette Smoke  Tobacco smoke can make asthma worse. Do not allow smoking in your home, car or around you.  Be sure no one smokes at a child s day care or school.  If you smoke, ask your health care provider for ways to help you quit.  Ask family members to quit too.  Ask your health care provider for a referral to Quit Plan to help you quit smoking, or call 2-361-975-PLAN.     Colds, Flu, Bronchitis  These are common triggers of asthma. Wash your hands often.  Don t touch your eyes, nose or mouth.  Get a flu shot every year.     Dust Mites  These are tiny bugs that live in cloth or carpet. They are too small to see. Wash sheets and blankets in hot water every week.   Encase pillows and mattress in dust mite proof covers.  Avoid having carpet if you can. If you have carpet, vacuum weekly.   Use a dust mask and HEPA vacuum.   Pollen and Outdoor Mold  Some people are allergic to trees, grass, or weed pollen, or molds. Try to keep your windows closed.  Limit time out doors when pollen count is high.   Ask you health care provider about taking medicine during allergy season.     Animal Dander  Some people are allergic to skin flakes, urine or  saliva from pets with fur or feathers. Keep pets with fur or feathers out of your home.    If you can t keep the pet outdoors, then keep the pet out of your bedroom.  Keep the bedroom door closed.  Keep pets off cloth furniture and away from stuffed toys.     Mice, Rats, and Cockroaches  Some people are allergic to the waste from these pests.   Cover food and garbage.  Clean up spills and food crumbs.  Store grease in the refrigerator.   Keep food out of the bedroom.   Indoor Mold  This can be a trigger if your home has high moisture. Fix leaking faucets, pipes, or other sources of water.   Clean moldy surfaces.  Dehumidify basement if it is damp and smelly.   Smoke, Strong Odors, and Sprays  These can reduce air quality. Stay away from strong odors and sprays, such as perfume, powder, hair spray, paints, smoke incense, paint, cleaning products, candles and new carpet.   Exercise or Sports  Some people with asthma have this trigger. Be active!  Ask your doctor about taking medicine before sports or exercise to prevent symptoms.    Warm up for 5-10 minutes before and after sports or exercise.     Other Triggers of Asthma  Cold air:  Cover your nose and mouth with a scarf.  Sometimes laughing or crying can be a trigger.  Some medicines and food can trigger asthma.

## 2018-11-30 NOTE — PATIENT INSTRUCTIONS
Asthma per action plan.    Get the bloodwork done.    Carry epi with you all the time.  Continue avoidance of shellfish, banana, strawberries, and bilberries.    -Remember about the importance of reading labels, ordering safe foods in the restaurants and risk of cross-contamination.  - Remember how to recognize and treat allergic reactions.  - Carry epinephrine autoinjector and cetirizine all the time and use it accordingly in case of accidental exposure. Call 911 or see ER after use of epinephrine.  - Visit www.foodallergy.org  View  Recognizing and Treating Anaphylaxis , an online video produced by the [employer] at [facility]: https://www.[employer].com/watch?v=ENIge6yt10E&feature=youtu.be      NO REASON TO WORRY TOO MUCH ABOUT CONTRAST.   WILL PROVIDE PRE TREATMENT REGIMEN TO YOUR ENT DOCTOR.       FOLLOW UP WILL DEPEND ON THE BLOODWORK RESULTS, BUT AT LEAST IN 1 YEAR.

## 2018-11-30 NOTE — LETTER
11/30/2018         RE: Terese Bell  [address] [facility] [address]        Dear Colleague,    Thank you for referring your patient, Terese Bell, to the [facility]. Please see a copy of my visit note below.    SUBJECTIVE:                                                                   Terese Bell presents today to our Allergy Clinic at [facility]; she is being seen in consultation at the request of Dr. Ferris.    She is a 68-year-old female with a history of tongue cancer.  Her concerns for today are possible food allergies and iodine allergy.    30 years ago, she started having asthma attacks, which is not an issue for her for years. Last albuterol use was in Spring 2016. She used Advair in the past.   At that time, she started having issues with ingestion of some foods. Eating fresh blueberry and strawberry would cause a tingling sensation in her mouth. Eating fresh banana and mushroom would cause itchy eyes, tingling skin all over, and throat closing.  Eating banana and strawberry cooked doesn't cause any symptoms.   30 years ago, she ingested a crab salad at a picnic with some alcohol. Within 30 minutes, she developed throat closing sensation, wheezing, facial swelling, and altered mental status. She was taken to the ED. Unclear if she was given epinephrine. An allergist saw her at that time, and she had testing done, that was positive for shellfish, blueberries, strawberries, yeast, pollen, and dust. At that time she was told that she shouldn't use iodine contrast in her life.  No accidental exposures to shellfish since that episode.   She was on allergen immunotherapy for 6 months, but she couldn't tolerate them   She never had radiocontrast media in her life.   In 1970's she was stung by a ground bee in her hand. Within minutes she developed facial swelling and hand swelling. Was seen in the ED, and needed epinephrine.  "She was stung 3 times since then, and all 3 times she developed facial and neck swelling. Had to use epi all 3 times. The last reaction was in October 2018.    She has a history of environmental allergies. Manifested by nasal congestion, and itchy eyes. Exposure to mold and dust makes her feels worse. Perennial symptoms are acute. She has chronic seasonal symptoms, in Spring and Fall.   The severity of seasonal symptoms is mild. She would treat her symptoms with OTC oral antihistamines, showers, and avoidance, which works.    She has a history of penicillin allergy, manifested by generalized pruritus, itchy eyes, and \"swollen sinuses.\" She quit taking penicillin because of that.  Patient Active Problem List   Diagnosis     Absence of menstruation     Generalized osteoarthrosis, unspecified site     Moderate persistent asthma     FAMILY HX GI MALIGNANCY brother colon ca.      Rosacea     HYPERLIPIDEMIA LDL GOAL <130     Tear Meniscus Knee  repaired     24 hour info given     Myalgia     History of tongue cancer     Tongue irritation     Tongue cancer (H)     History of recent ear, nose, and throat (ENT) procedure     Malignant neoplasm of tongue (H)     Fistula     Cervical cancer screening     Advanced directives, counseling/discussion       Past Medical History:   Diagnosis Date     Arthritis     1990     Cerebral infarction (H) .    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 (1) Maternal Uncle    Cancer (2) Father (Bryn Alexandre), Brother (Aly)    Cancer - colorectal (1) Brother    Cerebrovascular Disease (1) Mother (Marla)    Hypertension (4) Mother (Marla), Father (Bryn Alexandre), Brother, Brother (Aly Alexandre)    Neurologic Disorder (1) Brother: migranes    Prostate Cancer (1) Father (Bryn Alexandre)    Respiratory (1) Brother: emphysema       Negative family history of: Diabetes, Breast Cancer        Past Surgical History:   Procedure Laterality Date     ADENOIDECTOMY      1970     C NONSPECIFIC PROCEDURE      CHOLECYSTECTOMY     C NONSPECIFIC PROCEDURE      SINUS MASS REMOVED     C NONSPECIFIC PROCEDURE      R KNEE SURGERY     CHOLECYSTECTOMY       COLONOSCOPY  11/26/2012    Procedure: COLONOSCOPY;  Colonoscopy;  Surgeon: Yony Garcia MD;  Location: [facility]     COLONOSCOPY N/A 12/14/2017    Procedure: COLONOSCOPY;  colonoscopy;  Surgeon: Sterling Mckeon MD;  Location: [facility]     EXAM UNDER ANESTHESIA MOUTH N/A 8/15/2016    Procedure: EXAM UNDER ANESTHESIA MOUTH;  Surgeon: Thomas Ferris MD;  [facility]     GLOSSECTOMY Right 9/1/2016    Procedure: GLOSSECTOMY;  Surgeon: Thomas Ferris MD;  [facility]     GLOSSECTOMY PARTIAL       GRAFT FREE VASCULARIZED (LOCATION) Left 9/1/2016    Procedure: GRAFT FREE VASCULARIZED (LOCATION);  Surgeon: Moreno Leo MD;  [facility]     GRAFT SKIN SPLIT THICKNESS FROM EXTREMITY Left 9/1/2016    Procedure: GRAFT SKIN SPLIT THICKNESS FROM EXTREMITY;  Surgeon: Moreno Leo MD;  [facility]     [facility] ENDOSCOPY W PLACEMENT GASTROSTOMY TUBE PERCUT N/A 11/7/2016    Procedure: COMBINED ESOPHAGOSCOPY, GASTROSCOPY, DUODENOSCOPY (EGD), PLACE PERCUTANEOUS ENDOSCOPIC GASTROSTOMY TUBE;  Surgeon: Sterling Mckeon MD;  Location: [facility]     HEAD & NECK  SURGERY       KNEE SURGERY      bilat     LARYNGOSCOPY WITH BIOPSY(IES) N/A 8/15/2016    Procedure: LARYNGOSCOPY WITH BIOPSY(IES);  Surgeon: Thomas Ferris MD;  [facility]     NASAL/SINUS POLYPECTOMY      1990 approx     ORTHOPEDIC SURGERY  1977 and 2010    knee repair (r) and clean out (l)     PAROTIDECTOMY, RADICAL NECK DISSECTION Right 9/1/2016    Procedure: PAROTIDECTOMY, RADICAL NECK DISSECTION;  Surgeon: Thomas Ferris MD;  [facility]     TONSILLECTOMY      1970     TRACHEOSTOMY Right 9/1/2016    Procedure: TRACHEOSTOMY;  Surgeon: Thomas Ferris MD;  [facility]     Social History     Social History     Marital status:      Spouse name: Сергей     Number of children: 4     Years of education: N/A     Social History Main Topics     Smoking status: Former Smoker     Packs/day: 2.00     Years: 7.00     Types: Cigarettes     Start date: 1/1/1970     Quit date: 1/1/1980     Smokeless tobacco: Never Used     Alcohol use Yes      Comment: 4 drinks per year     Drug use: No     Sexual activity: Not Currently     Partners: Male     Birth control/ protection: None     Other Topics Concern     None     Social History Narrative        Dairy/d 2 servings/d.     Caffeine 6-7 servings/d    Exercise 3 x week    Sunscreen used - Yes    Seatbelts used - Yes    Working smoke/CO detectors in the home - Yes    Guns stored in the home - No    Self Breast Exams - Yes    Self Testicular Exam - NA    Eye Exam up to date - No    Dental Exam up to date - Yes    Pap Smear up to date - No    Mammogram up to date - Yes    PSA up to date - NA    Dexa Scan up to date - Yes    Flex Sig / Colonoscopy up to date - No    Immunizations up to date - Yes    Abuse: Current or Past(Physical, Sexual or Emotional)- No    Do you feel safe in your environment - Yes        Agustin Fox CMA                November 30, 2018    ENVIRONMENTAL HISTORY: [place] The home is heated with a forced air.  They do have central air conditioning. The patient's bedroom is furnished with carpeting in bedroom, allergen mattress cover, allergen pillowcase cover and fabric window coverings. No pets inside the house. There is no history of cockroach or mice infestation. There are no smokers in the house.  The house does not have a damp basement.                Review of Systems   Constitutional: Negative for activity change, chills and fever.   HENT: Positive for rhinorrhea. Negative for congestion, dental problem, ear pain, facial swelling, nosebleeds, postnasal drip, sinus pressure and sneezing.    Eyes: Positive for itching. Negative for discharge and redness.   Respiratory: Negative for cough, chest tightness, shortness of breath and wheezing.    Cardiovascular: Negative for chest pain.   Gastrointestinal: Negative for diarrhea, nausea and vomiting.   Musculoskeletal: Negative for arthralgias, joint swelling and myalgias.   Skin: Negative for rash.   Allergic/Immunologic: Positive for food allergies.   Neurological: Negative for headaches.   Hematological: Negative for adenopathy.   Psychiatric/Behavioral: Negative for behavioral problems and self-injury.           Current Outpatient Prescriptions:      DENTAGEL 1.1 % GEL, Apply 1 Application to affected area daily, Disp: , Rfl: 11     Fluticasone-Salmeterol (ADVAIR DISKUS IN), , Disp: , Rfl:      indomethacin (INDOCIN) 25 MG capsule, Take 1 capsule (25 mg) by mouth 2 times daily (with meals), Disp: 42 capsule, Rfl: 1     lidocaine (LIDODERM) 5 % Patch, Apply up to 3 patches to neck at once for up to 12 h within a 24 h period.  Remove after 12 hours., Disp: 30 patch, Rfl: 0     nitroGLYcerin (NITRO-DUR) 0.4 MG/HR 24 hr patch, Cut into 1/4 and place 1/4 over the area of pain.  Change every 24 hours., Disp: 30 patch, Rfl: 0     NITROGLYCERIN TD, Cut in quarter and apply to elbow and knee for pain, Disp: , Rfl:      ORDER FOR DME, SET TO LOCAL PRINT,, Equipment being  ordered: tennis elbow band, Disp: 1 Device, Rfl: 0     albuterol (PROAIR HFA, PROVENTIL HFA, VENTOLIN HFA) 108 (90 BASE) MCG/ACT inhaler, Inhale 2 puffs into the lungs every 6 hours as needed for wheezing (Patient not taking: Reported on 11/30/2018), Disp: , Rfl:      EPINEPHrine (EPIPEN/ADRENACLICK/OR ANY BX GENERIC EQUIV) 0.3 MG/0.3ML injection 2-pack, Inject 0.3 mLs (0.3 mg) into the muscle once as needed for anaphylaxis (Patient not taking: Reported on 11/30/2018), Disp: 0.6 mL, Rfl: 3  No current facility-administered medications for this visit.     Facility-Administered Medications Ordered in Other Visits:      Lidocaine 1% injection 1 mL, 1 mL, Other, Once PRN, Yony Garcia MD     lidocaine 4 % (LMX4) cream, , Topical, Once PRN, Yony Garcia MD     sodium chloride (PF) 0.9% PF flush 3 mL, 3 mL, Intravenous, Q1H PRN, Yony Garcia MD     sodium chloride (PF) 0.9% PF flush 3 mL, 3 mL, Intravenous, Q8H, Yony Garcia MD  Immunization History   Administered Date(s) Administered     DT (PEDS <7y) 12/21/2000     DTaP, Unspecified 11/05/2012     FLU 6-35 months 11/10/2010, 11/05/2012     Flu, Unspecified 10/26/1996, 11/09/1998     Influenza (High Dose) 3 valent vaccine 09/30/2016, 11/16/2017     Influenza (IIV3) PF 12/21/2000, 01/14/2002, 11/15/2004, 11/10/2008, 11/10/2010, 11/05/2012     Influenza Vaccine IM 3yrs+ 4 Valent IIV4 09/29/2014     Mantoux Tuberculin Skin Test 09/09/2016, 09/18/2016     Pneumo Conj 13-V (2010&after) 10/28/2015     Pneumococcal 23 valent 10/04/2016     TD (ADULT, 7+) 12/21/2000     TDAP Vaccine (Adacel) 11/05/2012     Allergies   Allergen Reactions     Banana Swelling and Anaphylaxis     Tongue and lips swell and get itchy     Bee Pollen Anaphylaxis     Bee Venom Anaphylaxis     Blueberry Anaphylaxis and Swelling     Iodine Anaphylaxis     NOT dietary related.     Penicillins Anaphylaxis     Shellfish-Derived Products Swelling and Difficulty breathing  "    Sulfa Drugs Anaphylaxis     Erythromycin Nausea     Pravastatin Sodium Other (See Comments)     muscle spasam     Simvastatin Cough     Strawberry Hives     Latex Rash     Rash from bandages/wraps/pressure tapes     Tetracycline GI Disturbance     Other reaction(s): GI Upset  And tingling         OBJECTIVE:                                                                 /65 (BP Location: Left arm, Patient Position: Sitting, Cuff Size: Adult Regular)  Pulse 63  Temp 97.7  F (36.5  C) (Oral)  Resp 16  Ht 1.615 m (5' 3.58\")  Wt 68.3 kg (150 lb 9.6 oz)  SpO2 98%  BMI 26.19 kg/m2        Physical Exam   Constitutional: She is oriented to person, place, and time. No distress.   HENT:   Head: Normocephalic and atraumatic.   Right Ear: Tympanic membrane, external ear and ear canal normal.   Left Ear: Tympanic membrane, external ear and ear canal normal.   Nose: No mucosal edema or rhinorrhea.   Mouth/Throat: Oropharynx is clear and moist and mucous membranes are normal.   Eyes: Conjunctivae are normal. Right eye exhibits no discharge. Left eye exhibits no discharge.   Neck: Normal range of motion.   Cardiovascular: Normal rate, regular rhythm and normal heart sounds.    No murmur heard.  Pulmonary/Chest: Effort normal and breath sounds normal. No respiratory distress. She has no wheezes. She has no rales.   Musculoskeletal: Normal range of motion.   Lymphadenopathy:     She has no cervical adenopathy.   Neurological: She is alert and oriented to person, place, and time.   Skin: Skin is warm. She is not diaphoretic.   There is a flesh color transplant mass replacing regular tongue tissue.   Psychiatric: Affect normal.   Nursing note and vitals reviewed.            WORKUP:   NUTS/SHELLFISH ALLERGEN PERCUTANEOUS SKIN TESTING  [place] nuts & shellfish 11/30/2018   Consent Y   Ordering Physician Avi   Interpreting Physician Avi   Testing Technician Janay UP RN   Location Back   Time start:  1:55 PM "   Time End:  2:10 PM   Positive Control: Histatrol*ALK 1 mg/ml 5/11   Negative Control: 50% Glycerin** [place] 0/0   Selection: Shellfish   Shrimp 1:20 (W/F in millimeters) 0/0   Lobster 1:20 (W/F in millimeters) 0/0   Crab 1:20 (W/F in millimeters) 0/0   Clam 1:20 (W/F in millimeters) 0/0   Oyster 1:20 (W/F in millimeters) 0/0   Scallops 1:20 (W/F in millimeters) 0/0      SPIROMETRY       FVC 3.32L (110% of predicted).     FEV1 2.86L (125% of predicted).     FEV1/FVC 86%     FEF 25%-75%  3.14L/s (159% of predicted)    Unable to interpret.  She is not able to seal the probe properly. There is a constant leakage of the air on the right side.        Asthma Control Test (ACT) total score: 24      ASSESSMENT/PLAN:      Problem List Items Addressed This Visit        Respiratory    1. Moderate persistent asthma  Historical diagnosis.  Previously on Advair.  Currently well controlled with albuterol on as needed basis.  -Continue as is.            Other Relevant Orders    Spirometry, Breathing Capacity (Completed)      Other Visit Diagnoses     2. Adverse effect of drug, initial encounter    -  Primary  Historical diagnosis.  The patient was told to avoid iodine contrast based on her previous history of allergic reaction to shellfish.  Shellfish or seafood allergy is not an independent risk factor for immediate hypersensitivity reactions to radiocontrast material, although this is a common misconception.  In most of the cases, these reactions I believe to be non-IgE mediated.      However, considering the patient's history of multiple allergic reactions to multiple antigens and history of asthma, I recommend using nonionic low osmolar contrast material (LOCM).  Provided that a nonionic LOCM agent will be used, empiric premedication of patients who have not experienced problems with RCM in the past is not supported by the available evidence.    If the use of nonionic LOCM is not possible, or for increased safety even  with nonionic LOCM (per provider's discretion), I suggest the following premedication regimen, with administration beginning 13 hours prior to the procedure:    Prednisone, given orally 13 hours, 7 hours, and 1 hour before, 50 mg per dose. If oral administration is not feasible, methylprednisolone may be administered intravenously at the same time intervals, (40 mg).  Diphenhydramine, 50 mg orally or parenterally given 1 hour before.    Possibly, she had IgE mediated reactions to penicillin.  The time elapsed since the last reaction is important because penicillin-specific IgE antibodies decrease over time, and therefore patients with recent reactions are more likely to be allergic than patients with distant reactions. Approximately 80 percent of patients with IgE-mediated penicillin allergy lose the sensitivity after 10 years.  -Recommend penicillin testing with subsequent amoxicillin oral challenge in office settings if the test is negative.                        3. Anaphylactic shock due to crustaceans, initial encounter      Historical diagnosis.  Today's negative skin test is reassuring.  -Continue strict avoidance.  -Ordered serum IgE for shellfish.  -Depending on the results, anticipate oral food challenge test if the patient is interested.    - Reminded about the importance of reading labels, ordering safe foods in the restaurants and risk of cross-contamination.  - Instructed about the recognizing and treating allergic reactions.  - Instructed to carry epinephrine autoinjector 2-Balta and cetirizine all the time and use it accordingly in case of accidental exposure. Call 911 or see ER after use of epinephrine.            Other Relevant Orders    Allergen clam IgE (Completed)    Allergen crab IgE (Completed)    Allergen lobster IgE (Completed)    Allergen shrimp IgE (Completed)    Allergen scallop IgE (Completed)    Allergen oyster IgE (Completed)                  Other Relevant Orders  ALLERGY SKIN  TESTS,ALLERGENS (Completed)    4. Reaction to food, initial encounter    Symptoms with banana ingestion symptoms are consistent with oral allergy syndrome.  The patient has oral allergy syndrome (Food-Pollen syndrome) and this condition was explained to her in detail.     Sensitization to aeroallergens like Bet v 1 (birch) with a respiratory route may result in cross-reactivity to type 2 allergens like apples, peaches and other foods when eating fresh. It may also occur with sensitization to weed and grass pollen.  This is IgE mediated reaction, rarely progresses to more severe symptoms, and certain forms of food vegetables may be tolerated(cooked).  Most of the reactions are mild and limited to the throat or face area. Sometimes may cause some GI effects and rarely several allergic reactions (not comepltey impossible). Symptoms may vary by season.  Normally it is not advised the patient to have an epinephrine device.  Not necessarily I can explain his symptoms with strawberry, blueberry, and mushroom ingestion from the standpoint of Food pollen syndrome.  -Ordered serum IgE for banana, strawberry, blueberry, and mushroom.  -Recommend to continue strict avoidance of mushrooms in any form, and bananas, strawberries, blueberries in a fresh form.  Anticipate percutaneous skin puncture testing with fresh banana, strawberry, blueberry, and mushroom.    Allergen banana IgE (Completed)    Allergen Strawberry IgE (Completed)    Allergen Blueberry IgE (Completed)    allergen mushroom IgE: Laboratory Miscellaneous Order (Completed)    5. Toxic reaction to hornets, wasps and bees, accidental or unintentional, initial encounter    Symptoms consistent with a systemic reaction.  -Ordered serum tryptase level.  -Considering nationwide shortage in testing kits for venom skin test, I ordered serum IgE for venom of stinging insects.  If positive, consider venom immunotherapy.    -Strict avoidance.  -Carry injectable epinephrine and  cetirizine all the time.    Relevant Orders    Allergen common wasp IgE (Completed)    Allergen honeybee IgE (Completed)    Allergen paper wasp IgE (Completed)    Allergen whiteface hornet IgE (Completed)    Allergen yellow hornet IgE (Completed)    Tryptase (Completed)             6. Non-seasonal allergic rhinitis due to other allergic trigger  Perennial acute symptoms and chronic seasonal exacerbations in Spring and Fall.  Severely he is mild and well controlled with over-the-counter oral antihistamines, showers, and avoidance measures.  -Continue as is.  Depending on future symptom control, consider testing.      I spent 60 minutes for this visit, with at least 50% of the time coordinating care and face-to-face counseling the patient in regards to natural history, diagnosis, treatment of food allergies, reactions to radiocontrast material, asthma, and allergic rhinitis.    Follow-up in 1 year, or sooner if needed for oral food challenge test and/or venom immunotherapy.      Thank you for allowing us to participate in the care of this patient. Please feel free to contact us if there are any questions or concerns about the patient.    Disclaimer: This note consists of symbols derived from keyboarding, dictation and/or voice recognition software. As a result, there may be errors in the script that have gone undetected. Please consider this when interpreting information found in this chart.    Sebastien Cárdenas MD, [facility]  Allergy, Asthma and Immunology  [place], [place] and [place]      Again, thank you for allowing me to participate in the care of your patient.        Sincerely,        Sebastien Cárdenas MD

## 2018-11-30 NOTE — MR AVS SNAPSHOT
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    Get the bloodwork done.    Carry epi with you all the time.  Continue avoidance of shellfish, banana, strawberries, and bilberries.    -Remember about the importance of reading labels, ordering safe foods in the restaurants and risk of cross-contamination.  - Remember how to recognize and treat allergic reactions.  - Carry epinephrine autoinjector and cetirizine all the time and use it accordingly in case of accidental exposure. Call 911 or see ER after use of epinephrine.  - Visit www.foodallergy.org  View  Recognizing and Treating Anaphylaxis , an online video produced by the [employer] [place] at [facility]: [email or web address].com/watch?v=[email or web address]&feature=youtu.be      NO REASON TO WORRY TOO MUCH ABOUT CONTRAST.   WILL PROVIDE PRE TREATMENT REGIMEN TO YOUR ENT DOCTOR.       FOLLOW UP WILL DEPEND ON THE BLOODWORK RESULTS, BUT AT LEAST IN 1 YEAR.           Follow-ups after your visit        Your next 10 appointments already scheduled     Jan 24, 2019 11:00 AM CST   Return Visit with HERNÁN Dave CNP   [facility] ([facility])    [address], [address]  [place] [address]   [phone]            Mar 27, 2019  9:40 AM CDT   (Arrive by 9:25 AM)   Return Visit with Thomas Ferris MD   [facility] ([facility])    [address]  [address] [place] [address] [phone]              Future tests that were ordered for you today     Open Future Orders        Priority Expected Expires Ordered    Allergen common wasp IgE Routine  11/30/2019 11/30/2018    Allergen honeybee IgE Routine  11/30/2019 11/30/2018    Allergen paper wasp IgE Routine  11/30/2019 11/30/2018    Allergen whiteface hornet IgE Routine  11/30/2019 11/30/2018    Allergen yellow hornet IgE Routine  11/30/2019 11/30/2018    Allergen banana IgE Routine  11/30/2019 11/30/2018    Allergen [place] IgE Routine  11/30/2019 11/30/2018    Allergen Blueberry IgE Routine  11/30/2019 11/30/2018    Allergen crab IgE Routine  11/30/2019 11/30/2018    Allergen lobster IgE Routine  11/30/2019 11/30/2018    Allergen shrimp IgE Routine  11/30/2019 11/30/2018    Allergen scallop IgE Routine  11/30/2019 11/30/2018    Allergen oyster IgE Routine  11/30/2019 11/30/2018    Allergen clam IgE Routine  11/30/2019 11/30/2018            Who to contact     If you have questions or need follow up information about today's clinic visit or your schedule please contact [facility] directly at [phone].  Normal or non-critical lab and imaging results will be communicated to you by [employer], letter or phone within 4 business days after the clinic has received the results. If you do not hear from us within 7 days, please contact the clinic through [employer] or phone. If you have a critical or abnormal lab result, we will notify you by phone as soon as possible.  Submit refill requests through [employer] or call your pharmacy and they will forward the refill request to us. Please allow 3 business days for your refill to be completed.          Additional Information About Your Visit        [employer] Information     [employer] gives you secure access to your electronic health record. If you see a primary care provider, you can also send messages  "to your care team and make appointments. If you have questions, please call your primary care clinic.  If you do not have a primary care provider, please call [phone] and they will assist you.        Care EveryWhere ID     This is your Care EveryWhere ID. This could be used by other organizations to access your [facility] medical records  [id number]        Your Vitals Were     Pulse Temperature Respirations Height Pulse Oximetry BMI (Body Mass Index)    63 97.7  F (36.5  C) (Oral) 16 1.615 m (5' 3.58\") 98% 26.19 kg/m2       Blood Pressure from Last 3 Encounters:   11/30/18 110/65   07/23/18 125/67   04/24/18 110/68    Weight from Last 3 Encounters:   11/30/18 68.3 kg (150 lb 9.6 oz)   11/14/18 68 kg (150 lb)   08/02/18 66.7 kg (147 lb)              We Performed the Following     ALLERGY SKIN TESTS,ALLERGENS     Spirometry, Breathing Capacity        Primary Care Provider Office Phone # Fax #    Nicole Mcdonald, APRN [facility] [phone] [address] [place] [address]        Equal Access to Services     [facility] CAMI : Hadii aad ku hadasho Somoeali, waaxda luqadaha, qaybta kaalmada adeegyada, waxay idiin hayyanin richie alex la'zeina . So [facility] [phone].    ATENCIÓN: Si habla español, tiene a thompson disposición servicios gratuitos de asistencia lingüística. [facility] [phone].    We comply with applicable federal civil rights laws and [place] laws. We do not discriminate on the basis of race, color, national origin, age, disability, sex, sexual orientation, or gender identity.            Thank you!     Thank you for choosing [facility]  for your care. Our goal is always to provide you with excellent care. Hearing back from our patients is one way we can continue to improve our services. Please take a few minutes to complete the written survey that you may receive in the mail after your visit with us. Thank you!             Your Updated Medication List - Protect others " around you: Learn how to safely use, store and throw away your medicines at www.disposemymeds.org.          This list is accurate as of 11/30/18  2:34 PM.  Always use your most recent med list.                   Brand Name Dispense Instructions for use Diagnosis    ADVAIR DISKUS IN           albuterol 108 (90 Base) MCG/ACT inhaler    PROAIR HFA/PROVENTIL HFA/VENTOLIN HFA     Inhale 2 puffs into the lungs every 6 hours as needed for wheezing    Moderate persistent asthma without complication       DENTAGEL 1.1 % Gel topical gel   Generic drug:  sodium fluoride dental gel      Apply 1 Application to affected area daily        EPINEPHrine 0.3 MG/0.3ML injection 2-pack    EPIPEN/ADRENACLICK/or ANY BX GENERIC EQUIV    0.6 mL    Inject 0.3 mLs (0.3 mg) into the muscle once as needed for anaphylaxis    Anaphylactic reaction, subsequent encounter       indomethacin 25 MG capsule    INDOCIN    42 capsule    Take 1 capsule (25 mg) by mouth 2 times daily (with meals)    Acute idiopathic gout of right foot       lidocaine 5 % patch    LIDODERM    30 patch    Apply up to 3 patches to neck at once for up to 12 h within a 24 h period.  Remove after 12 hours.    History of recent ear, nose, and throat (ENT) procedure, Malignant neoplasm of tongue (H)       * NITROGLYCERIN TD      Cut in quarter and apply to elbow and knee for pain        * nitroGLYcerin 0.4 MG/HR 24 hr patch    NITRO-DUR    30 patch    Cut into 1/4 and place 1/4 over the area of pain.  Change every 24 hours.    Right tennis elbow       order for DME     1 Device    Equipment being ordered: tennis elbow band    Right tennis elbow       * Notice:  This list has 2 medication(s) that are the same as other medications prescribed for you. Read the directions carefully, and ask your doctor or other care provider to review them with you.

## 2018-11-30 NOTE — PROGRESS NOTES
SUBJECTIVE:                                                                   Terese Bell presents today to our Allergy Clinic at [facility]; she is being seen in consultation at the request of Dr. Ferirs.    She is a 68-year-old female with a history of tongue cancer.  Her concerns for today are possible food allergies and iodine allergy.    30 years ago, she started having asthma attacks, which is not an issue for her for years. Last albuterol use was in Spring 2016. She used Advair in the past.   At that time, she started having issues with ingestion of some foods. Eating fresh blueberry and strawberry would cause a tingling sensation in her mouth. Eating fresh banana and mushroom would cause itchy eyes, tingling skin all over, and throat closing.  Eating banana and strawberry cooked doesn't cause any symptoms.   30 years ago, she ingested a crab salad at a picnic with some alcohol. Within 30 minutes, she developed throat closing sensation, wheezing, facial swelling, and altered mental status. She was taken to the ED. Unclear if she was given epinephrine. An allergist saw her at that time, and she had testing done, that was positive for shellfish, blueberries, strawberries, yeast, pollen, and dust. At that time she was told that she shouldn't use iodine contrast in her life.  No accidental exposures to shellfish since that episode.   She was on allergen immunotherapy for 6 months, but she couldn't tolerate them   She never had radiocontrast media in her life.   In 1970's she was stung by a ground bee in her hand. Within minutes she developed facial swelling and hand swelling. Was seen in the ED, and needed epinephrine. She was stung 3 times since then, and all 3 times she developed facial and neck swelling. Had to use epi all 3 times. The last reaction was in October 2018.    She has a history of environmental allergies. Manifested by nasal congestion, and itchy eyes. Exposure to  "mold and dust makes her feels worse. Perennial symptoms are acute. She has chronic seasonal symptoms, in Spring and Fall.   The severity of seasonal symptoms is mild. She would treat her symptoms with OTC oral antihistamines, showers, and avoidance, which works.    She has a history of penicillin allergy, manifested by generalized pruritus, itchy eyes, and \"swollen sinuses.\" She quit taking penicillin because of that.  Patient Active Problem List   Diagnosis     Absence of menstruation     Generalized osteoarthrosis, unspecified site     Moderate persistent asthma     FAMILY HX GI MALIGNANCY brother colon ca.      Rosacea     HYPERLIPIDEMIA LDL GOAL <130     Tear Meniscus Knee  repaired     24 hour info given     Myalgia     History of tongue cancer     Tongue irritation     Tongue cancer (H)     History of recent ear, nose, and throat (ENT) procedure     Malignant neoplasm of tongue (H)     Fistula     Cervical cancer screening     Advanced directives, counseling/discussion       Past Medical History:   Diagnosis Date     Arthritis     1990     Cerebral infarction (H) .    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 (1) Maternal Uncle    Cancer (2) Father (Bryn Alexandre), Brother (Aly)    Cancer - colorectal (1) " Brother    Cerebrovascular Disease (1) Mother (Marla)    Hypertension (4) Mother (Marla), Father (Bryn Alexandre), Brother, Brother (Aly Alexandre)    Neurologic Disorder (1) Brother: migranes    Prostate Cancer (1) Father (Bryn Alexandre)    Respiratory (1) Brother: emphysema       Negative family history of: Diabetes, Breast Cancer        Past Surgical History:   Procedure Laterality Date     ADENOIDECTOMY      1970     C NONSPECIFIC PROCEDURE      CHOLECYSTECTOMY     C NONSPECIFIC PROCEDURE      SINUS MASS REMOVED     C NONSPECIFIC PROCEDURE      R KNEE SURGERY     CHOLECYSTECTOMY       COLONOSCOPY  11/26/2012    Procedure: COLONOSCOPY;  Colonoscopy;  Surgeon: Yony Garcia MD;  Location: [facility]     COLONOSCOPY N/A 12/14/2017    Procedure: COLONOSCOPY;  colonoscopy;  Surgeon: Sterling Mckeon MD;  Location: [facility]     EXAM UNDER ANESTHESIA MOUTH N/A 8/15/2016    Procedure: EXAM UNDER ANESTHESIA MOUTH;  Surgeon: Thomas Ferris MD;  [facility]: [facility]     GLOSSECTOMY Right 9/1/2016    Procedure: GLOSSECTOMY;  Surgeon: Thomas Ferris MD;  [facility]     GLOSSECTOMY PARTIAL       GRAFT FREE VASCULARIZED (LOCATION) Left 9/1/2016    Procedure: GRAFT FREE VASCULARIZED (LOCATION);  Surgeon: Moreno Leo MD;  [facility]     GRAFT SKIN SPLIT THICKNESS FROM EXTREMITY Left 9/1/2016    Procedure: GRAFT SKIN SPLIT THICKNESS FROM EXTREMITY;  Surgeon: Moreno Leo MD;  [facility]     [facility] UGI ENDOSCOPY W PLACEMENT GASTROSTOMY TUBE PERCUT N/A 11/7/2016    Procedure: COMBINED ESOPHAGOSCOPY, GASTROSCOPY, DUODENOSCOPY (EGD), PLACE PERCUTANEOUS ENDOSCOPIC GASTROSTOMY TUBE;  Surgeon: Sterling Mckeon MD;  Location: [facility]     HEAD & NECK SURGERY       KNEE SURGERY      bilat     LARYNGOSCOPY WITH BIOPSY(IES) N/A 8/15/2016    Procedure: LARYNGOSCOPY WITH BIOPSY(IES);  Surgeon: Thomas Ferris MD;  [facility]     NASAL/SINUS POLYPECTOMY      1990 [facility]     ORTHOPEDIC SURGERY  1977 and 2010    knee repair (r)  and clean out (l)     PAROTIDECTOMY, RADICAL NECK DISSECTION Right 9/1/2016    Procedure: PAROTIDECTOMY, RADICAL NECK DISSECTION;  Surgeon: Thomas Ferris MD;  [facility]     TONSILLECTOMY      1970     TRACHEOSTOMY Right 9/1/2016    Procedure: TRACHEOSTOMY;  Surgeon: Thomas Ferris MD;  [facility]     Social History     Social History     Marital status:      Spouse name: Сергей     Number of children: 4     Years of education: N/A     Social History Main Topics     Smoking status: Former Smoker     Packs/day: 2.00     Years: 7.00     Types: Cigarettes     Start date: 1/1/1970     Quit date: 1/1/1980     Smokeless tobacco: Never Used     Alcohol use Yes      Comment: 4 drinks per year     Drug use: No     Sexual activity: Not Currently     Partners: Male     Birth control/ protection: None     Other Topics Concern     None     Social History Narrative        Dairy/d 2 servings/d.     Caffeine 6-7 servings/d    Exercise 3 x week    Sunscreen used - Yes    Seatbelts used - Yes    Working smoke/CO detectors in the home - Yes    Guns stored in the home - No    Self Breast Exams - Yes    Self Testicular Exam - NA    Eye Exam up to date - No    Dental Exam up to date - Yes    Pap Smear up to date - No    Mammogram up to date - Yes    PSA up to date - NA    Dexa Scan up to date - Yes    Flex Sig / Colonoscopy up to date - No    Immunizations up to date - Yes    Abuse: Current or Past(Physical, Sexual or Emotional)- No    Do you feel safe in your environment - Yes        Agustin Fox CMA                November 30, 2018    ENVIRONMENTAL HISTORY: [place] The home is heated with a forced air. They do have central air conditioning. The patient's bedroom is furnished with carpeting in bedroom, allergen mattress cover, allergen pillowcase cover and fabric window coverings. No pets inside the house. There is no history of cockroach or mice infestation. There are no  smokers in the house.  The house does not have a damp basement.                Review of Systems   Constitutional: Negative for activity change, chills and fever.   HENT: Positive for rhinorrhea. Negative for congestion, dental problem, ear pain, facial swelling, nosebleeds, postnasal drip, sinus pressure and sneezing.    Eyes: Positive for itching. Negative for discharge and redness.   Respiratory: Negative for cough, chest tightness, shortness of breath and wheezing.    Cardiovascular: Negative for chest pain.   Gastrointestinal: Negative for diarrhea, nausea and vomiting.   Musculoskeletal: Negative for arthralgias, joint swelling and myalgias.   Skin: Negative for rash.   Allergic/Immunologic: Positive for food allergies.   Neurological: Negative for headaches.   Hematological: Negative for adenopathy.   Psychiatric/Behavioral: Negative for behavioral problems and self-injury.           Current Outpatient Prescriptions:      DENTAGEL 1.1 % GEL, Apply 1 Application to affected area daily, Disp: , Rfl: 11     Fluticasone-Salmeterol (ADVAIR DISKUS IN), , Disp: , Rfl:      indomethacin (INDOCIN) 25 MG capsule, Take 1 capsule (25 mg) by mouth 2 times daily (with meals), Disp: 42 capsule, Rfl: 1     lidocaine (LIDODERM) 5 % Patch, Apply up to 3 patches to neck at once for up to 12 h within a 24 h period.  Remove after 12 hours., Disp: 30 patch, Rfl: 0     nitroGLYcerin (NITRO-DUR) 0.4 MG/HR 24 hr patch, Cut into 1/4 and place 1/4 over the area of pain.  Change every 24 hours., Disp: 30 patch, Rfl: 0     NITROGLYCERIN TD, Cut in quarter and apply to elbow and knee for pain, Disp: , Rfl:      ORDER FOR DME, SET TO LOCAL PRINT,, Equipment being ordered: tennis elbow band, Disp: 1 Device, Rfl: 0     albuterol (PROAIR HFA, PROVENTIL HFA, VENTOLIN HFA) 108 (90 BASE) MCG/ACT inhaler, Inhale 2 puffs into the lungs every 6 hours as needed for wheezing (Patient not taking: Reported on 11/30/2018), Disp: , Rfl:      EPINEPHrine  (EPIPEN/ADRENACLICK/OR ANY BX GENERIC EQUIV) 0.3 MG/0.3ML injection 2-pack, Inject 0.3 mLs (0.3 mg) into the muscle once as needed for anaphylaxis (Patient not taking: Reported on 11/30/2018), Disp: 0.6 mL, Rfl: 3  No current facility-administered medications for this visit.     Facility-Administered Medications Ordered in Other Visits:      Lidocaine 1% injection 1 mL, 1 mL, Other, Once PRN, Yony Garcia MD     lidocaine 4 % (LMX4) cream, , Topical, Once PRN, Yony Garcia MD     sodium chloride (PF) 0.9% PF flush 3 mL, 3 mL, Intravenous, Q1H PRN, Yony Garcia MD     sodium chloride (PF) 0.9% PF flush 3 mL, 3 mL, Intravenous, Q8H, Yony Garcia MD  Immunization History   Administered Date(s) Administered     DT (PEDS <7y) 12/21/2000     DTaP, Unspecified 11/05/2012     FLU 6-35 months 11/10/2010, 11/05/2012     Flu, Unspecified 10/26/1996, 11/09/1998     Influenza (High Dose) 3 valent vaccine 09/30/2016, 11/16/2017     Influenza (IIV3) PF 12/21/2000, 01/14/2002, 11/15/2004, 11/10/2008, 11/10/2010, 11/05/2012     Influenza Vaccine IM 3yrs+ 4 Valent IIV4 09/29/2014     Mantoux Tuberculin Skin Test 09/09/2016, 09/18/2016     Pneumo Conj 13-V (2010&after) 10/28/2015     Pneumococcal 23 valent 10/04/2016     TD (ADULT, 7+) 12/21/2000     TDAP Vaccine (Adacel) 11/05/2012     Allergies   Allergen Reactions     Banana Swelling and Anaphylaxis     Tongue and lips swell and get itchy     Bee Pollen Anaphylaxis     Bee Venom Anaphylaxis     Blueberry Anaphylaxis and Swelling     Iodine Anaphylaxis     NOT dietary related.     Penicillins Anaphylaxis     Shellfish-Derived Products Swelling and Difficulty breathing     Sulfa Drugs Anaphylaxis     Erythromycin Nausea     Pravastatin Sodium Other (See Comments)     muscle spasam     Simvastatin Cough     Strawberry Hives     Latex Rash     Rash from bandages/wraps/pressure tapes     Tetracycline GI Disturbance     Other reaction(s): GI  "Upset  And tingling         OBJECTIVE:                                                                 /65 (BP Location: Left arm, Patient Position: Sitting, Cuff Size: Adult Regular)  Pulse 63  Temp 97.7  F (36.5  C) (Oral)  Resp 16  Ht 1.615 m (5' 3.58\")  Wt 68.3 kg (150 lb 9.6 oz)  SpO2 98%  BMI 26.19 kg/m2        Physical Exam   Constitutional: She is oriented to person, place, and time. No distress.   HENT:   Head: Normocephalic and atraumatic.   Right Ear: Tympanic membrane, external ear and ear canal normal.   Left Ear: Tympanic membrane, external ear and ear canal normal.   Nose: No mucosal edema or rhinorrhea.   Mouth/Throat: Oropharynx is clear and moist and mucous membranes are normal.   Eyes: Conjunctivae are normal. Right eye exhibits no discharge. Left eye exhibits no discharge.   Neck: Normal range of motion.   Cardiovascular: Normal rate, regular rhythm and normal heart sounds.    No murmur heard.  Pulmonary/Chest: Effort normal and breath sounds normal. No respiratory distress. She has no wheezes. She has no rales.   Musculoskeletal: Normal range of motion.   Lymphadenopathy:     She has no cervical adenopathy.   Neurological: She is alert and oriented to person, place, and time.   Skin: Skin is warm. She is not diaphoretic.   There is a flesh color transplant mass replacing regular tongue tissue.   Psychiatric: Affect normal.   Nursing note and vitals reviewed.            WORKUP:   NUTS/SHELLFISH ALLERGEN PERCUTANEOUS SKIN TESTING  [place] nuts & shellfish 11/30/2018   Consent Y   Ordering Physician Avi   Interpreting Physician Avi   Testing Technician Janay UP RN   Location Back   Time start:  1:55 PM   Time End:  2:10 PM   Positive Control: Histatrol*ALK 1 mg/ml 5/11   Negative Control: 50% Glycerin** Nena Olegario 0/0   Selection: Shellfish   Shrimp 1:20 (W/F in millimeters) 0/0   Lobster 1:20 (W/F in millimeters) 0/0   Crab 1:20 (W/F in millimeters) 0/0   Clam 1:20 (W/F " in millimeters) 0/0   Oyster 1:20 (W/F in millimeters) 0/0   Scallops 1:20 (W/F in millimeters) 0/0      SPIROMETRY       FVC 3.32L (110% of predicted).     FEV1 2.86L (125% of predicted).     FEV1/FVC 86%     FEF 25%-75%  3.14L/s (159% of predicted)    Unable to interpret.  She is not able to seal the probe properly. There is a constant leakage of the air on the right side.        Asthma Control Test (ACT) total score: 24      ASSESSMENT/PLAN:      Problem List Items Addressed This Visit        Respiratory    1. Moderate persistent asthma  Historical diagnosis.  Previously on Advair.  Currently well controlled with albuterol on as needed basis.  -Continue as is.            Other Relevant Orders    Spirometry, Breathing Capacity (Completed)      Other Visit Diagnoses     2. Adverse effect of drug, initial encounter    -  Primary  Historical diagnosis.  The patient was told to avoid iodine contrast based on her previous history of allergic reaction to shellfish.  Shellfish or seafood allergy is not an independent risk factor for immediate hypersensitivity reactions to radiocontrast material, although this is a common misconception.  In most of the cases, these reactions I believe to be non-IgE mediated.      However, considering the patient's history of multiple allergic reactions to multiple antigens and history of asthma, I recommend using nonionic low osmolar contrast material (LOCM).  Provided that a nonionic LOCM agent will be used, empiric premedication of patients who have not experienced problems with RCM in the past is not supported by the available evidence.    If the use of nonionic LOCM is not possible, or for increased safety even with nonionic LOCM (per provider's discretion), I suggest the following premedication regimen, with administration beginning 13 hours prior to the procedure:    Prednisone, given orally 13 hours, 7 hours, and 1 hour before, 50 mg per dose. If oral administration is not  feasible, methylprednisolone may be administered intravenously at the same time intervals, (40 mg).  Diphenhydramine, 50 mg orally or parenterally given 1 hour before.    Possibly, she had IgE mediated reactions to penicillin.  The time elapsed since the last reaction is important because penicillin-specific IgE antibodies decrease over time, and therefore patients with recent reactions are more likely to be allergic than patients with distant reactions. Approximately 80 percent of patients with IgE-mediated penicillin allergy lose the sensitivity after 10 years.  -Recommend penicillin testing with subsequent amoxicillin oral challenge in office settings if the test is negative.                        3. Anaphylactic shock due to crustaceans, initial encounter      Historical diagnosis.  Today's negative skin test is reassuring.  -Continue strict avoidance.  -Ordered serum IgE for shellfish.  -Depending on the results, anticipate oral food challenge test if the patient is interested.    - Reminded about the importance of reading labels, ordering safe foods in the restaurants and risk of cross-contamination.  - Instructed about the recognizing and treating allergic reactions.  - Instructed to carry epinephrine autoinjector 2-Balta and cetirizine all the time and use it accordingly in case of accidental exposure. Call 911 or see ER after use of epinephrine.            Other Relevant Orders    Allergen clam IgE (Completed)    Allergen crab IgE (Completed)    Allergen lobster IgE (Completed)    Allergen shrimp IgE (Completed)    Allergen scallop IgE (Completed)    Allergen oyster IgE (Completed)                  Other Relevant Orders  ALLERGY SKIN TESTS,ALLERGENS (Completed)    4. Reaction to food, initial encounter    Symptoms with banana ingestion symptoms are consistent with oral allergy syndrome.  The patient has oral allergy syndrome (Food-Pollen syndrome) and this condition was explained to her in detail.      Sensitization to aeroallergens like Bet v 1 (birch) with a respiratory route may result in cross-reactivity to type 2 allergens like apples, peaches and other foods when eating fresh. It may also occur with sensitization to weed and grass pollen.  This is IgE mediated reaction, rarely progresses to more severe symptoms, and certain forms of food vegetables may be tolerated(cooked).  Most of the reactions are mild and limited to the throat or face area. Sometimes may cause some GI effects and rarely several allergic reactions (not comepltey impossible). Symptoms may vary by season.  Normally it is not advised the patient to have an epinephrine device.  Not necessarily I can explain his symptoms with strawberry, blueberry, and mushroom ingestion from the standpoint of Food pollen syndrome.  -Ordered serum IgE for banana, strawberry, blueberry, and mushroom.  -Recommend to continue strict avoidance of mushrooms in any form, and bananas, strawberries, blueberries in a fresh form.  Anticipate percutaneous skin puncture testing with fresh banana, strawberry, blueberry, and mushroom.    Allergen banana IgE (Completed)    Allergen Strawberry IgE (Completed)    Allergen Blueberry IgE (Completed)    allergen mushroom IgE: Laboratory Miscellaneous Order (Completed)    5. Toxic reaction to hornets, wasps and bees, accidental or unintentional, initial encounter    Symptoms consistent with a systemic reaction.  -Ordered serum tryptase level.  -Considering nationwide shortage in testing kits for venom skin test, I ordered serum IgE for venom of stinging insects.  If positive, consider venom immunotherapy.    -Strict avoidance.  -Carry injectable epinephrine and cetirizine all the time.    Relevant Orders    Allergen common wasp IgE (Completed)    Allergen honeybee IgE (Completed)    Allergen paper wasp IgE (Completed)    Allergen whiteface hornet IgE (Completed)    Allergen yellow hornet IgE (Completed)    Tryptase (Completed)              6. Non-seasonal allergic rhinitis due to other allergic trigger  Perennial acute symptoms and chronic seasonal exacerbations in Spring and Fall.  Severely he is mild and well controlled with over-the-counter oral antihistamines, showers, and avoidance measures.  -Continue as is.  Depending on future symptom control, consider testing.      I spent 60 minutes for this visit, with at least 50% of the time coordinating care and face-to-face counseling the patient in regards to natural history, diagnosis, treatment of food allergies, reactions to radiocontrast material, asthma, and allergic rhinitis.    Follow-up in 1 year, or sooner if needed for oral food challenge test and/or venom immunotherapy.      Thank you for allowing us to participate in the care of this patient. Please feel free to contact us if there are any questions or concerns about the patient.    Disclaimer: This note consists of symbols derived from keyboarding, dictation and/or voice recognition software. As a result, there may be errors in the script that have gone undetected. Please consider this when interpreting information found in this chart.    Sebastien Cárdenas MD, FACI  Allergy, Asthma and Immunology  [place], [place] and [place]

## 2018-11-30 NOTE — NURSING NOTE
Per provider verbal order, RN placed Shellfish Panel scratch testing panels.  Consent was obtained prior to procedure.  Once panels were placed, patient was monitored for 15 minutes in clinic.  RN read test after 15 minutes and provider was notified of results.  Pt tolerated procedure well.  All questions and concerns were addressed at office visit.     Janay UP   Allergy RN

## 2018-11-30 NOTE — LETTER
ANAPHYLAXIS ALLERGY PLAN    Name: Terese Bell      :  1950    Allergy to:  Worked up for shellfish allergy  Weight: 150 lbs 9.6 oz           Asthma:  Yes  (higher risk for a severe reaction)      Do not depend on antihistamines or inhalers (bronchodilators) to treat a severe reaction; USE EPINEPHRINE      MEDICATIONS/DOSES  Epinephrine:  EpiPen   Epinephrine dose:  0.3 mg IM  Antihistamine:  Zyrtec (Cetirizine)  Antihistamine dose:  20 mg  Other (e.g., inhaler-bronchodilator if wheezing):  Albuterol 4 puffs       ANAPHYLAXIS ALLERGY PLAN (Page 2)  Patient:  Terese Bell  :  1950         Electronically signed on 2018 by:  Sebastien Cárdenas MD  Parent/Guardian Authorization Signature:  ___________________________ Date:    FORM PROVIDED COURTESY OF FOOD ALLERGY RESEARCH & EDUCATION (FARE) ([email or web address]) 2017

## 2018-12-01 LAB
BLUEBERRY IGE QN: <0.1 KU/L
DEPRECATED MISC ALLERGEN IGE RAST QL: NORMAL

## 2018-12-04 LAB
BANANA IGE QN: <0.1 KU(A)/L
CLAM IGE QN: <0.1 KU(A)/L
CRAB IGE QN: <0.1 KU(A)/L
HONEY BEE IGE QN: <0.1 KU(A)/L
LAB SCANNED RESULT: NORMAL
LOBSTER IGE QN: <0.1 KU(A)/L
OYSTER IGE QN: <0.1 KU(A)/L
PAPER WASP IGE QN: <0.1 KU(A)/L
SCALLOP IGE QN: <0.1 KU(A)/L
SHRIMP IGE QN: <0.1 KU(A)/L
STRAWBERRY IGE QN: <0.1 KU(A)/L
TRYPTASE SERPL-MCNC: 1.1 UG/L
WHITEFACED HORNET IGE QN: <0.1 KU(A)/L
YELLOW HORNET IGE QN: <0.1 KU(A)/L
YELLOW JACKET IGE QN: <0.1 KU(A)/L

## 2018-12-04 ASSESSMENT — ASTHMA QUESTIONNAIRES: ACT_TOTALSCORE: 24

## 2018-12-07 NOTE — PROGRESS NOTES
Serum tryptase within normal limits.  Negative serum IgE for blueberry, mushroom, shellfish, (clam, crab, lobster, shrimp, scallop, and oyster), banana, strawberry, blueberry, and venom of stinging insects.  -Please add the patient to the waiting list for venom skin testing.    Suggest percutaneous skin puncture testing with fres strawberry, banana, blueberry and mushroom.    -Suggest oral food challenge test, shellfish. Would start with crab.  Sebastien Cárdenas

## 2019-01-17 NOTE — PROGRESS NOTES
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     TYPE OF RADIATION THERAPY ADMINISTERED: 6000 cGy in 30 fractions         Oncologic history : Mrs. 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 She had radiation therapy in our clinic to a total dose of 6000 cGy in 30 treatments at 200 cGy each fraction from 10/17/2016 to 2016.  She tolerated radiation therapy reasonably well.  The patient did have some significant sore throat and dysphagia toward the end of the therapy, which required a PEG tube for nutritional support.  She otherwise was reasonably stable at the end of the therapy.     Interval history  Patient returns today for a routine post-treatment checkup.  She reports that she has been doing overall well.  She continues to have pain to left  face post shingles - well controlled with lidocaine patches and CBD oil. She continues to use Therabite for Trismus every 2-3 days and do her jaw and neck exercises daily.  Her taste changes are stable. She has been eating well, supplementing with one supplemental drink daily, with a 10 lbs weight gain.She does see dental regularly. Completed care with  lymphedema management clinic. Well managed today with no lymphedema noted. She continues to have constipation intermittently for which she takes Senokot PRN or prune juice - effective. Continues to work with speech therapist. She still has some difficulty with swallowing certain foods which she tries to avoid. Recently she reports a couple of episodes with increased difficulty swallowing.  Feels may be related to dry mouth - worse since the  winter months. Also states may be party due to eating too quickly.     Mild fatigue. Mild intermittent indigestion (maalox effective).     ENT seen 11/14/18 (Dr. Ash). Had physical exam and fiberoptic endoscopy. WILMA.     ROS:  Patient denies any of the following except if noted above: fevers, chills,  vision or hearing changes, headaches,chest pain, dyspnea, abdominal pain, vomiting, diarrhea ROS otherwise negative on a 12-system review.        OBJECTIVE:   Vital Signs: /65 (BP Location: Left arm, Patient Position: Sitting, Cuff Size: Adult Large)   Pulse 69   Resp 16   Wt 72.9 kg (160 lb 12.8 oz)   SpO2 98%   BMI 27.96 kg/m  ;  GENERAL:  Appears well, in no acute distress.   HEENT: NCAT. No thrush, no mucositis.   Oral cavity exam shows grafted tissue in place.  Mucous membranes are dry. No masses or lymphadenopathy palpated.  RESP: Breathing comfortably on RA  CV: WWP  NEURO: Normal gait.      IMAGING:   CT CHEST W/O CONTRAST 11/5/2018 9:33 AM     HISTORY: Oral cancer.     COMPARISON: 1/31/2018     TECHNIQUE: Noncontrast chest CT.  Radiation dose for this scan was  reduced using automated exposure control, adjustment of the mA and/or  kV according to patient size, or iterative reconstruction technique.     FINDINGS: No new or suspicious pulmonary nodules. No pleural effusion.     Very small area of groundglass airspace opacity in the left upper lobe  (image 28, series 4 for example), probably infectious in etiology.  Lungs are otherwise clear.     Normal heart size. Patchy coronary vascular calcification. No enlarged  mediastinal, hilar or axillary lymph nodes.     Osseous structures appear normal.     No acute abnormality in the visualized upper abdomen.                                                                      IMPRESSION:   1. No evidence of metastatic disease.  2. Probable mild infectious process in the left upper lobe.     ZACH URIBE MD    CT SCAN OF THE NECK WITHOUT CONTRAST November  5, 2018 9:32 AM      HISTORY: Contrast allergy. Oral cancer (H).     TECHNIQUE: Axial images and coronal reformations. No IV contrast. No  contrast was administered because of the patient's history of a  contrast allergy. Radiation dose for this scan was reduced using  automated exposure control, adjustment of the mA and/or kV according  to patient size, or iterative reconstruction technique.     COMPARISON: CT dated 1/31/2018.     FINDINGS: Postoperative changes are seen in the right neck. A flap  graft is again seen along the right side of the tongue. This extends  into the right submandibular region. Multiple surgical clips are seen  in the right neck. Right neck dissection has been performed. No  recurrent mass is identified in the right oral cavity. No enlarged  lymph nodes are seen in the neck. Visualized portions of the lungs and  mediastinum remain normal. Again noted is a 0.9 cm nodule in the right  lobe of the thyroid.                                                                      IMPRESSION:  1. Postoperative change right neck. When compared to the scan of  1/31/2018, there has been no significant change.  2. No evidence for any residual for recurrent tumor. No enlarged  cervical lymph nodes are identified.     JAGRUTI BERTRAND MD      LAB:  TSH   Date Value Ref Range Status   11/14/2018 1.12 0.40 - 4.00 mU/L Final       IMPRESSION:   Ms. 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  She is recovered from the acute toxicities of treatment.  We will continue to follow her for recurrence and management of side effects. WILMA       RECOMMENDATIONS:      1. RTC in 6 mo.   2. Continue oral nutrition and continue swallowing and stretching exercises.  3. Continue close follow up with ENT Dr. Ferris (3-4 mths). Next visit 3/27/2019 with CT scan.   4. TSH 1.12 on 11/2018. Will reassess  Q 6-12 months.   5. Follow-up PCP for fatigue.   6. Difficulty swallowing:  Recently she reports a couple of episodes with increased difficulty swallowing. Patient to continue products for dry mouth including Biotene and Xylitol gum and to use a cool mist vaporizer. Recommend patient follow-up with speech therapy for evaluation.        Jen [facility]  Radiation Therapy Center  [facility]  [address], [address]  [place] [address]

## 2019-01-24 ENCOUNTER — OFFICE VISIT (OUTPATIENT)
Dept: RADIATION THERAPY | Facility: OUTPATIENT CENTER | Age: 69
End: 2019-01-24
Payer: MEDICARE

## 2019-01-24 VITALS
SYSTOLIC BLOOD PRESSURE: 105 MMHG | RESPIRATION RATE: 16 BRPM | OXYGEN SATURATION: 98 % | WEIGHT: 160.8 LBS | BODY MASS INDEX: 27.96 KG/M2 | DIASTOLIC BLOOD PRESSURE: 65 MMHG | HEART RATE: 69 BPM

## 2019-01-24 DIAGNOSIS — C02.9 MALIGNANT NEOPLASM OF TONGUE (H): Primary | ICD-10-CM

## 2019-01-24 DIAGNOSIS — Z85.810 HISTORY OF TONGUE CANCER: ICD-10-CM

## 2019-01-24 DIAGNOSIS — Z92.3 HISTORY OF RADIATION TO HEAD AND NECK REGION: ICD-10-CM

## 2019-01-24 NOTE — NURSING NOTE
FOLLOW-UP VISIT    Patient Name: Terese Bell      : 1950     Age: 68 year old        ______________________________________________________________________________     Chief Complaint   Patient presents with     Radiation Therapy     Follow up     /65 (BP Location: Left arm, Patient Position: Sitting, Cuff Size: Adult Large)   Pulse 69   Resp 16   Wt 72.9 kg (160 lb 12.8 oz)   SpO2 98%   BMI 27.96 kg/m       Date Radiation Completed: 16    Pain  Patient reports chronic shingle pain along left side of neck    Meds  Current Med List Reviewed: Yes  Medication Note:     Labs  TSH   Date Value Ref Range Status   2018 1.12 0.40 - 4.00 mU/L Final   ]    Imaging  CT scan of neck and chest- 18    Skin:Warm  Dry  Intact  Oral Products: Biotene, Xylitol gum  Mucositis: No  Dry mouth: Yes: Patient reports no change since last follow up. Using Biotene and Xylitol gum.  Dental evaluation: Yes: Sees dentist regularly  PEG tube: No  Speech therapy: No- last seen 18  Lymphedema: No- last seen 18  Energy Level:normal  Appetite: normal  Intake: Patient reports increased eating over the holidays, reports some weight gain. Patient also reports she has noticed more difficulty swallowing food, started this fall.   Weight:  Wt Readings from Last 5 Encounters:   19 72.9 kg (160 lb 12.8 oz)   18 68.3 kg (150 lb 9.6 oz)   18 68 kg (150 lb)   18 66.7 kg (147 lb)   18 66.3 kg (146 lb 3.2 oz)       Appointments:     DATE  Oncologist:     ENT: Dr. Ferris 3/27/19   Primary:      Other Notes:

## 2019-01-24 NOTE — LETTER
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     TYPE OF RADIATION THERAPY ADMINISTERED: 6000 cGy in 30 fractions         Oncologic history : Mrs. 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 She had radiation therapy in our clinic to a total dose of 6000 cGy in 30 treatments at 200 cGy each fraction from 10/17/2016 to 2016.  She tolerated radiation therapy reasonably well.  The patient did have some significant sore throat and dysphagia toward the end of the therapy, which required a PEG tube for nutritional support.  She otherwise was reasonably stable at the end of the therapy.     Interval history  Patient returns today for a routine post-treatment checkup.  She reports that she has been doing overall well.  She continues to have pain to left  face post shingles - well controlled with lidocaine patches and CBD oil. She continues to use Therabite for Trismus every 2-3 days and do her jaw and neck exercises daily.  Her taste changes are stable. She has been eating well, supplementing with one supplemental drink daily, with a 10 lbs weight gain.She does see dental regularly. Completed care with  lymphedema management clinic. Well managed today with no lymphedema noted. She continues to have constipation intermittently for which she takes Senokot PRN or prune juice - effective. Continues to work with speech therapist. She still has some difficulty with swallowing certain foods which she tries to avoid. Recently she reports a couple of episodes  with increased difficulty swallowing.  Feels may be related to dry mouth - worse since the winter months. Also states may be party due to eating too quickly.     Mild fatigue. Mild intermittent indigestion (maalox effective).     ENT seen 11/14/18 (Dr. Ash). Had physical exam and fiberoptic endoscopy. WILMA.     ROS:  Patient denies any of the following except if noted above: fevers, chills,  vision or hearing changes, headaches,chest pain, dyspnea, abdominal pain, vomiting, diarrhea ROS otherwise negative on a 12-system review.        OBJECTIVE:   Vital Signs: /65 (BP Location: Left arm, Patient Position: Sitting, Cuff Size: Adult Large)   Pulse 69   Resp 16   Wt 72.9 kg (160 lb 12.8 oz)   SpO2 98%   BMI 27.96 kg/m   ;  GENERAL:  Appears well, in no acute distress.   HEENT: NCAT. No thrush, no mucositis.   Oral cavity exam shows grafted tissue in place.  Mucous membranes are dry. No masses or lymphadenopathy palpated.  RESP: Breathing comfortably on RA  CV: WWP  NEURO: Normal gait.      IMAGING:   CT CHEST W/O CONTRAST 11/5/2018 9:33 AM     HISTORY: Oral cancer.     COMPARISON: 1/31/2018     TECHNIQUE: Noncontrast chest CT.  Radiation dose for this scan was  reduced using automated exposure control, adjustment of the mA and/or  kV according to patient size, or iterative reconstruction technique.     FINDINGS: No new or suspicious pulmonary nodules. No pleural effusion.     Very small area of groundglass airspace opacity in the left upper lobe  (image 28, series 4 for example), probably infectious in etiology.  Lungs are otherwise clear.     Normal heart size. Patchy coronary vascular calcification. No enlarged  mediastinal, hilar or axillary lymph nodes.     Osseous structures appear normal.     No acute abnormality in the visualized upper abdomen.                                                                      IMPRESSION:   1. No evidence of metastatic disease.  2. Probable mild infectious process in  the left upper lobe.     ZACH URIBE MD    CT SCAN OF THE NECK WITHOUT CONTRAST November 5, 2018 9:32 AM      HISTORY: Contrast allergy. Oral cancer (H).     TECHNIQUE: Axial images and coronal reformations. No IV contrast. No  contrast was administered because of the patient's history of a  contrast allergy. Radiation dose for this scan was reduced using  automated exposure control, adjustment of the mA and/or kV according  to patient size, or iterative reconstruction technique.     COMPARISON: CT dated 1/31/2018.     FINDINGS: Postoperative changes are seen in the right neck. A flap  graft is again seen along the right side of the tongue. This extends  into the right submandibular region. Multiple surgical clips are seen  in the right neck. Right neck dissection has been performed. No  recurrent mass is identified in the right oral cavity. No enlarged  lymph nodes are seen in the neck. Visualized portions of the lungs and  mediastinum remain normal. Again noted is a 0.9 cm nodule in the right  lobe of the thyroid.                                                                      IMPRESSION:  1. Postoperative change right neck. When compared to the scan of  1/31/2018, there has been no significant change.  2. No evidence for any residual for recurrent tumor. No enlarged  cervical lymph nodes are identified.     JAGRUTI BERTRAND MD      LAB:  TSH   Date Value Ref Range Status   11/14/2018 1.12 0.40 - 4.00 mU/L Final       IMPRESSION:   Ms. 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  She is recovered from the acute toxicities of treatment.  We will continue to follow her for recurrence and management of side effects. WILMA       RECOMMENDATIONS:      1. RTC in 6 mo.   2. Continue oral nutrition and continue swallowing and stretching exercises.  3. Continue close follow up with ENT   Melyssatheresa (3-4 mths). Next visit 3/27/2019 with CT scan.   4. TSH 1.12 on 11/2018. Will reassess Q 6-12 months.   5. Follow-up PCP for fatigue.   6. Difficulty swallowing:  Recently she reports a couple of episodes with increased difficulty swallowing. Patient to continue products for dry mouth including Biotene and Xylitol gum and to use a cool mist vaporizer. Recommend patient follow-up with speech therapy for evaluation.        Jen Rey [facility]  Radiation Therapy Center  [facility]  [address], [address]  [place] [address]

## 2019-02-15 ENCOUNTER — MYC MEDICAL ADVICE (OUTPATIENT)
Dept: FAMILY MEDICINE | Facility: CLINIC | Age: 69
End: 2019-02-15

## 2019-02-15 DIAGNOSIS — E78.5 HYPERLIPIDEMIA LDL GOAL <130: Primary | ICD-10-CM

## 2019-02-15 DIAGNOSIS — M79.10 MYALGIA: ICD-10-CM

## 2019-02-15 DIAGNOSIS — E55.9 VITAMIN D DEFICIENCY: ICD-10-CM

## 2019-02-22 DIAGNOSIS — E55.9 VITAMIN D DEFICIENCY: ICD-10-CM

## 2019-02-22 DIAGNOSIS — E78.5 HYPERLIPIDEMIA LDL GOAL <130: ICD-10-CM

## 2019-02-22 DIAGNOSIS — M79.10 MYALGIA: ICD-10-CM

## 2019-02-22 LAB
ANION GAP SERPL CALCULATED.3IONS-SCNC: 2 MMOL/L (ref 3–14)
BUN SERPL-MCNC: 13 MG/DL (ref 7–30)
CALCIUM SERPL-MCNC: 9 MG/DL (ref 8.5–10.1)
CHLORIDE SERPL-SCNC: 103 MMOL/L (ref 94–109)
CHOLEST SERPL-MCNC: 241 MG/DL
CO2 SERPL-SCNC: 32 MMOL/L (ref 20–32)
CREAT SERPL-MCNC: 0.57 MG/DL (ref 0.52–1.04)
GFR SERPL CREATININE-BSD FRML MDRD: >90 ML/MIN/{1.73_M2}
GLUCOSE SERPL-MCNC: 84 MG/DL (ref 70–99)
HDLC SERPL-MCNC: 73 MG/DL
LDLC SERPL CALC-MCNC: 155 MG/DL
NONHDLC SERPL-MCNC: 168 MG/DL
POTASSIUM SERPL-SCNC: 4.2 MMOL/L (ref 3.4–5.3)
SODIUM SERPL-SCNC: 137 MMOL/L (ref 133–144)
TRIGL SERPL-MCNC: 65 MG/DL
TSH SERPL DL<=0.005 MIU/L-ACNC: 1.51 MU/L (ref 0.4–4)

## 2019-02-22 PROCEDURE — 36415 COLL VENOUS BLD VENIPUNCTURE: CPT | Performed by: NURSE PRACTITIONER

## 2019-02-22 PROCEDURE — 82306 VITAMIN D 25 HYDROXY: CPT | Performed by: NURSE PRACTITIONER

## 2019-02-22 PROCEDURE — 84443 ASSAY THYROID STIM HORMONE: CPT | Performed by: NURSE PRACTITIONER

## 2019-02-22 PROCEDURE — 80048 BASIC METABOLIC PNL TOTAL CA: CPT | Performed by: NURSE PRACTITIONER

## 2019-02-22 PROCEDURE — 80061 LIPID PANEL: CPT | Performed by: NURSE PRACTITIONER

## 2019-02-26 LAB — DEPRECATED CALCIDIOL+CALCIFEROL SERPL-MC: 48 UG/L (ref 20–75)

## 2019-03-04 ASSESSMENT — ENCOUNTER SYMPTOMS
ABDOMINAL PAIN: 0
FEVER: 0
HEMATOCHEZIA: 0
CHILLS: 1
COUGH: 0
NERVOUS/ANXIOUS: 0
WEAKNESS: 0
DIZZINESS: 0
DIARRHEA: 0
DYSURIA: 0
PARESTHESIAS: 1
BREAST MASS: 0
CONSTIPATION: 1
JOINT SWELLING: 0
ARTHRALGIAS: 1
MYALGIAS: 1
SHORTNESS OF BREATH: 0
EYE PAIN: 0
FREQUENCY: 0
PALPITATIONS: 0
HEMATURIA: 0
SORE THROAT: 0
NAUSEA: 0
HEADACHES: 0

## 2019-03-04 ASSESSMENT — ACTIVITIES OF DAILY LIVING (ADL): CURRENT_FUNCTION: NO ASSISTANCE NEEDED

## 2019-03-05 DIAGNOSIS — L30.9 DERMATITIS: ICD-10-CM

## 2019-03-05 NOTE — TELEPHONE ENCOUNTER
"Requested Prescriptions   Pending Prescriptions Disp Refills     augmented betamethasone dipropionate (DIPROLENE-AF) 0.05 % external cream 50 g 1    Last Written Prescription Date:  10/28/2015 #50g x 1  Last filled 10/28/2018  Last office visit: 3/2/2018 ARIANA Mcdonald    Note: Medication is not on the current med list.     Future Office Visit:   Next 5 appointments (look out 90 days)    Mar 07, 2019  9:20 AM CST  PHYSICAL with HERNÁN Carranza CNP  [facility] [address] [address]  [phone]          Sig: Apply sparingly to affected area twice daily as needed.  Do not apply to face.    Topical Steroids and Nonsteroidals Protocol Failed - 3/5/2019  7:46 AM       Failed - Medication is active on med list       Passed - Patient is age 6 or older       Passed - Authorizing prescriber's most recent note related to this medication read.    If refill request is for ophthalmic use, please forward request to provider for approval.         Passed - High potency steroid not ordered       Passed - Recent (12 mo) or future (30 days) visit within the authorizing provider's specialty    Patient had office visit in the last 12 months or has a visit in the next 30 days with authorizing provider or within the authorizing provider's specialty.  See \"Patient Info\" tab in inbasket, or \"Choose Columns\" in Meds & Orders section of the refill encounter.                "

## 2019-03-05 NOTE — TELEPHONE ENCOUNTER
Routing refill request to provider for review/approval because:  Patient needs to be seen because it has been more than 1 year since last office visit.  Surekha Andrews RN

## 2019-03-06 RX ORDER — BETAMETHASONE DIPROPIONATE 0.5 MG/G
CREAM TOPICAL
Qty: 50 G | Refills: 1 | Status: SHIPPED | OUTPATIENT
Start: 2019-03-06 | End: 2021-03-04

## 2019-03-07 ENCOUNTER — TELEPHONE (OUTPATIENT)
Dept: FAMILY MEDICINE | Facility: CLINIC | Age: 69
End: 2019-03-07

## 2019-03-07 ENCOUNTER — OFFICE VISIT (OUTPATIENT)
Dept: FAMILY MEDICINE | Facility: CLINIC | Age: 69
End: 2019-03-07
Payer: MEDICARE

## 2019-03-07 VITALS
TEMPERATURE: 97.8 F | HEART RATE: 59 BPM | DIASTOLIC BLOOD PRESSURE: 60 MMHG | BODY MASS INDEX: 27.52 KG/M2 | HEIGHT: 64 IN | RESPIRATION RATE: 16 BRPM | SYSTOLIC BLOOD PRESSURE: 116 MMHG | WEIGHT: 161.2 LBS | OXYGEN SATURATION: 100 %

## 2019-03-07 DIAGNOSIS — M79.2 NERVE PAIN: ICD-10-CM

## 2019-03-07 DIAGNOSIS — Z00.00 ROUTINE GENERAL MEDICAL EXAMINATION AT A HEALTH CARE FACILITY: Primary | ICD-10-CM

## 2019-03-07 DIAGNOSIS — M77.11 RIGHT TENNIS ELBOW: ICD-10-CM

## 2019-03-07 PROCEDURE — 99213 OFFICE O/P EST LOW 20 MIN: CPT | Mod: 25 | Performed by: NURSE PRACTITIONER

## 2019-03-07 PROCEDURE — G0438 PPPS, INITIAL VISIT: HCPCS | Performed by: NURSE PRACTITIONER

## 2019-03-07 RX ORDER — CHOLECALCIFEROL (VITAMIN D3) 50 MCG
1 TABLET ORAL DAILY
COMMUNITY

## 2019-03-07 RX ORDER — NITROGLYCERIN 80 MG/1
PATCH TRANSDERMAL
Qty: 30 PATCH | Refills: 0 | Status: SHIPPED | OUTPATIENT
Start: 2019-03-07 | End: 2021-03-04

## 2019-03-07 ASSESSMENT — PAIN SCALES - GENERAL: PAINLEVEL: MODERATE PAIN (4)

## 2019-03-07 ASSESSMENT — MIFFLIN-ST. JEOR: SCORE: 1243.95

## 2019-03-07 NOTE — PROGRESS NOTES
"  SUBJECTIVE:   Terese Bell is a 68 year old female who presents for Preventive Visit.  Are you in the first 12 months of your Medicare Part B coverage?  No  Answers for HPI/ROS submitted by the patient on 3/4/2019   Annual Exam:  In general, how would you rate your overall physical health?: good  Frequency of exercise:: 2-3 days/week  Do you usually eat at least 4 servings of fruit and vegetables a day, include whole grains & fiber, and avoid regularly eating high fat or \"junk\" foods? : No  Taking medications regularly:: Not Applicable  Activities of Daily Living: no assistance needed  Home safety: no safety concerns identified  Hearing Impairment:: difficulty understanding soft or whispered speech  In the past 6 months, have you been bothered by leaking of urine?: No  abdominal pain: No  Blood in stool: No  Blood in urine: No  chest pain: No  chills: Yes  congestion: No  constipation: Yes  cough: No  diarrhea: No  dizziness: No  ear pain: Yes  eye pain: No  nervous/anxious: No  fever: No  frequency: No  genital sores: No  headaches: No  hearing loss: No  arthralgias: Yes  joint swelling: No  peripheral edema: No  mood changes: No  myalgias: Yes  nausea: No  dysuria: No  palpitations: No  Skin sensation changes: Yes  sore throat: No  urgency: No  rash: No  shortness of breath: No  visual disturbance: No  weakness: No  pelvic pain: No  vaginal bleeding: No  vaginal discharge: No  tenderness: No  breast mass: No  breast discharge: No  In general, how would you rate your overall mental or emotional health?: good  Additional concerns today:: Yes  Duration of exercise:: 30-45 minutes    Mental Health:  PHQ-2 Score: (P) 0    Do you feel safe in your environment? Yes    Do you have a Health Care Directive? Yes: Advance Directive has been received and scanned.    Additional concerns to address?    *Is wanting to get an ointment for the back of her neck, does use Diprolene cream and CBD oil, is hoping to get " "an ointment that will help with the area, states that she wants something \"greasier\"    *Would like to get a referral for accupuncture related to her shingles.     *Feels exhausted all of the time, will fall asleep as soon as she stops moving around and doing things. Did also gain 9# in 2 weeks with no change in diet or activity, has remained steady since the gain.     *Does have small red spots that have shown up around the right hip area that she would like looked at. States that there are about half dozen of them and they do not bother her, but would like them checked out.    Fall risk:  Fallen 2 or more times in the past year?: No  Any fall with injury in the past year?: No  Cognitive Screenin) Repeat 3 items (Leader, Season, Table)    2) Clock draw: NORMAL  3) 3 item recall: Recalls 3 objects  Results: 3 items recalled: COGNITIVE IMPAIRMENT LESS LIKELY    Mini-CogTM Copyright JEOVANNY Carter. Licensed by the author for use in [facility]; reprinted with permission (harshil@[facility]). All rights reserved.      Do you have sleep apnea, excessive snoring or daytime drowsiness?: no    Reviewed and updated as needed this visit by clinical staff  Tobacco  Allergies  Meds  Med Hx  Surg Hx  Fam Hx  Soc Hx        Reviewed and updated as needed this visit by Provider  Tobacco  Allergies  Meds  Med Hx  Surg Hx  Fam Hx  Soc Hx       Social History     Tobacco Use     Smoking status: Former Smoker     Packs/day: 2.00     Years: 7.00     Pack years: 14.00     Types: Cigarettes     Start date: 1970     Last attempt to quit: 1980     Years since quittin.2     Smokeless tobacco: Never Used   Substance Use Topics     Alcohol use: Yes     Comment: 4 drinks per year                           Current providers sharing in care for this patient include:   Patient Care Team:  Nicole Mcdonald APRN CNP as PCP - General  Nicole Mcdonald APRN CNP as Assigned PCP  Thomas Ferris MD as MD " (Otolaryngology)  Jen Rey APRN CNP as Nurse Practitioner (Nurse Practitioner)  Juwan Zhang MD as MD (Radiation Oncology)    The following health maintenance items are reviewed in Epic and correct as of today:  Health Maintenance   Topic Date Due     MEDICARE ANNUAL WELLNESS VISIT  10/28/2016     ASTHMA CONTROL TEST Q6 MOS  05/30/2019     ASTHMA ACTION PLAN Q1 YR  11/30/2019     FALL RISK ASSESSMENT  01/24/2020     PHQ-2 Q1 YR  03/07/2020     MAMMO SCREEN Q2 YR (SYSTEM ASSIGNED)  11/09/2020     ADVANCE DIRECTIVE PLANNING Q5 YRS  11/02/2022     DTAP/TDAP/TD IMMUNIZATION (3 - Td) 11/05/2022     COLONOSCOPY Q5 YR  12/14/2022     LIPID SCREEN Q5 YR FEMALE (SYSTEM ASSIGNED)  02/22/2024     DEXA SCAN SCREENING (SYSTEM ASSIGNED)  Completed     INFLUENZA VACCINE  Completed     ZOSTER IMMUNIZATION  Completed     HEPATITIS C SCREENING  Completed     IPV IMMUNIZATION  Aged Out     MENINGITIS IMMUNIZATION  Aged Out     Labs reviewed in EPIC  Pneumonia Vaccine is current with vaccines  Mammogram Screening: mammogram  11/18     ROS:  CONSTITUTIONAL:POSITIVE  for weight gain and has had  10 # weight gain .  NO  Ankle edema   INTEGUMENTARY/SKIN:POSITIVE does have a rash on her face .  ( hx of shingles.  ) does have some  Red spots on the  Right hip.   EYES: NEGATIVE for vision changes or irritation and current with eye exam   ENT/MOUTH: POSITIVE for hx of oral cancer.  Is doing well.    Is current with dentist.  Sees them every  3 months.   Will be getting a bite guard.,  RESP: NEGATIVE for significant cough or SOB.    Would like her inhales.  , will be seeing  Allergist   CV: NEGATIVE for chest pain, palpitations or peripheral edema  GI: NEGATIVE for nausea, abdominal pain, heartburn, or change in bowel habits and feels that her stomach is more sensitive.  Occasionally will have some stomach issues.    : negative for urinary issues  MUSCULOSKELETAL: POSITIVE  for arthralgias knees. ,  Fingers.   Is taking magnesium  "daily   NEURO: NEGATIVE for weakness, dizziness or paresthesias  ENDOCRINE: POSITIVE  for cold intolerance and hair texture is changing,   Feeling tired,  + weight gain    HEME/ALLERGY/IMMUNE: NEGATIVE for bleeding problems  PSYCHIATRIC: NEGATIVE for changes in mood or affect.  Misses her boyfriend - he lives in [place]     OBJECTIVE:   /60 (BP Location: Left arm, Patient Position: Chair, Cuff Size: Adult Regular)   Pulse 59   Temp 97.8  F (36.6  C) (Tympanic)   Resp 16   Ht 1.622 m (5' 3.86\")   Wt 73.1 kg (161 lb 3.2 oz)   SpO2 100%   BMI 27.79 kg/m   Estimated body mass index is 27.79 kg/m  as calculated from the following:    Height as of this encounter: 1.622 m (5' 3.86\").    Weight as of this encounter: 73.1 kg (161 lb 3.2 oz).  EXAM:   GENERAL APPEARANCE: healthy, alert and no distress  EYES: Eyes grossly normal to inspection, PERRL and conjunctivae and sclerae normal  HENT: nose and mouth without ulcers or lesions, oropharynx clear, oral mucous membranes moist, right ear: occluded with wax, left ear: normal: no effusions, no erythema, normal landmarks   NECK: no adenopathy, no asymmetry, masses, or scars, thyroid normal to palpation and does have normal lymph nodes.    RESP: lungs clear to auscultation - no rales, rhonchi or wheezes  BREAST: normal without masses, tenderness or nipple discharge and no palpable axillary masses or adenopathy  CV: regular rate and rhythm, normal S1 S2, no S3 or S4, no murmur, click or rub, no peripheral edema and peripheral pulses strong  ABDOMEN: soft, nontender, no hepatosplenomegaly, no masses and bowel sounds normal  MS: no musculoskeletal defects are noted and gait is age appropriate without ataxia  SKIN: no suspicious lesions or rashes  NEURO: Normal strength and tone, sensory exam grossly normal, mentation intact and speech normal  PSYCH: mentation appears normal and affect normal/bright    Diagnostic Test Results:  Results for orders placed or performed in " visit on 02/22/19   **Vitamin D Deficiency FUTURE anytime   Result Value Ref Range    Vitamin D Deficiency screening 48 20 - 75 ug/L   **TSH with free T4 reflex FUTURE anytime   Result Value Ref Range    TSH 1.51 0.40 - 4.00 mU/L   Lipid panel reflex to direct LDL Fasting   Result Value Ref Range    Cholesterol 241 (H) <200 mg/dL    Triglycerides 65 <150 mg/dL    HDL Cholesterol 73 >49 mg/dL    LDL Cholesterol Calculated 155 (H) <100 mg/dL    Non HDL Cholesterol 168 (H) <130 mg/dL   **Basic metabolic panel FUTURE anytime   Result Value Ref Range    Sodium 137 133 - 144 mmol/L    Potassium 4.2 3.4 - 5.3 mmol/L    Chloride 103 94 - 109 mmol/L    Carbon Dioxide 32 20 - 32 mmol/L    Anion Gap 2 (L) 3 - 14 mmol/L    Glucose 84 70 - 99 mg/dL    Urea Nitrogen 13 7 - 30 mg/dL    Creatinine 0.57 0.52 - 1.04 mg/dL    GFR Estimate >90 >60 mL/min/[1.73_m2]    GFR Estimate If Black >90 >60 mL/min/[1.73_m2]    Calcium 9.0 8.5 - 10.1 mg/dL       ASSESSMENT / PLAN:     ASSESSMENT/PLAN:      ICD-10-CM    1. Routine general medical examination at a health care facility Z00.00    2. Right tennis elbow M77.11 nitroGLYcerin (NITRO-DUR) 0.4 MG/HR 24 hr patch   3. Nerve pain M79.2 PAIN MANAGEMENT REFERRAL       Patient Instructions       Preventive Health Recommendations    See your health care provider every year to    Review health changes.     Discuss preventive care.      Review your medicines if your doctor has prescribed any.      You no longer need a yearly Pap test unless you've had an abnormal Pap test in the past 10 years. If you have vaginal symptoms, such as bleeding or discharge, be sure to talk with your provider about a Pap test.      Every 1 to 2 years, have a mammogram.  If you are over 69, talk with your health care provider about whether or not you want to continue having screening mammograms.      Every 10 years, have a colonoscopy. Or, have a yearly FIT test (stool test). These exams will check for colon cancer.        Have a cholesterol test every 5 years, or more often if your doctor advises it.       Have a diabetes test (fasting glucose) every three years. If you are at risk for diabetes, you should have this test more often.       At age 65, have a bone density scan (DEXA) to check for osteoporosis (brittle bone disease).    Shots:    Get a flu shot each year.    Get a tetanus shot every 10 years.    Talk to your doctor about your pneumonia vaccines. There are now two you should receive - Pneumovax (PPSV 23) and Prevnar (PCV 13).    Talk to your pharmacist about the shingles vaccine.    Talk to your doctor about the hepatitis B vaccine.    Nutrition:     Eat at least 5 servings of fruits and vegetables each day.      Eat whole-grain bread, whole-wheat pasta and brown rice instead of white grains and rice.      Get adequate Calcium and Vitamin D.     Lifestyle    Exercise at least 150 minutes a week (30 minutes a day, 5 days a week). This will help you control your weight and prevent disease.      Limit alcohol to one drink per day.      No smoking.       Wear sunscreen to prevent skin cancer.       See your dentist twice a year for an exam and cleaning.      See your eye doctor every 1 to 2 years to screen for conditions such as glaucoma, macular degeneration and cataracts.    Personalized Prevention Plan  You are due for the preventive services outlined below.  Your care team is available to assist you in scheduling these services.  If you have already completed any of these items, please share that information with your care team to update in your medical record.  Health Maintenance Due   Topic Date Due     Annual Wellness Visit  10/28/2016       You are doing great.         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  Practitioner is supportive of decision.    COUNSELING:  Reviewed preventive health counseling, as reflected in patient instructions       Regular exercise       Healthy diet/nutrition       Vision screening       Dental care       Advanced Planning     BP Readings from Last 1 Encounters:   03/07/19 116/60     Estimated body mass index is 27.79 kg/m  as calculated from the following:    Height as of this encounter: 1.622 m (5' 3.86\").    Weight as of this encounter: 73.1 kg (161 lb 3.2 oz).           reports that she quit smoking about 39 years ago. Her smoking use included cigarettes. She started smoking about 49 years ago. She has a 14.00 pack-year smoking history. she has never used smokeless tobacco.      Appropriate preventive services were discussed with this patient, including applicable screening as appropriate for cardiovascular disease, diabetes, osteopenia/osteoporosis, and glaucoma.  As appropriate for age/gender, discussed screening for colorectal cancer, prostate cancer, breast cancer, and cervical cancer. Checklist reviewing preventive services available has been given to the patient.    Reviewed patients plan of care and provided an AVS. The Basic Care Plan (routine screening as documented in Health Maintenance) for Terese meets the Care Plan requirement. This Care Plan has been established and reviewed with the Patient.    Counseling Resources:  ATP IV Guidelines  Pooled Cohorts Equation Calculator  Breast Cancer Risk Calculator  FRAX Risk Assessment  ICSI Preventive Guidelines  Dietary Guidelines for Americans, 2010  USDA's MyPlate  ASA Prophylaxis  Lung CA Screening    PANCHO EPSTEIN NP, APRN CNP  [facility]  "

## 2019-03-07 NOTE — NURSING NOTE
"Initial /60 (BP Location: Left arm, Patient Position: Chair, Cuff Size: Adult Regular)   Pulse 59   Temp 97.8  F (36.6  C) (Tympanic)   Resp 16   Ht 1.622 m (5' 3.86\")   Wt 73.1 kg (161 lb 3.2 oz)   SpO2 100%   BMI 27.79 kg/m   Estimated body mass index is 27.79 kg/m  as calculated from the following:    Height as of this encounter: 1.622 m (5' 3.86\").    Weight as of this encounter: 73.1 kg (161 lb 3.2 oz). .    Natalie Pineda CMA ([facility])    "

## 2019-03-07 NOTE — PATIENT INSTRUCTIONS
Preventive Health Recommendations    See your health care provider every year to    Review health changes.     Discuss preventive care.      Review your medicines if your doctor has prescribed any.      You no longer need a yearly Pap test unless you've had an abnormal Pap test in the past 10 years. If you have vaginal symptoms, such as bleeding or discharge, be sure to talk with your provider about a Pap test.      Every 1 to 2 years, have a mammogram.  If you are over 69, talk with your health care provider about whether or not you want to continue having screening mammograms.      Every 10 years, have a colonoscopy. Or, have a yearly FIT test (stool test). These exams will check for colon cancer.       Have a cholesterol test every 5 years, or more often if your doctor advises it.       Have a diabetes test (fasting glucose) every three years. If you are at risk for diabetes, you should have this test more often.       At age 65, have a bone density scan (DEXA) to check for osteoporosis (brittle bone disease).    Shots:    Get a flu shot each year.    Get a tetanus shot every 10 years.    Talk to your doctor about your pneumonia vaccines. There are now two you should receive - Pneumovax (PPSV 23) and Prevnar (PCV 13).    Talk to your pharmacist about the shingles vaccine.    Talk to your doctor about the hepatitis B vaccine.    Nutrition:     Eat at least 5 servings of fruits and vegetables each day.      Eat whole-grain bread, whole-wheat pasta and brown rice instead of white grains and rice.      Get adequate Calcium and Vitamin D.     Lifestyle    Exercise at least 150 minutes a week (30 minutes a day, 5 days a week). This will help you control your weight and prevent disease.      Limit alcohol to one drink per day.      No smoking.       Wear sunscreen to prevent skin cancer.       See your dentist twice a year for an exam and cleaning.      See your eye doctor every 1 to 2 years to screen for conditions  such as glaucoma, macular degeneration and cataracts.    Personalized Prevention Plan  You are due for the preventive services outlined below.  Your care team is available to assist you in scheduling these services.  If you have already completed any of these items, please share that information with your care team to update in your medical record.  Health Maintenance Due   Topic Date Due     Annual Wellness Visit  10/28/2016       You are doing great.         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

## 2019-03-08 NOTE — TELEPHONE ENCOUNTER
Called pharmacy  Confirmed and advisd usage,  Per insurance 30  Patches have to last 90 days   [facility]nr[facility]  RN/Anuel Storey

## 2019-03-25 ENCOUNTER — MYC MEDICAL ADVICE (OUTPATIENT)
Dept: FAMILY MEDICINE | Facility: CLINIC | Age: 69
End: 2019-03-25

## 2019-03-25 DIAGNOSIS — L30.9 DERMATITIS: Primary | ICD-10-CM

## 2019-03-26 RX ORDER — BETAMETHASONE DIPROPIONATE 0.5 MG/G
OINTMENT, AUGMENTED TOPICAL 2 TIMES DAILY
Qty: 45 G | Refills: 0 | Status: SHIPPED | OUTPATIENT
Start: 2019-03-26 | End: 2019-06-05

## 2019-03-27 ENCOUNTER — OFFICE VISIT (OUTPATIENT)
Dept: OTOLARYNGOLOGY | Facility: CLINIC | Age: 69
End: 2019-03-27
Payer: MEDICARE

## 2019-03-27 VITALS — HEIGHT: 63 IN | WEIGHT: 163 LBS | BODY MASS INDEX: 28.88 KG/M2

## 2019-03-27 DIAGNOSIS — Z85.810 HISTORY OF TONGUE CANCER: Primary | ICD-10-CM

## 2019-03-27 ASSESSMENT — PAIN SCALES - GENERAL: PAINLEVEL: NO PAIN (0)

## 2019-03-27 ASSESSMENT — MIFFLIN-ST. JEOR: SCORE: 1238.49

## 2019-03-27 NOTE — LETTER
3/27/2019       RE: Terese Bell  [address] [facility] [address]     Dear Colleague,    Thank you for referring your patient, Terese Bell, to the [facility] EAR NOSE AND THROAT at [facility]. Please see a copy of my visit note below.    March 27, 2019        PRIOR ONCOLOGIC HISTORY:  Ms. 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 Schumacher.  He ended up doing 13 operations in 11 years, most recently in 2010.      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 Leo on 9/1/2016.  The margins were negative but the lymph nodes were 1/40 positive.  She completed postoperative radiation therapy on 11/29/2016.    HISTORY OF PRESENT ILLNESS:  Ms. Bettie Bell returns in good spirits.  She had a great trip to [place] and [place] with her boyfriend.  She is hoping to visit [place] this summer.      She continues to have difficulty with her weight, but this time it is weight gain.  She gained 12 pounds over a couple of weeks and was very worried about it.  She has lost a couple since that time.  We both agreed that it was better than losing weight.  She is still taking one TwoCal HN a day.  She drinks it.  We talked about whether or not she needs to continue that.      She complains of a little tenderness in the left neck.  There was a little lump there previously and she was a little bit worried about it and it has since gone away.  The tenderness is improved but it still has some residual discomfort.     Otherwise, she is doing well.  She is doing her swallowing excise continues to have some difficulty with trismus and is working with Janay on this issue.        ENT HEAD AND  NECK REVIEW Latest Ref Rng & Units 8/2/2018 11/14/2018 11/30/2018 1/24/2019 2/22/2019 3/7/2019 3/27/2019   Weight - 66.7 kg 68 kg 68.3 kg 72.9 kg - 73.1 kg 73.9 kg   TSH 0.40 - 4.00 mU/L - 1.12 - - 1.51 - -       PHYSICAL EXAMINATION:  On examination, she looks well.  She is unaccompanied today.  The oral cavity and oropharynx continue to look great.  The tongue is slightly tethered to the right gutter, but there are no palpable masses.  The graft looks excellent.  The rest of the oral cavity and oropharynx looks good.  There are no lesions seen.      The neck has a little point tenderness over the left thyroid cartilage laterally, where the greater alar takes off from the lamina.   I sometimes can feel a small lymph node over it and other times it just feels like the cartilage itself is a little bit tender.  There are no palpable masses otherwise.      IMPRESSION AND PLAN:  We deferred the fiberoptic examination today, but will repeat it next time.  She is now 2.5 years out and WILMA.     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  I told her to keep an eye on it and if it worsens, we will order an earlier CT scan.  At the last CT scan 4 months ago, she had the symptoms already, but the scans were negative.  We decided to bring her back a little sooner, in three months' time.  Her weight will continue to be watched.       Thomas Ferris MD  Otolaryngology/Head & Neck Surgery  [phone]        Nicole Mcdonald NP    [facility]    [address], [place]  [address]       Pattie Natarajan MD    Radiation Therapy     [address] [facility]    [address]    [place], [place]  [address]     Moreno Leo MD  Otolaryngology/Head & Neck Surgery  [place] [address]

## 2019-03-27 NOTE — PROGRESS NOTES
March 27, 2019        PRIOR ONCOLOGIC HISTORY:  Ms. 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 Schumacher.  He ended up doing 13 operations in 11 years, most recently in 2010.      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 Leo on 9/1/2016.  The margins were negative but the lymph nodes were 1/40 positive.  She completed postoperative radiation therapy on 11/29/2016.    HISTORY OF PRESENT ILLNESS:  Ms. Bettie Bell returns in good spirits.  She had a great trip to [place] and [place] with her boyfriend.  She is hoping to visit [place] this summer.      She continues to have difficulty with her weight, but this time it is weight gain.  She gained 12 pounds over a couple of weeks and was very worried about it.  She has lost a couple since that time.  We both agreed that it was better than losing weight.  She is still taking one TwoCal HN a day.  She drinks it.  We talked about whether or not she needs to continue that.      She complains of a little tenderness in the left neck.  There was a little lump there previously and she was a little bit worried about it and it has since gone away.  The tenderness is improved but it still has some residual discomfort.     Otherwise, she is doing well.  She is doing her swallowing excise continues to have some difficulty with trismus and is working with Janay on this issue.        ENT HEAD AND NECK REVIEW Latest Ref Rng & Units 8/2/2018 11/14/2018 11/30/2018 1/24/2019 2/22/2019 3/7/2019 3/27/2019   Weight - 66.7 kg 68 kg 68.3 kg 72.9 kg - 73.1 kg 73.9 kg   TSH 0.40 - 4.00 mU/L - 1.12 - - 1.51 - -       PHYSICAL EXAMINATION:  On examination, she looks well.  She is unaccompanied today.  The oral cavity  and oropharynx continue to look great.  The tongue is slightly tethered to the right gutter, but there are no palpable masses.  The graft looks excellent.  The rest of the oral cavity and oropharynx looks good.  There are no lesions seen.      The neck has a little point tenderness over the left thyroid cartilage laterally, where the greater alar takes off from the lamina.   I sometimes can feel a small lymph node over it and other times it just feels like the cartilage itself is a little bit tender.  There are no palpable masses otherwise.      IMPRESSION AND PLAN:  We deferred the fiberoptic examination today, but will repeat it next time.  She is now 2.5 years out and WILMA.     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  I told her to keep an eye on it and if it worsens, we will order an earlier CT scan.  At the last CT scan 4 months ago, she had the symptoms already, but the scans were negative.  We decided to bring her back a little sooner, in three months' time.  Her weight will continue to be watched.       Thomas Ferris MD  Otolaryngology/Head & Neck Surgery  [phone]              CC    Nicole Mcdonald NP    [facility]    [address], [place]  [address]       Pattie Natarajan MD    Radiation Therapy     [address]    [address]    [place], [place]  [address]     Moreno Leo MD  Otolaryngology/Head & Neck Surgery  [place] [address]

## 2019-03-27 NOTE — PATIENT INSTRUCTIONS
1. Please follow-up in clinic in June 2. Please call [facility] at [phone] with any new/worsening concerns regarding your neck. We will order a CT if needed.  2. Please call the ENT clinic with any questions,concerns, new or worsening symptoms.    -Clinic number is [phone]

## 2019-03-27 NOTE — NURSING NOTE
"Chief Complaint   Patient presents with     RECHECK     Return 3 month F/U Tongue cancer, No pain today.        Ht 1.6 m (5' 3\")   Wt 73.9 kg (163 lb)   BMI 28.87 kg/m      MIGUELITO Montoya LPN    "

## 2019-06-05 ENCOUNTER — ALLIED HEALTH/NURSE VISIT (OUTPATIENT)
Dept: SPEECH THERAPY | Facility: CLINIC | Age: 69
End: 2019-06-05

## 2019-06-05 ENCOUNTER — ANCILLARY PROCEDURE (OUTPATIENT)
Dept: CT IMAGING | Facility: CLINIC | Age: 69
End: 2019-06-05
Attending: OTOLARYNGOLOGY
Payer: MEDICARE

## 2019-06-05 ENCOUNTER — OFFICE VISIT (OUTPATIENT)
Dept: OTOLARYNGOLOGY | Facility: CLINIC | Age: 69
End: 2019-06-05
Payer: MEDICARE

## 2019-06-05 ENCOUNTER — TELEPHONE (OUTPATIENT)
Dept: OTOLARYNGOLOGY | Facility: CLINIC | Age: 69
End: 2019-06-05

## 2019-06-05 VITALS
SYSTOLIC BLOOD PRESSURE: 109 MMHG | RESPIRATION RATE: 16 BRPM | TEMPERATURE: 97.7 F | BODY MASS INDEX: 29.06 KG/M2 | DIASTOLIC BLOOD PRESSURE: 64 MMHG | HEIGHT: 63 IN | WEIGHT: 164 LBS | HEART RATE: 55 BPM

## 2019-06-05 DIAGNOSIS — C02.9 MALIGNANT NEOPLASM OF TONGUE (H): ICD-10-CM

## 2019-06-05 DIAGNOSIS — Z85.810 HISTORY OF TONGUE CANCER: Primary | ICD-10-CM

## 2019-06-05 DIAGNOSIS — C02.9 TONGUE CANCER (H): Primary | ICD-10-CM

## 2019-06-05 DIAGNOSIS — R13.12 OROPHARYNGEAL DYSPHAGIA: ICD-10-CM

## 2019-06-05 DIAGNOSIS — Z85.810 HISTORY OF TONGUE CANCER: ICD-10-CM

## 2019-06-05 ASSESSMENT — PAIN SCALES - GENERAL: PAINLEVEL: MILD PAIN (2)

## 2019-06-05 ASSESSMENT — MIFFLIN-ST. JEOR: SCORE: 1243.03

## 2019-06-05 NOTE — PROGRESS NOTES
SLP: Pt seen for brief f/u at the request of MD due to increasing concern for trismus.  Pt reports increased trismus as she has not been completing exercises as as regularly since return from her vacation in May.  Pt no longer able to maintain 3 fingers during jaw stretch.  Instruction and encouragement provided on trismus exercises as well as oropharyngeal strengthening exercises.  Pt aware of exercises and independently recalled.  Pt plans to re-initiate exercises as she had previously done, using her [employer] during commercial breaks during AM and PM television programs.  Further treatmnet not clinically indicated at this time; however, ST will plan to f/u when pt returns to ENT clinic.

## 2019-06-05 NOTE — PROGRESS NOTES
Jun 5, 2019    PRIOR ONCOLOGIC HISTORY:  Ms. 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 Schumacher.  He ended up doing 13 operations in 11 years, most recently in 2010.      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 Leo on 9/1/2016.  The margins were negative but the lymph nodes were 1/40 positive.  She completed postoperative radiation therapy on 11/29/2016.    HISTORY OF PRESENT ILLNESS:  Ms. Bettie Bell has been doing well.  The discomfort that she had in her left neck at the last visit continues.  She feels like it is perhaps getting a little bit worse.  She is still able to swallow and speak, and that has not been affected.      Her boyfriend, Stas, got quite ill over the summer and is being treated right now.  We talked about that a little bit.      PHYSICAL EXAMINATION:  She looks terrific.  She is in good spirits.  The oral cavity and oropharynx look good.  The flap looks to be in excellent position with a reasonable sulcus.  The periphery of the flap was palpated very carefully and was clear.  There were no areas of induration or ulceration.  The rest of the oral cavity and oropharynx look terrific.  There is point tenderness in the left thyroid ala region.  The surrounding thyroid cartilage is minimally tender.  She also has some tenderness in the right mandible on the medial aspect of it around the bicuspid area.  However, there is no ulceration.      Fiberoptic Endoscopy:  Consent for fiberoptic laryngoscopy was obtained, and we confirmed correctness of procedure and identity of patient.  Fiberoptic laryngoscopy was indicated due to the need to evaluate the piriform sinus on the left due to concern of osteoradionecrosis of the left  thyroid ala.  The nose was topically decongested and anesthetized.  The fiberoptic laryngoscope was passed under endoscopic vision.  The turbinates were normal.  The inferior and middle meati were clear bilaterally without purulence, masses, or polyps.  The nasopharynx was clear.  The eustachian tubes were clear.  The soft palate appeared normal with good mobility.  The epiglottis was sharp and the visualized portion of the vallecula was clear.  The larynx was clear with mobile cords.  The arytenoids were clear and there was no pooling in the hypopharynx. There is no evidence of inflammation in the left piriform sinus or pooling.      IMPRESSION AND PLAN:  Ms. Bettie Bell is doing well and has no evidence of disease.  However, I am a little bit concerned about the persisting pain in the left thyroid ala as well as in the right mandible.  We will obtain a CT scan at this time.  If there is evidence of osteoradionecrosis or other concerns, we may want to consider starting her on vitamin E, Trental, and antibiotics.  If she is doing well, we can otherwise see her back in six months' time.           Thomas Ferris MD  Otolaryngology/Head & Neck Surgery  [phone]                  Nicole Mcdonald NP    [facility]    [address], [place]  [address]       Pattie Natarajan MD    Radiation Therapy     [address]    [address]    [place], [place]  [address]     Moreno Leo MD  Otolaryngology/Head & Neck Surgery  [place] [address]

## 2019-06-05 NOTE — NURSING NOTE
"Chief Complaint   Patient presents with     RECHECK     tongue cancer    Blood pressure 109/64, pulse 55, temperature 97.7  F (36.5  C), resp. rate 16, height 1.6 m (5' 3\"), weight 74.4 kg (164 lb), not currently breastfeeding.      Hilton Chong LPN    "

## 2019-06-05 NOTE — LETTER
6/5/2019       RE: Terese Bell  [address]  [place] [address]     Dear Colleague,    Thank you for referring your patient, Terese Bell, to the [facility] E[facility]AT at [facility]. Please see a copy of my visit note below.      Jun 5, 2019    PRIOR ONCOLOGIC HISTORY:  Ms. 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 Schumacher.  He ended up doing 13 operations in 11 years, most recently in 2010.      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 Leo on 9/1/2016.  The margins were negative but the lymph nodes were 1/40 positive.  She completed postoperative radiation therapy on 11/29/2016.    HISTORY OF PRESENT ILLNESS:  Ms. Bettie Bell has been doing well.  The discomfort that she had in her left neck at the last visit continues.  She feels like it is perhaps getting a little bit worse.  She is still able to swallow and speak, and that has not been affected.      Her boyfriend, Stas, got quite ill over the summer and is being treated right now.  We talked about that a little bit.      PHYSICAL EXAMINATION:  She looks terrific.  She is in good spirits.  The oral cavity and oropharynx look good.  The flap looks to be in excellent position with a reasonable sulcus.  The periphery of the flap was palpated very carefully and was clear.  There were no areas of induration or ulceration.  The rest of the oral cavity and oropharynx look terrific.  There is point tenderness in the left thyroid ala region.  The surrounding thyroid cartilage is minimally tender.  She also has some tenderness in the right mandible on the medial aspect of it around the bicuspid area.  However, there  is no ulceration.      Fiberoptic Endoscopy:  Consent for fiberoptic laryngoscopy was obtained, and we confirmed correctness of procedure and identity of patient.  Fiberoptic laryngoscopy was indicated due to the need to evaluate the piriform sinus on the left due to concern of osteoradionecrosis of the left thyroid ala.  The nose was topically decongested and anesthetized.  The fiberoptic laryngoscope was passed under endoscopic vision.  The turbinates were normal.  The inferior and middle meati were clear bilaterally without purulence, masses, or polyps.  The nasopharynx was clear.  The eustachian tubes were clear.  The soft palate appeared normal with good mobility.  The epiglottis was sharp and the visualized portion of the vallecula was clear.  The larynx was clear with mobile cords.  The arytenoids were clear and there was no pooling in the hypopharynx. There is no evidence of inflammation in the left piriform sinus or pooling.      IMPRESSION AND PLAN:  Ms. Bettie Bell is doing well and has no evidence of disease.  However, I am a little bit concerned about the persisting pain in the left thyroid ala as well as in the right mandible.  We will obtain a CT scan at this time.  If there is evidence of osteoradionecrosis or other concerns, we may want to consider starting her on vitamin E, Trental, and antibiotics.  If she is doing well, we can otherwise see her back in six months' time.       Thomas Ferris MD  Otolaryngology/Head & Neck Surgery  [phone]          Nicole Mcdonald NP    [facility]    [address] [place], [place]  [address]       Pattie Natarajan MD    Radiation Therapy     [address]    [address]    [place], [place]  [address]     Moreno Leo MD  Otolaryngology/Head & Neck Surgery  [facility] [address]                Again, thank you for allowing me to participate in the care of your patient.      Sincerely,    Thomas Ferris MD

## 2019-06-05 NOTE — TELEPHONE ENCOUNTER
[facility] Call Center    Phone Message    May a detailed message be left on voicemail: yes    Reason for Call: Other: Pt called to let Rachel know that she already scheduled the follow up appt. No other message given     Action Taken: Message routed to:  [facility] ([facility]): see above

## 2019-06-05 NOTE — PATIENT INSTRUCTIONS
-We will call with CT results once available.  -Follow up in clinic with Dr. Ferris in 4-6 months.

## 2019-06-05 NOTE — TELEPHONE ENCOUNTER
LVM with patient to help schedule 4-6 Month F/U with Dr. Ferris.  Left call center number to help schedule.

## 2019-06-13 ENCOUNTER — OFFICE VISIT (OUTPATIENT)
Dept: PALLIATIVE MEDICINE | Facility: CLINIC | Age: 69
End: 2019-06-13
Payer: MEDICARE

## 2019-06-13 ENCOUNTER — PATIENT OUTREACH (OUTPATIENT)
Dept: OTOLARYNGOLOGY | Facility: CLINIC | Age: 69
End: 2019-06-13

## 2019-06-13 VITALS
WEIGHT: 164 LBS | SYSTOLIC BLOOD PRESSURE: 92 MMHG | RESPIRATION RATE: 16 BRPM | BODY MASS INDEX: 29.06 KG/M2 | DIASTOLIC BLOOD PRESSURE: 63 MMHG | HEIGHT: 63 IN | HEART RATE: 63 BPM

## 2019-06-13 DIAGNOSIS — B02.29 POST HERPETIC NEURALGIA: Primary | ICD-10-CM

## 2019-06-13 PROCEDURE — 97810 ACUP 1/> WO ESTIM 1ST 15 MIN: CPT | Mod: GA | Performed by: FAMILY MEDICINE

## 2019-06-13 PROCEDURE — 97811 ACUP 1/> W/O ESTIM EA ADD 15: CPT | Mod: GA | Performed by: FAMILY MEDICINE

## 2019-06-13 PROCEDURE — 99214 OFFICE O/P EST MOD 30 MIN: CPT | Mod: 25 | Performed by: FAMILY MEDICINE

## 2019-06-13 ASSESSMENT — MIFFLIN-ST. JEOR: SCORE: 1243.03

## 2019-06-13 ASSESSMENT — PAIN SCALES - GENERAL: PAINLEVEL: MILD PAIN (2)

## 2019-06-13 NOTE — PROGRESS NOTES
Called and left message for patient with the following CT scan results:    IMPRESSION: Postsurgical changes of right hemiglossectomy and right  neck dissection with no evidence of recurrent mass on these  noncontrast images. No lymphadenopathy.       Patient will follow-up with Dr. Ferris in 6 months. Left direct line for return call with any further questions or concerns.     Lisa Faulkner, RN, BSN

## 2019-06-13 NOTE — PROGRESS NOTES
S: 68-year-old past medical history significant for osteoarthritis, asthma, rosacea, hyperlipidemia, glossal cancer status post resection/radiation/chemotherapy, presents to acupuncture clinic for consideration of acupuncture to address concerns of postherpetic neuralgia involving the left side of her face and jaw area.  History is obtained from the patient who states that after undergoing multiple surgeries and radiation for tongue cancer in 2016 she developed shingles in a left sided facial distribution with ocular sparing in 9/17.  She states that the herpetic rash took almost 4 months to resolve, subsequently she has had constant intermittent colicky type pain involving her left face and neck and occiput area.  Multiple treatment modalities have been directed at this including simple over-the-counter pain medication, NSAID class medication, narcotics, physical therapy, massage, and also some over-the-counter products as well with very limited success.  The patient underwent evaluation by Dr. Slade Kay with the pain clinic and acupuncture was recommended as 1 of the modalities to try to address her ongoing pain.  The patient states there is always a baseline, dull achy discomfort, occasional lancinating type pain at times and this has been so severe that she is actually blacked out from the pain.  The patient has previous acupuncture experience when she was undergoing her surgery and radiation therapy in hospital at [facility] and was pleased with the experience.    Active Ambulatory Problems     Diagnosis Date Noted     Absence of menstruation 06/17/2004     Generalized osteoarthrosis, unspecified site 06/17/2004     Moderate persistent asthma      FAMILY HX GI MALIGNANCY brother colon ca.  09/13/2006     Rosacea 03/03/2010     HYPERLIPIDEMIA LDL GOAL <130 05/09/2010     Tear Meniscus Knee  repaired 06/17/2010     24 hour info given 03/22/2012     Myalgia 11/13/2012     History of  tongue cancer 07/05/2016     Tongue irritation 07/05/2016     Tongue cancer (H) 08/29/2016     History of recent ear, nose, and throat (ENT) procedure 09/01/2016     Malignant neoplasm of tongue (H) 09/08/2016     Fistula 09/12/2016     Cervical cancer screening 10/05/2016     Advanced directives, counseling/discussion 11/02/2017     Resolved Ambulatory Problems     Diagnosis Date Noted     Pityriasis rosea 06/17/2004     Pure hypercholesterolemia 06/30/2004     Mild major depression (H) 04/06/2009     Advanced directives,HC information sent 3/27/2012 03/27/2012     RUQ abdominal pain 11/13/2012     Obese 11/13/2012     Past Medical History:   Diagnosis Date     Arthritis      Cerebral infarction (H) .     Complication of anesthesia      Depressive disorder 4/6/2009     Difficult intubation      Hearing problem 9/2016     Hoarseness      Mild major depression (H) 4/6/2009     Moderate persistent asthma      Obesity, unspecified      Pure hypercholesterolemia      Rosacea 3/3/2010     Symptomatic menopausal or female climacteric states      Thrombosis of leg      Tinnitus      Tongue cancer (H)      Past Surgical History:   Procedure Laterality Date     ADENOIDECTOMY      1970     C NONSPECIFIC PROCEDURE      CHOLECYSTECTOMY     C NONSPECIFIC PROCEDURE      SINUS MASS REMOVED     C NONSPECIFIC PROCEDURE      R KNEE SURGERY     CHOLECYSTECTOMY       COLONOSCOPY  11/26/2012    Procedure: COLONOSCOPY;  Colonoscopy;  Surgeon: Yony Garcia MD;  Location: [facility]     COLONOSCOPY N/A 12/14/2017    Procedure: COLONOSCOPY;  colonoscopy;  Surgeon: Sterling Mckeon MD;  Location: [facility]     EXAM UNDER ANESTHESIA MOUTH N/A 8/15/2016    Procedure: EXAM UNDER ANESTHESIA MOUTH;  Surgeon: Thomas Ferris MD;  Location[facility]     GLOSSECTOMY Right 9/1/2016    Procedure: GLOSSECTOMY;  Surgeon: Thomas Ferris MD;  [facility]     GLOSSECTOMY PARTIAL       GRAFT FREE VASCULARIZED (LOCATION) Left 9/1/2016    Procedure: GRAFT FREE  VASCULARIZED (LOCATION);  Surgeon: Moreno Leo MD;  Location: [facility]     GRAFT SKIN SPLIT THICKNESS FROM EXTREMITY Left 2016    Procedure: GRAFT SKIN SPLIT THICKNESS FROM EXTREMITY;  Surgeon: Moreno Leo MD;  Location: [facility]     HC UGI ENDOSCOPY W PLACEMENT GASTROSTOMY TUBE PERCUT N/A 2016    Procedure: COMBINED ESOPHAGOSCOPY, GASTROSCOPY, DUODENOSCOPY (EGD), PLACE PERCUTANEOUS ENDOSCOPIC GASTROSTOMY TUBE;  Surgeon: Sterling Mckeon MD;  Location: [facility]     HEAD & NECK SURGERY       KNEE SURGERY      bilat     LARYNGOSCOPY WITH BIOPSY(IES) N/A 8/15/2016    Procedure: LARYNGOSCOPY WITH BIOPSY(IES);  Surgeon: Thomas Ferris MD;  Location:  OR     NASAL/SINUS POLYPECTOMY       approx     ORTHOPEDIC SURGERY   and     knee repair (r) and clean out (l)     PAROTIDECTOMY, RADICAL NECK DISSECTION Right 2016    Procedure: PAROTIDECTOMY, RADICAL NECK DISSECTION;  Surgeon: Thomas eFrris MD;  Location:  OR     TONSILLECTOMY      1970     TRACHEOSTOMY Right 2016    Procedure: TRACHEOSTOMY;  Surgeon: Thomas Ferris MD;  [facility]     Social History     Socioeconomic History     Marital status:      Spouse name: Сергей     Number of children: 4     Years of education: Not on file     Highest education level: Not on file   Occupational History     Not on file   Social Needs     Financial resource strain: Not on file     Food insecurity:     Worry: Not on file     Inability: Not on file     Transportation needs:     Medical: Not on file     Non-medical: Not on file   Tobacco Use     Smoking status: Former Smoker     Packs/day: 2.00     Years: 7.00     Pack years: 14.00     Types: Cigarettes     Start date: 1970     Last attempt to quit: 1980     Years since quittin.4     Smokeless tobacco: Never Used   Substance and Sexual Activity     Alcohol use: Yes     Comment: 4 drinks per year     Drug use: No     Sexual activity: Not Currently     Partners: Male     Birth  control/protection: None   Lifestyle     Physical activity:     Days per week: Not on file     Minutes per session: Not on file     Stress: Not on file   Relationships     Social connections:     Talks on phone: Not on file     Gets together: Not on file     Attends Islam service: Not on file     Active member of club or organization: Not on file     Attends meetings of clubs or organizations: Not on file     Relationship status: Not on file     Intimate partner violence:     Fear of current or ex partner: Not on file     Emotionally abused: Not on file     Physically abused: Not on file     Forced sexual activity: Not on file   Other Topics Concern     Parent/sibling w/ CABG, MI or angioplasty before 65F 55M? Not Asked   Social History Narrative        Dairy/d 2 servings/d.     Caffeine 6-7 servings/d    Exercise 3 x week    Sunscreen used - Yes    Seatbelts used - Yes    Working smoke/CO detectors in the home - Yes    Guns stored in the home - No    Self Breast Exams - Yes    Self Testicular Exam - NA    Eye Exam up to date - No    Dental Exam up to date - Yes    Pap Smear up to date - No    Mammogram up to date - Yes    PSA up to date - NA    Dexa Scan up to date - Yes    Flex Sig / Colonoscopy up to date - No    Immunizations up to date - Yes    Abuse: Current or Past(Physical, Sexual or Emotional)- No    Do you feel safe in your environment - Yes        Agustin Fox CMA                November 30, 2018    ENVIRONMENTAL HISTORY: [place] The home is heated with a forced air. They do have central air conditioning. The patient's bedroom is furnished with carpeting in bedroom, allergen mattress cover, allergen pillowcase cover and fabric window coverings. No pets inside the house. There is no history of cockroach or mice infestation. There are no smokers in the house.  The house does not have a damp basement.      Family History   Problem Relation Age of Onset      Neurologic Disorder Brother         migranes     Hypertension Mother      Arthritis Mother      Cerebrovascular Disease Mother      Hypertension Father      Prostate Cancer Father      Cancer Father      Alzheimer Disease Paternal Grandmother      Hypertension Brother      Arthritis Brother      Respiratory Brother         emphysema     Cancer - colorectal Brother      C.A.D. Maternal Uncle      Cancer Brother      Hypertension Brother      Diabetes No family hx of      Breast Cancer No family hx of      Current Outpatient Medications   Medication     augmented betamethasone dipropionate (DIPROLENE-AF) 0.05 % external cream     CANNABIDIOL, HEMP OIL/EXTRACT, OR CBD PRODUCT OTHER THAN MEDICAL CANNABIS,     EPINEPHrine (EPIPEN/ADRENACLICK/OR ANY BX GENERIC EQUIV) 0.3 MG/0.3ML injection 2-pack     Fluticasone-Salmeterol (ADVAIR DISKUS IN)     MAGNESIUM PO     nitroGLYcerin (NITRO-DUR) 0.4 MG/HR 24 hr patch     vitamin D3 (CHOLECALCIFEROL) 2000 units tablet     No current facility-administered medications for this visit.      Facility-Administered Medications Ordered in Other Visits   Medication     Lidocaine 1% injection 1 mL     lidocaine 4 % (LMX4) cream     sodium chloride (PF) 0.9% PF flush 3 mL     sodium chloride (PF) 0.9% PF flush 3 mL        Allergies   Allergen Reactions     Banana Swelling and Anaphylaxis     Tongue and lips swell and get itchy     Bee Pollen Anaphylaxis     Bee Venom Anaphylaxis     Blueberry Anaphylaxis and Swelling     Contrast Dye Anaphylaxis     Iodine Anaphylaxis     NOT dietary related.     Penicillins Anaphylaxis     Shellfish-Derived Products Swelling and Difficulty breathing     Sulfa Drugs Anaphylaxis     Erythromycin Nausea     Pravastatin Sodium Other (See Comments)     muscle spasam     Simvastatin Cough     Strawberry Hives     Latex Rash     Rash from bandages/wraps/pressure tapes     Tetracycline GI Disturbance     Other reaction(s): GI Upset  And tingling         Review Of  Systems  Skin: negative  Eyes: negative  Ears/Nose/Throat: negative  Respiratory: No shortness of breath, dyspnea on exertion, cough, or hemoptysis  Cardiovascular: negative  Gastrointestinal: negative  Genitourinary: negative  Musculoskeletal: negative and neck pain  Neurologic: Baseline left jaw, left occiput area pain occasionally lancinating.  Psychiatric: negative  Hematologic/Lymphatic/Immunologic: negative  Endocrine: negative      O:  Exam:  Constitutional: healthy, alert and no distress  Head: Normocephalic. No masses, lesions, tenderness or abnormalities.  Postsurgical changes to the tongue as well as to the right neck area.  Neck: Neck supple. No adenopathy. Thyroid symmetric, normal size,, Carotids without bruits.  No lesions.  ENT: ENT exam normal, no neck nodes or sinus tenderness  Cardiovascular: negative, PMI normal. No lifts, heaves, or thrills. RRR. No murmurs, clicks gallops or rub  Respiratory: negative, Percussion normal. Good diaphragmatic excursion. Lungs clear  Gastrointestinal: Abdomen soft, non-tender. BS normal. No masses, organomegaly  : Deferred  Musculoskeletal: extremities normal- no gross deformities noted, gait normal and normal muscle tone  Skin: no suspicious lesions or rashes  Neurologic: Gait normal. Reflexes normal and symmetric. Sensation grossly WNL.  Psychiatric: mentation appears normal and affect normal/bright  Hematologic/Lymphatic/Immunologic: Normal cervical lymph nodes    A: Postherpetic neuralgia  P: Acupuncture risks and benefits as well as realistic expectations are reviewed with the patient she wishes to proceed.  We will plan to see her once every 1 to 2 weeks initially for 4 to 6 weeks and assess response.      Pre-Procedure:  Patient's Name and Date of Birth verified:  YES  Verified By:  [facility] (initials)  Diagnosis:  Data Unavailable  Interval History:  na  Complications and/or Adverse Effects of Last Acupuncture Treatment: na   Site Marking and  Verification:  Verification of site selection (based on symptoms and condition:  YES  Verified by: brandy (initials)  Patient identification verified by provider: YES  Verified by: rituc (initials)  Pause for the Cause:  YES  Verified by: brandy (initials)   Procedure Note:  Acupuncture Treatment Number: 1  Acupuncture Points Selected: Mixed facial L side, Four Pereyra, AUR: ASP 1-5.    Electrical Stim: No  Frequency 0 HZ    Number of needles:  17      Re-insertion/Manipulation of needles after initial 15 minutes: YES  Time:  30   Minutes   Post-Procedure:  Complication/Adverse Effects: 0    Management:  0    Signature:  Juan Can MD

## 2019-06-13 NOTE — NURSING NOTE
"Pain Clinic Visit    Chief Complaint   Patient presents with     Neurologic Problem     left jaw line, back of head       Injury: Patient had surgery, 9/1/16, for partial tongue replacement. The radiation caused her to develop shingles on 9/1/17 - mid 12/2017. She now has left jaw pain and posterior head pain, left side.     Location of Pain: left jaw pain and left posterior head pain  Duration of Pain: 9/1/17  Rating of Pain: 4/10 constant  Pain is better with: hot packs, moist hand towel, time  Pain is worse with: when the itching in her head starts and the blistering happens  Treatment so far consists of: none, she has qualified for the [place] medical marijuana program if she was interested, she has seen pain management, Dr. Laurence Wilson  Ever received acupuncture before? Yes when she was in rehab        Initial BP 92/63   Pulse 63   Resp 16   Ht 1.6 m (5' 3\")   Wt 74.4 kg (164 lb)   BMI 29.05 kg/m   Estimated body mass index is 29.05 kg/m  as calculated from the following:    Height as of this encounter: 1.6 m (5' 3\").    Weight as of this encounter: 74.4 kg (164 lb).  Medication Reconciliation: giovanni Seals Certified Medical Assistant     "

## 2019-06-13 NOTE — PROGRESS NOTES
Called patient to inform her of the following CT Soft Tissue Neck:     Impression     IMPRESSION: Postsurgical changes of right hemiglossectomy and right  neck dissection with no evidence of recurrent mass on these  noncontrast images. No lymphadenopathy.     Patient is appreciative of the call. Patient verbalizes understanding of results. Patient denies any questions or concerns at this time.     Veronica Laboy RN

## 2019-07-02 ENCOUNTER — OFFICE VISIT (OUTPATIENT)
Dept: PALLIATIVE MEDICINE | Facility: CLINIC | Age: 69
End: 2019-07-02
Payer: MEDICARE

## 2019-07-02 VITALS
BODY MASS INDEX: 29.06 KG/M2 | SYSTOLIC BLOOD PRESSURE: 110 MMHG | DIASTOLIC BLOOD PRESSURE: 67 MMHG | WEIGHT: 164 LBS | HEART RATE: 54 BPM | HEIGHT: 63 IN

## 2019-07-02 DIAGNOSIS — B02.29 POSTHERPETIC NEURALGIA: Primary | ICD-10-CM

## 2019-07-02 PROCEDURE — 97811 ACUP 1/> W/O ESTIM EA ADD 15: CPT | Mod: GA | Performed by: FAMILY MEDICINE

## 2019-07-02 PROCEDURE — 97810 ACUP 1/> WO ESTIM 1ST 15 MIN: CPT | Mod: GA | Performed by: FAMILY MEDICINE

## 2019-07-02 ASSESSMENT — MIFFLIN-ST. JEOR: SCORE: 1243.03

## 2019-07-02 ASSESSMENT — PAIN SCALES - GENERAL: PAINLEVEL: MILD PAIN (3)

## 2019-07-02 NOTE — NURSING NOTE
"Pain Clinic Visit    Chief Complaint   Patient presents with     Acupuncture     left jaw line and posterior head       After the last acupuncture session patient states: she felt relief for a few days after the treatment but the last 4-5 days pain has been between 5-6. She is wondering if the weather has anything to do with it. She has trouble sleeping. She has a lot of tightness. Her mouth doesn't open very wide, not wide enough to get food in.     Rating of Pain: Mild Pain (3)    Jonelle Seals Clinical Medical Assistant       Initial /67   Pulse 54   Ht 1.6 m (5' 3\")   Wt 74.4 kg (164 lb)   BMI 29.05 kg/m   Estimated body mass index is 29.05 kg/m  as calculated from the following:    Height as of this encounter: 1.6 m (5' 3\").    Weight as of this encounter: 74.4 kg (164 lb).  Medication Reconciliation: complete    "

## 2019-07-11 ENCOUNTER — OFFICE VISIT (OUTPATIENT)
Dept: PALLIATIVE MEDICINE | Facility: CLINIC | Age: 69
End: 2019-07-11
Payer: MEDICARE

## 2019-07-11 VITALS
DIASTOLIC BLOOD PRESSURE: 64 MMHG | BODY MASS INDEX: 29.06 KG/M2 | WEIGHT: 164 LBS | HEIGHT: 63 IN | HEART RATE: 55 BPM | SYSTOLIC BLOOD PRESSURE: 104 MMHG

## 2019-07-11 DIAGNOSIS — B02.29 POSTHERPETIC NEURALGIA: Primary | ICD-10-CM

## 2019-07-11 PROCEDURE — 97811 ACUP 1/> W/O ESTIM EA ADD 15: CPT | Mod: GA | Performed by: FAMILY MEDICINE

## 2019-07-11 PROCEDURE — 97810 ACUP 1/> WO ESTIM 1ST 15 MIN: CPT | Mod: GA | Performed by: FAMILY MEDICINE

## 2019-07-11 ASSESSMENT — MIFFLIN-ST. JEOR: SCORE: 1243.03

## 2019-07-11 ASSESSMENT — PAIN SCALES - GENERAL: PAINLEVEL: MILD PAIN (2)

## 2019-07-11 NOTE — NURSING NOTE
"Pain Clinic Visit    Chief Complaint   Patient presents with     Acupuncture     Jaw nerve pain, top of left ear and left side of head       After the last acupuncture session patient states: things have been pretty good. She felt really good the day of the tx but the next day \"wham,\" but the weather was bad. Saturday evening she had a lot of pain, bad. That hasn't happened in a while - 6-8 months. She had to use some hot packs and liquid oxycodone.    Rating of Pain: Mild Pain (2)    Jonelle Selas Clinical Medical Assistant       Initial /64   Pulse 55   Ht 1.6 m (5' 3\")   Wt 74.4 kg (164 lb)   BMI 29.05 kg/m   Estimated body mass index is 29.05 kg/m  as calculated from the following:    Height as of this encounter: 1.6 m (5' 3\").    Weight as of this encounter: 74.4 kg (164 lb).  Medication Reconciliation: complete      "

## 2019-07-18 ENCOUNTER — OFFICE VISIT (OUTPATIENT)
Dept: PALLIATIVE MEDICINE | Facility: CLINIC | Age: 69
End: 2019-07-18
Payer: MEDICARE

## 2019-07-18 VITALS
SYSTOLIC BLOOD PRESSURE: 108 MMHG | HEART RATE: 68 BPM | WEIGHT: 164 LBS | HEIGHT: 63 IN | DIASTOLIC BLOOD PRESSURE: 65 MMHG | BODY MASS INDEX: 29.06 KG/M2

## 2019-07-18 DIAGNOSIS — B02.29 POSTHERPETIC NEURALGIA: Primary | ICD-10-CM

## 2019-07-18 PROCEDURE — 97811 ACUP 1/> W/O ESTIM EA ADD 15: CPT | Mod: GA | Performed by: FAMILY MEDICINE

## 2019-07-18 PROCEDURE — 97810 ACUP 1/> WO ESTIM 1ST 15 MIN: CPT | Mod: GA | Performed by: FAMILY MEDICINE

## 2019-07-18 ASSESSMENT — MIFFLIN-ST. JEOR: SCORE: 1243.03

## 2019-07-18 ASSESSMENT — PAIN SCALES - GENERAL: PAINLEVEL: MILD PAIN (2)

## 2019-07-18 NOTE — PROGRESS NOTES
S: She noticed approximately 3 days of relief, almost complete after the initial treatment.  Back to baseline.  O:Alert NAD  A: Postherpetic neuralgia  P:Acu per note.  Pre-Procedure:  Patient's Name and Date of Birth verified:  YES  Verified By:  brandy (initials)  Diagnosis:  Data Unavailable  Interval History:  2w  Complications and/or Adverse Effects of Last Acupuncture Treatment: 0   Site Marking and Verification:  Verification of site selection (based on symptoms and condition:  YES  Verified by: brandy (initials)  Patient identification verified by provider: YES  Verified by: brandy (initials)  Pause for the Cause:  YES  Verified by: brandy (initials)   Procedure Note:  Acupuncture Treatment Number: 2  Acupuncture Points Selected: Mixed L face. Four Pereyra. ASP 1-5    Electrical Stim: No  Frequency 0 HZ    Number of needles:  13    Re-insertion/Manipulation of needles after initial 15 minutes: YES  Time:  30   Minutes   Post-Procedure:  Complication/Adverse Effects: 0      Management:  0      Signature:  Juan Can MD

## 2019-07-18 NOTE — PROGRESS NOTES
S:Really good the day of treatment; found with the weather that her pain was flared up subsequently.  O:Alert NAD  A:Postherpetic Neuralgia  P:Acu per note.  Pre-Procedure:  Patient's Name and Date of Birth verified:  YES  Verified By:  brandy (initials)  Diagnosis:  Data Unavailable  Interval History:  1w  Complications and/or Adverse Effects of Last Acupuncture Treatment: 0   Site Marking and Verification:  Verification of site selection (based on symptoms and condition:  YES  Verified by: brandy (initials)  Patient identification verified by provider: YES  Verified by: brandy (initials)  Pause for the Cause:  YES  Verified by: brandy (initials)   Procedure Note:  Acupuncture Treatment Number: 3  Acupuncture Points Selected: Mixed L face. Four Pereyra. AUR:ASP 1-3    Electrical Stim: No  Frequency 0   HZ    Number of needles:  19      Re-insertion/Manipulation of needles after initial 15 minutes: YES  Time:  30   Minutes   Post-Procedure:  Complication/Adverse Effects: 0      Management:  0      Signature:  Juan Can MD

## 2019-07-18 NOTE — NURSING NOTE
"Pain Clinic Visit    Chief Complaint   Patient presents with     Acupuncture     jaw nerve pain       After the last acupuncture session patient states: she was tired on Thursday, whipped out, light gardening, neck sore in pm. Friday - neck was pretty good, Saturday - 4/10 - in the afternoon pain decreased, did some gardening and garage work. Sunday - good in am and afternoon but really crappy in the evening - she used lidocaine, and heat packs, did some light gardening and garage work. Monday - woke up and feeling great in am and afternoon, still feeling good in pm, when she was getting ready for bed it started acting up, she was using her therabite, did some gardening. Tuesday - felt good all day, garage work. Wednesday - felt good, no discomfort. In evening after she used the therabite and it started to tense up again.    Rating of Pain: Mild Pain (2)    Jonelle Seals Clinical Medical Assistant       Initial /65   Pulse 68   Ht 1.6 m (5' 3\")   Wt 74.4 kg (164 lb)   BMI 29.05 kg/m   Estimated body mass index is 29.05 kg/m  as calculated from the following:    Height as of this encounter: 1.6 m (5' 3\").    Weight as of this encounter: 74.4 kg (164 lb).  Medication Reconciliation: complete    "

## 2019-07-25 ENCOUNTER — OFFICE VISIT (OUTPATIENT)
Dept: PALLIATIVE MEDICINE | Facility: CLINIC | Age: 69
End: 2019-07-25
Payer: MEDICARE

## 2019-07-25 VITALS
WEIGHT: 164 LBS | HEART RATE: 54 BPM | SYSTOLIC BLOOD PRESSURE: 106 MMHG | HEIGHT: 63 IN | DIASTOLIC BLOOD PRESSURE: 66 MMHG | BODY MASS INDEX: 29.06 KG/M2

## 2019-07-25 DIAGNOSIS — B02.29 POSTHERPETIC NEURALGIA: Primary | ICD-10-CM

## 2019-07-25 PROCEDURE — 97811 ACUP 1/> W/O ESTIM EA ADD 15: CPT | Mod: GA | Performed by: FAMILY MEDICINE

## 2019-07-25 PROCEDURE — 97810 ACUP 1/> WO ESTIM 1ST 15 MIN: CPT | Mod: GA | Performed by: FAMILY MEDICINE

## 2019-07-25 ASSESSMENT — MIFFLIN-ST. JEOR: SCORE: 1243.03

## 2019-07-25 ASSESSMENT — PAIN SCALES - GENERAL: PAINLEVEL: NO PAIN (1)

## 2019-07-25 NOTE — NURSING NOTE
"Pain Clinic Visit    Chief Complaint   Patient presents with     Acupuncture     nerve pain, jaw       After the last acupuncture session patient states: things have been pretty good. Tuesday evening she had a bad evening, off the charts, but that is the only time.     Rating of Pain: No Pain (1)    Jonelle Seals Clinical Medical Assistant       Initial /66   Pulse 54   Ht 1.6 m (5' 3\")   Wt 74.4 kg (164 lb)   BMI 29.05 kg/m   Estimated body mass index is 29.05 kg/m  as calculated from the following:    Height as of this encounter: 1.6 m (5' 3\").    Weight as of this encounter: 74.4 kg (164 lb).  Medication Reconciliation: complete    "

## 2019-07-26 ENCOUNTER — OFFICE VISIT (OUTPATIENT)
Dept: RADIATION THERAPY | Facility: OUTPATIENT CENTER | Age: 69
End: 2019-07-26
Payer: MEDICARE

## 2019-07-26 VITALS
BODY MASS INDEX: 28.84 KG/M2 | OXYGEN SATURATION: 97 % | HEART RATE: 56 BPM | SYSTOLIC BLOOD PRESSURE: 95 MMHG | WEIGHT: 162.8 LBS | DIASTOLIC BLOOD PRESSURE: 61 MMHG | RESPIRATION RATE: 18 BRPM

## 2019-07-26 DIAGNOSIS — Z92.3 HISTORY OF RADIATION TO HEAD AND NECK REGION: ICD-10-CM

## 2019-07-26 DIAGNOSIS — C02.9 TONGUE CANCER (H): Primary | ICD-10-CM

## 2019-07-26 RX ORDER — SODIUM FLUORIDE 1.1 G/100G
1 GEL ORAL 2 TIMES DAILY
Refills: 11 | COMMUNITY
Start: 2019-06-15 | End: 2021-03-04

## 2019-07-26 RX ORDER — PILOCARPINE HYDROCHLORIDE 5 MG/1
5 TABLET, FILM COATED ORAL 3 TIMES DAILY
Qty: 90 TABLET | Refills: 3 | Status: CANCELLED | OUTPATIENT
Start: 2019-07-26

## 2019-07-26 NOTE — LETTER
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     TYPE OF RADIATION THERAPY ADMINISTERED: 6000 cGy in 30 fractions         Oncologic history : Mrs. 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 She had radiation therapy in our clinic to a total dose of 6000 cGy in 30 treatments at 200 cGy each fraction from 10/17/2016 to 2016.  She tolerated radiation therapy reasonably well.  The patient did have some significant sore throat and dysphagia toward the end of the therapy, which required a PEG tube for nutritional support.  She otherwise was reasonably stable at the end of the therapy.     Interval history  Patient returns today for a routine post-treatment checkup.  She reports that she has been doing overall well.      She reports persistent pain to left face post shingles (postherpetic pain) - controlled with lidocaine patches,CBD oil, acupuncture, hot packs. (managed by pain and palliative med and PCP).    She also reports persisting pain to right mandible on the medial aspect of it around the bicuspid area. Intermittent for several weeks. Also reported associated tooth pain in the area.  When last seen in ENT (19) no ulceration was noted in area of reported tenderness (epic note). WILMA on physical exam and fiberoptic endoscopy. CT 2019 showed WILMA.     She continues to use Therabite for Trismus every 2-3 days and do her jaw and neck exercises daily. Continues to follow-up with speech (last seen 2019)    Her taste changes are stable.  She has been eating well, supplementing with one supplemental drink daily. Weight is stable  She still has some difficulty with swallowing certain foods which she tries to avoid.     She does see dental regularly. Last seen in May 2019 (next due October). Fluoride trays daily.     Completed care with  lymphedema management clinic. Well managed today with no lymphedema noted. Doing massage and stretching prn.    Most days has good energy level.      Mild intermittent indigestion (maalox effective). Worse with high coffee intake and tries to cut back.     No new hearing changes. History of tinnitus stable Rt>lt.     No new concerning lumps reported.       ROS otherwise negative on a 12-system review.       OBJECTIVE:   Vital Signs: BP 95/61   Pulse 56   Resp 18   Wt 73.8 kg (162 lb 12.8 oz)   SpO2 97%   BMI 28.84 kg/m   ;   GENERAL:  Appears well, in no acute distress.   HEENT: NCAT. No thrush, no mucositis.   Oral cavity exam shows grafted tissue in place.  Mucous membranes are dry. No masses or lymphadenopathy palpated. On exam noted to have small white patch to mucosa medially in the bicuspid area with tenderness on palpation.  RESP: Breathing comfortably on RA  CV: WWP  NEURO: Normal gait.      IMAGING:   CT SOFT TISSUE NECK W/O CONTRAST 6/5/2019 11:25 AM     History:  History of tongue cancer  ICD-10: History of tongue cancer      Comparison:  11/5/2018 dated neck CT      Technique: Thin section helical CT images were obtained from the skull  base down to the level of the aortic arch.  Axial, coronal and  sagittal reformations were performed with 2-3 mm slice thickness  reconstruction. Images were reviewed in soft tissue, lung and bone  windows.     Findings:      Evaluation of the mucosal surfaces spaces is slightly suboptimal given  lack of IV contrast.     Right neck dissection changes are seen. Right submandibular gland is  surgically absent. Right hemitongue resection. No cervical  lymphadenopathy as  per size criteria. Left submandibular gland is  normal. Under the level of the external marker there is the left  submandibular gland and no mass is identified.      There is 8.4 mm right thyroid lobe hypodense nodule, unchanged in  appearance and size.      Degenerative changes throughout the cervical spine are noted. No  aggressive bone lesions. In the visualized parts of the brain there is  no evidence of hemorrhage. Lung apices are clear.                                                                      IMPRESSION: Postsurgical changes of right hemiglossectomy and right  neck dissection with no evidence of recurrent mass on these  noncontrast images. No lymphadenopathy.     VENANCIO GIANG MD    LAB:  Results for YANDEL RIOS (MRN [id number]) as of 7/23/2019 11:19   Ref. Range 4/20/2011 09:27 11/5/2012 09:00 8/17/2017 14:25 11/14/2018 11:08 2/22/2019 09:52   TSH Latest Ref Range: 0.40 - 4.00 mU/L 1.23 1.40 1.44 1.12 1.51       IMPRESSION:   Ms. 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  She is recovered from the acute toxicities of treatment.        RECOMMENDATIONS:     1. Continue to follow-up with PCP and Pain and Palliative medicine for persistent pain to left face post shingles (postherpetic pain) - controlled with lidocaine patches, CBD oil, acupuncture, hot packs.    2. She also reports persisting pain to right mandible on the medial aspect  around the bicuspid area. Intermittent for several weeks. Also reported associated tooth pain in the area.  On exam noted to have small white patch to mucosa medially in the bicuspid area with tenderness on palpation.  When last seen in ENT (6/5/19) no ulceration was noted in area of reported tenderness (epic note). WILMA on  physical exam and fiberoptic endoscopy. CT 6/5/2019 showed WILMA. Notified Dr. Ferris office regarding  finding (currently scheduled for follow-up 10/19).     Discussed with Dr. Zhang and recommendation to have patient return in 2 months to closely observe. If persisting/non healing will have patient seen sooner by ENT for assessment and consideration of biopsy.  If patient has worsening symptoms before next scheduled visit should follow-up with ENT (called patient to inform of plan).     She was also advised to follow-up with dental given the tooth pain.     3. Continue oral nutrition and continue swallowing and stretching exercises. Continue Therabite for Trismus.  Continue to follow-up with speech (last seen 6/2019).    4. TSH 1.51 on 2/2019. Will reassess Q 6-12 months.     5. Continue follow-up with PCP for general health maintenance.     6. Xerostomia:  patient to continue products for dry mouth including Biotene and Xylitol/Theramints gum and to use a cool mist vaporizer. Discussed Salagen but patient states has difficulty swallowing pills and would prefer to defer at this time. Cool mist vaporizer recommended.      7. Cancer screening: Mammogram up to date:  last 11/2018. Colonoscopy last 12/2017, next recommended 10 years. .     8. No new hearing changes. History of tinnitus stable Rt>lt. Consider audiology if new hearing changes.       Jen Rey [facility]  Radiation Therapy Center  [facility]  [address], [address]  [place] [address]

## 2019-07-26 NOTE — NURSING NOTE
FOLLOW-UP VISIT    Patient Name: Terese Bell      : 1950     Age: 68 year old        ______________________________________________________________________________     Chief Complaint   Patient presents with     Radiation Therapy     follow up     BP 95/61   Pulse 56   Resp 18   Wt 73.8 kg (162 lb 12.8 oz)   SpO2 97%   BMI 28.84 kg/m       Date Radiation Completed: 2016    Pain  Current history of pain associated with this visit:   Intensity: Patient is unable to quantify.  Current: history of shingles in previous radiation field, and ongoing neck/jaw tightness despite doing stretches and therapy  Location: L head/neck area  Treatment: otc liquid tylenol, acupuncture    Meds  Current Med List Reviewed: Yes  Medication Note:     Labs  TSH   Date Value Ref Range Status   2019 1.51 0.40 - 4.00 mU/L Final   ]    Imaging  CT: 19    Skin:Warm  Dry  Intact  Oral Products: xylotol gum, water  Mucositis: No  Dry mouth: Yes  Dental evaluation: Yes: doing fluoride trays daily  PEG tube: No  Speech therapy: Yes: checks in with speech prn  Lymphedema: No  Energy Level:normal  Appetite: fair  Intake: things still taste bland but weight is stable. She eats what goes down easily and does a daily shake using HN 2cal that she orders  Weight:  Wt Readings from Last 5 Encounters:   19 73.8 kg (162 lb 12.8 oz)   19 74.4 kg (164 lb)   19 74.4 kg (164 lb)   19 74.4 kg (164 lb)   19 74.4 kg (164 lb)       Appointments:     DATE  Oncologist: Dr Enrrique Ferris   10/16/19   ENT:     Primary:      Other Notes:

## 2019-07-30 NOTE — PROGRESS NOTES
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     TYPE OF RADIATION THERAPY ADMINISTERED: 6000 cGy in 30 fractions         Oncologic history : Mrs. 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 She had radiation therapy in our clinic to a total dose of 6000 cGy in 30 treatments at 200 cGy each fraction from 10/17/2016 to 2016.  She tolerated radiation therapy reasonably well.  The patient did have some significant sore throat and dysphagia toward the end of the therapy, which required a PEG tube for nutritional support.  She otherwise was reasonably stable at the end of the therapy.     Interval history  Patient returns today for a routine post-treatment checkup.  She reports that she has been doing overall well.      She reports persistent pain to left face post shingles (postherpetic pain) - controlled with lidocaine patches,CBD oil, acupuncture, hot packs. (managed by pain and palliative med and PCP).    She also reports persisting pain to right mandible on the medial aspect of it around the bicuspid area. Intermittent for several weeks. Also reported associated tooth pain in the area.  When last seen in ENT (19) no ulceration was noted in area of reported tenderness (epic note). WILMA on physical exam and fiberoptic endoscopy. CT 2019 showed WILMA.     She continues to use Therabite for Trismus every 2-3 days and do her jaw and neck exercises daily. Continues to follow-up with speech (last seen 2019)    Her taste changes are stable. She has been eating well, supplementing with one supplemental drink daily. Weight is stable   She still has some difficulty with swallowing certain foods which she tries to avoid.     She does see dental regularly. Last seen in May 2019 (next due October). Fluoride trays daily.     Completed care with  lymphedema management clinic. Well managed today with no lymphedema noted. Doing massage and stretching prn.    Most days has good energy level.      Mild intermittent indigestion (maalox effective). Worse with high coffee intake and tries to cut back.     No new hearing changes. History of tinnitus stable Rt>lt.     No new concerning lumps reported.       ROS otherwise negative on a 12-system review.       OBJECTIVE:   Vital Signs: BP 95/61   Pulse 56   Resp 18   Wt 73.8 kg (162 lb 12.8 oz)   SpO2 97%   BMI 28.84 kg/m  ;   GENERAL:  Appears well, in no acute distress.   HEENT: NCAT. No thrush, no mucositis.   Oral cavity exam shows grafted tissue in place.  Mucous membranes are dry. No masses or lymphadenopathy palpated. On exam noted to have small white patch to mucosa medially in the bicuspid area with tenderness on palpation.  RESP: Breathing comfortably on RA  CV: WWP  NEURO: Normal gait.      IMAGING:   CT SOFT TISSUE NECK W/O CONTRAST 6/5/2019 11:25 AM     History:  History of tongue cancer  ICD-10: History of tongue cancer      Comparison:  11/5/2018 dated neck CT      Technique: Thin section helical CT images were obtained from the skull  base down to the level of the aortic arch.  Axial, coronal and  sagittal reformations were performed with 2-3 mm slice thickness  reconstruction. Images were reviewed in soft tissue, lung and bone  windows.     Findings:      Evaluation of the mucosal surfaces spaces is slightly suboptimal given  lack of IV contrast.     Right neck dissection changes are seen. Right submandibular gland is  surgically absent. Right hemitongue resection. No cervical  lymphadenopathy as per size criteria. Left submandibular gland is  normal. Under the level of the external marker  there is the left  submandibular gland and no mass is identified.      There is 8.4 mm right thyroid lobe hypodense nodule, unchanged in  appearance and size.      Degenerative changes throughout the cervical spine are noted. No  aggressive bone lesions. In the visualized parts of the brain there is  no evidence of hemorrhage. Lung apices are clear.                                                                      IMPRESSION: Postsurgical changes of right hemiglossectomy and right  neck dissection with no evidence of recurrent mass on these  noncontrast images. No lymphadenopathy.     VENANCIO GIANG MD    LAB:  Results for YANDEL RIOS (MRN [id number]) as of 7/23/2019 11:19   Ref. Range 4/20/2011 09:27 11/5/2012 09:00 8/17/2017 14:25 11/14/2018 11:08 2/22/2019 09:52   TSH Latest Ref Range: 0.40 - 4.00 mU/L 1.23 1.40 1.44 1.12 1.51       IMPRESSION:   Ms. 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  She is recovered from the acute toxicities of treatment.        RECOMMENDATIONS:     1. Continue to follow-up with PCP and Pain and Palliative medicine for persistent pain to left face post shingles (postherpetic pain) - controlled with lidocaine patches, CBD oil, acupuncture, hot packs.    2. She also reports persisting pain to right mandible on the medial aspect  around the bicuspid area. Intermittent for several weeks. Also reported associated tooth pain in the area.  On exam noted to have small white patch to mucosa medially in the bicuspid area with tenderness on palpation.  When last seen in ENT (6/5/19) no ulceration was noted in area of reported tenderness (epic note). WILMA on  physical exam and fiberoptic endoscopy. CT 6/5/2019 showed WILMA. Notified Dr. Ferris office regarding finding (currently scheduled for follow-up 10/19).     Discussed with Dr. Zhang and recommendation  to have patient return in 2 months to closely observe. If persisting/non healing will have patient seen sooner by ENT for assessment and consideration of biopsy.  If patient has worsening symptoms before next scheduled visit should follow-up with ENT (called patient to inform of plan).     She was also advised to follow-up with dental given the tooth pain.     3. Continue oral nutrition and continue swallowing and stretching exercises. Continue Therabite for Trismus.  Continue to follow-up with speech (last seen 6/2019).    4. TSH 1.51 on 2/2019. Will reassess Q 6-12 months.     5. Continue follow-up with PCP for general health maintenance.     6. Xerostomia:  patient to continue products for dry mouth including Biotene and Xylitol/Theramints gum and to use a cool mist vaporizer. Discussed Salagen but patient states has difficulty swallowing pills and would prefer to defer at this time. Cool mist vaporizer recommended.      7. Cancer screening: Mammogram up to date:  last 11/2018. Colonoscopy last 12/2017, next recommended 10 years. .     8. No new hearing changes. History of tinnitus stable Rt>lt. Consider audiology if new hearing changes.       Jen Rey [facility]  Radiation Therapy Center  [facility]  [address], [address]  [place] [address]

## 2019-08-01 ENCOUNTER — OFFICE VISIT (OUTPATIENT)
Dept: PALLIATIVE MEDICINE | Facility: CLINIC | Age: 69
End: 2019-08-01
Payer: MEDICARE

## 2019-08-01 VITALS
BODY MASS INDEX: 28.7 KG/M2 | HEIGHT: 63 IN | WEIGHT: 162 LBS | HEART RATE: 50 BPM | DIASTOLIC BLOOD PRESSURE: 65 MMHG | SYSTOLIC BLOOD PRESSURE: 104 MMHG

## 2019-08-01 DIAGNOSIS — B02.29 POST HERPETIC NEURALGIA: Primary | ICD-10-CM

## 2019-08-01 PROCEDURE — 97810 ACUP 1/> WO ESTIM 1ST 15 MIN: CPT | Mod: GA | Performed by: FAMILY MEDICINE

## 2019-08-01 PROCEDURE — 97811 ACUP 1/> W/O ESTIM EA ADD 15: CPT | Mod: GA | Performed by: FAMILY MEDICINE

## 2019-08-01 ASSESSMENT — MIFFLIN-ST. JEOR: SCORE: 1233.96

## 2019-08-01 ASSESSMENT — PAIN SCALES - GENERAL: PAINLEVEL: NO PAIN (1)

## 2019-08-01 NOTE — PROGRESS NOTES
S:Doing very well. No major flareups since last tx.  O:Alert NAD  A:Postherpetic Neuralgia  P:Acu per note.  Pre-Procedure:  Patient's Name and Date of Birth verified:  YES  Verified By:  brandy (initials)  Diagnosis:  Data Unavailable  Interval History:  1w  Complications and/or Adverse Effects of Last Acupuncture Treatment: 0   Site Marking and Verification:  Verification of site selection (based on symptoms and condition:  YES  Verified by: brandy (initials)  Patient identification verified by provider: YES  Verified by: brandy (initials)  Pause for the Cause:  YES  Verified by: rituc   (initials)   Procedure Note:  Acupuncture Treatment Number:6   Acupuncture Points Selected: Mixed facial L sided  Predominant. Four Pereyra ASP 1-3.    Electrical Stim: No  Frequency 0 HZ    Number of needles:  21    Re-insertion/Manipulation of needles after initial 15 minutes: YES  Time:  30   Minutes   Post-Procedure:  Complication/Adverse Effects: 0      Management:  0      Signature:  Juan Can MD

## 2019-08-01 NOTE — NURSING NOTE
"Pain Clinic Visit    Chief Complaint   Patient presents with     Acupuncture     Nerve pain, jaw       After the last acupuncture session patient states: things have been pretty good. She had a couple times that she was exceedingly uncomfortable. Overall it is much much better.     Rating of Pain: No Pain (1)    Jonelle Seals Clinical Medical Assistant       Initial /65   Pulse 50   Ht 1.6 m (5' 3\")   Wt 73.5 kg (162 lb)   BMI 28.70 kg/m   Estimated body mass index is 28.7 kg/m  as calculated from the following:    Height as of this encounter: 1.6 m (5' 3\").    Weight as of this encounter: 73.5 kg (162 lb).  Medication Reconciliation: complete    "

## 2019-08-01 NOTE — PROGRESS NOTES
S: Has done very well since last visit.  O:Alert NAD  A: Postherpetic neuralgia  P:Acu per note.  Pre-Procedure:  Patient's Name and Date of Birth verified:  YES  Verified By:  brandy (initials)  Diagnosis:  Data Unavailable  Interval History:  1w  Complications and/or Adverse Effects of Last Acupuncture Treatment: 0   Site Marking and Verification:  Verification of site selection (based on symptoms and condition:  YES  Verified by: brandy (initials)  Patient identification verified by provider: YES  Verified by: brandy (initials)  Pause for the Cause:  YES  Verified by: brandy (initials)   Procedure Note:  Acupuncture Treatment Number: 5  Acupuncture Points Selected: Mixed facial points. Four Pereyra    Electrical Stim: No  Frequency 0   HZ    Number of needles:  13      Re-insertion/Manipulation of needles after initial 15 minutes: YES  Time:  30   Minutes   Post-Procedure:  Complication/Adverse Effects: 0      Management:  0      Signature:  Juan Can MD

## 2019-08-08 ENCOUNTER — OFFICE VISIT (OUTPATIENT)
Dept: PALLIATIVE MEDICINE | Facility: CLINIC | Age: 69
End: 2019-08-08
Payer: MEDICARE

## 2019-08-08 VITALS
DIASTOLIC BLOOD PRESSURE: 68 MMHG | BODY MASS INDEX: 28.7 KG/M2 | HEART RATE: 58 BPM | WEIGHT: 162 LBS | SYSTOLIC BLOOD PRESSURE: 110 MMHG | HEIGHT: 63 IN

## 2019-08-08 DIAGNOSIS — B02.29 POSTHERPETIC NEURALGIA: Primary | ICD-10-CM

## 2019-08-08 PROCEDURE — 97811 ACUP 1/> W/O ESTIM EA ADD 15: CPT | Performed by: FAMILY MEDICINE

## 2019-08-08 PROCEDURE — 97810 ACUP 1/> WO ESTIM 1ST 15 MIN: CPT | Performed by: FAMILY MEDICINE

## 2019-08-08 ASSESSMENT — PAIN SCALES - GENERAL: PAINLEVEL: NO PAIN (1)

## 2019-08-08 ASSESSMENT — MIFFLIN-ST. JEOR: SCORE: 1228.96

## 2019-08-08 NOTE — NURSING NOTE
"Pain Clinic Visit    Chief Complaint   Patient presents with     Acupuncture     Nerve pain, jaw       After the last acupuncture session patient states: 3 nights in a row she had pain really bad. Once the pain patches and the terrsil kicked in it was better. She has been having shingles breakouts. She itches unbelievable. Pain on the left side of jaw.     Rating of Pain: No Pain (1)    Jonelle Seals Clinical Medical Assistant       Initial /68   Pulse 58   Ht 1.6 m (5' 3\")   Wt 73.5 kg (162 lb)   BMI 28.70 kg/m   Estimated body mass index is 28.7 kg/m  as calculated from the following:    Height as of this encounter: 1.6 m (5' 3\").    Weight as of this encounter: 73.5 kg (162 lb).  Medication Reconciliation: complete    "

## 2019-08-21 ENCOUNTER — OFFICE VISIT (OUTPATIENT)
Dept: OTOLARYNGOLOGY | Facility: CLINIC | Age: 69
End: 2019-08-21
Payer: MEDICARE

## 2019-08-21 VITALS
DIASTOLIC BLOOD PRESSURE: 73 MMHG | HEART RATE: 73 BPM | SYSTOLIC BLOOD PRESSURE: 121 MMHG | BODY MASS INDEX: 28.17 KG/M2 | RESPIRATION RATE: 16 BRPM | WEIGHT: 165 LBS | HEIGHT: 64 IN

## 2019-08-21 DIAGNOSIS — M54.2 NECK PAIN ON LEFT SIDE: ICD-10-CM

## 2019-08-21 DIAGNOSIS — H72.91 PERFORATED EAR DRUM, RIGHT: ICD-10-CM

## 2019-08-21 DIAGNOSIS — K13.21 LEUKOPLAKIA, TONGUE: ICD-10-CM

## 2019-08-21 DIAGNOSIS — C01 MALIGNANT NEOPLASM OF BASE OF TONGUE (H): Primary | ICD-10-CM

## 2019-08-21 ASSESSMENT — PAIN SCALES - GENERAL: PAINLEVEL: NO PAIN (1)

## 2019-08-21 ASSESSMENT — MIFFLIN-ST. JEOR: SCORE: 1254.47

## 2019-08-21 NOTE — LETTER
"8/21/2019     RE: Terese Bell  [address] [facility] [address]     Dear Colleague,    Thank you for referring your patient, Terese Bell, to the [facility] EAR NOSE AND THROAT at [facility] Please see a copy of my visit note below.  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 Schumacher.  He ended up doing 13 operations in 11 years, most recently in 2010.       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 Leo on 9/1/2016.  The margins were negative but the lymph nodes were 1/40 positive.  She completed postoperative radiation therapy on 11/29/2016.    Patient has been dealing with pain in the right jaw and right thyroid ala region.  As of 6/5/2019, no evidence of ORN at the visit with Dr. Ferris. Patient referred back to us by [facility] Radiation Oncology with possible sore in the right jaw.  Upon evaluation today, no ORN present in jaw.  There is some leukoplakia along the posterolateral tongue on the right (see images)      HPI:  Ms. Bettie Bell is here today for evaluation for a possible area of ORN in the right lower jaw.  She was referred back to us by providers at [facility] radiation oncology.    Sarah reports that she continues to have pain in the left side of her neck.  She is had pain documented in the left thyroid all a for the last several months.  She had a CT scan in June which was negative for ORN there.  She reports the pain seems a little bit better and it does not seem to be as \"swollen\" where " "it was before.  Does not seem to be abutting the jaw on the left side any longer.  She still has some trouble with pain with swallowing from time to time, but it significantly less.    She now is having some soreness in the right back of her tongue and the jaw area.  This is been going on for about a month.  She has been aggressively brushing the area thinking that food is gotten caught there.  She saw her dentist 2 weeks ago who thought there were possibly 3 spots that were concerning but she was not sure if was true ORN.    Otherwise, the patient does report that her mouth feels dry and her neck feels tight.  She does not have any lymphedema or neck swelling.  She does her stretching exercises for her jaw and lymphedema exercises regularly.  She continues to have some chronic nasal congestion since radiation therapy as well as some right ear tinnitus and decrease in hearing.  No dizziness or lightheadedness.  No drainage from the ear.    She denies any masses in her neck, hemoptysis, or bleeding from her mouth except for a couple short-lived episodes of bleeding on the right side of the tongue in the last month.  All of which resolved very easily.    The patient denies any hoarseness of voice, referred otalgia, dysphagia, facial paresthesias, other pain, and recurrent/new lumps or bumps. Weight is stable.          Physical Exam:  /73 (BP Location: Right arm, Patient Position: Sitting, Cuff Size: Adult Regular)   Pulse 73   Resp 16   Ht 1.619 m (5' 3.75\")   Wt 74.8 kg (165 lb)   BMI 28.54 kg/m       Constitutional: The patient is unaccompanied, well-groomed, and in no acute distress.    Ears: Pinnae and tragus non-tender. Left EAC and TM clear.  Right EAC clear.  Right TM has a small 10% chronic perforation in the anterior inferior portion    Nose: Sinuses were non-tender.  Anterior rhinoscopy revealed midline septum and absence of purulence or polyps.    Oral Cavity: well healed Free flap of the right " sie of the tongue, small area of leukoplakia noted on the right posterolateral tongue, near the retromolar area (see image below), NO evidence of ORN, normal floor of mouth, buccal mucosa, and palate.  No other lesions or masses on inspection or palpation.    Oropharynx: Normal mucosa, palate symmetric with normal elevation. No abnormal lymph tissue in the oropharynx.  Pterygoid region non-tender.   Hypopharynx/Larynx:  Deferred for fiberoptic endoscopic exam  Neck: Supple with normal laryngeal and tracheal landmarks.  The parotid beds were without masses.  No palpable thyroid.  Normal range of motion; pain with palpation over the left thyroid ala, no underlying mass  Lymphatic: There is no palpable lymphadenopathy in the neck.   Skin: Normal:  warm and pink without rash  Neurologic: Alert and oriented x 3.  CN's III-XII within normal limits.  Voice normal.   Communication: Normal; communicates verbally, normal voice quality.  Psychiatric: The patient's affect was calm, cooperative, and appropriate.                Fiberoptic Endoscopy:  Consent for fiberoptic laryngoscopy was obtained, and we confirmed correctness of procedure and identity of patient.  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  The nose was topically decongested and anesthetized.  The fiberoptic laryngoscope was passed under endoscopic vision.  The turbinates were normal.  The inferior and middle meati were clear bilaterally without purulence, masses, or polyps.  The nasopharynx was clear.  The Eustachian tubes were clear.  The soft palate appeared normal with good mobility.  The epiglottis was sharp and the visualized portion of the vallecula was clear.  The larynx was clear with mobile cords.  The arytenoids were clear and there was no pooling in the hypopharynx.   NO evidence of ORN in the left piriform sinus          IMPRESSION AND PLAN:  1.  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   Patient doing well overall.  Is due to return again in October of this year. Will need 3-year scans scheduled at that time.    2. Leukoplakia right tongue.  Small area of leukoplakia on the right posterior tongue.  We will monitor that at each subsequent visit.  We will have the patient do some gargling with salt and baking soda to see if this helps improve the tenderness associated with it.  I also worry that some trauma might be related to the lesion as she  has been brushing quite vigorously.  She will stop pressing that area quite vigorously and use a WaterPik.    3. Left neck pain.  Patient with ongoing pain of the left thyroid although.  No evidence of ORN on the endoscopy exam.  Patient had a CT scan in June that was unremarkable.  Pain is improving with the acupuncture treatment the patient is doing.  We will continue to monitor.    4.  Right TM perforation.  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  She is very anxious about this because she does not want to lose her hearing.   We will have her meet with one of our neuro-otologist to discuss the possibility of repairing it, however, we did discuss that this might not be something they recommend doing because of the previous radiation therapy and the poor healing.  She just wants to have her hearing test and at least meet with 1 of the physicians to make the appropriate decision.          Bridgette Farmer PA-C  Otolaryngology  Head & Neck Surgery  [phone]       CC:  Nicole Mcdonald NP   [facility]   [address] [place] [address]  [id number]      Pattie Natarajan MD   Radiation Therapy    [address] [facility]   [address]   [place], [place]  [address]      Moreno Leo MD  Otolaryngology/Head & Neck Surgery  [facility] [address]    Thomas Ferris MD  Otolaryngology/Head & Neck Surgery  [facility] 396

## 2019-08-21 NOTE — NURSING NOTE
"Chief Complaint   Patient presents with     RECHECK     Malignant neoplasm of base of tongue      /73 (BP Location: Right arm, Patient Position: Sitting, Cuff Size: Adult Regular)   Pulse 73   Resp 16   Ht 1.619 m (5' 3.75\")   Wt 74.8 kg (165 lb)   BMI 28.54 kg/m      Daphney Faust CMA  "

## 2019-08-21 NOTE — PROGRESS NOTES
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 Schumacher.  He ended up doing 13 operations in 11 years, most recently in 2010.       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 Leo on 9/1/2016.  The margins were negative but the lymph nodes were 1/40 positive.  She completed postoperative radiation therapy on 11/29/2016.    Patient has been dealing with pain in the right jaw and right thyroid ala region.  As of 6/5/2019, no evidence of ORN at the visit with Dr. Ferris. Patient referred back to us by [facility] Radiation Oncology with possible sore in the right jaw.  Upon evaluation today, no ORN present in jaw.  There is some leukoplakia along the posterolateral tongue on the right (see images)      HPI:  Ms. Bettie Bell is here today for evaluation for a possible area of ORN in the right lower jaw.  She was referred back to us by providers at [facility] radiation oncology.    Sarah reports that she continues to have pain in the left side of her neck.  She is had pain documented in the left thyroid all a for the last several months.  She had a CT scan in June which was negative for ORN there.  She reports the pain seems a little bit better and it does not seem to be as \"swollen\" where it was before.  Does not seem to be abutting the jaw on the left side any longer.  She still has some trouble with pain with swallowing from time to time, but it significantly less.    She now is having some soreness in the right back of her tongue and the jaw area.  This is been going on for about a " "month.  She has been aggressively brushing the area thinking that food is gotten caught there.  She saw her dentist 2 weeks ago who thought there were possibly 3 spots that were concerning but she was not sure if was true ORN.    Otherwise, the patient does report that her mouth feels dry and her neck feels tight.  She does not have any lymphedema or neck swelling.  She does her stretching exercises for her jaw and lymphedema exercises regularly.  She continues to have some chronic nasal congestion since radiation therapy as well as some right ear tinnitus and decrease in hearing.  No dizziness or lightheadedness.  No drainage from the ear.    She denies any masses in her neck, hemoptysis, or bleeding from her mouth except for a couple short-lived episodes of bleeding on the right side of the tongue in the last month.  All of which resolved very easily.    The patient denies any hoarseness of voice, referred otalgia, dysphagia, facial paresthesias, other pain, and recurrent/new lumps or bumps. Weight is stable.          Physical Exam:  /73 (BP Location: Right arm, Patient Position: Sitting, Cuff Size: Adult Regular)   Pulse 73   Resp 16   Ht 1.619 m (5' 3.75\")   Wt 74.8 kg (165 lb)   BMI 28.54 kg/m      Constitutional: The patient is unaccompanied, well-groomed, and in no acute distress.    Ears: Pinnae and tragus non-tender. Left EAC and TM clear.  Right EAC clear.  Right TM has a small 10% chronic perforation in the anterior inferior portion    Nose: Sinuses were non-tender.  Anterior rhinoscopy revealed midline septum and absence of purulence or polyps.    Oral Cavity: well healed Free flap of the right sie of the tongue, small area of leukoplakia noted on the right posterolateral tongue, near the retromolar area (see image below), NO evidence of ORN, normal floor of mouth, buccal mucosa, and palate.  No other lesions or masses on inspection or palpation.    Oropharynx: Normal mucosa, palate symmetric " with normal elevation. No abnormal lymph tissue in the oropharynx.  Pterygoid region non-tender.   Hypopharynx/Larynx:  Deferred for fiberoptic endoscopic exam  Neck: Supple with normal laryngeal and tracheal landmarks.  The parotid beds were without masses.  No palpable thyroid.  Normal range of motion; pain with palpation over the left thyroid ala, no underlying mass  Lymphatic: There is no palpable lymphadenopathy in the neck.   Skin: Normal:  warm and pink without rash  Neurologic: Alert and oriented x 3.  CN's III-XII within normal limits.  Voice normal.   Communication: Normal; communicates verbally, normal voice quality.  Psychiatric: The patient's affect was calm, cooperative, and appropriate.                Fiberoptic Endoscopy:  Consent for fiberoptic laryngoscopy was obtained, and we confirmed correctness of procedure and identity of patient.  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  The nose was topically decongested and anesthetized.  The fiberoptic laryngoscope was passed under endoscopic vision.  The turbinates were normal.  The inferior and middle meati were clear bilaterally without purulence, masses, or polyps.  The nasopharynx was clear.  The Eustachian tubes were clear.  The soft palate appeared normal with good mobility.  The epiglottis was sharp and the visualized portion of the vallecula was clear.  The larynx was clear with mobile cords.  The arytenoids were clear and there was no pooling in the hypopharynx.   NO evidence of ORN in the left piriform sinus          IMPRESSION AND PLAN:  1.  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  Patient doing well overall.  Is due to return again in October of this year. Will need 3-year scans scheduled at that time.    2. Leukoplakia right tongue.  Small area of leukoplakia on the right posterior tongue.  We will monitor that at each subsequent visit.  We will have the patient do some  gargling with salt and baking soda to see if this helps improve the tenderness associated with it.  I also worry that some trauma might be related to the lesion as she  has been brushing quite vigorously.  She will stop pressing that area quite vigorously and use a WaterPik.    3. Left neck pain.  Patient with ongoing pain of the left thyroid although.  No evidence of ORN on the endoscopy exam.  Patient had a CT scan in June that was unremarkable.  Pain is improving with the acupuncture treatment the patient is doing.  We will continue to monitor.    4.  Right TM perforation.  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  She is very anxious about this because she does not want to lose her hearing.   We will have her meet with one of our neuro-otologist to discuss the possibility of repairing it, however, we did discuss that this might not be something they recommend doing because of the previous radiation therapy and the poor healing.  She just wants to have her hearing test and at least meet with 1 of the physicians to make the appropriate decision.       Addendum   CT chest back and is negative for metastatic disease.  CT neck read not done yet.  Message to patient via [employer].  geoladI only to Dr. Tj RN.    Bridgette Farmer PA-C, 9/19/2019, 1:11 PM    Neck CT   1. Postoperative changes to the right neck and right floor of mouth  with no evidence for tumor recurrence or progression. However,  evaluation of the oral cavity is limited by extensive metallic  artifact.  2. Thyroid nodules bilaterally, no change.  3. Otherwise, normal soft tissue neck CT.    Results given to patient via [employer].  geoladI to Dr. Tj RN.  Bridgette Farmer PA-C, 9/19/2019, 4:18 PM           Bridgette Farmer PA-C  Otolaryngology  Head & Neck Surgery  [phone]       CC:  Nicole Mcdonald NP   [address]  [place]  [address]      Pattie Natarajan MD   Radiation Therapy    [address] [facility]   [address]   [place], [place]  [address]      Moreno Leo MD  Otolaryngology/Head & Neck Surgery  [facility] [address]    Thomas Ferris MD  Otolaryngology/Head & Neck Surgery  [facility] [address]

## 2019-08-21 NOTE — PATIENT INSTRUCTIONS
Terese Bell,    It was a pleasure to see you today.    1. You were seen in the ENT Clinic today by Bridgette Farmer PA-C    If you have any questions or concerns after your appointment, please call   - Option 1: ENT Clinic: [phone]  - Option 2: Jared (Dr. Ferris's Nurse): [phone]      2. For the right ear drum perforation, see one of our neuro-otologists, with an audiogram    3.  Please return in October as schedule to see me.    4.  For the white patch on the right tongue:  Gargle with salt and baking soda 3-4 times a day.  1/4 tsp of salt and soda in 8 ounces of water.    Avoid brushing that area vigorously.  Use the water pick.    5.  Please have the [facility] Dental team reevaluate the fit of the bite guard so that it isn't rubbing in the back of the mouth.      Thank you,  Bridgette Farmer PA-C  Otolaryngology  Head & Neck Surgery  [phone]

## 2019-08-22 NOTE — TELEPHONE ENCOUNTER
FUTURE VISIT INFORMATION      FUTURE VISIT INFORMATION:    Date: 9/26/19    Time: 11:15 am ENT  9:30 am Audiology    Location: [facility]  REFERRAL INFORMATION:    Referring provider:  CUONG Carey    Referring providers clinic:  [facility]    Reason for visit/diagnosis  Right eardrum perforation    RECORDS REQUESTED FROM:       Clinic name Comments Records Status Imaging Status   [facility] ENT Office Visit-8/21/19-CUONG Carey [facility] MRI MR Brain-3/12/18 Epic PACS

## 2019-08-29 NOTE — PROGRESS NOTES
S: Left-sided jaw pain flared up considerably after last treatment.  Seem to settle down after that has been relatively quiet since that time.  O:Alert NAD  A: Postherpetic neuralgia  P:Acu per note.  Pre-Procedure:  Patient's Name and Date of Birth verified:  YES  Verified By:  brandy (initials)  Diagnosis:  Data Unavailable  Interval History:  1w  Complications and/or Adverse Effects of Last Acupuncture Treatment: 0   Site Marking and Verification:  Verification of site selection (based on symptoms and condition:  YES  Verified by: brandy (initials)  Patient identification verified by provider: YES  Verified by: brandy   (initials)  Pause for the Cause:  YES  Verified by: brandy   (initials)   Procedure Note:  Acupuncture Treatment Number: 7  Acupuncture Points Selected: Mixed facial. L side dominant. Four Pereyra. AUR:ASP 1-4.    Electrical Stim: No  Frequency 0 HZ    Number of needles:  25    Re-insertion/Manipulation of needles after initial 15 minutes: YES  Time:  30   Minutes   Post-Procedure:  Complication/Adverse Effects: 0      Management:  0      Signature:  Juan Can MD

## 2019-09-05 ENCOUNTER — OFFICE VISIT (OUTPATIENT)
Dept: PALLIATIVE MEDICINE | Facility: CLINIC | Age: 69
End: 2019-09-05
Payer: MEDICARE

## 2019-09-05 VITALS
DIASTOLIC BLOOD PRESSURE: 64 MMHG | SYSTOLIC BLOOD PRESSURE: 110 MMHG | HEIGHT: 64 IN | WEIGHT: 165 LBS | HEART RATE: 52 BPM | BODY MASS INDEX: 28.17 KG/M2

## 2019-09-05 DIAGNOSIS — B02.29 POSTHERPETIC NEURALGIA: Primary | ICD-10-CM

## 2019-09-05 PROCEDURE — 97810 ACUP 1/> WO ESTIM 1ST 15 MIN: CPT | Mod: GA | Performed by: FAMILY MEDICINE

## 2019-09-05 PROCEDURE — 97811 ACUP 1/> W/O ESTIM EA ADD 15: CPT | Mod: GA | Performed by: FAMILY MEDICINE

## 2019-09-05 ASSESSMENT — MIFFLIN-ST. JEOR: SCORE: 1254.47

## 2019-09-05 ASSESSMENT — PAIN SCALES - GENERAL: PAINLEVEL: NO PAIN (1)

## 2019-09-05 NOTE — PROGRESS NOTES
S:Doing well, really no pain for the last 3 weeks.  O:Alert NAD  A: Postherpetic neuralgia  P:Acu per note.  Pre-Procedure:  Patient's Name and Date of Birth verified:  YES  Verified By:  brandy (initials)  Diagnosis:  Data Unavailable  Interval History:  3w  Complications and/or Adverse Effects of Last Acupuncture Treatment: 0   Site Marking and Verification:  Verification of site selection (based on symptoms and condition:  YES  Verified by: brandy (initials)  Patient identification verified by provider: YES  Verified by: brandy (initials)  Pause for the Cause:  YES  Verified by: brandy (initials)   Procedure Note:  Acupuncture Treatment Number: 8  Acupuncture Points Selected: Four Pereyra. AUR:ASP 1-2. Mixed facial points with L side predominance    Electrical Stim: No  Frequency 0 HZ    Number of needles:  21      Re-insertion/Manipulation of needles after initial 15 minutes: YES  Time:  30   Minutes   Post-Procedure:  Complication/Adverse Effects: 0      Management:  0      Signature:  Juan Can MD

## 2019-09-05 NOTE — NURSING NOTE
"Pain Clinic Visit    Chief Complaint   Patient presents with     Acupuncture     Nerve pain, jaw.       After the last acupuncture session patient states: Patient notes that 90% of the time she feels pretty good. She has only had a couple episodes of increased pain. She has been feeling pretty good.     Rating of Pain: No Pain (1)    Jonelle Seals Clinical Medical Assistant       Initial /64   Pulse 52   Ht 1.619 m (5' 3.75\")   Wt 74.8 kg (165 lb)   BMI 28.54 kg/m   Estimated body mass index is 28.54 kg/m  as calculated from the following:    Height as of this encounter: 1.619 m (5' 3.75\").    Weight as of this encounter: 74.8 kg (165 lb).  Medication Reconciliation: complete    "

## 2019-09-19 ENCOUNTER — HOSPITAL ENCOUNTER (OUTPATIENT)
Dept: CT IMAGING | Facility: CLINIC | Age: 69
End: 2019-09-19
Attending: PHYSICIAN ASSISTANT
Payer: MEDICARE

## 2019-09-19 ENCOUNTER — OFFICE VISIT (OUTPATIENT)
Dept: PALLIATIVE MEDICINE | Facility: CLINIC | Age: 69
End: 2019-09-19
Payer: MEDICARE

## 2019-09-19 ENCOUNTER — HOSPITAL ENCOUNTER (OUTPATIENT)
Dept: CT IMAGING | Facility: CLINIC | Age: 69
Discharge: HOME | End: 2019-09-19
Attending: PHYSICIAN ASSISTANT | Admitting: PHYSICIAN ASSISTANT
Payer: MEDICARE

## 2019-09-19 VITALS
WEIGHT: 165 LBS | SYSTOLIC BLOOD PRESSURE: 92 MMHG | HEART RATE: 54 BPM | BODY MASS INDEX: 28.17 KG/M2 | HEIGHT: 64 IN | DIASTOLIC BLOOD PRESSURE: 60 MMHG

## 2019-09-19 DIAGNOSIS — M54.2 NECK PAIN ON LEFT SIDE: ICD-10-CM

## 2019-09-19 DIAGNOSIS — B02.29 POSTHERPETIC NEURALGIA: Primary | ICD-10-CM

## 2019-09-19 DIAGNOSIS — C01 MALIGNANT NEOPLASM OF BASE OF TONGUE (H): ICD-10-CM

## 2019-09-19 PROCEDURE — 70490 CT SOFT TISSUE NECK W/O DYE: CPT

## 2019-09-19 PROCEDURE — 97811 ACUP 1/> W/O ESTIM EA ADD 15: CPT | Mod: GA | Performed by: FAMILY MEDICINE

## 2019-09-19 PROCEDURE — 71250 CT THORAX DX C-: CPT

## 2019-09-19 PROCEDURE — 97810 ACUP 1/> WO ESTIM 1ST 15 MIN: CPT | Mod: GA | Performed by: FAMILY MEDICINE

## 2019-09-19 ASSESSMENT — MIFFLIN-ST. JEOR: SCORE: 1254.47

## 2019-09-19 ASSESSMENT — PAIN SCALES - GENERAL: PAINLEVEL: NO PAIN (1)

## 2019-09-19 NOTE — NURSING NOTE
"Pain Clinic Visit    Chief Complaint   Patient presents with     Acupuncture     Nerve pain, jaw       After the last acupuncture session patient states: things have been pretty good. She has only had 2-3 episodes that have gotten really bad in the last 3 weeks and uses the lidocaine patches to catch it before it gets bad. She is having sinus issues today.     Rating of Pain: No Pain (1)    Jonelle Seals Clinical Medical Assistant       Initial BP 92/60   Pulse 54   Ht 1.619 m (5' 3.75\")   Wt 74.8 kg (165 lb)   BMI 28.54 kg/m   Estimated body mass index is 28.54 kg/m  as calculated from the following:    Height as of this encounter: 1.619 m (5' 3.75\").    Weight as of this encounter: 74.8 kg (165 lb).  Medication Reconciliation: complete    "

## 2019-09-19 NOTE — PROGRESS NOTES
S:Doing very well. 2 episodes of nerve pain in L face since last visit. Very pleased.  O:Alert NAD  A: Postherpetic neuralgia  P:Acu per note.  Pre-Procedure:  Patient's Name and Date of Birth verified:  YES  Verified By:  brandy (initials)  Diagnosis:  Data Unavailable  Interval History:  3w  Complications and/or Adverse Effects of Last Acupuncture Treatment: 0   Site Marking and Verification:  Verification of site selection (based on symptoms and condition:  YES  Verified by: brandy (initials)  Patient identification verified by provider: YES  Verified by: brandy (initials)  Pause for the Cause:  YES  Verified by: brandy (initials)   Procedure Note:  Acupuncture Treatment Number: 9  Acupuncture Points Selected: BODY:Four Pereyra. AUR:ASP1-3. Mixed facial points with L side predom    Electrical Stim: No  Frequency 0 HZ    Number of needles:  21      Re-insertion/Manipulation of needles after initial 15 minutes: YES  Time:  30   Minutes   Post-Procedure:  Complication/Adverse Effects: 0      Management:  0      Signature:  Juan Can MD

## 2019-09-20 NOTE — PROGRESS NOTES
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     TYPE OF RADIATION THERAPY ADMINISTERED: 6000 cGy in 30 fractions         Oncologic history : Mrs. 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 She had radiation therapy in our clinic to a total dose of 6000 cGy in 30 treatments at 200 cGy each fraction from 10/17/2016 to 2016.  She tolerated radiation therapy reasonably well.  The patient did have some significant sore throat and dysphagia toward the end of the therapy, which required a PEG tube for nutritional support.  She otherwise was reasonably stable at the end of the therapy.     Interval history  When last seen in follow-up on 19, she reported persisting pain to right mandible on the medial aspect  around the bicuspid area. Intermittent for several weeks. Also reported associated tooth pain in the area.  On exam noted to have small white patch to mucosa medially in the bicuspid area with tenderness on palpation.  When last seen in ENT (19) no ulceration was noted in area of reported tenderness (epic note). WILMA on  physical exam and fiberoptic endoscopy. CT 2019 showed WILMA. Recommendation was to have patient return in 2 months to closely observe. Plan was if persisting/non healing will have patient seen sooner by ENT for assessment and consideration of biopsy.  If patient has worsening symptoms before next scheduled visit recommendeded she should follow-up with ENT. Also advised to follow-up with dental given the tooth pain.    In the interim, seen by Dental who thought there were possibly 3 spots that were  "concerning but she was not sure if was true ORN. Referrred to ENT. Seen by ENT 8/21/19 and exam completed. 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 NO evidence of ORN, normal floor of mouth, buccal mucosa, and palate.  No other lesions or masses on inspection or palpation. Pain with palpation over the left thyroid ala, no underlying mass. On fiberoptic endoscopy: WILMA.  NO evidence of ORN in the left piriform sinus.    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 ENT recommended patient do some gargling with salt and baking soda to see if this helps improve the tenderness associated with it. Concern that some trauma might be related to the lesion as patient had been brushing quite vigorously. Advised to stop pressing that area quite vigorously and use a WaterPik.       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     Since that time, patient states has stopped brushing vigorously to area. She did received a new toothbrush from dental she has been using consistently and feels it is helping the pain to right back of tongue and jaw area. Pain now much improved.  Has not obtained WaterPik (due to cost) and is waiting for a sale. She also did not do any S&S rinses as recommended as was uncertain of how to prepare mixture. She also has cut back on time with fluoride trays and feels this is also helping.     Continued persistent pain to left face post shingles (postherpetic pain) - also improved - controlled with lidocaine patches, acupuncture, hot packs. (managed by pain and palliative med and PCP).    The patient denies any hoarseness of voice, referred otalgia, dysphagia, facial paresthesias, other pain, and recurrent/new lumps or bumps. Weight is stable.    States having some \"cold\" symptoms with nasal congestion, achyness in " past 2 days. States family members she saw lately has similar symptoms.     ROS otherwise negative on a 12-system review.       OBJECTIVE:   Vital Signs: BP 92/61   Pulse 73   Resp 18   Wt 73 kg (161 lb)   SpO2 100%   BMI 27.85 kg/m  ;   GENERAL:  Appears well, in no acute distress.   HEENT: NCAT. No thrush, no mucositis.   Mucous membranes are dry. No masses or lymphadenopathy palpated. On exam noted to have small white patch to mucosa medially in the bicuspid area (smaller in size) with NO tenderness on palpation.  RESP: Breathing comfortably on RA  CV: WWP  NEURO: Normal gait.      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  Status post resection with FF and XRT; evaluate for  recurrence/metastasis. 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 Neck  pain on left side.     TECHNIQUE:  Axial CT images of the neck were acquired without  intravenous contrast. Coronal reconstructions were created.     COMPARISON: Soft tissue neck CT 6/5/2019.     FINDINGS: Postoperative changes consisting of a right neck dissection  and right lateral floor of mouth resection with free flap  reconstruction again noted. Soft tissue contours in the surgical bed  are unchanged from the comparison study with no evidence for tumor  progression. However, evaluation is mildly limited by extensive  metallic artifact throughout the oral cavity.     There is no cervical lymphadenopathy. There are no fluid collections  or abscesses in the neck. The right submandibular gland has been  resected. The left submandibular gland and bilateral parotid glands  remain unremarkable. There is no evidence for mucosal space lesion.     A 0.8 cm hypodense nodule in the superior aspect of the right lobe of  the thyroid gland is again noted without change. Scattered smaller  nodules in the left lobe of the thyroid gland are also again noted  without change.     The lung  apices remain clear.                                                                      IMPRESSION:  1. Postoperative changes to the right neck and right floor of mouth  with no evidence for tumor recurrence or progression. However,  evaluation of the oral cavity is limited by extensive metallic  artifact.  2. Thyroid nodules bilaterally, no change.  3. Otherwise, normal soft tissue neck CT.        Radiation dose for this scan was reduced using automated exposure  control, adjustment of the mA and/or kV according to patient size, or  iterative reconstruction technique.     ENRIQUETA CARMONA MD    CT CHEST W/O CONTRAST 9/19/2019 10:56 AM     HISTORY: Head and neck cancer. Assess for pulmonary metastases.     TECHNIQUE: CT of the chest is performed without IV contrast.     Assessed structures to the limits no IV contrast administration  include the lungs, mediastinum, pleura, and chest wall.     Radiation exposure is reduced using automated exposure control,  adjustment of the mA and/or kV according to patient size, or iterative  reconstruction technique.     COMPARISON: 11/5/2018.     FINDINGS: There is a 2 mm noncalcified right lower lobe pulmonary  nodule on axial image 33. In retrospect this is stable from the prior  study, suggesting a benign entity. Previously seen left upper lobe  lung abnormality is resolved in the interim. No enlarged lymph nodes  are demonstrated. The thoracic aorta is upper normal in caliber.                                                                      IMPRESSION:  Negative chest CT for metastatic disease.     KRISTEN PEREZ MD    IMPRESSION:   Ms. 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  She is recovered from the acute toxicities of treatment.     CT chest 9/19/19 Negative  for metastatic disease. CT soft tissue neck 9/19/19 showing postoperative  changes to the right neck and right floor of mouth with no evidence for tumor recurrence or progression.    Seen by ENT 8/21/19 and exam completed. 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  On fiberoptic endoscopy: WILMA.  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 Concern some trauma might be related to the lesion as patient had been brushing quite vigorously.     Since that time, patient states has stopped brushing vigorously to area. She did received a new toothbrush from dental she has been using consistently and feels it is helping the pain to right back of tongue and jaw area. Pain now much improved.      On exam small white patch to mucosa medially in the bicuspid area smaller in size with NO tenderness on palpation. Improved.      RECOMMENDATIONS:    1. Will follow-up in 4 months to continue to monitor.     2. Continue to follow recommendations from ENT-  gargling with salt and baking soda and brushing less vigorously to prevent trauma. Gave recipe for S&S rinses.     3.  ENT follow-up 10/16/19. Imaging as per ENT.     4. Continue to follow-up with Dental.     5. Continue to follow-up with PCP and Pain and Palliative medicine for persistent pain to left face post shingles (postherpetic pain) - controlled with lidocaine patches, CBD oil, acupuncture, hot packs. CT negative.       6. Continue oral nutrition and continue swallowing and stretching exercises. Continue Therabite for Trismus.  Continue to follow-up with speech (last seen 6/2019).    7. TSH 1.51 on 2/2019. Will reassess Q 6-12 months.     8. Continue follow-up with PCP for general health maintenance.     9. Xerostomia:  patient to continue products for dry mouth including Biotene and Xylitol/Theramints gum. Reports cool mist vaporizer effective. Again prefers to defer Salagen at this time.      7. Cancer screening:  Mammogram: last 11/2018. Patient aware to have done in November.  Colonoscopy last 12/2017, next recommended 10 years.    8.  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 Seeing audiology 9/26/19 for right ear drum perforation      Jen Rey [facility]  Radiation Therapy Center  [facility]  [address], [address]  [place] [address]

## 2019-09-23 DIAGNOSIS — T78.2XXD ANAPHYLACTIC REACTION, SUBSEQUENT ENCOUNTER: ICD-10-CM

## 2019-09-23 DIAGNOSIS — Z91.030 ALLERGY TO BEE STING: Primary | ICD-10-CM

## 2019-09-23 DIAGNOSIS — Z12.31 ENCOUNTER FOR SCREENING MAMMOGRAM FOR BREAST CANCER: ICD-10-CM

## 2019-09-23 RX ORDER — EPINEPHRINE 0.3 MG/.3ML
0.3 INJECTION SUBCUTANEOUS
Qty: 0.6 ML | Refills: 3 | Status: SHIPPED | OUTPATIENT
Start: 2019-09-23 | End: 2021-03-04

## 2019-09-24 ENCOUNTER — OFFICE VISIT (OUTPATIENT)
Dept: RADIATION THERAPY | Facility: OUTPATIENT CENTER | Age: 69
End: 2019-09-24
Payer: MEDICARE

## 2019-09-24 VITALS
RESPIRATION RATE: 18 BRPM | DIASTOLIC BLOOD PRESSURE: 61 MMHG | WEIGHT: 161 LBS | BODY MASS INDEX: 27.85 KG/M2 | OXYGEN SATURATION: 100 % | SYSTOLIC BLOOD PRESSURE: 92 MMHG | HEART RATE: 73 BPM

## 2019-09-24 DIAGNOSIS — C02.9 TONGUE CANCER (H): Primary | ICD-10-CM

## 2019-09-24 NOTE — LETTER
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     TYPE OF RADIATION THERAPY ADMINISTERED: 6000 cGy in 30 fractions         Oncologic history : Mrs. 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 She had radiation therapy in our clinic to a total dose of 6000 cGy in 30 treatments at 200 cGy each fraction from 10/17/2016 to 2016.  She tolerated radiation therapy reasonably well.  The patient did have some significant sore throat and dysphagia toward the end of the therapy, which required a PEG tube for nutritional support.  She otherwise was reasonably stable at the end of the therapy.     Interval history  When last seen in follow-up on 19, she reported persisting pain to right mandible on the medial aspect  around the bicuspid area. Intermittent for several weeks. Also reported associated tooth pain in the area.  On exam noted to have small white patch to mucosa medially in the bicuspid area with tenderness on palpation.  When last seen in ENT (19) no ulceration was noted in area of reported tenderness (epic note). WILMA on  physical exam and fiberoptic endoscopy. CT 2019 showed WILMA.  Recommendation was to have patient return in 2 months to closely observe. Plan was if persisting/non healing will have patient seen sooner by ENT for assessment and consideration of biopsy.  If patient has worsening symptoms before next scheduled visit  recommendeded she should follow-up with ENT. Also advised to follow-up with dental given the  tooth pain.    In the interim, seen by Dental who thought there were possibly 3 spots that were concerning but she was not sure if was true ORN. Referrred to ENT. Seen by ENT 8/21/19 and exam completed. 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 NO evidence of ORN, normal floor of mouth, buccal mucosa, and palate.  No other lesions or masses on inspection or palpation. Pain with palpation over the left thyroid ala, no underlying mass. On fiberoptic endoscopy: WILMA.  NO evidence of ORN in the left piriform sinus.    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 ENT recommended patient do some gargling with salt and baking soda to see if this helps improve the tenderness associated with it. Concern that some trauma might be related to the lesion as patient had been brushing quite vigorously. Advised to stop pressing that area quite vigorously and use a WaterPik.       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     Since that time, patient states has stopped brushing vigorously to area. She did received a new toothbrush from dental she has been using consistently and feels it is helping the pain to right back of tongue and jaw area. Pain now much improved.  Has not obtained WaterPik (due to cost) and is waiting for a sale. She also did not do any S&S rinses as recommended as was uncertain of how to prepare mixture. She also has cut back on time with fluoride trays and feels this is also helping.     Continued persistent pain to left face post shingles (postherpetic pain) - also improved - controlled with lidocaine patches, acupuncture, hot packs. (managed by pain and palliative med and PCP).    The patient denies any hoarseness of voice, referred otalgia, dysphagia, facial paresthesias, other pain, and recurrent/new lumps or  "bumps. Weight is stable.    States having some \"cold\" symptoms with nasal congestion, achyness in past 2 days. States family members she saw lately has similar symptoms.     ROS otherwise negative on a 12-system review.       OBJECTIVE:   Vital Signs: BP 92/61   Pulse 73   Resp 18   Wt 73 kg (161 lb)   SpO2 100%   BMI 27.85 kg/m   ;   GENERAL:  Appears well, in no acute distress.   HEENT: NCAT. No thrush, no mucositis.   Mucous membranes are dry. No masses or lymphadenopathy palpated. On exam noted to have small white patch to mucosa medially in the bicuspid area (smaller in size) with NO tenderness on palpation.  RESP: Breathing comfortably on RA  CV: WWP  NEURO: Normal gait.      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  Status post resection with FF and XRT; evaluate for  recurrence/metastasis. 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 Neck  pain on left side.     TECHNIQUE:  Axial CT images of the neck were acquired without  intravenous contrast. Coronal reconstructions were created.     COMPARISON: Soft tissue neck CT 6/5/2019.     FINDINGS: Postoperative changes consisting of a right neck dissection  and right lateral floor of mouth resection with free flap  reconstruction again noted. Soft tissue contours in the surgical bed  are unchanged from the comparison study with no evidence for tumor  progression. However, evaluation is mildly limited by extensive  metallic artifact throughout the oral cavity.     There is no cervical lymphadenopathy. There are no fluid collections  or abscesses in the neck. The right submandibular gland has been  resected. The left submandibular gland and bilateral parotid glands  remain unremarkable. There is no evidence for mucosal space lesion.     A 0.8 cm hypodense nodule in the superior aspect of the right lobe of  the thyroid gland is again noted without change. Scattered " smaller  nodules in the left lobe of the thyroid gland are also again noted  without change.     The lung apices remain clear.                                                                      IMPRESSION:  1. Postoperative changes to the right neck and right floor of mouth  with no evidence for tumor recurrence or progression. However,  evaluation of the oral cavity is limited by extensive metallic  artifact.  2. Thyroid nodules bilaterally, no change.  3. Otherwise, normal soft tissue neck CT.        Radiation dose for this scan was reduced using automated exposure  control, adjustment of the mA and/or kV according to patient size, or  iterative reconstruction technique.     ENRIQUETA CARMONA MD    CT CHEST W/O CONTRAST 9/19/2019 10:56 AM     HISTORY: Head and neck cancer. Assess for pulmonary metastases.     TECHNIQUE: CT of the chest is performed without IV contrast.     Assessed structures to the limits no IV contrast administration  include the lungs, mediastinum, pleura, and chest wall.     Radiation exposure is reduced using automated exposure control,  adjustment of the mA and/or kV according to patient size, or iterative  reconstruction technique.     COMPARISON: 11/5/2018.     FINDINGS: There is a 2 mm noncalcified right lower lobe pulmonary  nodule on axial image 33. In retrospect this is stable from the prior  study, suggesting a benign entity. Previously seen left upper lobe  lung abnormality is resolved in the interim. No enlarged lymph nodes  are demonstrated. The thoracic aorta is upper normal in caliber.                                                                      IMPRESSION:  Negative chest CT for metastatic disease.     KRISTEN PEREZ MD    IMPRESSION:   Ms. 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  She is recovered from the acute toxicities of treatment.      CT chest 9/19/19 Negative  for metastatic disease. CT soft tissue neck 9/19/19 showing postoperative changes to the right neck and right floor of mouth with no evidence for tumor recurrence or progression.    Seen by ENT 8/21/19 and exam completed. 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  On fiberoptic endoscopy: WILMA.  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 Concern some trauma might be related to the lesion as patient had been brushing quite vigorously.     Since that time, patient states has stopped brushing vigorously to area. She did received a new toothbrush from dental she has been using consistently and feels it is helping the pain to right back of tongue and jaw area. Pain now much improved.      On exam small white patch to mucosa medially in the bicuspid area smaller in size with NO tenderness on palpation. Improved.      RECOMMENDATIONS:    1. Will follow-up in 4 months to continue to monitor.     2. Continue to follow recommendations from ENT-  gargling with salt and baking soda and brushing less vigorously to prevent trauma. Gave recipe for S&S rinses.     3.  ENT follow-up 10/16/19. Imaging as per ENT.     4. Continue to follow-up with Dental.     5. Continue to follow-up with PCP and Pain and Palliative medicine for persistent pain to left face post shingles (postherpetic pain) - controlled with lidocaine patches, CBD oil, acupuncture, hot packs. CT negative.       6. Continue oral nutrition and continue swallowing and stretching exercises. Continue Therabite for Trismus.  Continue to follow-up with speech (last seen 6/2019).    7. TSH 1.51 on 2/2019. Will reassess Q 6-12 months.     8. Continue follow-up with PCP for general health maintenance.     9. Xerostomia:  patient to continue products for dry mouth including Biotene and Xylitol/Theramints gum.  Reports cool mist vaporizer effective. Again prefers to defer Salagen at this time.      7. Cancer screening: Mammogram: last 11/2018. Patient aware to have done in November.  Colonoscopy last 12/2017, next recommended 10 years.    8.  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 Seeing audiology 9/26/19 for right ear drum perforation      Jen Rey [facility]  Radiation Therapy Center  [facility]  [address], [address]  [place] [address]

## 2019-09-24 NOTE — NURSING NOTE
FOLLOW-UP VISIT    Patient Name: Terese Bell      : 1950     Age: 69 year old        ______________________________________________________________________________     Chief Complaint   Patient presents with     Radiation Therapy     follow up     BP 92/61   Pulse 73   Resp 18   Wt 73 kg (161 lb)   SpO2 100%   BMI 27.85 kg/m       Date Radiation Completed: 2016    Pain  Current history of pain associated with this visit:   Intensity: 2/10  Current: shingles intermittently on L side of face. R lower jaw - pain has decreased/improved since last visit  Location:   Treatment: shingles - acupuncture, lidocaine patches    Meds  Current Med List Reviewed: Yes  Medication Note:     Labs  TSH   Date Value Ref Range Status   2019 1.51 0.40 - 4.00 mU/L Final   ]    Imaging  CT: 19 ordered and reviewed with ENT team - will review again today with NP    Skin:Warm  Dry  Intact  no visual shingles at this time  Oral Products: water, xylotol gum  Mucositis: No  Dry mouth: Yes: see oral products   Dental evaluation: Yes - daily fluoride trays  PEG tube: No  Speech therapy: Yes: doing stretches/exercises  Lymphedema: No  Energy Level:normal  Appetite: normal  Intake: tends to eat softer, blander foods and still uses HN 2 basil in shakes that she orders  Weight:  Wt Readings from Last 5 Encounters:   19 73 kg (161 lb)   19 74.8 kg (165 lb)   19 74.8 kg (165 lb)   19 74.8 kg (165 lb)   19 73.5 kg (162 lb)       Appointments:     DATE  Oncologist:     ENT: Dr. Ferris    Primary:      Other Notes:

## 2019-09-25 DIAGNOSIS — H91.90 HEARING LOSS, UNSPECIFIED HEARING LOSS TYPE, UNSPECIFIED LATERALITY: Primary | ICD-10-CM

## 2019-09-26 ENCOUNTER — OFFICE VISIT (OUTPATIENT)
Dept: OTOLARYNGOLOGY | Facility: CLINIC | Age: 69
End: 2019-09-26
Payer: MEDICARE

## 2019-09-26 ENCOUNTER — OFFICE VISIT (OUTPATIENT)
Dept: AUDIOLOGY | Facility: CLINIC | Age: 69
End: 2019-09-26
Attending: PHYSICIAN ASSISTANT
Payer: MEDICARE

## 2019-09-26 ENCOUNTER — PRE VISIT (OUTPATIENT)
Dept: OTOLARYNGOLOGY | Facility: CLINIC | Age: 69
End: 2019-09-26

## 2019-09-26 VITALS
WEIGHT: 161 LBS | HEIGHT: 63 IN | HEART RATE: 60 BPM | DIASTOLIC BLOOD PRESSURE: 71 MMHG | OXYGEN SATURATION: 100 % | BODY MASS INDEX: 28.53 KG/M2 | SYSTOLIC BLOOD PRESSURE: 118 MMHG

## 2019-09-26 DIAGNOSIS — H90.3 SENSORINEURAL HEARING LOSS (SNHL) OF BOTH EARS: ICD-10-CM

## 2019-09-26 DIAGNOSIS — H90.3 SENSORY HEARING LOSS, BILATERAL: Primary | ICD-10-CM

## 2019-09-26 DIAGNOSIS — R42 DIZZINESS: Primary | ICD-10-CM

## 2019-09-26 DIAGNOSIS — H91.90 HEARING LOSS, UNSPECIFIED HEARING LOSS TYPE, UNSPECIFIED LATERALITY: ICD-10-CM

## 2019-09-26 DIAGNOSIS — H61.21 IMPACTED CERUMEN OF RIGHT EAR: ICD-10-CM

## 2019-09-26 ASSESSMENT — PAIN SCALES - GENERAL: PAINLEVEL: NO PAIN (0)

## 2019-09-26 ASSESSMENT — MIFFLIN-ST. JEOR: SCORE: 1230.54

## 2019-09-26 NOTE — PROGRESS NOTES
AUDIOLOGY REPORT    SUMMARY: Audiology visit completed. See audiogram for results.      RECOMMENDATIONS: Follow-up with ENT.        Letty Pinon, CCC-A  Licensed Audiologist  [place] #[id number]
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
## 2019-09-26 NOTE — NURSING NOTE
"Chief Complaint   Patient presents with     Consult     tight tympanic perforation      Blood pressure 118/71, pulse 60, height 1.61 m (5' 3.39\"), weight 73 kg (161 lb), SpO2 100 %, not currently breastfeeding.    Hilton Chong LPN    "

## 2019-09-26 NOTE — PATIENT INSTRUCTIONS
1.  You were seen in the ENT Clinic today by Dr. Martinez.  If you have any questions or concerns after your appointment, please call [phone]. Press option #1 for scheduling related needs. Press option #3 for Nurse advice.    2.  Please schedule an appointment for the following:   - Audiology - Vestibular Testing    3.  Plan is to return to clinic to see Dr. Maritnez AFTER completion of Vestibular Testing.      Jeniffer Watson LPN  [facility]  [phone]    The patient presents with a history of hearing loss and imbalance/dizziness. She will be referred for vestibular testing. She denies hearing aids, but she would like to speak with Audiology about amplification devices for watching television. She will be seen again after her vestibular testing.
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
## 2019-09-28 ENCOUNTER — HEALTH MAINTENANCE LETTER (OUTPATIENT)
Age: 69
End: 2019-09-28

## 2019-10-03 ENCOUNTER — TELEPHONE (OUTPATIENT)
Dept: FAMILY MEDICINE | Facility: CLINIC | Age: 69
End: 2019-10-03

## 2019-10-03 NOTE — TELEPHONE ENCOUNTER
Received a Third Party Rejection from the [facility] [place] for Indomethacin 25mg    Routed to the [employer] electronic prior authorization team          Stanley Rowe RT (r)  [facility]

## 2019-10-07 NOTE — TELEPHONE ENCOUNTER
Central Prior Authorization Team   Phone: [phone]      PA Initiation    Medication: Indomethacin  Insurance Company: [employer] - Phone [phone] Fax [phone]  Pharmacy Filling the Rx: [id number] - [place], [place] - [address]  Filling Pharmacy Phone: [phone]  Filling Pharmacy Fax:    Start Date: 10/7/2019

## 2019-10-07 NOTE — TELEPHONE ENCOUNTER
Prior Authorization Approval    Authorization Effective Date: 7/9/2019  Authorization Expiration Date: 10/6/2020  Medication: Indomethacin  Approved Dose/Quantity:    Reference #:     Insurance Company: [employer] - Phone [phone] Fax [phone]  Expected CoPay:       CoPay Card Available:      Foundation Assistance Needed:    Which Pharmacy is filling the prescription (Not needed for infusion/clinic administered): [id number] - [place], [place] - [address]  Pharmacy Notified: Yes  Patient Notified: Yes **Instructed pharmacy to notify patient when script is ready to /ship.**

## 2019-10-10 ENCOUNTER — OFFICE VISIT (OUTPATIENT)
Dept: PALLIATIVE MEDICINE | Facility: CLINIC | Age: 69
End: 2019-10-10
Payer: MEDICARE

## 2019-10-10 VITALS
BODY MASS INDEX: 27.49 KG/M2 | SYSTOLIC BLOOD PRESSURE: 119 MMHG | HEIGHT: 64 IN | DIASTOLIC BLOOD PRESSURE: 66 MMHG | WEIGHT: 161 LBS | HEART RATE: 70 BPM

## 2019-10-10 DIAGNOSIS — B02.29 POSTHERPETIC NEURALGIA: Primary | ICD-10-CM

## 2019-10-10 PROCEDURE — 97810 ACUP 1/> WO ESTIM 1ST 15 MIN: CPT | Mod: GA | Performed by: FAMILY MEDICINE

## 2019-10-10 PROCEDURE — 97811 ACUP 1/> W/O ESTIM EA ADD 15: CPT | Mod: GA | Performed by: FAMILY MEDICINE

## 2019-10-10 ASSESSMENT — PAIN SCALES - GENERAL: PAINLEVEL: NO PAIN (1)

## 2019-10-10 ASSESSMENT — MIFFLIN-ST. JEOR: SCORE: 1236.32

## 2019-10-10 NOTE — NURSING NOTE
"Pain Clinic Visit    Chief Complaint   Patient presents with     Acupuncture     nerve pain, jaw       After the last acupuncture session patient states: things have been pretty good. On a whole really good. She has been working out in the yard. She had a bad bout of the flu and sinus issues.     Rating of Pain: No Pain (1)    Jonelle Seals Clinical Medical Assistant       Initial /66   Pulse 70   Ht 1.619 m (5' 3.75\")   Wt 73 kg (161 lb)   BMI 27.85 kg/m   Estimated body mass index is 27.85 kg/m  as calculated from the following:    Height as of this encounter: 1.619 m (5' 3.75\").    Weight as of this encounter: 73 kg (161 lb).  Medication Reconciliation: complete    "

## 2019-10-10 NOTE — PROGRESS NOTES
S:Doing quite well, very active closing up the garden for the year.   O:ALert NAD  A: Postherpetic neuralgia  P;Acu per note.  Pre-Procedure:  Patient's Name and Date of Birth verified:  YES  Verified By:  brandy (initials)  Diagnosis:  Data Unavailable  Interval History:  3w  Complications and/or Adverse Effects of Last Acupuncture Treatment: 0   Site Marking and Verification:  Verification of site selection (based on symptoms and condition:  YES  Verified by: brandy   (initials)  Patient identification verified by provider: YES  Verified by: brandy   (initials)  Pause for the Cause:  YES  Verified by: brandy   (initials)   Procedure Note:  Acupuncture Treatment Number: 10  Acupuncture Points Selected: BODY:Four Pereyra. Mixed facial points L side predominant. AUR:ASP 1-4     Electrical Stim: No  Frequency 0 HZ    Number of needles:  17    Re-insertion/Manipulation of needles after initial 15 minutes: YES  Time:  30   Minutes   Post-Procedure:  Complication/Adverse Effects: 0    Management:  0      Signature:  Juan Can MD

## 2019-10-25 NOTE — MR AVS SNAPSHOT
After Visit Summary   5/3/2017    Terese Bell    MRN: [id number]           Patient Information     Date Of Birth          1950        Visit Information        Provider Department      5/3/2017 9:10 AM [facility]        Today's Diagnoses     Oropharyngeal dysphagia    -  1    Tongue cancer (H)           Follow-ups after your visit        Additional Services     SPEECH THERAPY REFERRAL       *This therapy referral will be filtered to a centralized scheduling office at [facility] and the patient will receive a call to schedule an appointment at a [facility] location most convenient for them. *     [facility] provides Speech Therapy evaluation and treatment and many specialty services across the [facility] system.  If requesting a specialty program, please choose from the list below.  If you have not heard from the scheduling office within 2 business days, please call [phone] for all locations, with the exception of [place], please call [phone].       Treatment: Evaluation & Treatment  Speech Treatment Diagnosis: Dysphagia  Special Instructions: Clinical swallow eval and SwallowSTRONG consideration  Special Programs: SwallowSTRONG    Please be aware that coverage of these services is subject to the terms and limitations of your health insurance plan.  Call member services at your health plan with any benefit or coverage questions.      **Note to Provider:  If you are referring outside of [facility] for the therapy appointment, please list the name of the location in the  special instructions  above, print the referral and give to the patient to schedule the appointment.                  Your next 10 appointments already scheduled     May 16, 2017  1:00 PM CDT   Treatment 60 with AYAD [facility] ([facility] [place])    [address] [place] [id number]    [phone]            May 17, 2017 10:00 AM CDT   Lymphedema Evaluation with Rachel Richey, PT   [facility] ([facility])    [address] [facility] [address]   [phone]            May 18, 2017 12:30 PM CDT   Treatment 60 with AYAD Day   [facility] ([facility] [place])    [address] [id number] [phone]            May 23, 2017  1:00 PM CDT   Treatment 60 with AYAD [facility]   [facility] ([facility] [place])    [address] [id number] [phone]            May 25, 2017 11:00 AM CDT   Treatment 60 with AYAD Day   [facility] ([facility] [place])    [address] [id number] [phone]            Jun 19, 2017  9:00 AM CDT   (Arrive by 8:45 AM)   Return Visit with Moreno Leo MD   [facility] ([facility] [place])    [address] [id number] [phone]            Aug 03, 2017  2:30 PM CDT   Return Visit with Pattie Natarajan MD   [facility] ([facility])    [address], [address]  [facility] [address]   [phone]              Future tests that were ordered for you today     Open Future Orders        Priority Expected Expires Ordered    CT Soft tissue neck w contrast Routine  5/3/2018 5/3/2017    CT Chest w contrast* Routine  5/3/2018 5/3/2017            Who to contact     Please call your clinic at [phone] to:    Ask questions about your health    Make or cancel appointments    Discuss your medicines    Learn about your test results    Speak to your doctor   If you have compliments or concerns about an experience at your clinic, or if you wish to file a complaint, please contact [facility] Patient Relations at [phone] or email  us at [email or web address]         Additional Information About Your Visit        [employer] Information     [employer] gives you secure access to your electronic health record. If you see a primary care provider, you can also send messages to your care team and make appointments. If you have questions, please call your primary care clinic.  If you do not have a primary care provider, please call [phone] and they will assist you.      [employer] is an electronic gateway that provides easy, online access to your medical records. With [employer], you can request a clinic appointment, read your test results, renew a prescription or communicate with your care team.     To access your existing account, please contact your [facility] or call [phone] for assistance.        Care EveryWhere ID     This is your Care EveryWhere ID. This could be used by other organizations to access your [place] medical records  [id number]         Blood Pressure from Last 3 Encounters:   03/30/17 100/62   01/26/17 104/67   12/22/16 102/72    Weight from Last 3 Encounters:   05/03/17 69.9 kg (154 lb)   03/30/17 73.9 kg (163 lb)   03/08/17 75.3 kg (166 lb)              We Performed the Following     SPEECH THERAPY REFERRAL        Primary Care Provider Office Phone # Fax #    HERNÁN Carranza [facility] [phone]       [place] [facility] [address] [place] [address]        Thank you!     Thank you for choosing [facility]  for your care. Our goal is always to provide you with excellent care. Hearing back from our patients is one way we can continue to improve our services. Please take a few minutes to complete the written survey that you may receive in the mail after your visit with us. Thank you!             Your Updated Medication List - Protect others around you: Learn how to safely use, store and throw away your medicines at www.disposemymeds.org.           This list is accurate as of: 5/3/17 11:59 PM.  Always use your most recent med list.                   Brand Name Dispense Instructions for use    albuterol 108 (90 BASE) MCG/ACT Inhaler    PROAIR HFA/PROVENTIL HFA/VENTOLIN HFA     Inhale 2 puffs into the lungs every 6 hours as needed for wheezing       DENTAGEL 1.1 % Gel topical gel   Generic drug:  sodium fluoride dental gel      Apply 1 Application to affected area daily       EPINEPHrine 0.3 MG/0.3ML injection     2 each    Inject 0.3 mLs (0.3 mg) into the muscle once as needed for anaphylaxis       fluticasone-salmeterol 250-50 MCG/DOSE diskus inhaler    ADVAIR DISKUS    1 Inhaler    Inhale 1 puff into the lungs 2 times daily as needed       order for DME     1 Device    Equipment being ordered: tennis elbow band          Anxiety    Asthma  denies recent asthma exacerbation  Cancer of kidney    Essential hypertension    Gross hematuria    History of BPH    History of SIADH    History of stomach ulcers  denies recent endoscopy  HTN (Hypertension)    Hypercholesterolemia    Hyponatremia  admission x 2 last 2017  Left renal mass    Urinary tract infection with hematuria

## 2019-10-28 ENCOUNTER — OFFICE VISIT (OUTPATIENT)
Dept: OTOLARYNGOLOGY | Facility: CLINIC | Age: 69
End: 2019-10-28
Payer: MEDICARE

## 2019-10-28 VITALS
BODY MASS INDEX: 27.33 KG/M2 | SYSTOLIC BLOOD PRESSURE: 114 MMHG | WEIGHT: 158 LBS | DIASTOLIC BLOOD PRESSURE: 70 MMHG | HEART RATE: 62 BPM

## 2019-10-28 DIAGNOSIS — K13.21 LEUKOPLAKIA, TONGUE: ICD-10-CM

## 2019-10-28 DIAGNOSIS — C01 MALIGNANT NEOPLASM OF BASE OF TONGUE (H): Primary | ICD-10-CM

## 2019-10-28 ASSESSMENT — PAIN SCALES - GENERAL: PAINLEVEL: NO PAIN (0)

## 2019-10-28 NOTE — PATIENT INSTRUCTIONS
Terese Bell,    It was a pleasure to see you today.    1. You were seen in the ENT Clinic today by Bridgette Farmer PA-C    If you have any questions or concerns after your appointment, please call   - Option 1: ENT Clinic: [phone]  - Option 2: Lisa (Dr. Leo's Nurse): [phone]    2. Please return for a clinical exam at the end of February 2020    Next set of scans in September 2020    3.  Salt/soda gargles daily    4.  Netti pot 1-2 times a day for the sinus congestion.    5.  Daily swallowing exercises and neck stretches.    Call with any new concerns or worsening pain in the jaw or neck.      Thank you,  Bridgette Farmer PA-C  Otolaryngology  Head & Neck Surgery  [phone]

## 2019-10-28 NOTE — PROGRESS NOTES
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 Schumacher.  He ended up doing 13 operations in 11 years, most recently in 2010.       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 Ferris, with R RFFF reconstruction by Dr. Leo on 9/1/2016.  The margins were negative but the lymph nodes were 1/40 positive.  She completed postoperative radiation therapy on 11/29/2016.     Patient has been dealing with pain in the right jaw and right thyroid ala region.  As of 6/5/2019, no evidence of ORN at the visit with Dr. Freris. Patient referred back to us by [facility] with possible sore in the right jaw.  Upon evaluation on 8/21/2019, no ORN present in jaw.  There is some leukoplakia along the posterolateral tongue on the right         HPI:  Ms. Bettie Bell is here today for an 8-week follow-up visit and repeat evaluation for possible ORN and leukoplakia on the right side of the tongue.    Sarah reports that the pain she had been having in her right jaw and the right side of her neck by the thyroid although it is improving.  She still has it but it is less intense and its less frequent.  She continues to have her chronic issues with difficulty opening her mouth and swelling.  Nothing is new or different.  She did see Dr. Martinez for the TM perforation and loss of balance.  She was told that the TM perforation is healing and was only the size of a pinhole at the time of the visit.  She was referred for vestibular therapy and is been rescheduled that for January 2020.    Otherwise, she has no new concerns at this time.  The  patient denies any hoarseness of voice, referred otalgia, facial paresthesias, tinnitus, hemoptysis, other pain, bleeding, and recurrent/new lumps or bumps. Weight is stable.          Physical Exam:  /70   Pulse 62   Wt 71.7 kg (158 lb)   BMI 27.33 kg/m      Constitutional: The patient is accompanied, well-groomed, and in no acute distress.    Head: Normocephalic and atraumatic.   Eyes: Pupils were equal and reactive.  Extraocular movement intact.    Ears: Pinnae and tragus non-tender.  EACs and TMs were clear.     Nose: Sinuses were non-tender.  Anterior rhinoscopy revealed midline septum and absence of purulence or polyps.    Oral Cavity: well healed Free flap of the right sie of the tongue, small area of leukoplakia noted on the right posterolateral tongue, near the retromolar area (see image below), NO evidence of ORN, Normal floor of mouth, buccal mucosa, and palate.  No lesions or masses on inspection or palpation.    Oropharynx: Normal mucosa, palate symmetric with normal elevation. No abnormal lymph tissue in the oropharynx.  Pterygoid region non-tender.   Hypopharynx/Larynx: Deferred for fiberoptic endoscopic exam  Neck: pain with palpation over the right thyroid ala, no underlying mass.  The parotid beds were without masses.  No palpable thyroid.  Normal range of motion  Lymphatic: There is no palpable lymphadenopathy in the neck.   Respiratory: Breathing comfortably without stridor or exertion of accessory muscles.   Skin: Normal:  warm and pink without rash  Neurologic: Alert and oriented x 3.  CN's III-XII within normal limits.  Voice normal.   Psychiatric: The patient's affect was calm, cooperative, and appropriate.          Leukoplakia of right side of tongue        Free flap right tongue    Fiberoptic Endoscopy:  Consent for fiberoptic laryngoscopy was obtained, and we confirmed correctness of procedure and identity of patient.  Fiberoptic laryngoscopy was indicated due to hx of tongue  cancer and leukoplakia.  The nose was topically decongested and anesthetized.  The fiberoptic laryngoscope was passed under endoscopic vision.  The turbinates were pale.  The inferior and middle meati with clear mucus, but without purulence, masses, or polyps.  The nasopharynx with a fair amount of clear mucus c/w PND. The Eustachian tubes were clear.  The soft palate appeared normal with good mobility.  The epiglottis was sharp and the visualized portion of the vallecula was clear.  The larynx was clear with mobile cords.  The arytenoids were clear but slightly edematous and there was no pooling in the hypopharynx. No evidence of ORN in the piriform sinuses bilaterally.      Video of endoscopy (please click on image above to watch)        IMPRESSION AND PLAN:    1.  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  S/p glossectomy and neck dissection by me, with R RFFF reconstruction by Dr. Leo on 9/1/2016.   She completed postoperative radiation therapy on 11/29/2016. We have been monitoring right jaw and right thyroid ala pain for ORN. At last visit, no ORN present.      Improving pain overall and no evidence of ORN.  Leukoplakia stable (see images on imagestream).    Patient will returnin 4 months for a clinical exam.  Next set of scans (4 year scans) will be due on September 2020.       Bridgette Farmer PA-C  Otolaryngology  Head & Neck Surgery  [phone]       CC:  Nicole Mcdonald NP   [facility]   [address], [place]  [address]      Pattie Natarajan MD   Radiation Therapy    [address]   [address]   [place], [place]  [address]      Moreno Leo MD  Otolaryngology/Head & Neck Surgery  [facility] [address]     Thomas Ferris MD  Otolaryngology/Head & Neck Surgery  [facility] [address]

## 2019-10-28 NOTE — NURSING NOTE
Chief Complaint   Patient presents with     RECHECK     4-6 follow up visit     Antonia Aguirre LPN

## 2019-10-31 ENCOUNTER — OFFICE VISIT (OUTPATIENT)
Dept: PALLIATIVE MEDICINE | Facility: CLINIC | Age: 69
End: 2019-10-31
Payer: MEDICARE

## 2019-10-31 VITALS
HEART RATE: 64 BPM | DIASTOLIC BLOOD PRESSURE: 70 MMHG | WEIGHT: 158 LBS | HEIGHT: 64 IN | SYSTOLIC BLOOD PRESSURE: 114 MMHG | BODY MASS INDEX: 26.98 KG/M2

## 2019-10-31 DIAGNOSIS — B02.29 POSTHERPETIC NEURALGIA: Primary | ICD-10-CM

## 2019-10-31 PROCEDURE — 97811 ACUP 1/> W/O ESTIM EA ADD 15: CPT | Mod: GA | Performed by: FAMILY MEDICINE

## 2019-10-31 PROCEDURE — 97810 ACUP 1/> WO ESTIM 1ST 15 MIN: CPT | Mod: GA | Performed by: FAMILY MEDICINE

## 2019-10-31 ASSESSMENT — PAIN SCALES - GENERAL: PAINLEVEL: NO PAIN (1)

## 2019-10-31 ASSESSMENT — MIFFLIN-ST. JEOR: SCORE: 1222.71

## 2019-10-31 NOTE — NURSING NOTE
"Pain Clinic Visit    Chief Complaint   Patient presents with     Acupuncture     Nerve pain, jaw       After the last acupuncture session patient states: things have been pretty good. Just this last week has been a little worse, like she should have been back a week ago. Just a little stiff in the jaw and neck.    Rating of Pain: No Pain (1)    Jonelle Seals Clinical Medical Assistant       Initial /70   Pulse 64   Ht 1.619 m (5' 3.75\")   Wt 71.7 kg (158 lb)   BMI 27.33 kg/m   Estimated body mass index is 27.33 kg/m  as calculated from the following:    Height as of this encounter: 1.619 m (5' 3.75\").    Weight as of this encounter: 71.7 kg (158 lb).  Medication Reconciliation: complete    "

## 2019-11-07 NOTE — PROGRESS NOTES
S: No significant flareups of her postherpetic neuralgia at this point.  Her jaw times in the left side still feels a little bit stiff but overall she is very impressed with her progress.  O:Alert NAD  A: Postherpetic neuralgia  P:Acu per note.  Pre-Procedure:  Patient's Name and Date of Birth verified:  YES  Verified By:  brandy (initials)  Diagnosis:  Data Unavailable  Interval History:  2w  Complications and/or Adverse Effects of Last Acupuncture Treatment: 0   Site Marking and Verification:  Verification of site selection (based on symptoms and condition:  YES  Verified by: krjolynn (initials)  Patient identification verified by provider: YES  Verified by: rituc (initials)  Pause for the Cause:  YES  Verified by: brandy (initials)   Procedure Note:  Acupuncture Treatment Number: 11  Acupuncture Points Selected: BODY:Four Lehman. Mixed Facial points L side predominant. AUR:ASP1-4.    Electrical Stim: No  Frequency 0   HZ    Number of needles:  27      Re-insertion/Manipulation of needles after initial 15 minutes: YES  Time:  30   Minutes   Post-Procedure:  Complication/Adverse Effects: 0      Management:  0      Signature:  Juan Can MD

## 2019-11-21 ENCOUNTER — OFFICE VISIT (OUTPATIENT)
Dept: PALLIATIVE MEDICINE | Facility: CLINIC | Age: 69
End: 2019-11-21
Payer: MEDICARE

## 2019-11-21 VITALS
BODY MASS INDEX: 26.98 KG/M2 | WEIGHT: 158 LBS | HEIGHT: 64 IN | SYSTOLIC BLOOD PRESSURE: 105 MMHG | DIASTOLIC BLOOD PRESSURE: 71 MMHG | HEART RATE: 65 BPM

## 2019-11-21 DIAGNOSIS — B02.29 POSTHERPETIC NEURALGIA: Primary | ICD-10-CM

## 2019-11-21 PROCEDURE — 97810 ACUP 1/> WO ESTIM 1ST 15 MIN: CPT | Mod: GA | Performed by: FAMILY MEDICINE

## 2019-11-21 PROCEDURE — 97811 ACUP 1/> W/O ESTIM EA ADD 15: CPT | Mod: GA | Performed by: FAMILY MEDICINE

## 2019-11-21 ASSESSMENT — MIFFLIN-ST. JEOR: SCORE: 1222.71

## 2019-11-21 ASSESSMENT — PAIN SCALES - GENERAL: PAINLEVEL: NO PAIN (1)

## 2019-11-21 NOTE — NURSING NOTE
"Pain Clinic Visit    Chief Complaint   Patient presents with     Acupuncture     Jaw, nerve pain       After the last acupuncture session patient states: her nerve pain is doing good today but she had some pain this week because she took a hard fall last week on her PJ bottoms. She is having pain in her right knee, bilateral hip - left hip is worse, and left shoulder. She has been icing a lot.     Rating of Pain: No Pain (1)    Jonelle Seals Clinical Medical Assistant       Initial /71   Pulse 65   Ht 1.619 m (5' 3.75\")   Wt 71.7 kg (158 lb)   BMI 27.33 kg/m   Estimated body mass index is 27.33 kg/m  as calculated from the following:    Height as of this encounter: 1.619 m (5' 3.75\").    Weight as of this encounter: 71.7 kg (158 lb).  Medication Reconciliation: complete    "

## 2019-11-22 NOTE — MR AVS SNAPSHOT
After Visit Summary   1/29/2018    Terese Bell    MRN: [id number]           Patient Information     Date Of Birth          1950        Visit Information        Provider Department      1/29/2018 10:00 AM Kamala Rudolph APRN CNP [facility]        Today's Diagnoses     Squamous cell carcinoma of tongue (H)    -  1       Follow-ups after your visit        Your next 10 appointments already scheduled     Jan 31, 2018 12:00 PM CST   (Arrive by 11:45 AM)   CT CHEST W CONTRAST with [email or web address]   [facility] CT ([facility])    [address] [address]   [phone]           Please bring any scans or X-rays taken at other hospitals, if similar tests were done. Also bring a list of your medicines, including vitamins, minerals and over-the-counter drugs. It is safest to leave personal items at home.  Be sure to tell your doctor:   If you have any allergies.   If there s any chance you are pregnant.   If you are breastfeeding.   If you have any special needs.  You will have contrast for this exam. To prepare:   Do not eat or drink for 2 hours before your exam. If you need to take medicine, you may take it with small sips of water. (We may ask you to take liquid medicine as well.)   The day before your exam, drink extra fluids at least six 8-ounce glasses (unless your doctor tells you to restrict your fluids).  Patients over 70 or patients with diabetes or kidney problems:   If you haven t had a blood test (creatinine test) within the last 30 days, go to your clinic or Diagnostic Imaging Department for this test.  If you have diabetes:   If your kidney function is normal, continue taking your metformin (Avandamet, Glucophage, Glucovance, Metaglip) on the day of your exam.   If your kidney function is abnormal, wait 48 hours before restarting this medicine.  Please wear loose clothing, such as a sweat suit or jogging clothes. Avoid snaps,  SHAR MON-FRI 8 AM- 5PM [phone]. Radiology resident on call [phone].      4550 ML Removed   zippers and other metal. We may ask you to undress and put on a hospital gown.  If you have any questions, please call the Imaging Department where you will have your exam.            Jan 31, 2018 12:30 PM CST   (Arrive by 12:15 PM)   CT SOFT TISSUE NECK W CONTRAST with WYCT1   [facility] CT ([facility])    [address] [place] [address]   [phone]           Please bring any scans or X-rays taken at other hospitals, if similar tests were done. Also bring a list of your medicines, including vitamins, minerals and over-the-counter drugs. It is safest to leave personal items at home.  Be sure to tell your doctor:   If you have any allergies.   If there s any chance you are pregnant.   If you are breastfeeding.   If you have any special needs.  You will have contrast for this exam. To prepare:   Do not eat or drink for 2 hours before your exam. If you need to take medicine, you may take it with small sips of water. (We may ask you to take liquid medicine as well.)   The day before your exam, drink extra fluids at least six 8-ounce glasses (unless your doctor tells you to restrict your fluids).  Patients over 70 or patients with diabetes or kidney problems:   If you haven t had a blood test (creatinine test) within the last 30 days, go to your clinic or Diagnostic Imaging Department for this test.  If you have diabetes:   If your kidney function is normal, continue taking your metformin (Avandamet, Glucophage, Glucovance, Metaglip) on the day of your exam.   If your kidney function is abnormal, wait 48 hours before restarting this medicine.  Please wear loose clothing, such as a sweat suit or jogging clothes. Avoid snaps, zippers and other metal. We may ask you to undress and put on a hospital gown.  If you have any questions, please call the Imaging Department where you will have your exam.            Jul 23, 2018 11:30 AM CDT   Return Visit with [facility] Provider   Radiation Therapy  [place] ([facility])    [address], [address]  [facility] [address]   [phone]              Who to contact     Please call your clinic at [phone] to:    Ask questions about your health    Make or cancel appointments    Discuss your medicines    Learn about your test results    Speak to your doctor   If you have compliments or concerns about an experience at your clinic, or if you wish to file a complaint, please contact [facility] Patient Relations at [phone] or email us at [email or web address]         Additional Information About Your Visit        [employer] Information     [email or web address] gives you secure access to your electronic health record. If you see a primary care provider, you can also send messages to your care team and make appointments. If you have questions, please call your primary care clinic.  If you do not have a primary care provider, please call [phone] and they will assist you.      [email or web address] is an electronic gateway that provides easy, online access to your medical records. With [email or web address], you can request a clinic appointment, read your test results, renew a prescription or communicate with your care team.     To access your existing account, please contact your [facility] or call [phone] for assistance.        Care EveryWhere ID     This is your Care EveryWhere ID. This could be used by other organizations to access your [place] medical records  [id number]        Your Vitals Were     Pulse Respirations Breastfeeding? BMI (Body Mass Index)          60 16 No 25.19 kg/m2         Blood Pressure from Last 3 Encounters:   01/29/18 109/60   12/14/17 96/49   11/02/17 98/52    Weight from Last 3 Encounters:   01/29/18 64.5 kg (142 lb 3.2 oz)   12/14/17 62.6 kg (138 lb)   11/02/17 65.3 kg (144 lb)              Today, you had the following     No orders found for display       Primary Care Provider Office  Phone # Fax #    Nicole Waters BayronHERNÁN Edmonds [phone] [address] [place] [address]        Equal Access to Services     TREMAINE ALMANZA : Hadii aad ku haddaneo Jeff, waaxda luqadaha, qaybta kaalmada adeegyada, luis alex deep clayton. So [facility] [phone].    ATENCIÓN: Si habla español, tiene a thompson disposición servicios gratuitos de asistencia lingüística. Llame al [phone].    We comply with applicable federal civil rights laws and [place] laws. We do not discriminate on the basis of race, color, national origin, age, disability, sex, sexual orientation, or gender identity.            Thank you!     Thank you for choosing [facility]  for your care. Our goal is always to provide you with excellent care. Hearing back from our patients is one way we can continue to improve our services. Please take a few minutes to complete the written survey that you may receive in the mail after your visit with us. Thank you!             Your Updated Medication List - Protect others around you: Learn how to safely use, store and throw away your medicines at www.disposemymeds.org.          This list is accurate as of 1/29/18  1:02 PM.  Always use your most recent med list.                   Brand Name Dispense Instructions for use Diagnosis    albuterol 108 (90 BASE) MCG/ACT Inhaler    PROAIR HFA/PROVENTIL HFA/VENTOLIN HFA     Inhale 2 puffs into the lungs every 6 hours as needed for wheezing    Moderate persistent asthma without complication       DENTAGEL 1.1 % Gel topical gel   Generic drug:  sodium fluoride dental gel      Apply 1 Application to affected area daily        EPINEPHrine 0.3 MG/0.3ML injection 2-pack    EPIPEN/ADRENACLICK/or ANY BX GENERIC EQUIV    0.6 mL    Inject 0.3 mLs (0.3 mg) into the muscle once as needed for anaphylaxis    Anaphylactic reaction, subsequent encounter       fluticasone-salmeterol 250-50 MCG/DOSE diskus inhaler    ADVAIR  DISKUS    1 Inhaler    Inhale 1 puff into the lungs 2 times daily as needed    SOB (shortness of breath)       indomethacin 25 MG capsule    INDOCIN    42 capsule    Take 1 capsule (25 mg) by mouth 2 times daily (with meals)    Acute idiopathic gout of right foot       NITROGLYCERIN TD      Cut in quarter and apply to elbow and knee for pain        order for DME     1 Device    Equipment being ordered: tennis elbow band    Right tennis elbow

## 2019-12-12 ENCOUNTER — HOSPITAL ENCOUNTER (OUTPATIENT)
Dept: MAMMOGRAPHY | Facility: CLINIC | Age: 69
Discharge: HOME | End: 2019-12-12
Attending: NURSE PRACTITIONER | Admitting: NURSE PRACTITIONER
Payer: MEDICARE

## 2019-12-12 ENCOUNTER — OFFICE VISIT (OUTPATIENT)
Dept: PALLIATIVE MEDICINE | Facility: CLINIC | Age: 69
End: 2019-12-12
Payer: MEDICARE

## 2019-12-12 VITALS
BODY MASS INDEX: 26.98 KG/M2 | SYSTOLIC BLOOD PRESSURE: 116 MMHG | HEART RATE: 66 BPM | HEIGHT: 64 IN | WEIGHT: 158 LBS | DIASTOLIC BLOOD PRESSURE: 75 MMHG

## 2019-12-12 DIAGNOSIS — B02.29 POSTHERPETIC NEURALGIA: Primary | ICD-10-CM

## 2019-12-12 DIAGNOSIS — Z12.31 ENCOUNTER FOR SCREENING MAMMOGRAM FOR BREAST CANCER: ICD-10-CM

## 2019-12-12 PROCEDURE — 77063 BREAST TOMOSYNTHESIS BI: CPT

## 2019-12-12 PROCEDURE — 97810 ACUP 1/> WO ESTIM 1ST 15 MIN: CPT | Mod: GA | Performed by: FAMILY MEDICINE

## 2019-12-12 PROCEDURE — 97811 ACUP 1/> W/O ESTIM EA ADD 15: CPT | Mod: GA | Performed by: FAMILY MEDICINE

## 2019-12-12 ASSESSMENT — MIFFLIN-ST. JEOR: SCORE: 1222.71

## 2019-12-12 ASSESSMENT — PAIN SCALES - GENERAL: PAINLEVEL: NO PAIN (1)

## 2019-12-12 NOTE — NURSING NOTE
"Pain Clinic Visit    Chief Complaint   Patient presents with     Acupuncture     Nerve pain, jaw       After the last acupuncture session patient states: she took a really bad fall. She injured her right knee, right ankle, right hip, left shoulder and elbow. She tripped on her PJ leg. Her jaw is mostly stiff. Her shoulder has been acting up because of the fall.     Rating of Pain: No Pain (1)    Jonelle Seals Clinical Medical Assistant       Initial /75   Pulse 66   Ht 1.619 m (5' 3.75\")   Wt 71.7 kg (158 lb)   BMI 27.33 kg/m   Estimated body mass index is 27.33 kg/m  as calculated from the following:    Height as of this encounter: 1.619 m (5' 3.75\").    Weight as of this encounter: 71.7 kg (158 lb).  Medication Reconciliation: complete    "

## 2019-12-13 NOTE — PROGRESS NOTES
S: Minimal left-sided facial discomfort.  Energy level is good.  No significant flareup since her last treatment.  O:Alert NAD  A: Postherpetic neuralgia  P:Acu per note.  Pre-Procedure:  Patient's Name and Date of Birth verified:  YES  Verified By:  brandy   (initials)  Diagnosis:  Data Unavailable  Interval History:  3w  Complications and/or Adverse Effects of Last Acupuncture Treatment:      Site Marking and Verification:  Verification of site selection (based on symptoms and condition:  YES  Verified by: brandy   (initials)  Patient identification verified by provider: YES  Verified by: brandy   (initials)  Pause for the Cause:  YES  Verified by: brandy   (initials)   Procedure Note:  Acupuncture Treatment Number: 13  Acupuncture Points Selected: BODY:Four Pereyra Mixed facial points L side predominant. AUR:ASP1-4.    Electrical Stim: No  Frequency 0 HZ    Number of needles:  17      Re-insertion/Manipulation of needles after initial 15 minutes: YES  Time:  30 Minutes   Post-Procedure:  Complication/Adverse Effects: 0      Management:  0      Signature:  Juan Can MD

## 2020-01-02 ENCOUNTER — OFFICE VISIT (OUTPATIENT)
Dept: PALLIATIVE MEDICINE | Facility: CLINIC | Age: 70
End: 2020-01-02
Payer: MEDICARE

## 2020-01-02 VITALS
SYSTOLIC BLOOD PRESSURE: 97 MMHG | DIASTOLIC BLOOD PRESSURE: 64 MMHG | BODY MASS INDEX: 26.98 KG/M2 | WEIGHT: 158 LBS | HEIGHT: 64 IN | HEART RATE: 59 BPM

## 2020-01-02 DIAGNOSIS — B02.29 POSTHERPETIC NEURALGIA: Primary | ICD-10-CM

## 2020-01-02 PROCEDURE — 97811 ACUP 1/> W/O ESTIM EA ADD 15: CPT | Mod: GA | Performed by: FAMILY MEDICINE

## 2020-01-02 PROCEDURE — 97810 ACUP 1/> WO ESTIM 1ST 15 MIN: CPT | Mod: GA | Performed by: FAMILY MEDICINE

## 2020-01-02 ASSESSMENT — PAIN SCALES - GENERAL: PAINLEVEL: MILD PAIN (2)

## 2020-01-02 ASSESSMENT — MIFFLIN-ST. JEOR: SCORE: 1222.71

## 2020-01-02 NOTE — NURSING NOTE
"Pain Clinic Visit    Chief Complaint   Patient presents with     Acupuncture     jaw nerve pain       After the last acupuncture session patient states: things have been pretty good. She will have 1-2 bad days but others are good. She had a good Shaun and unhappy that it is over. She had the best holiday,.    Rating of Pain: Mild Pain (2)    Jonelle Seals Clinical Medical Assistant       Initial BP 97/64   Pulse 59   Ht 1.619 m (5' 3.75\")   Wt 71.7 kg (158 lb)   BMI 27.33 kg/m   Estimated body mass index is 27.33 kg/m  as calculated from the following:    Height as of this encounter: 1.619 m (5' 3.75\").    Weight as of this encounter: 71.7 kg (158 lb).  Medication Reconciliation: complete    "

## 2020-01-09 NOTE — PROGRESS NOTES
S: Doing well, 1 or 2 bad days through the entire 3 weeks but generally very good.  Very pleased with her response to acupuncture.  O:Alert NAD  A: Postherpetic neuralgia  P:Acu per note.  Pre-Procedure:  Patient's Name and Date of Birth verified:  YES  Verified By:  brandy (initials)  Diagnosis:  Data Unavailable  Interval History:  3w  Complications and/or Adverse Effects of Last Acupuncture Treatment: 0   Site Marking and Verification:  Verification of site selection (based on symptoms and condition:  YES  Verified by: brandy (initials)  Patient identification verified by provider: YES  Verified by: brandy   (initials)  Pause for the Cause:  YES  Verified by: brandy   (initials)   Procedure Note:  Acupuncture Treatment Number: 14  Acupuncture Points Selected: BODY:Four Pereyra. Mixed facial points L>R. AUR: ASP 1-4    Electrical Stim: No  Frequency 0 HZ    Number of needles:  27      Re-insertion/Manipulation of needles after initial 15 minutes: YES  Time:  30   Minutes   Post-Procedure:  Complication/Adverse Effects: 0      Management:  0      Signature:  Juan Can MD

## 2020-01-21 ENCOUNTER — OFFICE VISIT (OUTPATIENT)
Dept: RADIATION THERAPY | Facility: OUTPATIENT CENTER | Age: 70
End: 2020-01-21
Payer: MEDICARE

## 2020-01-21 VITALS
WEIGHT: 160.8 LBS | SYSTOLIC BLOOD PRESSURE: 109 MMHG | OXYGEN SATURATION: 97 % | BODY MASS INDEX: 27.82 KG/M2 | RESPIRATION RATE: 18 BRPM | HEART RATE: 65 BPM | DIASTOLIC BLOOD PRESSURE: 64 MMHG

## 2020-01-21 DIAGNOSIS — Z85.810 HISTORY OF TONGUE CANCER: Primary | ICD-10-CM

## 2020-01-21 DIAGNOSIS — Z92.3 HISTORY OF RADIATION TO HEAD AND NECK REGION: ICD-10-CM

## 2020-01-21 DIAGNOSIS — C02.9 MALIGNANT NEOPLASM OF TONGUE (H): ICD-10-CM

## 2020-01-21 LAB — TSH SERPL DL<=0.005 MIU/L-ACNC: 1.86 MU/L (ref 0.4–4)

## 2020-01-21 PROCEDURE — 36415 COLL VENOUS BLD VENIPUNCTURE: CPT | Performed by: NURSE PRACTITIONER

## 2020-01-21 PROCEDURE — 84443 ASSAY THYROID STIM HORMONE: CPT | Performed by: NURSE PRACTITIONER

## 2020-01-21 NOTE — LETTER
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     TYPE OF RADIATION THERAPY ADMINISTERED: 6000 cGy in 30 fractions         Oncologic history : Mrs. 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 She had radiation therapy in our clinic to a total dose of 6000 cGy in 30 treatments at 200 cGy each fraction from 10/17/2016 to 2016.  She tolerated radiation therapy reasonably well.  The patient did have some significant sore throat and dysphagia toward the end of the therapy, which required a PEG tube for nutritional support.  She otherwise was reasonably stable at the end of the therapy.     Interval history:    Patient reports doing well today. She continues her daily mouth care - using new soft toothbrush she received from Dental. Continues to follow-up with them routinely. Continues to do fluoride trays routinely and doing gargles.     Continued persistent pain to left face post shingles (postherpetic pain) - also improved - controlled with lidocaine patches, acupuncture, hot packs. (managed by pain and palliative med and PCP).    She continues to use therabite for Trismus. She feels may be slightly worse. She also reports continued difficulty with swallowing certain foods which she has been avoiding.     She is doing her head and neck exercises routinely. Reports muscle tension to left neck for several days - she  "felt a \"small knot\" in area resolved with warm compress and rest.     The patient denies any hoarseness of voice, referred otalgia, dysphagia, facial paresthesias, bleeding, other pain, and recurrent/new lumps or bumps. Weight is stable.    ENT last seen 10/28/19: Improving pain right jaw overall and no evidence of ORN.  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 Continuing to follow. Next follow-up 4 months.     She was referred to Dr. Martinez for possible TM perforation and loss of balance.  She was referred for vestibular therapy and is been rescheduled that for January 2020. She reports also considering yoga to help with balance.     Reports has noted sores (papules) on the skin of her face (bilateral upper cheeks and nose). She was recommended to try and OTC skin cleanser by pharmacy and she states this was effective until 2 days ago when skin irritation returned. No scabbing, no bleeding. Patient has history of rosacea.    ROS otherwise negative on a 12-system review.       OBJECTIVE:   Vital Signs: /64   Pulse 65   Resp 18   Wt 72.9 kg (160 lb 12.8 oz)   SpO2 97%   BMI 27.82 kg/m   ;   GENERAL:  Appears well, in no acute distress.   HEENT: NCAT. No thrush, no mucositis.   Mucous membranes are dry. No masses or lymphadenopathy palpated. On exam noted to have small white patch to mucosa posteriolateral tongue on the right stable.   RESP: Breathing comfortably on RA  CV: WWP  NEURO: Normal gait.   SKIN: mild erythema of skin to nose - x2 papules noted to nose.     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  Status post resection with FF and XRT; evaluate for  recurrence/metastasis. 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 Neck  pain on left side.     TECHNIQUE:  Axial CT images of the neck were acquired without  intravenous contrast. Coronal reconstructions were created.     COMPARISON: " Soft tissue neck CT 6/5/2019.     FINDINGS: Postoperative changes consisting of a right neck dissection  and right lateral floor of mouth resection with free flap  reconstruction again noted. Soft tissue contours in the surgical bed  are unchanged from the comparison study with no evidence for tumor  progression. However, evaluation is mildly limited by extensive  metallic artifact throughout the oral cavity.     There is no cervical lymphadenopathy. There are no fluid collections  or abscesses in the neck. The right submandibular gland has been  resected. The left submandibular gland and bilateral parotid glands  remain unremarkable. There is no evidence for mucosal space lesion.     A 0.8 cm hypodense nodule in the superior aspect of the right lobe of  the thyroid gland is again noted without change. Scattered smaller  nodules in the left lobe of the thyroid gland are also again noted  without change.     The lung apices remain clear.                                                                      IMPRESSION:  1. Postoperative changes to the right neck and right floor of mouth  with no evidence for tumor recurrence or progression. However,  evaluation of the oral cavity is limited by extensive metallic  artifact.  2. Thyroid nodules bilaterally, no change.  3. Otherwise, normal soft tissue neck CT.        Radiation dose for this scan was reduced using automated exposure  control, adjustment of the mA and/or kV according to patient size, or  iterative reconstruction technique.     ENRIQUETA CARMONA MD    CT CHEST W/O CONTRAST 9/19/2019 10:56 AM     HISTORY: Head and neck cancer. Assess for pulmonary metastases.     TECHNIQUE: CT of the chest is performed without IV contrast.     Assessed structures to the limits no IV contrast administration  include the lungs, mediastinum, pleura, and chest wall.     Radiation exposure is reduced using automated exposure control,  adjustment of the mA and/or kV according to  patient size, or iterative  reconstruction technique.     COMPARISON: 11/5/2018.     FINDINGS: There is a 2 mm noncalcified right lower lobe pulmonary  nodule on axial image 33. In retrospect this is stable from the prior  study, suggesting a benign entity. Previously seen left upper lobe  lung abnormality is resolved in the interim. No enlarged lymph nodes  are demonstrated. The thoracic aorta is upper normal in caliber.                                                                      IMPRESSION:  Negative chest CT for metastatic disease.     KRISTEN PEREZ MD    IMPRESSION:   Ms. 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  She is recovered from the acute toxicities of treatment.     CT chest 9/19/19 negative  for metastatic disease. CT soft tissue neck 9/19/19 showing postoperative changes to the right neck and right floor of mouth with no evidence for tumor recurrence or progression.    Seen by ENT 10/28/19  and exam completed. WILMA. Improving pain overall and no evidence of ORN.  Leukoplakia stable. Continuing to follow.  Next  Imaging due 9/2020 (per ENT).     RECOMMENDATIONS:    1. RTC in 6 months.    2. Continue to follow recommendations from ENT-  gargling with salt and baking soda and brushing less vigorously to prevent trauma.    3.  Continue to follow-up with ENT. Next appt 2/25/2020.    4. Continue to follow-up with Dental.     5. Continue to follow-up with  Pain and Palliative medicine for persistent pain to left face post shingles (postherpetic pain) - controlled with lidocaine patches, CBD oil, acupuncture, hot packs. CT negative.     6. Continue oral nutrition and continue swallowing and stretching exercises. Continue Therabite for Trismus.  Continue to follow-up with speech (last seen 6/2019).    7. TSH 1.51 on 2/2019. To reassess Q 6-12 months. To have lab today  and will call pateint with results (see addendum)    8. Continue follow-up with PCP for general health maintenance. Recommend patient follow-up with PCP for skin irritation that initially resolved with OTC cleansing solution.      9. Xerostomia:  patient to continue products for dry mouth including Biotene and Xylitol/Theramints gum. Reports cool mist vaporizer effective. Again prefers to defer Salagen at this time.      7. Cancer screening: Mammogram 12/2019 negative. Colonoscopy last 12/2017, next recommended 10 years.    8.  She was referred for vestibular therapy and is been rescheduled that for January 2020. Patient also plans to start yoga for balance.        Jen Rey [facility]  Radiation Therapy Center  [facility]  [address], [address]  [place] [address]    Addendum:   Ref Range & Units 11:40 AM 11mo ago 1yr ago   TSH 0.40 - 4.00 mU/L 1.86  1.51  1.12      Released results. Called patient (VM) - will repeat labs in 6-12 mths.     HERNÁN Hammer CNP

## 2020-01-21 NOTE — NURSING NOTE
FOLLOW-UP VISIT    Patient Name: Terese Bell      : 1950     Age: 69 year old        ______________________________________________________________________________     Chief Complaint   Patient presents with     Radiation Therapy     follow up     /64   Pulse 65   Resp 18   Wt 72.9 kg (160 lb 12.8 oz)   SpO2 97%   BMI 27.82 kg/m       Date Radiation Completed: 2016    Pain  Current history of pain associated with this visit:   Intensity: Patient is unable to quantify.  Current: tightness/stiffness  Location: L neck  Treatment: warm pack, stretching  -chronic shingles pain - continues with acupuncture every few weeks for maintenance     Meds  Current Med List Reviewed: Yes  Medication Note:     Labs  TSH   Date Value Ref Range Status   2019 1.51 0.40 - 4.00 mU/L Final   ]    Imaging  CT: 19, next due at 2020    Skin:Warm  Dry  Intact  pt notes that nose area seems to be breaking out/acne like changes, using clean and clear and OTC creams  Oral Products: water, xylotol gum  Mucositis: No  Dry mouth: Yes  Dental evaluation: Yes: fluoride trays routinely  PEG tube: No  Speech therapy: Yes: doing therabite a few times per week. Doing stretches/exercises routinely  Lymphedema: No  Energy Level:normal  Appetite: fair  Intake: still using shakes, can eat most foods, weight stable  Weight:  Wt Readings from Last 5 Encounters:   20 72.9 kg (160 lb 12.8 oz)   20 71.7 kg (158 lb)   19 71.7 kg (158 lb)   19 71.7 kg (158 lb)   10/31/19 71.7 kg (158 lb)       Appointments:     DATE  Oncologist: coleen    ENT:  ROGER [address]  20     Primary:      Other Notes:

## 2020-01-23 ENCOUNTER — OFFICE VISIT (OUTPATIENT)
Dept: PALLIATIVE MEDICINE | Facility: CLINIC | Age: 70
End: 2020-01-23
Payer: MEDICARE

## 2020-01-23 VITALS
BODY MASS INDEX: 26.98 KG/M2 | DIASTOLIC BLOOD PRESSURE: 69 MMHG | HEART RATE: 85 BPM | SYSTOLIC BLOOD PRESSURE: 114 MMHG | HEIGHT: 64 IN | WEIGHT: 158 LBS

## 2020-01-23 DIAGNOSIS — B02.29 POSTHERPETIC NEURALGIA: Primary | ICD-10-CM

## 2020-01-23 PROCEDURE — 97811 ACUP 1/> W/O ESTIM EA ADD 15: CPT | Mod: GA | Performed by: FAMILY MEDICINE

## 2020-01-23 PROCEDURE — 97810 ACUP 1/> WO ESTIM 1ST 15 MIN: CPT | Mod: GA | Performed by: FAMILY MEDICINE

## 2020-01-23 ASSESSMENT — PAIN SCALES - GENERAL: PAINLEVEL: NO PAIN (1)

## 2020-01-23 ASSESSMENT — MIFFLIN-ST. JEOR: SCORE: 1222.71

## 2020-01-23 NOTE — PROGRESS NOTES
S: Her left face area has generally been very very good, she had one episode of spastic type discomfort in her left jaw yesterday which resolved with symptomatic management at home.  O:Alert NAD  A: Postherpetic neuralgia  P:Acu per note.  Pre-Procedure:  Patient's Name and Date of Birth verified:  YES  Verified By:  brandy (initials)  Diagnosis:  Data Unavailable  Interval History:  2w  Complications and/or Adverse Effects of Last Acupuncture Treatment: 0   Site Marking and Verification:  Verification of site selection (based on symptoms and condition:  YES  Verified by: brandy (initials)  Patient identification verified by provider: YES  Verified by: brandy (initials)  Pause for the Cause:  YES  Verified by: brandy (initials)   Procedure Note:  Acupuncture Treatment Number: 15  Acupuncture Points Selected: Mixed facial points, L side mandible, Four Pereyra AUR:SM,PZ    Electrical Stim: No  Frequency 0   HZ    Number of needles:  17      Re-insertion/Manipulation of needles after initial 15 minutes: YES  Time:  30   Minutes   Post-Procedure:  Complication/Adverse Effects: 0      Management:  0      Signature:  Juan Can MD

## 2020-01-23 NOTE — NURSING NOTE
"Pain Clinic Visit    Chief Complaint   Patient presents with     Acupuncture     Nerve pain, jaw       After the last acupuncture session patient states: yesterday she had severe jaw pain but thinks it is due to the weather. On the whole things have been pretty good. She has some arthritis in her knees and is thinking of trying acupuncture in the knees.     Rating of Pain: No Pain (1)    Jonelle Seals Clinical Medical Assistant       Initial /69   Pulse 85   Ht 1.619 m (5' 3.75\")   Wt 71.7 kg (158 lb)   BMI 27.33 kg/m   Estimated body mass index is 27.33 kg/m  as calculated from the following:    Height as of this encounter: 1.619 m (5' 3.75\").    Weight as of this encounter: 71.7 kg (158 lb).  Medication Reconciliation: complete    "

## 2020-02-02 DIAGNOSIS — M10.071 ACUTE IDIOPATHIC GOUT OF RIGHT FOOT: ICD-10-CM

## 2020-02-04 NOTE — TELEPHONE ENCOUNTER
"Requested Prescriptions   Pending Prescriptions Disp Refills     indomethacin (INDOCIN) 25 MG capsule [Pharmacy Med Name: Indomethacin Oral Capsule 25 MG] 42 capsule 0     Sig: Take 1 capsule (25 mg) by mouth 2 times daily (with meals)  Last Written Prescription Date:  10/02/2019 #42 x 0  Last filled - not provided  Last office visit: 3/7/2019 ARIANA Mcdonald     Future Office Visit:   Next 5 appointments (look out 90 days)    Feb 20, 2020 10:30 AM CST  Return Visit with Juan Can MD  [facility] ([facility]) [address]  [address]  [place] [place] [address]  [phone]   Mar 19, 2020 10:30 AM CDT  Return Visit with Juan Can MD  [facility] ([facility]) [address]  [address]  [facility] [place] [address]  [phone]                Gout Agents Protocol Failed - 2/2/2020 11:02 AM        Failed - CBC on file in past 12 months     Recent Labs   Lab Test 11/04/16  1353 09/29/16  0806   WBC  --  5.1   RBC  --  3.63*   HGB 12.1 11.5*   HCT  --  35.9   PLT  --  278                 Failed - ALT on file in past 12 months     Recent Labs   Lab Test 11/02/17  1327   ALT 26             Failed - Has Uric Acid on file in past 12 months and value is less than 6     No lab results found.  If level is 6mg/dL or greater, ok to refill one time and refer to provider.           Passed - Recent (12 mo) or future (30 days) visit within the authorizing provider's specialty     Patient has had an office visit with the authorizing provider or a provider within the authorizing providers department within the previous 12 mos or has a future within next 30 days. See \"Patient Info\" tab in inbasket, or \"Choose Columns\" in Meds & Orders section of the refill encounter.              Passed - Medication is active on med list        Passed - Patient is age 18 or older        Passed - No active pregnancy on record        Passed - " Normal serum creatinine on file in the past 12 months     Recent Labs   Lab Test 02/22/19  0952   CR 0.57             Passed - No positive pregnancy test in past year

## 2020-02-05 RX ORDER — INDOMETHACIN 25 MG/1
25 CAPSULE ORAL 2 TIMES DAILY WITH MEALS
Qty: 42 CAPSULE | Refills: 0 | Status: SHIPPED | OUTPATIENT
Start: 2020-02-05 | End: 2020-03-02

## 2020-02-05 NOTE — TELEPHONE ENCOUNTER
Routing refill request to provider for review/approval because:  Labs not current    Nasreen Pelayo RN

## 2020-02-20 ENCOUNTER — OFFICE VISIT (OUTPATIENT)
Dept: PALLIATIVE MEDICINE | Facility: CLINIC | Age: 70
End: 2020-02-20
Payer: MEDICARE

## 2020-02-20 VITALS
BODY MASS INDEX: 26.98 KG/M2 | HEART RATE: 65 BPM | HEIGHT: 64 IN | SYSTOLIC BLOOD PRESSURE: 106 MMHG | WEIGHT: 158 LBS | DIASTOLIC BLOOD PRESSURE: 66 MMHG

## 2020-02-20 DIAGNOSIS — B02.29 POSTHERPETIC NEURALGIA: Primary | ICD-10-CM

## 2020-02-20 PROCEDURE — 97810 ACUP 1/> WO ESTIM 1ST 15 MIN: CPT | Mod: GA | Performed by: FAMILY MEDICINE

## 2020-02-20 PROCEDURE — 97811 ACUP 1/> W/O ESTIM EA ADD 15: CPT | Mod: GA | Performed by: FAMILY MEDICINE

## 2020-02-20 ASSESSMENT — MIFFLIN-ST. JEOR: SCORE: 1222.71

## 2020-02-20 ASSESSMENT — PAIN SCALES - GENERAL: PAINLEVEL: NO PAIN (1)

## 2020-02-20 NOTE — NURSING NOTE
"Pain Clinic Visit    Chief Complaint   Patient presents with     Acupuncture     nerve pain, jaw       After the last acupuncture session patient states: she is going to a balance class a couple times a week and a fitness class a couple times a week. She has been moving a lot of boxes.     Rating of Pain: No Pain (1)    Jonelle Seals Clinical Medical Assistant       Initial /66   Pulse 65   Ht 1.619 m (5' 3.75\")   Wt 71.7 kg (158 lb)   BMI 27.33 kg/m   Estimated body mass index is 27.33 kg/m  as calculated from the following:    Height as of this encounter: 1.619 m (5' 3.75\").    Weight as of this encounter: 71.7 kg (158 lb).  Medication Reconciliation: complete    "

## 2020-02-25 ENCOUNTER — ALLIED HEALTH/NURSE VISIT (OUTPATIENT)
Dept: SPEECH THERAPY | Facility: CLINIC | Age: 70
End: 2020-02-25

## 2020-02-25 ENCOUNTER — OFFICE VISIT (OUTPATIENT)
Dept: OTOLARYNGOLOGY | Facility: CLINIC | Age: 70
End: 2020-02-25
Payer: MEDICARE

## 2020-02-25 VITALS
HEART RATE: 74 BPM | BODY MASS INDEX: 28 KG/M2 | HEIGHT: 64 IN | WEIGHT: 164 LBS | SYSTOLIC BLOOD PRESSURE: 123 MMHG | DIASTOLIC BLOOD PRESSURE: 75 MMHG

## 2020-02-25 DIAGNOSIS — C01 MALIGNANT NEOPLASM OF BASE OF TONGUE (H): Primary | ICD-10-CM

## 2020-02-25 DIAGNOSIS — L90.5 SCAR OF NECK: ICD-10-CM

## 2020-02-25 DIAGNOSIS — M43.6 NECK STIFFNESS: ICD-10-CM

## 2020-02-25 DIAGNOSIS — K13.21 LEUKOPLAKIA, TONGUE: ICD-10-CM

## 2020-02-25 DIAGNOSIS — R13.12 OROPHARYNGEAL DYSPHAGIA: ICD-10-CM

## 2020-02-25 ASSESSMENT — PAIN SCALES - GENERAL: PAINLEVEL: NO PAIN (0)

## 2020-02-25 ASSESSMENT — MIFFLIN-ST. JEOR: SCORE: 1250.41

## 2020-02-25 NOTE — NURSING NOTE
"Chief Complaint   Patient presents with     RECHECK     3 month follow up    Blood pressure 123/75, pulse 74, height 1.62 m (5' 3.78\"), weight 74.4 kg (164 lb), not currently breastfeeding.      Hilton Chong LPN    "

## 2020-02-25 NOTE — PROGRESS NOTES
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 Schumacher.  He ended up doing 13 operations in 11 years, most recently in 2010.       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 Ferris, with R RFFF reconstruction by Dr. Leo on 9/1/2016.  The margins were negative but the lymph nodes were 1/40 positive.  She completed postoperative radiation therapy on 11/29/2016.     Patient has been dealing with pain in the right jaw and right thyroid ala region.  As of 6/5/2019, no evidence of ORN at the visit with Dr. Ferris. Patient referred back to us by [facility] with possible sore in the right jaw.  Upon evaluation on 8/21/2019, no ORN present in jaw.  There is some leukoplakia along the posterolateral tongue on the right             HPI:  Ms. Bettie Bell patient is doing well overall.  She states she feels pretty good.  She has been doing yoga twice a week for balance which has been helping quite a bit.  She is joined Silver sneakers and is trying to get more exercise and and strengthen her muscles.  She thinks it is making her feel better overall.    She has not had any episodes of new pain in her neck.  She still has some intermittent right-sided neck pain that she has had for quite some time.  She also continues to have some tightness along the right side of the neck and in the right side of her mouth, where she has scar tissue from her surgery.  She reports that her mouth is still dry but no worse than usual.  She has been eating better.  She still avoids foods such as meat, rice, breads that are toasted,  "etc.  She is eating more different types of food in general however.    She uses her Therabite 2 times a week for the trismus but does not use it every day because he feels like it makes some of the tightness in her neck worse.  She does or not stretching 2-3 times a day.  She does her swallow exercises every other day and again does not do them daily as she feels like it makes the tightness in her neck worse.  She would like to see Janay, speech-language pathologist, today for a check-in.    The patient denies any hoarseness of voice, referred otalgia, odynophagia, dysphagia, dizziness/lightheadedness, loss of balance, facial paresthesias, tinnitus, hemoptysis, other pain, bleeding, and recurrent/new lumps or bumps. Weight is stable.        Physical Exam:  /75   Pulse 74   Ht 1.62 m (5' 3.78\")   Wt 74.4 kg (164 lb)   BMI 28.35 kg/m      Constitutional: The patient is unaccompanied, well-groomed, and in no acute distress.    Head: Normocephalic and atraumatic.   Eyes: Pupils were equal and reactive.  Extraocular movement intact.    Ears: Pinnae and tragus non-tender.  EACs and TMs were clear.     Nose: Sinuses were non-tender.  Anterior rhinoscopy revealed midline septum and absence of purulence or polyps.    Oral Cavity: well healed Free flap of the right sie of the tongue, improving area of leukoplakia noted on the right posterolateral tongue, near the retromolar area (see image below), No evidence of ORN, scar band noted at RMT and right base of tongue, unchanged; Normal floor of mouth, buccal mucosa except for an area of a ridge along the right buccal mucosa where her teeth come meet and bite the mucosa, and palate.  No lesions or masses on inspection or palpation.  trismus noted  Oropharynx: Normal mucosa, palate symmetric with normal elevation. No abnormal lymph tissue in the oropharynx.  Pterygoid region non-tender.   Hypopharynx/Larynx: .skkiamge  Neck: pain with palpation over the right thyroid " ala, no underlying mass.  atrophy of the right neck The parotid beds were without masses.  No palpable thyroid.  Normal range of motion  Lymphatic: There is no palpable lymphadenopathy in the neck.   Respiratory: Breathing comfortably without stridor or exertion of accessory muscles.   Skin: Normal:  warm and pink without rash  Neurologic: Alert and oriented x 3.  CN's III-XII within normal limits.  Voice normal.   Psychiatric: The patient's affect was calm, cooperative, and appropriate.        Area of right buccal mucosa that has a ridge from where the teeth come together, pinching it      Leukoplakia along right lateral mobile tongue, posterior to flap from 2/25/2020      Leukoplakia along right lateral mobile tongue, posterior to flap from 10/28/2019        Fiberoptic Endoscopy:  Consent for fiberoptic laryngoscopy was obtained, and we confirmed correctness of procedure and identity of patient.  Fiberoptic laryngoscopy was indicated due to SCC of tongue.  The nose was topically decongested and anesthetized.  The fiberoptic laryngoscope was passed under endoscopic vision.  The turbinates were normal.  The inferior and middle meati were clear bilaterally without purulence, masses, or polyps.  The nasopharynx was clear.  The Eustachian tubes were clear.  The soft palate appeared normal with good mobility.  The epiglottis was sharp and the visualized portion of the vallecula was clear.  The larynx was clear with mobile cords.  The arytenoids were clear and there was no pooling in the hypopharynx.  No concerning lesions, masses, or signs of recurrence.    See Imagestream recording in the [facility] Orders section        IMPRESSION AND PLAN:    1. Malignant neoplasm of base of tongue (H)  Patient doing well.  No concerning history or exam findings.  Endoscopy unremarkable.  Patient will return in 4 months for a follow-up with Dr. Leo.  Her 4-year scans will be due in September 2020.    2. Leukoplakia, tongue  Patient  with leukoplakia on the right lateral mobile tongue posterior to the flap.  It is improved from her last visit in October 2019.  Will require ongoing monitoring.  There is no underlying mass or any reason to biopsy at this point.    3. Oropharyngeal dysphagia, neck stiffness and neck scarring.  Patient with dysphagia and trismus from her previous surgery.  Has been intermittently doing her exercises for her trismus as well as swallowing.  Encouraged her to continue to do it on an ongoing basis as to avoid any long-term problems.  Did have Janay Rojas MS, CCC-SLP, see the patient today.  Please refer to her note for full evaluation and treatment recommendations.     Janay did recommend a referral for myofascial release.  Referral placed.       Bridgette Farmer PA-C  Otolaryngology  Head & Neck Surgery  [phone]       CC:  Nicole Mcdonald NP   [address]  [address]      Pattie Natarajan MD   Radiation Therapy    [address]   [address] [place] [address]  [address]      Moreno Leo MD  Otolaryngology/Head & Neck Surgery  [facility] [address]     Thomas Ferris MD  Otolaryngology/Head & Neck Surgery  [facility] [address]

## 2020-02-25 NOTE — PATIENT INSTRUCTIONS
Terese Bell,    It was a pleasure to see you today.    1. You were seen in the ENT Clinic today by Bridgette Farmer PA-C.  If you have any questions or concerns after your appointment, please call   - Option 1: ENT Clinic: [phone]  - Option 2: Jared (Dr. Ferris's Nurse): [phone]    2. Please return to see Dr. Leo in 4 months.    Next set of CT scans are your 4 year scans in September 2020.    3. Continue your swallow and stretching exercises every day.          Thank you,  Bridgette Farmer PA-C  Otolaryngology  Head & Neck Surgery  [phone]

## 2020-02-25 NOTE — LETTER
2/25/2020       RE: Terese Bell  [address] [facility] [address]     Dear Colleague,    Thank you for referring your patient, Terese Bell, to the [facility] EAR NOSE AND THROAT at [facility]. Please see a copy of my visit note below.    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 Schumacher.  He ended up doing 13 operations in 11 years, most recently in 2010.       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 Ferris, with R RFFF reconstruction by Dr. Leo on 9/1/2016.  The margins were negative but the lymph nodes were 1/40 positive.  She completed postoperative radiation therapy on 11/29/2016.     Patient has been dealing with pain in the right jaw and right thyroid ala region.  As of 6/5/2019, no evidence of ORN at the visit with Dr. Ferris. Patient referred back to us by [facility] with possible sore in the right jaw.  Upon evaluation on 8/21/2019, no ORN present in jaw.  There is some leukoplakia along the posterolateral tongue on the right             HPI:  Ms. Bettie Bell patient is doing well overall.  She states she feels pretty good.  She has been doing yoga twice a week for balance which has been helping quite a bit.  She is joined Silver sneakers and is trying to get more exercise and and strengthen her muscles.  She thinks it is making her feel better overall.    She has not had any episodes of new pain in her neck.  She still has some intermittent right-sided neck pain that she has had for quite some time.  She also  "continues to have some tightness along the right side of the neck and in the right side of her mouth, where she has scar tissue from her surgery.  She reports that her mouth is still dry but no worse than usual.  She has been eating better.  She still avoids foods such as meat, rice, breads that are toasted, etc.  She is eating more different types of food in general however.    She uses her Therabite 2 times a week for the trismus but does not use it every day because he feels like it makes some of the tightness in her neck worse.  She does or not stretching 2-3 times a day.  She does her swallow exercises every other day and again does not do them daily as she feels like it makes the tightness in her neck worse.  She would like to see Janay, speech-language pathologist, today for a check-in.    The patient denies any hoarseness of voice, referred otalgia, odynophagia, dysphagia, dizziness/lightheadedness, loss of balance, facial paresthesias, tinnitus, hemoptysis, other pain, bleeding, and recurrent/new lumps or bumps. Weight is stable.        Physical Exam:  /75   Pulse 74   Ht 1.62 m (5' 3.78\")   Wt 74.4 kg (164 lb)   BMI 28.35 kg/m       Constitutional: The patient is unaccompanied, well-groomed, and in no acute distress.    Head: Normocephalic and atraumatic.   Eyes: Pupils were equal and reactive.  Extraocular movement intact.    Ears: Pinnae and tragus non-tender.  EACs and TMs were clear.     Nose: Sinuses were non-tender.  Anterior rhinoscopy revealed midline septum and absence of purulence or polyps.    Oral Cavity: well healed Free flap of the right sie of the tongue, improving area of leukoplakia noted on the right posterolateral tongue, near the retromolar area (see image below), No evidence of ORN, scar band noted at RMT and right base of tongue, unchanged; Normal floor of mouth, buccal mucosa except for an area of a ridge along the right buccal mucosa where her teeth come meet and bite " the mucosa, and palate.  No lesions or masses on inspection or palpation.   trismus noted  Oropharynx: Normal mucosa, palate symmetric with normal elevation. No abnormal lymph tissue in the oropharynx.  Pterygoid region non-tender.   Hypopharynx/Larynx: .skkiamge  Neck: pain with palpation over the right thyroid ala, no underlying mass.  atrophy of the right neck The parotid beds were without masses.  No palpable thyroid.  Normal range of motion  Lymphatic: There is no palpable lymphadenopathy in the neck.   Respiratory: Breathing comfortably without stridor or exertion of accessory muscles.   Skin: Normal:  warm and pink without rash  Neurologic: Alert and oriented x 3.  CN's III-XII within normal limits.  Voice normal.   Psychiatric: The patient's affect was calm, cooperative, and appropriate.        Area of right buccal mucosa that has a ridge from where the teeth come together, pinching it      Leukoplakia along right lateral mobile tongue, posterior to flap from 2/25/2020      Leukoplakia along right lateral mobile tongue, posterior to flap from 10/28/2019        Fiberoptic Endoscopy:  Consent for fiberoptic laryngoscopy was obtained, and we confirmed correctness of procedure and identity of patient.  Fiberoptic laryngoscopy was indicated due to SCC of tongue.  The nose was topically decongested and anesthetized.  The fiberoptic laryngoscope was passed under endoscopic vision.  The turbinates were normal.  The inferior and middle meati were clear bilaterally without purulence, masses, or polyps.  The nasopharynx was clear.  The Eustachian tubes were clear.  The soft palate appeared normal with good mobility.  The epiglottis was sharp and the visualized portion of the vallecula was clear.  The larynx was clear with mobile cords.  The arytenoids were clear and there was no pooling in the hypopharynx.  No concerning lesions, masses, or signs of recurrence.    See Imagestream recording in the [facility] Orders  section        IMPRESSION AND PLAN:    1. Malignant neoplasm of base of tongue (H)  Patient doing well.  No concerning history or exam findings.  Endoscopy unremarkable.  Patient will return in 4 months for a follow-up with Dr. Leo.  Her 4-year scans will be due in September 2020.    2. Leukoplakia, tongue  Patient with leukoplakia on the right lateral mobile tongue posterior to the flap.  It is improved from her last visit in October 2019.  Will require ongoing monitoring.  There is no underlying mass or any reason to biopsy at this point.    3. Oropharyngeal dysphagia, neck stiffness and neck scarring.  Patient with dysphagia and trismus from her previous surgery.  Has been intermittently doing her exercises for her trismus as well as swallowing.  Encouraged her to continue to do it on an ongoing basis as to avoid any long-term problems.  Did have Janay Rojas MS, CCC-SLP, see the patient today.  Please refer to her note for full evaluation and treatment recommendations.     Janay did recommend a referral for myofascial release.  Referral placed.       Bridgette Farmer PA-C  Otolaryngology  Head & Neck Surgery  [phone]       CC:  Nicole Mcdonald NP   [facility]   [address], [place]  [address]      Pattie Natarajan MD   Radiation Therapy    [address] [facility]   [address] [place] [address]  [address]      Moreno Leo MD  Otolaryngology/Head & Neck Surgery  [facility] [address]     Thomas Ferris MD  Otolaryngology/Head & Neck Surgery  [facility] [address]

## 2020-02-26 ENCOUNTER — TELEPHONE (OUTPATIENT)
Dept: OTOLARYNGOLOGY | Facility: CLINIC | Age: 70
End: 2020-02-26

## 2020-02-26 NOTE — TELEPHONE ENCOUNTER
[facility] Call Center    Phone Message    May a detailed message be left on voicemail: yes     Reason for Call: Order(s): Other:   Reason for requested: ORDER needs to be updated that this is for Lymphodema Therapy.     Date needed: ASA   [facility]       Action Taken: Message routed to:  [facility] ([facility] ENT    Travel Screening: Not Applicable

## 2020-02-26 NOTE — TELEPHONE ENCOUNTER
Left vm message regarding this. Did state on message that provider is not ordering PT for lymphodema therapy, the order is for myofascial release for scar tissue from neck surgery and radiation therapy, as stated in the referral notes on the order. Call back number was left in case of further questions or concerns, or if there is still something that needs to be done differently on referral.

## 2020-02-27 NOTE — PROGRESS NOTES
S: Has been doing well, one slight flareup last week but much better than usual for her.  No new concerns.  O:Alert NAD  A: Postherpetic neuralgia  P:Acu per note.  Pre-Procedure:  Patient's Name and Date of Birth verified:  YES  Verified By:  brandy (initials)  Diagnosis:  Data Unavailable  Interval History:  3w  Complications and/or Adverse Effects of Last Acupuncture Treatment: 0   Site Marking and Verification:  Verification of site selection (based on symptoms and condition:  YES  Verified by: brandy (initials)  Patient identification verified by provider: YES  Verified by: brandy (initials)  Pause for the Cause:  YES  Verified by: brandy (initials)   Procedure Note:  Acupuncture Treatment Number: 16  Acupuncture Points Selected: Facial input,L side dominant, Four Pereyra, AUR:SM,PZ    Electrical Stim: No  Frequency 0   HZ    Number of needles:  21      Re-insertion/Manipulation of needles after initial 15 minutes: YES  Time:  30   Minutes   Post-Procedure:  Complication/Adverse Effects: 0      Management:  0      Signature:  Juan Can MD

## 2020-02-28 NOTE — PROGRESS NOTES
Patient seen for Allied health care provider visit.  Patient reports no change in her job range of motion since last visit.  She continues to use the Therabite every other day.  She does have some searing pain in her jaw if she uses it more frequently.  The pain decreases her desire to use it.  We discussed myofascial release and she reports having had this in the past.  She felt like this really helped with the mobility of her neck.  Discussed with Bridgette Farmer PA-C who will put in a referral for that therapy.  Melania continues to eat and drink most foods although has some difficulties with drier things.  She is feeling quite good remaining stable.  She continues to do her pharyngeal strengthening exercises every other day.  No formal evaluation completed.  She will notify ENT provider if there are changes in her swallow function or a decline in her abilities.  Time spent with patient 7 minutes.

## 2020-03-02 ENCOUNTER — TELEPHONE (OUTPATIENT)
Dept: FAMILY MEDICINE | Facility: CLINIC | Age: 70
End: 2020-03-02

## 2020-03-02 ENCOUNTER — OFFICE VISIT (OUTPATIENT)
Dept: FAMILY MEDICINE | Facility: CLINIC | Age: 70
End: 2020-03-02
Payer: MEDICARE

## 2020-03-02 VITALS
BODY MASS INDEX: 27.86 KG/M2 | TEMPERATURE: 98.4 F | DIASTOLIC BLOOD PRESSURE: 68 MMHG | HEIGHT: 64 IN | WEIGHT: 163.2 LBS | RESPIRATION RATE: 14 BRPM | HEART RATE: 72 BPM | SYSTOLIC BLOOD PRESSURE: 114 MMHG

## 2020-03-02 DIAGNOSIS — Z00.00 ENCOUNTER FOR MEDICARE ANNUAL WELLNESS EXAM: Primary | ICD-10-CM

## 2020-03-02 DIAGNOSIS — M10.071 ACUTE IDIOPATHIC GOUT OF RIGHT FOOT: ICD-10-CM

## 2020-03-02 DIAGNOSIS — E78.5 HYPERLIPIDEMIA LDL GOAL <130: ICD-10-CM

## 2020-03-02 DIAGNOSIS — E55.9 VITAMIN D DEFICIENCY: ICD-10-CM

## 2020-03-02 DIAGNOSIS — B02.29 POST HERPETIC NEURALGIA: ICD-10-CM

## 2020-03-02 LAB
ALT SERPL W P-5'-P-CCNC: 13 U/L (ref 0–50)
CHOLEST SERPL-MCNC: 237 MG/DL
ERYTHROCYTE [DISTWIDTH] IN BLOOD BY AUTOMATED COUNT: 12.2 % (ref 10–15)
HCT VFR BLD AUTO: 38.4 % (ref 35–47)
HDLC SERPL-MCNC: 80 MG/DL
HGB BLD-MCNC: 12.8 G/DL (ref 11.7–15.7)
LDLC SERPL CALC-MCNC: 141 MG/DL
MCH RBC QN AUTO: 31.6 PG (ref 26.5–33)
MCHC RBC AUTO-ENTMCNC: 33.3 G/DL (ref 31.5–36.5)
MCV RBC AUTO: 95 FL (ref 78–100)
NONHDLC SERPL-MCNC: 157 MG/DL
PLATELET # BLD AUTO: 247 10E9/L (ref 150–450)
RBC # BLD AUTO: 4.05 10E12/L (ref 3.8–5.2)
TRIGL SERPL-MCNC: 79 MG/DL
WBC # BLD AUTO: 2.9 10E9/L (ref 4–11)

## 2020-03-02 PROCEDURE — 82306 VITAMIN D 25 HYDROXY: CPT | Performed by: NURSE PRACTITIONER

## 2020-03-02 PROCEDURE — 85027 COMPLETE CBC AUTOMATED: CPT | Performed by: NURSE PRACTITIONER

## 2020-03-02 PROCEDURE — 80061 LIPID PANEL: CPT | Performed by: NURSE PRACTITIONER

## 2020-03-02 PROCEDURE — G0439 PPPS, SUBSEQ VISIT: HCPCS | Performed by: NURSE PRACTITIONER

## 2020-03-02 PROCEDURE — 36415 COLL VENOUS BLD VENIPUNCTURE: CPT | Performed by: NURSE PRACTITIONER

## 2020-03-02 PROCEDURE — 84460 ALANINE AMINO (ALT) (SGPT): CPT | Performed by: NURSE PRACTITIONER

## 2020-03-02 RX ORDER — LIDOCAINE 50 MG/G
OINTMENT TOPICAL PRN
Qty: 50 G | Refills: 1 | Status: SHIPPED | OUTPATIENT
Start: 2020-03-02 | End: 2020-07-15

## 2020-03-02 RX ORDER — INDOMETHACIN 25 MG/1
25 CAPSULE ORAL 2 TIMES DAILY WITH MEALS
Qty: 60 CAPSULE | Refills: 3 | Status: SHIPPED | OUTPATIENT
Start: 2020-03-02 | End: 2021-03-04

## 2020-03-02 ASSESSMENT — MIFFLIN-ST. JEOR: SCORE: 1243.65

## 2020-03-02 ASSESSMENT — ACTIVITIES OF DAILY LIVING (ADL): CURRENT_FUNCTION: NO ASSISTANCE NEEDED

## 2020-03-02 NOTE — LETTER
[address]  [address]  Ph. [phone]  Fax [phone]          To Whom It May Concern,      Terese Bell has a long history of oral cancer with surgical repair.  She continues to see Oncology and her oral surgeon.   Do to the oral surgery,  Removal of a large part of her tongue with reconstruction she does have a lot of nerve pain.  The Lidocaine ointment has been used successfully to control her pain .   Please reconsider your denial of this medication that is working very well for her and now approve the request for the Lidocaine ointment .           Sincerely,           Nicole Mcdonald APRN-CNP  [facility]

## 2020-03-02 NOTE — PROGRESS NOTES
"SUBJECTIVE:   Terese Bell is a 69 year old female who presents for Preventive Visit.    Are you in the first 12 months of your Medicare coverage?  No    Healthy Habits:    In general, how would you rate your overall health?  Good    Frequency of exercise:  2-3 days/week    Duration of exercise:  30-45 minutes    Do you usually eat at least 4 servings of fruit and vegetables a day, include whole grains    & fiber and avoid regularly eating high fat or \"junk\" foods?  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    In the past 6 months, have you been bothered by leaking of urine? Yes    In general, how would you rate your overall mental or emotional health?  Very good      PHQ-2 Total Score:    Additional concerns today:  Yes     *Wonders if she can use the Diprolene 0.05% cream on her shingles. Would like to discuss getting a topical lidocaine to help control the shingles pain as well. Does use CBD cream with lidocaine patches but then the patches don't want to stick.     *Has been dealing with some intermittent gout issues in her right knee and left great toe. Would like to discuss getting a larger prescription for the indomethacin to help when she does get the gout flares.    *Is searching for liquid senior vitamins for moderately active seniors. Would like some suggestions.    *Did get some food poisoning a few months ago and was violently ill with vomiting. Is now wondering if she has a hiatal hernia because she has been dealing with some acid reflux since this illness which has never been an issue for her.    Do you feel safe in your environment? Yes    Have you ever done Advance Care Planning? (For " example, a Health Directive, POLST, or a discussion with a medical provider or your loved ones about your wishes): Yes, patient states has an Advance Care Planning document and will bring a copy to the clinic.    Fall risk  Fallen 2 or more times in the past year?: Yes  Any fall with injury in the past year?: No    Cognitive Screening   1) Repeat 3 items (Leader, Season, Table)    2) Clock draw: NORMAL  3) 3 item recall: Recalls 1 object   Results: NORMAL clock, 1-2 items recalled: COGNITIVE IMPAIRMENT LESS LIKELY    Mini-CogTM Copyright JEOVANNY Carter. Licensed by the author for use in [facility]; reprinted with permission (harshil@[facility]). All rights reserved.      Do you have sleep apnea, excessive snoring or daytime drowsiness?: no    Reviewed and updated as needed this visit by clinical staff  Tobacco  Allergies  Meds  Problems  Med Hx  Surg Hx  Fam Hx  Soc Hx        Reviewed and updated as needed this visit by Provider  Meds  Problems        Social History     Tobacco Use     Smoking status: Former Smoker     Packs/day: 2.00     Years: 7.00     Pack years: 14.00     Types: Cigarettes     Start date: 1970     Last attempt to quit: 1980     Years since quittin.1     Smokeless tobacco: Never Used   Substance Use Topics     Alcohol use: Not Currently     Comment: 4 drinks per year     If you drink alcohol do you typically have >3 drinks per day or >7 drinks per week? No    Alcohol Use 3/2/2020   Prescreen: >3 drinks/day or >7 drinks/week? -   Prescreen: >3 drinks/day or >7 drinks/week? No       Current providers sharing in care for this patient include:   Patient Care Team:  Nicole Mcdonald APRN CNP as PCP - General  Nicole Mcdonald APRN CNP as Assigned PCP  Thomas Ferris MD as MD (Otolaryngology)  Jen Rey APRN CNP as Nurse Practitioner (Nurse Practitioner)  Juwan Zhang MD as MD (Radiation Oncology)    The following health maintenance items are reviewed in  Epic and correct as of today:  Health Maintenance   Topic Date Due     ASTHMA CONTROL TEST  05/30/2019     ASTHMA ACTION PLAN  11/30/2019     MEDICARE ANNUAL WELLNESS VISIT  03/07/2020     FALL RISK ASSESSMENT  02/25/2021     MAMMO SCREENING  12/12/2021     ADVANCE CARE PLANNING  11/02/2022     DTAP/TDAP/TD IMMUNIZATION (3 - Td) 11/05/2022     COLONOSCOPY  12/14/2022     LIPID  02/22/2024     DEXA  Completed     HEPATITIS C SCREENING  Completed     PHQ-2  Completed     INFLUENZA VACCINE  Completed     PNEUMOCOCCAL IMMUNIZATION 65+ LOW/MEDIUM RISK  Completed     ZOSTER IMMUNIZATION  Completed     IPV IMMUNIZATION  Aged Out     MENINGITIS IMMUNIZATION  Aged Out     BP Readings from Last 3 Encounters:   03/02/20 114/68   02/25/20 123/75   02/20/20 106/66    Wt Readings from Last 3 Encounters:   03/02/20 74 kg (163 lb 3.2 oz)   02/25/20 74.4 kg (164 lb)   02/20/20 71.7 kg (158 lb)                  Patient Active Problem List   Diagnosis     Absence of menstruation     Generalized osteoarthrosis, unspecified site     Moderate persistent asthma     FAMILY HX GI MALIGNANCY brother colon ca.      Rosacea     HYPERLIPIDEMIA LDL GOAL <130     Tear Meniscus Knee  repaired     24 hour info given     Myalgia     History of tongue cancer     Tongue irritation     Tongue cancer (H)     History of recent ear, nose, and throat (ENT) procedure     Malignant neoplasm of tongue (H)     Fistula     Cervical cancer screening     Advanced directives, counseling/discussion     Past Surgical History:   Procedure Laterality Date     ADENOIDECTOMY      1970     C NONSPECIFIC PROCEDURE      CHOLECYSTECTOMY     C NONSPECIFIC PROCEDURE      SINUS MASS REMOVED     C NONSPECIFIC PROCEDURE      R KNEE SURGERY     CHOLECYSTECTOMY       COLONOSCOPY  11/26/2012    Procedure: COLONOSCOPY;  Colonoscopy;  Surgeon: Yony Garcia MD;  Location: [facility]     COLONOSCOPY N/A 12/14/2017    Procedure: COLONOSCOPY;  colonoscopy;  Surgeon: Sterling Mckeon,  MD;  Location: [facility]     EXAM UNDER ANESTHESIA MOUTH N/A 8/15/2016    Procedure: EXAM UNDER ANESTHESIA MOUTH;  Surgeon: Thomas Ferris MD;  [facility]     GLOSSECTOMY Right 2016    Procedure: GLOSSECTOMY;  Surgeon: Thomas Ferris MD;  [facility]     GLOSSECTOMY PARTIAL       GRAFT FREE VASCULARIZED (LOCATION) Left 2016    Procedure: GRAFT FREE VASCULARIZED (LOCATION);  Surgeon: Moreno Leo MD;  [facility]     GRAFT SKIN SPLIT THICKNESS FROM EXTREMITY Left 2016    Procedure: GRAFT SKIN SPLIT THICKNESS FROM EXTREMITY;  Surgeon: Moreno Leo MD;  [facility]     HC UGI ENDOSCOPY W PLACEMENT GASTROSTOMY TUBE PERCUT N/A 2016    Procedure: COMBINED ESOPHAGOSCOPY, GASTROSCOPY, DUODENOSCOPY (EGD), PLACE PERCUTANEOUS ENDOSCOPIC GASTROSTOMY TUBE;  Surgeon: Sterling Mckeon MD;  Location: [facility]     HEAD & NECK SURGERY       KNEE SURGERY      bilat     LARYNGOSCOPY WITH BIOPSY(IES) N/A 8/15/2016    Procedure: LARYNGOSCOPY WITH BIOPSY(IES);  Surgeon: Thomas Ferris MD;  [facility]     NASAL/SINUS POLYPECTOMY       approx     ORTHOPEDIC SURGERY   and     knee repair (r) and clean out (l)     PAROTIDECTOMY, RADICAL NECK DISSECTION Right 2016    Procedure: PAROTIDECTOMY, RADICAL NECK DISSECTION;  Surgeon: Thomas Ferris MD;  [facility]     TONSILLECTOMY      1970     TRACHEOSTOMY Right 2016    Procedure: TRACHEOSTOMY;  Surgeon: Thomas Ferris MD;  Location:  OR       Social History     Tobacco Use     Smoking status: Former Smoker     Packs/day: 2.00     Years: 7.00     Pack years: 14.00     Types: Cigarettes     Start date: 1970     Last attempt to quit: 1980     Years since quittin.1     Smokeless tobacco: Never Used   Substance Use Topics     Alcohol use: Not Currently     Comment: 4 drinks per year     Family History   Problem Relation Age of Onset     Neurologic Disorder Brother         migranes     Hypertension Mother      Arthritis Mother       Cerebrovascular Disease Mother      Hypertension Father      Prostate Cancer Father      Cancer Father      Alzheimer Disease Paternal Grandmother      Hypertension Brother      Arthritis Brother      Respiratory Brother         emphysema     Cancer - colorectal Brother      C.A.D. Maternal Uncle      Cancer Brother      Hypertension Brother      Cancer Brother      Diabetes No family hx of      Breast Cancer No family hx of          Current Outpatient Medications   Medication Sig Dispense Refill     augmented betamethasone dipropionate (DIPROLENE-AF) 0.05 % external cream Apply sparingly to affected area twice daily as needed.  Do not apply to face. 50 g 1     CANNABIDIOL, HEMP OIL/EXTRACT, OR CBD PRODUCT OTHER THAN MEDICAL CANNABIS, Apply topically daily       DENTAGEL 1.1 % GEL topical gel Apply 1 Application topically 2 times daily  11     Fluticasone-Salmeterol (ADVAIR DISKUS IN) /P/t is taking this as a prn. She is not taking daily.       indomethacin (INDOCIN) 25 MG capsule Take 1 capsule (25 mg) by mouth 2 times daily (with meals) 60 capsule 3     lidocaine (XYLOCAINE) 5 % external ointment Apply topically as needed for moderate pain 50 g 1     MAGNESIUM PO        nitroGLYcerin (NITRO-DUR) 0.4 MG/HR 24 hr patch Cut into 1/4 and place 1/4 over the area of pain.  Change every 24 hours. 30 patch 0     vitamin D3 (CHOLECALCIFEROL) 2000 units tablet Take 1 tablet by mouth daily       EPINEPHrine (EPIPEN/ADRENACLICK/OR ANY BX GENERIC EQUIV) 0.3 MG/0.3ML injection 2-pack Inject 0.3 mLs (0.3 mg) into the muscle once as needed for anaphylaxis (Patient not taking: Reported on 3/2/2020) 0.6 mL 3     Allergies   Allergen Reactions     Banana Swelling and Anaphylaxis     Tongue and lips swell and get itchy     Bee Pollen Anaphylaxis     Bee Venom Anaphylaxis     Blueberry Anaphylaxis and Swelling     Contrast Dye Anaphylaxis     Iodine Anaphylaxis     NOT dietary related.     Penicillins Anaphylaxis      "Shellfish-Derived Products Swelling and Difficulty breathing     Sulfa Drugs Anaphylaxis     Erythromycin Nausea     Pravastatin Sodium Other (See Comments)     muscle spasam     Simvastatin Cough     Strawberry Hives     Latex Rash     Rash from bandages/wraps/pressure tapes     Tetracycline GI Disturbance     Other reaction(s): GI Upset  And tingling         Mammogram Screening: Mammogram Screening: Patient over age 50, mutual decision to screen reflected in health maintenance.    Review of Systems  CONSTITUTIONAL: NEGATIVE for fever, chills, change in weight  ENT/MOUTH: POSITIVE for follows  ENT, Oncology and her throat is doing well,   RESP: NEGATIVE for significant cough or SOB  CV: NEGATIVE for chest pain, palpitations or peripheral edema  GI: NEGATIVE for nausea, abdominal pain, heartburn, or change in bowel habits and POSITIVE for abdominal pain LUQ and MILDL  heartburn or reflux aft er her food poisoning has had some  Reflux.  It might be getting better   MUSCULOSKELETAL: NEGATIVE for significant arthralgias or myalgia  NEURO: NEGATIVE for weakness, dizziness or paresthesias  ENDOCRINE: NEGATIVE for temperature intolerance, skin/hair changes  PSYCHIATRIC: NEGATIVE for changes in mood or affect    OBJECTIVE:   /68 (BP Location: Left arm, Patient Position: Chair, Cuff Size: Adult Regular)   Pulse 72   Temp 98.4  F (36.9  C) (Tympanic)   Resp 14   Ht 1.615 m (5' 3.58\")   Wt 74 kg (163 lb 3.2 oz)   BMI 28.38 kg/m   Estimated body mass index is 28.38 kg/m  as calculated from the following:    Height as of this encounter: 1.615 m (5' 3.58\").    Weight as of this encounter: 74 kg (163 lb 3.2 oz).  Physical Exam  GENERAL: healthy, alert and no distress  HENT: normal cephalic/atraumatic, ear canals and TM's normal, nose and mouth without ulcers or lesions and tongue does have lichen planus on the area of the oral cancer   NECK: no adenopathy, no asymmetry, masses, or scars and thyroid normal to " palpation  RESP: lungs clear to auscultation - no rales, rhonchi or wheezes  CV: regular rate and rhythm, normal S1 S2, no S3 or S4, no murmur, click or rub, no peripheral edema and peripheral pulses strong  ABDOMEN: soft, nontender, no hepatosplenomegaly, no masses and bowel sounds normal  MS: no gross musculoskeletal defects noted, no edema  PSYCH: mentation appears normal, affect normal/bright    Diagnostic Test Results:  Labs reviewed in Epic  Results for orders placed or performed in visit on 03/02/20 (from the past 24 hour(s))   **ALT FUTURE 6mo   Result Value Ref Range    ALT 13 0 - 50 U/L   CBC with platelets   Result Value Ref Range    WBC 2.9 (L) 4.0 - 11.0 10e9/L    RBC Count 4.05 3.8 - 5.2 10e12/L    Hemoglobin 12.8 11.7 - 15.7 g/dL    Hematocrit 38.4 35.0 - 47.0 %    MCV 95 78 - 100 fl    MCH 31.6 26.5 - 33.0 pg    MCHC 33.3 31.5 - 36.5 g/dL    RDW 12.2 10.0 - 15.0 %    Platelet Count 247 150 - 450 10e9/L       ASSESSMENT / PLAN:     ASSESSMENT/PLAN:      ICD-10-CM    1. Encounter for Medicare annual wellness exam Z00.00    2. Acute idiopathic gout of right foot M10.071 indomethacin (INDOCIN) 25 MG capsule   3. Post herpetic neuralgia B02.29 lidocaine (XYLOCAINE) 5 % external ointment   4. Hyperlipidemia LDL goal <130 E78.5 Lipid panel reflex to direct LDL Fasting   5. Vitamin D deficiency E55.9 Vitamin D Deficiency       Patient Instructions       Patient Education   Personalized Prevention Plan  You are due for the preventive services outlined below.  Your care team is available to assist you in scheduling these services.  If you have already completed any of these items, please share that information with your care team to update in your medical record.  Health Maintenance Due   Topic Date Due     Asthma Control Test  05/30/2019     Asthma Action Plan - yearly  11/30/2019     Annual Wellness Visit  03/07/2020      you are looking very good     No change with medication     Silver Sneakers would be  "good for you                 COUNSELING:  Reviewed preventive health counseling, as reflected in patient instructions       Regular exercise       Healthy diet/nutrition       Vision screening       Dental care    Estimated body mass index is 28.38 kg/m  as calculated from the following:    Height as of this encounter: 1.615 m (5' 3.58\").    Weight as of this encounter: 74 kg (163 lb 3.2 oz).         reports that she quit smoking about 40 years ago. Her smoking use included cigarettes. She started smoking about 50 years ago. She has a 14.00 pack-year smoking history. She has never used smokeless tobacco.      Appropriate preventive services were discussed with this patient, including applicable screening as appropriate for cardiovascular disease, diabetes, osteopenia/osteoporosis, and glaucoma.  As appropriate for age/gender, discussed screening for colorectal cancer, prostate cancer, breast cancer, and cervical cancer. Checklist reviewing preventive services available has been given to the patient.    Reviewed patients plan of care and provided an AVS. The Intermediate Care Plan ( asthma action plan, low back pain action plan, and migraine action plan) for Terese meets the Care Plan requirement. This Care Plan has been established and reviewed with the Patient.    Counseling Resources:  ATP IV Guidelines  Pooled Cohorts Equation Calculator  Breast Cancer Risk Calculator  FRAX Risk Assessment  ICSI Preventive Guidelines  Dietary Guidelines for Americans, 2010  [employer]'s MyPlate  ASA Prophylaxis  Lung CA Screening    PANCHO EPSTEIN NP, APRN CNP  [facility]    Identified Health Risks:  "

## 2020-03-02 NOTE — PATIENT INSTRUCTIONS
Patient Education   Personalized Prevention Plan  You are due for the preventive services outlined below.  Your care team is available to assist you in scheduling these services.  If you have already completed any of these items, please share that information with your care team to update in your medical record.  Health Maintenance Due   Topic Date Due     Asthma Control Test  05/30/2019     Asthma Action Plan - yearly  11/30/2019     Annual Wellness Visit  03/07/2020      you are looking very good     No change with medication     Silver Sneakers would be good for you

## 2020-03-02 NOTE — NURSING NOTE
"Initial /68 (BP Location: Left arm, Patient Position: Chair, Cuff Size: Adult Regular)   Pulse 72   Temp 98.4  F (36.9  C) (Tympanic)   Resp 14   Ht 1.615 m (5' 3.58\")   Wt 74 kg (163 lb 3.2 oz)   BMI 28.38 kg/m   Estimated body mass index is 28.38 kg/m  as calculated from the following:    Height as of this encounter: 1.615 m (5' 3.58\").    Weight as of this encounter: 74 kg (163 lb 3.2 oz). .    Natalie Pineda CMA ([facility])    "

## 2020-03-03 LAB — DEPRECATED CALCIDIOL+CALCIFEROL SERPL-MC: 34 UG/L (ref 20–75)

## 2020-03-03 NOTE — TELEPHONE ENCOUNTER
Received a Third Party Rejection from [facility] for Lidocaine Oint 5%    Routed to the [employer] electronic prior authorization team        Stanley Rowe RT (r)  [facility]

## 2020-03-04 NOTE — TELEPHONE ENCOUNTER
PA Initiation    Medication: Lidocaine Oint  Insurance Company: [employer] - Phone [phone] Fax [phone]  Pharmacy Filling the Rx: [id number] - [place], [place] - [address]  Filling Pharmacy Phone: [phone]  Filling Pharmacy Fax:    Start Date: 3/4/2020    Central Prior Authorization Team   Phone: [phone]      Manually faxed prior authorization form to [employer] at fax# [phone]

## 2020-03-05 ENCOUNTER — TELEPHONE (OUTPATIENT)
Dept: FAMILY MEDICINE | Facility: CLINIC | Age: 70
End: 2020-03-05

## 2020-03-05 NOTE — TELEPHONE ENCOUNTER
[facility] has faxed over a form to clarify the rx for lidocaine ointment. Form placed on Nicole's desk 03/05/2020, please fax completed form to [phone].    Natalie Pineda CMA ([facility])

## 2020-03-05 NOTE — TELEPHONE ENCOUNTER
Completed form faxed to [facility] in [place] at [phone].    Natalie Pineda CMA ([facility])

## 2020-03-06 NOTE — TELEPHONE ENCOUNTER
PRIOR AUTHORIZATION DENIED    Medication: Lidocaine Oint    Denial Date: 3/6/2020    Denial Rational: PA is denied due to patient not having an FDA approved indication for this medication. [Lidocaine ointment generally for ointment covered indications are A) production of anesthesia of accessible mucous membranes of the mouth and throat, B) as an anesthetic lubricant for intubation, C) temporary relief of pain associated with minor burns, including sunburn, abrasions of the skin, and insect bites]        Appeal Information:

## 2020-03-09 ENCOUNTER — HOSPITAL ENCOUNTER (OUTPATIENT)
Dept: PHYSICAL THERAPY | Facility: CLINIC | Age: 70
Setting detail: THERAPIES SERIES
End: 2020-03-09
Attending: PHYSICIAN ASSISTANT
Payer: MEDICARE

## 2020-03-09 DIAGNOSIS — R13.12 OROPHARYNGEAL DYSPHAGIA: ICD-10-CM

## 2020-03-09 DIAGNOSIS — L90.5 SCAR OF NECK: ICD-10-CM

## 2020-03-09 DIAGNOSIS — C01 MALIGNANT NEOPLASM OF BASE OF TONGUE (H): ICD-10-CM

## 2020-03-09 DIAGNOSIS — M43.6 NECK STIFFNESS: ICD-10-CM

## 2020-03-09 PROCEDURE — 97162 PT EVAL MOD COMPLEX 30 MIN: CPT | Mod: GP | Performed by: PHYSICAL THERAPIST

## 2020-03-09 PROCEDURE — 97140 MANUAL THERAPY 1/> REGIONS: CPT | Mod: GP | Performed by: PHYSICAL THERAPIST

## 2020-03-09 NOTE — PROGRESS NOTES
[facility]    OUTPATIENT PHYSICAL THERAPY ORTHOPEDIC EVALUATION  PLAN OF TREATMENT FOR OUTPATIENT REHABILITATION  (COMPLETE FOR INITIAL CLAIMS ONLY)  Patient's Last Name, First Name, M.I.  YOB: 1950  Bettie BellTerese  REGINA    Provider s Name:  [facility]   Medical Record No.  [id number]   Start of Care Date:  03/09/20   Onset Date:  (MD Order date. )   Type:     _X__PT   ___OT   ___SLP Medical Diagnosis:  Neck Stiffness, Scar of neck, Malig Neoplasm base of tongue.      PT Diagnosis:  Limited use of jaw, neck d/t recent CA w/ multiple treatment sessions.    Visits from SOC:  1      _________________________________________________________________________________  Plan of Treatment/Functional Goals:     STM, MF Release, S&S, Jt mobs prn, Check neck PIVM.      CA - Contraindicated.   Goals  Goal Identifier: 1  Goal Description: STG: Improve neck rotation to 50 B for improved safety w/ driving.   Target Date: 04/13/20    Goal Identifier: 2  Goal Description: STG: Pt will be able to chew harder food 5/10 on funcitonal scale.   Target Date: 04/13/20    Goal Identifier: 3  Goal Description: STG: Pt will be shady to Open wide to bite an apple or snadwich 6/10 on functional scale.   Target Date: 04/13/20    Goal Identifier: 4  Goal Description: LTG: Pt will be independent w/ self cares and HEP to restore better function of TMJ.   Target Date: 04/27/20    Therapy Frequency:  2 times/Week  Predicted Duration of Therapy Intervention:  5 weeks, then reassess. 10 visits.     Melania Nicholas, PT, MTC                  I CERTIFY THE NEED FOR THESE SERVICES FURNISHED UNDER        THIS PLAN OF TREATMENT AND WHILE UNDER MY CARE     (Physician co-signature of this document indicates review and certification of the therapy plan).                     Certification Date From:  03/09/20   Certification Date To:  04/27/20    Referring Provider:  Bridgette Farmer  Assessment        See Epic Evaluation Start of Care Date: 03/09/20

## 2020-03-09 NOTE — TELEPHONE ENCOUNTER
Routed to provider, please review below response.    Stanley Rowe RT (r)  [facility]

## 2020-03-09 NOTE — PROGRESS NOTES
03/09/20 1300   General Information   Type of Visit Initial OP Ortho PT Evaluation   Start of Care Date 03/09/20   Referring Physician Bridgette Farmer    Patient/Family Goals Statement Not swallowing well, difficulty opening of jaw. Neck rotation limited w/driving. Nutrition is limited.    Orders Evaluate and Treat   Orders Comment MF Release for Scar tissue   Date of Order 02/25/20   Certification Required? Yes  (MC/BCBS   Auth'd $[id number] limit)   Medical Diagnosis Neck Stiffness, Scar of neck, Malig Neoplasm base of tongue.    Surgical/Medical history reviewed   (COPD, CA, Prev Smok, OA, Asthma, Sinus surgery, Breast, Gall)   Precautions/Limitations   (Has the okay to eat anything she wants. ')   Body Part(s)   Body Part(s) Cervical Spine   Presentation and Etiology   Pertinent history of current problem (include personal factors and/or comorbidities that impact the POC) Sept 1, 2016 had surgery on mouth, mm's removed. Was using a therabite every day, but neck was getting sore, took a 5 day break from it, and then it felt better. now doing Therabite about every 3rd day. Does stretching for opening every day, and ant neck stretch. Neck rotation stretches.    Impairments E. Decreased flexibility;D. Decreased ROM   Functional Limitations Other  (Oral functions)   Symptom Location Neck painful all the time.    How/Where did it occur   (CA Treatment, noelle, )   Onset date of current episode/exacerbation   (MD Order date. )   Chronicity Chronic   Pain rating (0-10 point scale)   (1-2/10)   Pain quality B. Dull;C. Aching;G. Cramping;H. Other   Pain quality comment Tight    Frequency of pain/symptoms A. Constant   Pain/symptoms are: The same all the time   Pain/symptoms exacerbated by M. Other   Pain exacerbation comment EAting and Cold    Pain/symptoms eased by G. Heat;I. OTC medication(s);K. Other   Pain eased by comment Hot packs    Progression of symptoms since onset: Improved   Prior Level of Function  "  Functional Level Prior Comment Independent w/ ADL's   Current Level of Function   Current Community Support Family/friend caregiver   Patient role/employment history F. Retired   Living environment [facility]   Fall Risk Screen   Fall screen completed by PT   Have you fallen 2 or more times in the past year? No   Have you fallen and had an injury in the past year? Yes   Timed Up and Go score (seconds) Pt walks quickly into dept and no balance issues.    Is patient a fall risk? No   Abuse Screen (yes response referral indicated)   Feels Unsafe at Home or Work/School no   Feels Threatened by Someone no   Does Anyone Try to Keep You From Having Contact with Others or Doing Things Outside Your Home? no   Physical Signs of Abuse Present no   System Outcome Measures   Outcome Measures   (NDI and TMJ Funct scale scanned into chart. )   Functional Scales   Functional Scales OPTIMAL   Optimal (1=able to do without difficulty, 2=able to do with little difficulty, 3=able to do with moderate difficulty, 4=able to do with much difficulty, 5=unable to do, 9=NA) Activity 1;Activity 3;Activity 2   Activity 1 comment EAting tough food 8/10    Activity 2 comment Opening wide to bit into apple or sandwich, 10/10    Activity 3 comment Chewing softer food 5/10    Cervical Spine   Observation No acute distress.    Integumentary  Scars d/t surgery/radiation.    Posture Head forward, Increased thoracic kyphosis.    Cervical Flexion ROM 1\" w/ B UTrap tightness   Cervical Extension ROM 25% w/ L Neck Pain    Cervical Right Side Bending ROM 21 w/R sided neck pain.    Cervical Left Side Bending ROM 28 w/ L ear pain.    Cervical Right Rotation ROM 40 w/ R neck pain near jaw.    Cervical Left Rotation ROM 49 w/ Pinch pain just below jaw line.    Alar Ligament Test Negative    Transverse Ligament Test Negative   Spurling Test L Post Quad pain under L angle of jaw.    Cervical/Shoulder Special Tests Comments R TMJ more Hypo than L, TMJ ROM: " Opening 24, Laterotrusion B 5, Protrusion 4.     Segmental Mobility-Cervical Next visit   Planned Therapy Interventions   Planned Therapy Interventions Comment STM, MF Release, S&S, Jt mobs prn, Check neck PIVM.    Planned Modality Interventions   Planned Modality Interventions Comments CA - Contraindicated.    Clinical Impression   Criteria for Skilled Therapeutic Interventions Met yes, treatment indicated   PT Diagnosis Limited use of jaw, neck d/t recent CA w/ multiple treatment sessions.    Influenced by the following impairments Pain, Decreased ROM of Jaw and Neck.    Functional limitations due to impairments Driving safely, Eating, Swallowing. Decreased nutritional intake.    Clinical Presentation Evolving/Changing   Clinical Presentation Rationale TMJ Functional Scale, ROM for neck and TMJ, Post Quadrant painful.    Clinical Decision Making (Complexity) Moderate complexity   Therapy Frequency 2 times/Week   Predicted Duration of Therapy Intervention (days/wks) 5 weeks, then reassess. 10 visits.    Risk & Benefits of therapy have been explained Yes   Patient, Family & other staff in agreement with plan of care Yes   Clinical Impression Comments Limited use of jaw, neck d/t recent CA w/ multiple treatment sessions.    Education Assessment   Preferred Learning Style Listening;Demonstration;Pictures/video   Barriers to Learning No barriers   ORTHO GOALS   PT Ortho Eval Goals 1;2;3;4   Ortho Goal 1   Goal Identifier 1   Goal Description STG: Improve neck rotation to 50 B for improved safety w/ driving.    Target Date 04/13/20   Ortho Goal 2   Goal Identifier 2   Goal Description STG: Pt will be able to chew harder food 5/10 on funcitonal scale.    Target Date 04/13/20   Ortho Goal 3   Goal Identifier 3   Goal Description STG: Pt will be shady to Open wide to bite an apple or snadwich 6/10 on functional scale.    Target Date 04/13/20   Ortho Goal 4   Goal Identifier 4   Goal Description LTG: Pt will be independent  w/ self cares and HEP to restore better function of TMJ.    Target Date 04/27/20   Total Evaluation Time   PT Eval, Moderate Complexity Minutes (65010) 40   Therapy Certification   Certification date from 03/09/20   Certification date to 04/27/20   Medical Diagnosis Neck Stiffness, Scar of neck, Malig Neoplasm base of tongue.    Melania Nicholas, PT, MTC (#[id number])  [facility]           [phone]  Fax          [phone]  Appt #      [phone]

## 2020-03-13 ENCOUNTER — HOSPITAL ENCOUNTER (OUTPATIENT)
Dept: PHYSICAL THERAPY | Facility: CLINIC | Age: 70
Setting detail: THERAPIES SERIES
End: 2020-03-13
Attending: PHYSICIAN ASSISTANT
Payer: MEDICARE

## 2020-03-13 PROCEDURE — 97110 THERAPEUTIC EXERCISES: CPT | Mod: GP | Performed by: PHYSICAL THERAPIST

## 2020-03-13 PROCEDURE — 97140 MANUAL THERAPY 1/> REGIONS: CPT | Mod: GP | Performed by: PHYSICAL THERAPIST

## 2020-03-16 ENCOUNTER — HOSPITAL ENCOUNTER (OUTPATIENT)
Dept: PHYSICAL THERAPY | Facility: CLINIC | Age: 70
Setting detail: THERAPIES SERIES
End: 2020-03-16
Attending: PHYSICIAN ASSISTANT
Payer: MEDICARE

## 2020-03-16 PROCEDURE — 97140 MANUAL THERAPY 1/> REGIONS: CPT | Mod: GP | Performed by: PHYSICAL THERAPIST

## 2020-03-19 ENCOUNTER — OFFICE VISIT (OUTPATIENT)
Dept: PALLIATIVE MEDICINE | Facility: CLINIC | Age: 70
End: 2020-03-19
Payer: MEDICARE

## 2020-03-19 ENCOUNTER — HOSPITAL ENCOUNTER (OUTPATIENT)
Dept: PHYSICAL THERAPY | Facility: CLINIC | Age: 70
Setting detail: THERAPIES SERIES
End: 2020-03-19
Attending: PHYSICIAN ASSISTANT
Payer: MEDICARE

## 2020-03-19 VITALS
WEIGHT: 163 LBS | BODY MASS INDEX: 28.35 KG/M2 | SYSTOLIC BLOOD PRESSURE: 101 MMHG | DIASTOLIC BLOOD PRESSURE: 68 MMHG | HEART RATE: 56 BPM

## 2020-03-19 DIAGNOSIS — B02.29 POSTHERPETIC NEURALGIA: Primary | ICD-10-CM

## 2020-03-19 PROCEDURE — 97110 THERAPEUTIC EXERCISES: CPT | Mod: GP | Performed by: PHYSICAL THERAPIST

## 2020-03-19 PROCEDURE — 97810 ACUP 1/> WO ESTIM 1ST 15 MIN: CPT | Mod: GA | Performed by: FAMILY MEDICINE

## 2020-03-19 PROCEDURE — 97140 MANUAL THERAPY 1/> REGIONS: CPT | Mod: GP | Performed by: PHYSICAL THERAPIST

## 2020-03-19 PROCEDURE — 97811 ACUP 1/> W/O ESTIM EA ADD 15: CPT | Mod: GA | Performed by: FAMILY MEDICINE

## 2020-03-19 ASSESSMENT — PAIN SCALES - GENERAL: PAINLEVEL: NO PAIN (1)

## 2020-03-19 NOTE — PATIENT INSTRUCTIONS
"  Pain Clinic Visit    Chief Complaint   Patient presents with     RECHECK     Neck, knees, toes, and hands       After the last acupuncture session patient states:She is feeling great, less then a one for pain.    Rating of Pain: No Pain (1)    Bridgette Greco Medical Assistant       Initial /68   Pulse 56   Wt 73.9 kg (163 lb)   BMI 28.35 kg/m   Estimated body mass index is 28.35 kg/m  as calculated from the following:    Height as of 3/2/20: 1.615 m (5' 3.58\").    Weight as of this encounter: 73.9 kg (163 lb).  Medication Reconciliation: complete    "

## 2020-03-19 NOTE — NURSING NOTE
"Chief Complaint   Patient presents with     RECHECK     Neck, knees, toes, and hands       Initial /68   Pulse 56   Wt 73.9 kg (163 lb)   BMI 28.35 kg/m   Estimated body mass index is 28.35 kg/m  as calculated from the following:    Height as of 3/2/20: 1.615 m (5' 3.58\").    Weight as of this encounter: 73.9 kg (163 lb).  Medications and allergies reviewed.      Bridgette SERRANO MA         "

## 2020-03-26 NOTE — PROGRESS NOTES
S:Feeling really well. 1/10 for pain very infrequently.  O:Alert NAD  A:Postherpetic Neuralgia  P:Acu per note.  Pre-Procedure:  Patient's Name and Date of Birth verified:  YES  Verified By:  brandy (initials)  Diagnosis:  Data Unavailable  Interval History:  1m  Complications and/or Adverse Effects of Last Acupuncture Treatment: 0   Site Marking and Verification:  Verification of site selection (based on symptoms and condition:  YES  Verified by: brandy (initials)  Patient identification verified by provider: YES  Verified by: brandy (initials)  Pause for the Cause:  YES  Verified by: rituc (initials)   Procedure Note:  Acupuncture Treatment Number: 17  Acupuncture Points Selected: Mixed facial points, knee biulateral. Four Pereyra. AUR:SM,PZ.      Electrical Stim: No  Frequency 0   HZ    Number of needles:  27      Re-insertion/Manipulation of needles after initial 15 minutes: YES  Time:  30   Minutes   Post-Procedure:  Complication/Adverse Effects: 0  \    Management:  0      Signature:  Juan Can MD

## 2020-03-27 NOTE — TELEPHONE ENCOUNTER
Routing back to [employer] epa team for appeal.    Stanley Rowe RT (r)  [facility]

## 2020-03-30 NOTE — TELEPHONE ENCOUNTER
Medication Appeal Initiation    We have initiated an appeal for the requested medication:  Medication: Lidocaine Oint (SilverScripts) Denied, Ape[employer]  Appeal Start Date:  3/30/2020  Insurance Company: [employer] - Phone [phone] Fax [phone]  Comments:  Appeal initiated with letter of medical necessity included and faxed to [employer] fax# [phone]

## 2020-04-02 NOTE — TELEPHONE ENCOUNTER
Received response from [employer].    Faxed response to the pharmacy          Stanley Rowe RT (r)  [facility]

## 2020-04-02 NOTE — TELEPHONE ENCOUNTER
Received a questionaire from [facility] Re: Lidocaine Oint    Reviewed with clinic RN, Re-faxed.    Stanley Rowe RT (r)  [facility]

## 2020-04-14 ENCOUNTER — TELEPHONE (OUTPATIENT)
Dept: OTOLARYNGOLOGY | Facility: CLINIC | Age: 70
End: 2020-04-14

## 2020-04-14 NOTE — TELEPHONE ENCOUNTER
Plan of treatment signed by Dr. Leo, and CUONG Carey has left [facility]. Orders faxed back to [facility].    Ashley Cody, EMT

## 2020-05-01 ENCOUNTER — HOSPITAL ENCOUNTER (OUTPATIENT)
Dept: PHYSICAL THERAPY | Facility: CLINIC | Age: 70
Setting detail: THERAPIES SERIES
End: 2020-05-01
Attending: PHYSICIAN ASSISTANT
Payer: MEDICARE

## 2020-05-01 PROCEDURE — 97140 MANUAL THERAPY 1/> REGIONS: CPT | Mod: GP | Performed by: PHYSICAL THERAPIST

## 2020-05-01 PROCEDURE — 97110 THERAPEUTIC EXERCISES: CPT | Mod: GP | Performed by: PHYSICAL THERAPIST

## 2020-05-01 NOTE — PROGRESS NOTES
"Outpatient Physical Therapy Progress Note     Patient: Terese Bell  : 1950    Beginning/End Dates of Reporting Period:  3/9/20 to 2020.  Total # of Rx sessions: 5/10.  Has been on hold d/t coronavirus.     Referring Provider: Bridgette Faremr Diagnosis: Neck Stiffness, Scar of neck, Malignant Neoplasm of base of tongue.      Client Self Report: Has been trying to work on the lump on the L side of her neck which helps.  Stiff/Achey. Some days unbearable and the pain goes up to 8/10.     Objective Measurements:  Objective Measure: CROM:   Details: 20  Flex 3/4\", Ext 50%, SB R 26, L 29, Rot R 51, L 50.  3/19/20  Flex 1\", Ext 50%, SB R 18, L 26, Rot R 48, L 48. INITIALLY:  Flex 1 w/tight B UTrap, Ext 25% w/ neck pain, SB R 21 w/ R neck pain, L 28 w/ L Ear pain; Rot R 40 w/ R neck P near jaw, L 49 w/ pinch just below jaw line.   Objective Measure: TMJ ROM   Details: 20  Opening 24.  3/19/20  Opening 29, Laterotrusion B 8, Protrusion 5. INITIALlY:  Opening 24, Laterotrusion B 5, Protrusion 4.    Objective Measure: Spec tests   Details: R TMJ Hypo, L Post Quad-pain under angle of L jaw, Poor posture.      Goals:  Goal Identifier 1   Goal Description STG: Improve neck rotation to 50 B for improved safety w/ driving.  20  MET   Target Date 20   Date Met  20   Progress:     Goal Identifier 2   Goal Description STG: Pt will be able to chew harder food 5/10 on funcitonal scale. 20 NOT MET    Target Date 20   Date Met      Progress:     Goal Identifier 3   Goal Description STG: Pt will be shady to Open wide to bite an apple or snadwich 6/10 on functional scale.  20  Rated 5/10   MET    Target Date 20   Date Met  20   Progress:     Goal Identifier 4   Goal Description LTG: Pt will be independent w/ self cares and HEP to restore better function of TMJ.    Target Date 20   Date Met      Progress:     Progress Toward Goals:   Progress this " reporting period: Pt has met 2/3 STG's. Has been on hold d/t Coronavirus.  Restarting in house therapy sessions.     Plan:  Continue therapy per current plan of care.    Discharge:  No  RECERTIFICATION    Terese Bell  1950    Session Number: 5/10 /BCBS since start of care.    Reasons for Continuing Treatment:   Has been on hold d/t coronavirus.     Frequency/Duration  2 times per week for 4 weeks for a total of 8 visits.    Recertification Period  5/1/20 - 6/1/20    Physician Signature:    Date:    X_______________________________________________________    Physician Name: Bridgette Farmer    I certify the need for these services furnished under this plan of treatment and while under my care. Physician co-signature of this document indicates review and certification of the therapy plan.  This signature may be written on paper, or electronically signed within [facility].     Melania Nicholas, PT, MTC (#[id number])  [facility]           [phone]  Fax          [phone]  Appt #      [phone]

## 2020-05-11 ENCOUNTER — HOSPITAL ENCOUNTER (OUTPATIENT)
Dept: PHYSICAL THERAPY | Facility: CLINIC | Age: 70
Setting detail: THERAPIES SERIES
End: 2020-05-11
Attending: PHYSICIAN ASSISTANT
Payer: MEDICARE

## 2020-05-11 PROCEDURE — 97140 MANUAL THERAPY 1/> REGIONS: CPT | Mod: GP | Performed by: PHYSICAL THERAPIST

## 2020-05-11 PROCEDURE — 97110 THERAPEUTIC EXERCISES: CPT | Mod: GP | Performed by: PHYSICAL THERAPIST

## 2020-05-15 ENCOUNTER — HOSPITAL ENCOUNTER (OUTPATIENT)
Dept: PHYSICAL THERAPY | Facility: CLINIC | Age: 70
Setting detail: THERAPIES SERIES
End: 2020-05-15
Attending: PHYSICIAN ASSISTANT
Payer: MEDICARE

## 2020-05-15 PROCEDURE — 97140 MANUAL THERAPY 1/> REGIONS: CPT | Mod: GP | Performed by: PHYSICAL THERAPIST

## 2020-05-20 ENCOUNTER — HOSPITAL ENCOUNTER (OUTPATIENT)
Dept: PHYSICAL THERAPY | Facility: CLINIC | Age: 70
Setting detail: THERAPIES SERIES
End: 2020-05-20
Attending: PHYSICIAN ASSISTANT
Payer: MEDICARE

## 2020-05-20 PROCEDURE — 97140 MANUAL THERAPY 1/> REGIONS: CPT | Mod: GP | Performed by: PHYSICAL THERAPIST

## 2020-06-08 ENCOUNTER — HOSPITAL ENCOUNTER (OUTPATIENT)
Dept: PHYSICAL THERAPY | Facility: CLINIC | Age: 70
Setting detail: THERAPIES SERIES
End: 2020-06-08
Attending: PHYSICIAN ASSISTANT
Payer: MEDICARE

## 2020-06-08 PROCEDURE — 97140 MANUAL THERAPY 1/> REGIONS: CPT | Mod: GP | Performed by: PHYSICAL THERAPIST

## 2020-06-08 NOTE — PROGRESS NOTES
"Outpatient Physical Therapy Progress Note     Patient: Terese Bell  : 1950    Beginning/End Dates of Reporting Period:  20 to 2020.  Total # of Rx sessions. 9    Referring Provider: Bridgette Farmer    Therapy Diagnosis: Neck Stiffness, Scar of Neck, Maliognant Neoplasm of base of tongue.      Client Self Report: Doing much better than originally.  Doing therabite EOD. Can get some control of the L upper neck w/ pressure point therapy. Neck is stiff. Has been working at home a lot and thinks this is contributing to neck stiffness.     Objective Measurements:  Objective Measure: CROM:   Details: 20  Flex 3/4\", Ext 50%, SB R 26, L 29, Rot R 51, L 49.  3/19/20  Flex 1\", Ext 50%, SB R 18, L 26, Rot R 48, L 48. INITIALLY:  Flex 1 w/tight B UTrap, Ext 25% w/ neck pain, SB R 21 w/ R neck pain, L 28 w/ L Ear pain; Rot R 40 w/ R neck P near jaw, L 49 w/ pinch just below jaw line.     Objective Measure: TMJ ROM   Details: 20  Opening 26. 20  Opening 24.  3/19/20  Opening 29, Laterotrusion B 8, Protrusion 5. INITIALlY:  Opening 24, Laterotrusion B 5, Protrusion 4.      Objective Measure: Spec tests   Details: R TMJ Hypo, L Post Quad-pain under angle of L jaw, Poor posture.      Goals:  Goal Identifier 1   Goal Description STG: Improve neck rotation to 50 B for improved safety w/ driving.  20  MET   Target Date 20   Date Met  20   Progress:     Goal Identifier 2   Goal Description STG: Pt will be able to chew harder food 5/10 on funcitonal scale. 20  Rated 4-5/10.  MET    Target Date 20   Date Met  20   Progress:     Goal Identifier 3   Goal Description STG: Pt will be shady to Open wide to bite an apple or snadwich 6/10 on functional scale.  20  Rated 8/10   NOT MET today    Target Date 20   Date Met      Progress:     Goal Identifier 4   Goal Description LTG: Pt will be independent w/ self cares and HEP to restore better function of TMJ.  "   Target Date 04/27/20   Date Met      Progress:     Progress Toward Goals:   Progress this reporting period: Pt has met 2/3 STG's. States she is a lot better than initially.     Plan:  Continue therapy per current plan of care.    Discharge:  No  RECERTIFICATION    Terese Bell  1950    Session Number: 9/10 MC/BCBS since start of care.    Reasons for Continuing Treatment:   Getting good relief. Neck has improved w/ regards to stiffness, ROM.     Frequency/Duration  1 times per week for 8 weeks for a total of 8 additional visits.    Recertification Period  6/8/20 - 7/27/19    Physician Signature:    Date:    X_______________________________________________________    Physician Name: Bridgette Farmer    I certify the need for these services furnished under this plan of treatment and while under my care. Physician co-signature of this document indicates review and certification of the therapy plan.  This signature may be written on paper, or electronically signed within EPIC.

## 2020-06-11 ENCOUNTER — TELEPHONE (OUTPATIENT)
Dept: OTOLARYNGOLOGY | Facility: CLINIC | Age: 70
End: 2020-06-11

## 2020-06-11 NOTE — TELEPHONE ENCOUNTER
[facility] Call Center    Phone Message    May a detailed message be left on voicemail: yes     Reason for Call: Other: Bianca is requesting to have the certifications in epic on 3/9, 5/1/, and 6/8 signed.  Thank you.     Action Taken: Message routed to:  [facility]    Travel Screening: Not Applicable

## 2020-06-12 ENCOUNTER — HOSPITAL ENCOUNTER (OUTPATIENT)
Dept: PHYSICAL THERAPY | Facility: CLINIC | Age: 70
Setting detail: THERAPIES SERIES
End: 2020-06-12
Attending: PHYSICIAN ASSISTANT
Payer: MEDICARE

## 2020-06-12 PROCEDURE — 97140 MANUAL THERAPY 1/> REGIONS: CPT | Mod: GP | Performed by: PHYSICAL THERAPIST

## 2020-06-17 ENCOUNTER — HOSPITAL ENCOUNTER (OUTPATIENT)
Dept: PHYSICAL THERAPY | Facility: CLINIC | Age: 70
Setting detail: THERAPIES SERIES
End: 2020-06-17
Attending: PHYSICIAN ASSISTANT
Payer: MEDICARE

## 2020-06-17 PROCEDURE — 97140 MANUAL THERAPY 1/> REGIONS: CPT | Mod: GP | Performed by: PHYSICAL THERAPIST

## 2020-06-24 ENCOUNTER — OFFICE VISIT (OUTPATIENT)
Dept: OTOLARYNGOLOGY | Facility: CLINIC | Age: 70
End: 2020-06-24
Payer: MEDICARE

## 2020-06-24 VITALS
HEIGHT: 63 IN | OXYGEN SATURATION: 100 % | SYSTOLIC BLOOD PRESSURE: 121 MMHG | TEMPERATURE: 99 F | HEART RATE: 60 BPM | DIASTOLIC BLOOD PRESSURE: 71 MMHG | WEIGHT: 159 LBS | RESPIRATION RATE: 13 BRPM | BODY MASS INDEX: 28.17 KG/M2

## 2020-06-24 DIAGNOSIS — C01 MALIGNANT NEOPLASM OF BASE OF TONGUE (H): Primary | ICD-10-CM

## 2020-06-24 ASSESSMENT — PAIN SCALES - GENERAL: PAINLEVEL: NO PAIN (0)

## 2020-06-24 ASSESSMENT — MIFFLIN-ST. JEOR: SCORE: 1215.35

## 2020-06-24 NOTE — PATIENT INSTRUCTIONS
1. Please follow-up in clinic in 6 months.   2. Please call the ENT clinic with any questions,concerns, new or worsening symptoms.    -Clinic number is [phone]   - Lisa's direct line (Dr. Leo's nurse) [phone]    3. You are due for CT neck and chest in September.

## 2020-06-24 NOTE — PROGRESS NOTES
HISTORY OF PRESENT ILLNESS:  Terese Davis is just over three years out from a right partial glossectomy that Dr. 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  She had postoperative radiation therapy and she is here today for a regular visit.  The patient reports she has been doing well.  She denies any odynophagia, dysphagia, hoarseness or otalgia.  She has not noticed any lumps or bumps in her neck.  She has not had any hemoptysis or hoarseness.      PHYSICAL EXAMINATION:  She is well-appearing, in no distress.  Examination of the oral cavity reveals the free flap is healthy in appearance.  There is a small amount of scarring at the front of her reconstruction and one very small white patch posteriorly, but this is benign appearing and appears to be at the junction of the native floor of mouth and the free flap.  I do not see any evidence of recurrent tumor in the tongue or floor of mouth.  Palpation of these areas is negative including the tongue base.  The remainder of the oral cavity mucosa is negative.  Examination of the neck reveals evidence of a right neck dissection, but no lymphadenopathy in either neck.      IMPRESSION:  WILMA.       PLAN:  She is not due for scans yet.  I will see her back in six months, at which time she will have had scans in the interim.         cc:      Thomas Ferris MD   [facility] - Department of Otolaryngology    [address]   [place], [place]  [address]      Nicole Mcdonald NP   [facility]   [address] [place] [address]  [address]       Edmond Schumacher MD   [facility]   [address] [place] [address]  [address]

## 2020-06-24 NOTE — NURSING NOTE
"Chief Complaint   Patient presents with     RECHECK     follow up     Blood pressure 121/71, pulse 60, temperature 99  F (37.2  C), resp. rate 13, height 1.6 m (5' 3\"), weight 72.1 kg (159 lb), SpO2 100 %, not currently breastfeeding.    Hilton Chogn LPN    "

## 2020-06-24 NOTE — LETTER
6/24/2020       RE: Terese Bell  [address] [place] [facility] [address]     Dear Colleague,    Thank you for referring your patient, Terese Bell, to the [facility] EAR NOSE AND THROAT at [facility]. Please see a copy of my visit note below.    HISTORY OF PRESENT ILLNESS:  Terese Davis is just over three years out from a right partial glossectomy that Dr. 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  She had postoperative radiation therapy and she is here today for a regular visit.  The patient reports she has been doing well.  She denies any odynophagia, dysphagia, hoarseness or otalgia.  She has not noticed any lumps or bumps in her neck.  She has not had any hemoptysis or hoarseness.      PHYSICAL EXAMINATION:  She is well-appearing, in no distress.  Examination of the oral cavity reveals the free flap is healthy in appearance.  There is a small amount of scarring at the front of her reconstruction and one very small white patch posteriorly, but this is benign appearing and appears to be at the junction of the native floor of mouth and the free flap.  I do not see any evidence of recurrent tumor in the tongue or floor of mouth.  Palpation of these areas is negative including the tongue base.  The remainder of the oral cavity mucosa is negative.  Examination of the neck reveals evidence of a right neck dissection, but no lymphadenopathy in either neck.      IMPRESSION:  WILMA.       PLAN:  She is not due for scans yet.  I will see her back in six months, at which time she will have had scans in the interim.         cc:      Thomas Ferris MD   [facility] - Department of Otolaryngology    [address] [place] [address] [address]   [place], [place]  [address]      Nicole Mcdonald NP   [facility]   [address] [place], [place]   [address]       Edmond Schumacher MD   [facility]   [address] [place] [address]  [address]         Again, thank you for allowing me to participate in the care of your patient.      Sincerely,    Moreno Leo MD

## 2020-07-06 ENCOUNTER — HOSPITAL ENCOUNTER (OUTPATIENT)
Dept: PHYSICAL THERAPY | Facility: CLINIC | Age: 70
Setting detail: THERAPIES SERIES
End: 2020-07-06
Attending: PHYSICIAN ASSISTANT
Payer: MEDICARE

## 2020-07-06 PROCEDURE — 97140 MANUAL THERAPY 1/> REGIONS: CPT | Mod: GP | Performed by: PHYSICAL THERAPIST

## 2020-07-06 PROCEDURE — 97110 THERAPEUTIC EXERCISES: CPT | Mod: GP | Performed by: PHYSICAL THERAPIST

## 2020-07-09 NOTE — TELEPHONE ENCOUNTER
[facility] Call Center    Phone Message    May a detailed message be left on voicemail: yes     Reason for Call: Other: Medicare Certifications and Re-Certifications in Epic for dates of service on 03/09/20, 05/01/20, and 06/08/20 - Dr Martinez needs to sign them so they can get paid for them from Medicare (Bridgette Farmer no longer here was the one who Referred this Pt to PT) - Has been requesting these to be signed for months and has sent messages and faxes - Please call her back to confirm you got this message and someone will have Dr Martinez sign these three certifications/re-certifications ASAP please - Thanks      Janay from [facility] - PT Dept , Ph # [phone], Fax # [phone]    Action Taken: Message routed to:  [facility]    Travel Screening: Not Applicable

## 2020-07-13 ENCOUNTER — HOSPITAL ENCOUNTER (OUTPATIENT)
Dept: PHYSICAL THERAPY | Facility: CLINIC | Age: 70
Setting detail: THERAPIES SERIES
End: 2020-07-13
Attending: PHYSICIAN ASSISTANT
Payer: MEDICARE

## 2020-07-13 PROCEDURE — 97140 MANUAL THERAPY 1/> REGIONS: CPT | Mod: GP | Performed by: PHYSICAL THERAPIST

## 2020-07-14 DIAGNOSIS — B02.29 POST HERPETIC NEURALGIA: ICD-10-CM

## 2020-07-14 NOTE — TELEPHONE ENCOUNTER
Routing refill request to provider for review/approval because:  Drug not on the [facility] refill protocol     Yulisa Graham RN

## 2020-07-14 NOTE — TELEPHONE ENCOUNTER
Informed Janay that we received her message. Explained that Dr. Martinez has not been seeing patients in clinic, so we are unable to have him sign the paperwork, but will have the paperwork signed by another provider and fax back today. Janay is in agreement with this plan.    Ashley Cody, EMT

## 2020-07-15 RX ORDER — LIDOCAINE 50 MG/G
OINTMENT TOPICAL PRN
Qty: 50 G | Refills: 0 | Status: SHIPPED | OUTPATIENT
Start: 2020-07-15 | End: 2020-07-22

## 2020-07-15 NOTE — TELEPHONE ENCOUNTER
Orders signed by Dr. Flores and faxed to number provided.    Ashley Cody, EMT     Problem: Patient Care Overview  Goal: Plan of Care Review  Outcome: Ongoing (interventions implemented as appropriate)   02/01/19 0425   Coping/Psychosocial   Plan Of Care Reviewed With patient   Plan of Care Review   Progress improving   Outcome Summary VSS. Pt for US kidneys today.     Goal: Individualization & Mutuality  Outcome: Ongoing (interventions implemented as appropriate)    Goal: Discharge Needs Assessment  Outcome: Ongoing (interventions implemented as appropriate)    Goal: Interprofessional Rounds/Family Conf  Outcome: Ongoing (interventions implemented as appropriate)      Problem: Fall Risk (Adult)  Goal: Identify Related Risk Factors and Signs and Symptoms  Outcome: Outcome(s) Achieved Date Met: 02/01/19    Goal: Absence of Falls  Outcome: Ongoing (interventions implemented as appropriate)

## 2020-07-16 ENCOUNTER — TELEPHONE (OUTPATIENT)
Dept: FAMILY MEDICINE | Facility: CLINIC | Age: 70
End: 2020-07-16

## 2020-07-16 DIAGNOSIS — B02.29 POST HERPETIC NEURALGIA: ICD-10-CM

## 2020-07-16 DIAGNOSIS — N64.4 BREAST PAIN, LEFT: Primary | ICD-10-CM

## 2020-07-16 NOTE — TELEPHONE ENCOUNTER
PRIOR AUTHORIZATION DENIED    Medication: Lidocaine 5% Genny FRIEDMAN-DENIED    Denial Date: 7/16/2020    Denial Rational:             Appeal Information:

## 2020-07-16 NOTE — TELEPHONE ENCOUNTER
Prior Authorization Retail Medication Request    Medication/Dose:   ICD code (if different than what is on RX):  Post herpetic neuralgia [B02.29]  Previously Tried and Failed:    Rationale:      Insurance Name:  [employer]  Insurance ID:  [id number]      Pharmacy Information (if different than what is on RX)  Name:  [facility]  Phone:  [phone]

## 2020-07-16 NOTE — TELEPHONE ENCOUNTER
Central Prior Authorization Team   Phone: [phone]      PA Initiation    Medication: Lidocaine 5% Oin DIALIA-Initiated  Insurance Company: [employer] - Phone [phone] Fax [phone]  Pharmacy Filling the Rx: [id number] - [place], [place] - [address]  Filling Pharmacy Phone: [phone]  Filling Pharmacy Fax:    Start Date: 7/16/2020

## 2020-07-21 ENCOUNTER — VIRTUAL VISIT (OUTPATIENT)
Dept: RADIATION THERAPY | Facility: OUTPATIENT CENTER | Age: 70
End: 2020-07-21
Payer: MEDICARE
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
## 2020-07-21 DIAGNOSIS — K11.7 XEROSTOMIA DUE TO RADIOTHERAPY: Primary | ICD-10-CM

## 2020-07-21 RX ORDER — PILOCARPINE HYDROCHLORIDE 5 MG/1
5 TABLET, FILM COATED ORAL 3 TIMES DAILY
Qty: 90 TABLET | Refills: 3 | Status: SHIPPED | OUTPATIENT
Start: 2020-07-21 | End: 2021-03-04

## 2020-07-21 NOTE — NURSING NOTE
"Terese Bell is a 69 year old female who is being evaluated via a billable telephone visit.      The patient has been notified of following:     \"This telephone visit will be conducted via a call between you and your physician/provider. We have found that certain health care needs can be provided without the need for a physical exam.  This service lets us provide the care you need with a short phone conversation.  If a prescription is necessary we can send it directly to your pharmacy.  If lab work is needed we can place an order for that and you can then stop by our lab to have the test done at a later time.    Telephone visits are billed at different rates depending on your insurance coverage. During this emergency period, for some insurers they may be billed the same as an in-person visit.  Please reach out to your insurance provider with any questions.    If during the course of the call the physician/provider feels a telephone visit is not appropriate, you will not be charged for this service.\"    Patient has given verbal consent for Telephone visit?  Yes    What phone number would you like to be contacted at? cell    How would you like to [facility] your [facility]? [facility]    Phone call duration: 12 minutes RN time    Shira Quigley RN    FOLLOW-UP VISIT    Patient Name: Terese Bell      : 1950     Age: 69 year old        ______________________________________________________________________________     Chief Complaint   Patient presents with     Radiation Therapy     phone follow up     There were no vitals taken for this visit.     Date Radiation Completed: 16    Pain  Current history of pain associated with this visit:   Intensity: Patient is unable to quantify.  Current: aching  Location: neck and jaw  Treatment: pt doing PT for neck, doing warm packs and home stretches    Meds  Current Med List Reviewed: Yes  Medication Note:     Labs  TSH   Date Value Ref Range Status "   01/21/2020 1.86 0.40 - 4.00 mU/L Final   ]    Imaging  CT: last 1/2019; upcoming 9/2020    Skin:Warm  Dry  Intact  Oral Products: Biotene and ACT  Mucositis: No  Dry mouth: Yes  Dental evaluation: Yes: followed with [facility], now back to local Dental team. Does daily fluoride tray. brushes 2x/day  PEG tube: No  Speech therapy: does home therabite. [facility] with [facility]  Lymphedema: has been seen in clinic but not recently  Energy Level:normal  Appetite: normal  Intake: normal. Certain meats she does not eat as she cannot swallow them. Uses water with meals to assist with swallow/dry mouth  Weight:  Wt Readings from Last 5 Encounters:   06/24/20 72.1 kg (159 lb)   03/19/20 73.9 kg (163 lb)   03/02/20 74 kg (163 lb 3.2 oz)   02/25/20 74.4 kg (164 lb)   02/20/20 71.7 kg (158 lb)       Appointments:     DATE  Oncologist:     ENT:  Dr. Leo  9/2020   Primary:      Other Notes:

## 2020-07-21 NOTE — LETTER
2020         RE: Terese Bell  [address]  [facility] [address]        Dear Colleague,    Thank you for referring your patient, Terese Bell, to the [facility]. Please see a copy of my visit note below.       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     TYPE OF RADIATION THERAPY ADMINISTERED: 6000 cGy in 30 fractions         Oncologic history : Mrs. 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 She had radiation therapy in our clinic to a total dose of 6000 cGy in 30 treatments at 200 cGy each fraction from 10/17/2016 to 2016.  She tolerated radiation therapy reasonably well.  The patient did have some significant sore throat and dysphagia toward the end of the therapy, which required a PEG tube for nutritional support.  She otherwise was reasonably stable at the end of the therapy.     Interval history:    The patient underwent exam by Dr. Leo on 20 and has remained WILMA. Last scans on 19 were WILMA, future scan on 9/15/20 pending.     Patient otherwise reports she is doing fairly well today. She continues her daily mouth care - using new soft toothbrush she received from Dental. Continues to follow-up with them routinely ~ q 3 months. Continues to do fluoride trays routinely and doing gargles. Does have xerostomia, recovered ~ 40-50%. Using biotene rinses and ATC capsules with some alleviation.    She continues to use therabite  for Trismus. She feels may be stable to slightly worse. She also reports continued difficulty with swallowing certain foods which she has been avoiding.     She is doing her head and neck exercises routinely.     Last TSH on 1/21/20 was WNL.    The patient denies any hoarseness of voice, referred otalgia, dysphagia, facial paresthesias, bleeding, other pain, and recurrent/new lumps or bumps. Weight is stable.    ROS otherwise negative on a 12-system review.          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  Status post resection with FF and XRT; evaluate for  recurrence/metastasis. 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 Neck  pain on left side.     TECHNIQUE:  Axial CT images of the neck were acquired without  intravenous contrast. Coronal reconstructions were created.     COMPARISON: Soft tissue neck CT 6/5/2019.     FINDINGS: Postoperative changes consisting of a right neck dissection  and right lateral floor of mouth resection with free flap  reconstruction again noted. Soft tissue contours in the surgical bed  are unchanged from the comparison study with no evidence for tumor  progression. However, evaluation is mildly limited by extensive  metallic artifact throughout the oral cavity.     There is no cervical lymphadenopathy. There are no fluid collections  or abscesses in the neck. The right submandibular gland has been  resected. The left submandibular gland and bilateral parotid glands  remain unremarkable. There is no evidence for mucosal space lesion.     A 0.8 cm hypodense nodule in the superior aspect of the right lobe of  the thyroid gland is again noted without change. Scattered smaller  nodules in the left lobe of the thyroid gland are also again noted  without change.     The lung apices remain clear.                                                                      IMPRESSION:  1. Postoperative  changes to the right neck and right floor of mouth  with no evidence for tumor recurrence or progression. However,  evaluation of the oral cavity is limited by extensive metallic  artifact.  2. Thyroid nodules bilaterally, no change.  3. Otherwise, normal soft tissue neck CT.        Radiation dose for this scan was reduced using automated exposure  control, adjustment of the mA and/or kV according to patient size, or  iterative reconstruction technique.     ENRIQUETA CARMONA MD    CT CHEST W/O CONTRAST 9/19/2019 10:56 AM     HISTORY: Head and neck cancer. Assess for pulmonary metastases.     TECHNIQUE: CT of the chest is performed without IV contrast.     Assessed structures to the limits no IV contrast administration  include the lungs, mediastinum, pleura, and chest wall.     Radiation exposure is reduced using automated exposure control,  adjustment of the mA and/or kV according to patient size, or iterative  reconstruction technique.     COMPARISON: 11/5/2018.     FINDINGS: There is a 2 mm noncalcified right lower lobe pulmonary  nodule on axial image 33. In retrospect this is stable from the prior  study, suggesting a benign entity. Previously seen left upper lobe  lung abnormality is resolved in the interim. No enlarged lymph nodes  are demonstrated. The thoracic aorta is upper normal in caliber.                                                                      IMPRESSION:  Negative chest CT for metastatic disease.     KRISTEN PEREZ MD    IMPRESSION:   Ms. 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      RECOMMENDATIONS:    1. Remains clinically and radio graphicall WILMA. The patient underwent exam by Dr. Leo on 6/24/20 and has remained WILMA. Last scans on 9/19/19 were WILMA, future scan on 9/15/20 pending.      2.  Continue to  follow-up with ENT. Next appt 12/2/20.    3. Continue to follow-up with Dental.     4. Continue oral nutrition and continue swallowing and stretching exercises. Continue Therabite for Trismus.  Continue to follow-up with speech.    5. TSH 1.86 on 1/2020. To reassess Q 6-12 months.     6. Xerostomia:  patient to continue products for dry mouth including Biotene , ATC capsules, Xylitol/Theramints gum. Reports cool mist vaporizer effective. Discussed possibility of Salagen trial, agrees to try, sent to pharmacy.    7. RTC in 6 months with NP.      Due to the concerns around COVID-19 and adhering to social distancing we conducted this visit over the telephone. Telephone call lasted 20 minutes.       Juwan Zhang M.D.  Department of Radiation Oncology  [facility]

## 2020-07-21 NOTE — PROGRESS NOTES
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     TYPE OF RADIATION THERAPY ADMINISTERED: 6000 cGy in 30 fractions         Oncologic history : Mrs. 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 She had radiation therapy in our clinic to a total dose of 6000 cGy in 30 treatments at 200 cGy each fraction from 10/17/2016 to 2016.  She tolerated radiation therapy reasonably well.  The patient did have some significant sore throat and dysphagia toward the end of the therapy, which required a PEG tube for nutritional support.  She otherwise was reasonably stable at the end of the therapy.     Interval history:    The patient underwent exam by Dr. Leo on 20 and has remained WILMA. Last scans on 19 were WILMA, future scan on 9/15/20 pending.     Patient otherwise reports she is doing fairly well today. She continues her daily mouth care - using new soft toothbrush she received from Dental. Continues to follow-up with them routinely ~ q 3 months. Continues to do fluoride trays routinely and doing gargles. Does have xerostomia, recovered ~ 40-50%. Using biotene rinses and ATC capsules with some alleviation.    She continues to use therabite for Trismus. She feels may be stable to slightly worse. She also reports continued difficulty with swallowing certain foods which she has been avoiding.     She is doing her head and neck exercises routinely.     Last TSH on 20 was WNL.    The patient denies  any hoarseness of voice, referred otalgia, dysphagia, facial paresthesias, bleeding, other pain, and recurrent/new lumps or bumps. Weight is stable.    ROS otherwise negative on a 12-system review.          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  Status post resection with FF and XRT; evaluate for  recurrence/metastasis. 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 Neck  pain on left side.     TECHNIQUE:  Axial CT images of the neck were acquired without  intravenous contrast. Coronal reconstructions were created.     COMPARISON: Soft tissue neck CT 6/5/2019.     FINDINGS: Postoperative changes consisting of a right neck dissection  and right lateral floor of mouth resection with free flap  reconstruction again noted. Soft tissue contours in the surgical bed  are unchanged from the comparison study with no evidence for tumor  progression. However, evaluation is mildly limited by extensive  metallic artifact throughout the oral cavity.     There is no cervical lymphadenopathy. There are no fluid collections  or abscesses in the neck. The right submandibular gland has been  resected. The left submandibular gland and bilateral parotid glands  remain unremarkable. There is no evidence for mucosal space lesion.     A 0.8 cm hypodense nodule in the superior aspect of the right lobe of  the thyroid gland is again noted without change. Scattered smaller  nodules in the left lobe of the thyroid gland are also again noted  without change.     The lung apices remain clear.                                                                      IMPRESSION:  1. Postoperative changes to the right neck and right floor of mouth  with no evidence for tumor recurrence or progression. However,  evaluation of the oral cavity is limited by extensive metallic  artifact.  2. Thyroid nodules bilaterally, no change.  3. Otherwise, normal soft tissue  neck CT.        Radiation dose for this scan was reduced using automated exposure  control, adjustment of the mA and/or kV according to patient size, or  iterative reconstruction technique.     ENRIQUETA CARMONA MD    CT CHEST W/O CONTRAST 9/19/2019 10:56 AM     HISTORY: Head and neck cancer. Assess for pulmonary metastases.     TECHNIQUE: CT of the chest is performed without IV contrast.     Assessed structures to the limits no IV contrast administration  include the lungs, mediastinum, pleura, and chest wall.     Radiation exposure is reduced using automated exposure control,  adjustment of the mA and/or kV according to patient size, or iterative  reconstruction technique.     COMPARISON: 11/5/2018.     FINDINGS: There is a 2 mm noncalcified right lower lobe pulmonary  nodule on axial image 33. In retrospect this is stable from the prior  study, suggesting a benign entity. Previously seen left upper lobe  lung abnormality is resolved in the interim. No enlarged lymph nodes  are demonstrated. The thoracic aorta is upper normal in caliber.                                                                      IMPRESSION:  Negative chest CT for metastatic disease.     KRISTEN PEREZ MD    IMPRESSION:   Ms. 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      RECOMMENDATIONS:    1. Remains clinically and radio graphicall WILMA. The patient underwent exam by Dr. Leo on 6/24/20 and has remained WILMA. Last scans on 9/19/19 were WILMA, future scan on 9/15/20 pending.      2.  Continue to follow-up with ENT. Next appt 12/2/20.    3. Continue to follow-up with Dental.     4. Continue oral nutrition and continue swallowing and stretching exercises. Continue Therabite for Trismus.  Continue to follow-up with speech.    5. TSH 1.86 on 1/2020. To reassess Q  6-12 months.     6. Xerostomia:  patient to continue products for dry mouth including Biotene , ATC capsules, Xylitol/Theramints gum. Reports cool mist vaporizer effective. Discussed possibility of Salagen trial, agrees to try, sent to pharmacy.    7. RTC in 6 months with NP.      Due to the concerns around COVID-19 and adhering to social distancing we conducted this visit over the telephone. Telephone call lasted 20 minutes.       Juwan Zhang M.D.  Department of Radiation Oncology  [facility]

## 2020-07-21 NOTE — TELEPHONE ENCOUNTER
?? Are you appealing the determination ? Routing back to provider.    Micromedex lidocaine, not sure if this is the issue, could try 1.8%, perhaps the Dx is to vague            Stanley Rowe RT (r)  [facility]

## 2020-07-22 ENCOUNTER — TELEPHONE (OUTPATIENT)
Dept: FAMILY MEDICINE | Facility: CLINIC | Age: 70
End: 2020-07-22

## 2020-07-22 DIAGNOSIS — B02.29 POST HERPETIC NEURALGIA: ICD-10-CM

## 2020-07-22 RX ORDER — LIDOCAINE 50 MG/G
OINTMENT TOPICAL
Qty: 50 G | Refills: 3 | Status: SHIPPED | OUTPATIENT
Start: 2020-07-22 | End: 2020-07-22

## 2020-07-22 RX ORDER — LIDOCAINE 50 MG/G
OINTMENT TOPICAL
Qty: 50 G | Refills: 3 | Status: SHIPPED | OUTPATIENT
Start: 2020-07-22 | End: 2020-12-02

## 2020-07-22 NOTE — TELEPHONE ENCOUNTER
Yulisa from [facility] pharmacy is calling and is asking to talk to a nurse about this pt lidocaine medication  per insurance guidelines they need to know how many times per day to apply,  they do NOTwant us to send  a new rx  they want us to call back and jsut give a verbal.     Tamie Streeter, [place]

## 2020-07-22 NOTE — TELEPHONE ENCOUNTER
Nicole,  Please see note below and advise how many times patient is to use the Lidocaine, thanks.    ROGER Jiang
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
## 2020-08-03 ENCOUNTER — HOSPITAL ENCOUNTER (OUTPATIENT)
Dept: PHYSICAL THERAPY | Facility: CLINIC | Age: 70
Setting detail: THERAPIES SERIES
End: 2020-08-03
Attending: PHYSICIAN ASSISTANT
Payer: MEDICARE

## 2020-08-03 PROCEDURE — 97140 MANUAL THERAPY 1/> REGIONS: CPT | Mod: GP | Performed by: PHYSICAL THERAPIST

## 2020-08-17 ENCOUNTER — HOSPITAL ENCOUNTER (OUTPATIENT)
Dept: MAMMOGRAPHY | Facility: CLINIC | Age: 70
End: 2020-08-17
Attending: NURSE PRACTITIONER
Payer: MEDICARE

## 2020-08-17 DIAGNOSIS — N64.4 BREAST PAIN, LEFT: ICD-10-CM

## 2020-08-17 PROCEDURE — 77065 DX MAMMO INCL CAD UNI: CPT

## 2020-08-18 ENCOUNTER — HOSPITAL ENCOUNTER (OUTPATIENT)
Dept: PHYSICAL THERAPY | Facility: CLINIC | Age: 70
Setting detail: THERAPIES SERIES
End: 2020-08-18
Attending: PHYSICIAN ASSISTANT
Payer: MEDICARE

## 2020-08-18 PROCEDURE — 97140 MANUAL THERAPY 1/> REGIONS: CPT | Mod: GP | Performed by: PHYSICAL THERAPIST

## 2020-08-18 NOTE — PROGRESS NOTES
"Outpatient Physical Therapy Progress Note     Patient: Terese Bell  : 1950    Beginning/End Dates of Reporting Period:  20 to 2020    Referring Provider: Bridgette Farmer Diagnosis: Neck Stiffness, Scar of Neck, Malignant Neoplasm of base of tongue.      Client Self Report: Doing really well. Wants to come back in 3 weeks. Trouble with things sticking in the back of the throat.  Trying to push it away w/ tongue, but it is difficult.  Going to  some nutrition liquirds to supplement.     Objective Measurements:  Objective Measure: CROM:   Details: 20-  Flex 1/2\", Ext 505, SB R 26, L 24; Rot R 47, L 50. 20  Flex 1/2\", Ext 65%, SB R 27, L 31; Rot R 45, L 48.  20  Flex 3/4\", Ext 50%, SB R 26, L 29, Rot R 51, L 49.  3/19/20  Flex 1\", Ext 50%, SB R 18, L 26, Rot R 48, L 48. INITIALLY:  Flex 1 w/tight B UTrap, Ext 25% w/ neck pain, SB R 21 w/ R neck pain, L 28 w/ L Ear pain; Rot R 40 w/ R neck P   near jaw, L 49 w/ pinch just below jaw line.     Objective Measure: TMJ ROM   Details: 20  Opening 27, 20 Opening 27. 6  Opening 26. 20  Opening 24.  3/19/20  Opening 29, Laterotrusion B 8, Protrusion 5. INITIALlY:  Opening 24, Laterotrusion B 5, Protrusion 4.      Objective Measure: Spec tests   Details: 20  B TMJ good mobility, INITIALLY:  R TMJ Hypo, L Post Quad-pain under angle of L jaw, Poor posture.      Goals:  Goal Identifier 1   Goal Description STG: Improve neck rotation to 50 B for improved safety w/ driving.  20  MET   Target Date 20   Date Met  20   Progress:     Goal Identifier 2   Goal Description STG: Pt will be able to chew harder food 5/10 on funcitonal scale. 20  Rated 6/10.  MET    Target Date 20   Date Met      Progress:     Goal Identifier 3   Goal Description STG: Pt will be shady to Open wide to bite an apple or snadwich 6/10 on functional scale.  20  Rated 9/10   NOT MET today    Target " Date 04/13/20   Date Met      Progress:     Goal Identifier 4   Goal Description LTG: Pt will be independent w/ self cares and HEP to restore better function of TMJ.    Target Date 04/27/20   Date Met      Progress:     Progress Toward Goals:   Progress this reporting period: Pt has met 1/3 STG's.   Progress limited due to tightness.     Plan:  Continue therapy per current plan of care.  Changes to therapy plan of care: Decrease frequency d/t patient feeling that she is doing well.     Discharge:  No  RECERTIFICATION    Terese Bell  1950    Session Number: 15/20 /[facility] since start of care.    Reasons for Continuing Treatment:   Pt wants to come in more infrequently to be sure she keeps her ROM. Her ROM has improved, but it is not normal yet.     Frequency/Duration  1 times per week for every other week for a total of 3 visits.    Recertification Period  8/18/20 - 10/26/20    Physician Signature:    Date:    X_______________________________________________________    Physician Name: Bridgette Farmer     I certify the need for these services furnished under this plan of treatment and while under my care. Physician co-signature of this document indicates review and certification of the therapy plan.  This signature may be written on paper, or electronically signed within [facility].     Melania Nicholas, PT, [facility] (#[id number])  [facility]           [phone]  Fax          [phone]  Appt #      [phone]

## 2020-09-09 ENCOUNTER — HOSPITAL ENCOUNTER (OUTPATIENT)
Dept: PHYSICAL THERAPY | Facility: CLINIC | Age: 70
Setting detail: THERAPIES SERIES
End: 2020-09-09
Attending: PHYSICIAN ASSISTANT
Payer: MEDICARE

## 2020-09-09 PROCEDURE — 97140 MANUAL THERAPY 1/> REGIONS: CPT | Mod: GP | Performed by: PHYSICAL THERAPIST

## 2020-09-15 ENCOUNTER — HOSPITAL ENCOUNTER (OUTPATIENT)
Dept: CT IMAGING | Facility: CLINIC | Age: 70
End: 2020-09-15
Attending: OTOLARYNGOLOGY
Payer: MEDICARE

## 2020-09-15 DIAGNOSIS — C01 MALIGNANT NEOPLASM OF BASE OF TONGUE (H): ICD-10-CM

## 2020-09-15 PROCEDURE — 71250 CT THORAX DX C-: CPT

## 2020-09-15 PROCEDURE — 70490 CT SOFT TISSUE NECK W/O DYE: CPT

## 2020-09-17 ENCOUNTER — TELEPHONE (OUTPATIENT)
Dept: OTOLARYNGOLOGY | Facility: CLINIC | Age: 70
End: 2020-09-17

## 2020-09-17 NOTE — TELEPHONE ENCOUNTER
Called patient with CT scans:    Neck:  IMPRESSION:    1. Postoperative and treatment changes at the right floor of the mouth  and right neck that appears similar to prior.  2. No new suspicious masses suggest recurrent tumor noting that  evaluation is difficult with lack of intravenous contrast. No new  suspicious cervical lymph nodes.   3. No significant change since prior.    Chest:  IMPRESSION:   1.  No findings specific for metastatic disease to the chest.  2.  Similar-appearing 3-4 mm and smaller pulmonary nodules.  3.  No new or enlarging pulmonary nodules.    Continue with planned appointment with Dr. Leo in December. Will plan for another surveillance scan in 1 year.    Antonia Alberts RN, DNP.  9/17/2020 12:33 PM

## 2020-09-30 ENCOUNTER — HOSPITAL ENCOUNTER (OUTPATIENT)
Dept: PHYSICAL THERAPY | Facility: CLINIC | Age: 70
Setting detail: THERAPIES SERIES
End: 2020-09-30
Attending: PHYSICIAN ASSISTANT
Payer: MEDICARE

## 2020-09-30 PROCEDURE — 97140 MANUAL THERAPY 1/> REGIONS: CPT | Mod: GP | Performed by: PHYSICAL THERAPIST

## 2020-10-21 ENCOUNTER — HOSPITAL ENCOUNTER (OUTPATIENT)
Dept: PHYSICAL THERAPY | Facility: CLINIC | Age: 70
Setting detail: THERAPIES SERIES
End: 2020-10-21
Attending: PHYSICIAN ASSISTANT
Payer: MEDICARE

## 2020-10-21 PROCEDURE — 97140 MANUAL THERAPY 1/> REGIONS: CPT | Mod: GP | Performed by: PHYSICAL THERAPIST

## 2020-10-21 NOTE — PROGRESS NOTES
"Outpatient Physical Therapy Progress Note     Patient: Terese Bell  : 1950    Beginning/End Dates of Reporting Period:  20 to 10/21/2020.  Total # of Rx sessions: 18    Referring Provider: Bridgette Farmer     Therapy Diagnosis: Neck Stiffness, Scar of Neck, Malignant Neoplasm of base of tongue.      Client Self Report: Have to chew so hard, wears me out for the swallowing. Still hard to open wide enough, hasn't worked as much with the therabite this month.    Objective Measurements:  Objective Measure: CROM:   Details: 10/21/20 -  Flex 1/2\", Ext 80%, SB R 26, L 19; Rot R 51, L 44. 20  Flex 1/2\", Ext 65%, SB R 27, L 31; Rot R 45, L 48.  20  Flex 3/4\", Ext 50%, SB R 26, L 29, Rot R 51, L 49.  3/19/20  Flex 1\", Ext 50%, SB R 18, L 26, Rot R 48, L 48. INITIALLY:  Flex 1 w/tight B UTrap, Ext 25% w/ neck pain, SB R 21 w/ R neck pain, L 28 w/ L Ear pain; Rot R 40 w/ R neck P near jaw, L 49 w/ pinch just below jaw line.     Objective Measure: TMJ ROM   Details: 10/21/20 /20  Opening 25, 20 Opening 27. 6/  Opening 26. 20  Opening 24.  3/19/20  Opening 29, Laterotrusion B 8, Protrusion 5. INITIALlY:  Opening 24, Laterotrusion B 5, Protrusion 4.      Objective Measure: Spec tests   Details: 10/21/20  B TMJ good mobility, INITIALLY:  R TMJ Hypo, L Post Quad-pain under angle of L jaw, Poor posture.      Goals:  Goal Identifier 1   Goal Description STG: Improve neck rotation to 50 B for improved safety w/ driving.  20  MET   Target Date 20   Date Met  20   Progress:     Goal Identifier 2   Goal Description STG: Pt will be able to chew harder food 5/10 on funcitonal scale. 20  Rated 6-7/10.  NOT MET    Target Date 20   Date Met      Progress:     Goal Identifier 3   Goal Description STG: Pt will be shady to Open wide to bite an apple or snadwich 6/10 on functional scale.  20  Rated 8-9/10   NOT MET today    Target Date 20   Date Met    "   Progress:     Goal Identifier 4   Goal Description LTG: Pt will be independent w/ self cares and HEP to restore better function of TMJ.    Target Date 04/27/20   Date Met      Progress:     Progress Toward Goals:   Progress this reporting period: Pt has met 1/3 STG's.  Progress limited due to Pt has not been as diligent working w/ therabite this last month.     Plan:  Continue therapy per current plan of care.    Discharge:  No  RECERTIFICATION    Terese Bell  1950  MRN: [id number]    Session Number: 18/20 MC/BCBS since start of care.    Diagnosis: Neck Stiffness, Scar of Neck, Malignant Neoplasm of base of tongue.   Onset Date: 2/25/20  - MD Order date.   Start of Care: 3/9/20    Reasons for Continuing Treatment:   Pt needs better function of her jaw to be established.     Frequency/Duration  1 times per week for 6 weeks for a total of 7 visits.    Recertification Period  10/21/20 - 12/2/20    Physician Signature:    Date:    X_______________________________________________________    Physician Name: Bridgette Farmer    I certify the need for these services furnished under this plan of treatment and while under my care. Physician co-signature of this document indicates review and certification of the therapy plan.  This signature may be written on paper, or electronically signed within [facility].     Melania Nicholas, PT, MTC (#[id number])  [facility]           [phone]  Fax          [phone]  Appt #      [phone]

## 2020-11-05 ENCOUNTER — HOSPITAL ENCOUNTER (OUTPATIENT)
Dept: PHYSICAL THERAPY | Facility: CLINIC | Age: 70
Setting detail: THERAPIES SERIES
End: 2020-11-05
Attending: RADIOLOGY
Payer: MEDICARE

## 2020-11-05 PROCEDURE — 97140 MANUAL THERAPY 1/> REGIONS: CPT | Mod: GP | Performed by: PHYSICAL THERAPIST

## 2020-11-16 NOTE — PROGRESS NOTES
"February 28, 2018    PRIOR ONCOLOGIC HISTORY:  Ms. 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 Schumacher.  He ended up doing 13 operations in 11 years, most recently in 2010.      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 Leo on 9/1/2016.  The margins were negative but the lymph nodes were 1/40 positive.  She completed postoperative radiation therapy on 11/29/2017.    HISTORY OF PRESENT ILLNESS:    Ms. Bell continues to do very well.  Her 15-month CT scans in January were clear of disease.  There was no evidence of disease in her neck or her lungs.  However, the scans were done without contrast due to her allergy.     She brings in a list of concerns today to discuss:    1.  Why she developed the cancer.    2.  She developed an episode of shingles in September that lasted until January.  Unfortunately, although the skin has healed now, she has continued severe pain on the left side of her face and her head.  She wonders whether there is anything that we can do for that.   3.  She indicates that despite working with a swallowing therapist in [place], her swallowing has not progressed.  She wonders whether there is any work that she can do to continue improvement there.   4.  The patient wonders whether the allergy to iodine is real.  She says that she used to have asthma but that has gone away, and she wonders whether the asthma and iodine allergy have actually resolved.  She wants to make sure that there is nothing \"floating around.\"     5.  The patient says that she is bothered by a metal clip that is just underneath her incision line on the right side.  She says that this is unlike the other clips and is behaving as floating around, and she wants " -- DO NOT REPLY / DO NOT REPLY ALL --  -- Message is from the [facility]--    Order Request  Lab: HIV pt states he was told to be retested    Message / reason: pt states his test came back Non reactive for HIV and was told to come back in for another test    Insurance type: ppo  Payor: GRAHAM/MAURILIO / Plan: [employer] [id number] / Product Type: PPO MISC    Preferred Delivery Method   Call when ready for pickup - phone number to notify: [phone]     Caller Information       Type Contact Phone    11/16/2020 11:37 AM CST Phone (Incoming) Papito Ted (Self) [phone] (M)          Alternative phone number: na    Turnaround time given to caller:   \"This message will be sent to [state Provider's name]. The clinical team will fulfill your request as soon as they review your message.\"   "to have it removed.   6.  Short-term memory loss.  She says she used to be very sharp but recently has had more and more difficulty remembering short-term issues.   7.  My \"restriction\" that she could not be further away from [place] than three hours. ( I didn't remember this!)     PHYSICAL EXAMINATION:  She looks very well.  The oral cavity and oropharynx look great.  There is either a series of metal clips or possibly a venous  just underneath her skin incision on the right side that is fully mobile.  I feel no masses.  She does have trismus, and that limits my examination and palpation of the floor of mouth.  The mouth is completely clean and soft.      IMPRESSION AND PLAN:  Ms. Bell continues to be WILMA based on examination and 15-month scans.       Here are the issues we discussed, using the format above:  1.  She wondered whether her exposure to chemicals in her work, and substantial stress in her life, could have contributed to the cancer.  She smoked heavily for 7 years earlier in her life as well.  I acknowledged that all of these could have been contributory, but that we will never really know exactly what caused her problem.  She indicates that many people in her family have cancers, and I told her that there were certainly genetic predispositions to developing cancer.  I believe that she found this discussion helpful.    2, 4 & 6. We have asked her to get with Dr. Mcdonald, who is her primary care physician, on the pain control as well as any concerns about a stroke.  She indicates that she has quite a bit of short-term memory loss that she thinks has gotten worse after the surgery and the radiation therapy.  I told her that we have memory experts here as well that could see her, but she wants to start with Dr. Mcdonald first.  We will also ask Dr Mcdonald to direct her to appropriate allergy testing to iodine.  In meantime, we can switch to MRI for monitoring.    3.  She has started physical therapy " again and is regularly doing her speech language pathology exercises but wants to talk to somebody again today, and Janay will visit with her.     5.  I asked Rachel to check with Dr. Leo to see whether he wants to remove that  or staples which I think would be a relief for her psychologically.  I would be happy to do it as well if he does not have the time but does authorize it.      7.   I do not remember telling her that she cannot be more than 3 hours away from the [facility], but if I did, that was probably around the time when we were giving her radiation therapy.  She would like to travel with her boyfriend to [place] and [facility].  I have given her authorization to start trips with her boyfriend.      PLAN:    We will plan to scan her again at 24 months, and I have suggested that we use an MRI this time.  I spent approximately 40 minutes with the patient in direct face-to-face conversation, nearly all of which was spent in counseling and care coordination.      Thomas Ferris M.D.  Otolaryngology/Head & Neck Surgery  [phone]                      HERNÁN Carranza MD [facility]     Moreno Leo MD  Otolaryngology/Head & Neck Surgery  [place] [address]

## 2020-11-18 ENCOUNTER — HOSPITAL ENCOUNTER (OUTPATIENT)
Dept: PHYSICAL THERAPY | Facility: CLINIC | Age: 70
Setting detail: THERAPIES SERIES
End: 2020-11-18
Attending: RADIOLOGY
Payer: MEDICARE

## 2020-11-18 PROCEDURE — 97140 MANUAL THERAPY 1/> REGIONS: CPT | Mod: GP | Performed by: PHYSICAL THERAPIST

## 2020-11-18 NOTE — PROGRESS NOTES
"Outpatient Physical Therapy Discharge Note     Patient: Terese Bell  : 1950    Beginning/End Dates of Reporting Period:  3/9/20 to 2020. Total # of Rx sessions: 20     Referring Provider: Bridgette Farmer     Therapy Diagnosis: Neck Stiffness, Scar of Neck, Malignant Neoplasm of Base of Tongue.      Client Self Report: Doing the Therabite more. Neck feels stiff, but better.     Objective Measurements:  Objective Measure: CROM:   Details: 20  Flex Normal, Ext 90%, SB R 26, L 30; Rot R  51, L 48.  10/21/20 -  Flex 1/2\", Ext 80%, SB R 26, L 19; Rot R 51, L 44. 20  Flex 1/2\", Ext 65%, SB R 27, L 31; Rot R 45, L 48.  20  Flex 3/4\", Ext 50%, SB R 26, L 29, Rot R 51, L 49.  3/19/20  Flex 1\", Ext 50%, SB R 18, L 26, Rot R 48, L 48. INITIALLY:  Flex 1 w/tight B UTrap, Ext 25% w/ neck pain, SB R 21 w/ R neck pain, L 28 w/ L Ear pain; Rot R 40 w/ R neck P near jaw, L 49 w/ pinch just below jaw line.     Objective Measure: TMJ ROM   Details: 20  Opening 28, 20 Opening 27. 20  Opening 26. 20  Opening 24.  3/19/20  Opening 29, Laterotrusion B 8, Protrusion 5. INITIALlY:  Opening 24, Laterotrusion B 5, Protrusion 4.      Objective Measure: Spec tests   Details: 20  B TMJ good mobility, INITIALLY:  R TMJ Hypo, L Post Quad-pain under angle of L jaw, Poor posture.      Goals:  Goal Identifier 1   Goal Description STG: Improve neck rotation to 50 B for improved safety w/ driving.  20  MET   Target Date 20   Date Met  20   Progress:     Goal Identifier 2   Goal Description STG: Pt will be able to chew harder food 5/10 on funcitonal scale. 20  Rated 7-8/10.  NOT MET    Target Date 20   Date Met      Progress:     Goal Identifier 3   Goal Description STG: Pt will be shady to Open wide to bite an apple or snadwich 6/10 on functional scale.  20  Rated 8-9/10   NOT MET today    Target Date 20   Date Met      Progress:     Goal " Identifier 4   Goal Description LTG: Pt will be independent w/ self cares and HEP to restore better function of TMJ.    Target Date 04/27/20   Date Met      Progress:     Progress Toward Goals:   Progress this reporting period: Pt has met 1 STG. Has not been as diligent w/ using therabite for stretching before, so has started to do therabite again and her mouth opening is improving again.     Plan:  Continue therapy per current plan of care.    Discharge:  No  RECERTIFICATION    Terese Bell  1950  MRN: [id number]    Session Number: 20/20 MC/BCBS since start of care.    Diagnosis:   Neck Stiffness, Scar of Neck, Malignant Neoplasm of Base of Tongue.   Onset Date: 2/25/20  MD Order date.   Start of Care: 3/9/20    Reasons for Continuing Treatment:   Pt has lost some ROM of her TMJ lately, so will be emphasizing this w/treatments.     Frequency/Duration  1 times per week for every other week for a total of 4 additional visits.    Recertification Period  11/16/20 - 12/31/20    Physician Signature:    Date:    X_______________________________________________________    Physician Name: Bridgette Farmer    I certify the need for these services furnished under this plan of treatment and while under my care. Physician co-signature of this document indicates review and certification of the therapy plan.  This signature may be written on paper, or electronically signed within [facility].     Melania Nicholas, PT, [facility] (#[id number])  [facility]           [phone]  Fax          [phone]  Appt #      [phone]

## 2020-12-01 ENCOUNTER — MYC MEDICAL ADVICE (OUTPATIENT)
Dept: FAMILY MEDICINE | Facility: CLINIC | Age: 70
End: 2020-12-01

## 2020-12-01 DIAGNOSIS — B02.29 POST HERPETIC NEURALGIA: ICD-10-CM

## 2020-12-02 RX ORDER — LIDOCAINE 50 MG/G
OINTMENT TOPICAL
Qty: 50 G | Refills: 0 | Status: SHIPPED | OUTPATIENT
Start: 2020-12-02 | End: 2021-03-04

## 2020-12-09 ENCOUNTER — HOSPITAL ENCOUNTER (OUTPATIENT)
Dept: PHYSICAL THERAPY | Facility: CLINIC | Age: 70
Setting detail: THERAPIES SERIES
End: 2020-12-09
Attending: RADIOLOGY
Payer: MEDICARE

## 2020-12-09 PROCEDURE — 97140 MANUAL THERAPY 1/> REGIONS: CPT | Mod: GP | Performed by: PHYSICAL THERAPIST

## 2020-12-09 NOTE — PROGRESS NOTES
Outpatient Physical Therapy Progress Note     Patient: Terese Bell  : 1950    Beginning/End Dates of Reporting Period:  3/9/20 to 2020. Total # of Rx sessions: 21     Referring Provider: Juwan Zhang    Therapy Diagnosis: Neck Stiffness, Scar of Neck, Malignant Neoplasm of Base of Tongue.      Client Self Report: Doing Therabite every other day, it irritates neck. Therapy helps. Will wait until new year to continue.     Objective Measurements:  Objective Measure: CROM:   Details: 20 Flx Normal, Ext 50% SB R 25, L 23: Rotation R 47, L 52.   INITIALLY:  Flex 1 w/tight B UTrap, Ext 25% w/ neck pain, SB R 21 w/ R neck pain, L 28 w/ L Ear pain; Rot R 40 w/ R neck P near jaw, L 49 w/ pinch just below jaw line.     Objective Measure: TMJ ROM   Details: 20  Opening 29.  20   Opening 28, 20 Opening 27. 6  Opening 26. 20  Opening 24.  3    Opening 29, Laterotrusion B 8, Protrusion 5. INITIALlY:  Opening 24, Laterotrusion B 5, Protrusion 4.      Objective Measure: Spec tests   Details: 20  B TMJ good mobility, INITIALLY:  R TMJ Hypo, L Post Quad-pain under angle of L jaw, Poor posture.      Goals:  Goal Identifier 1   Goal Description STG: Improve neck rotation to 50 B for improved safety w/ driving.  20  MET   Target Date 20   Date Met  20   Progress:     Goal Identifier 2   Goal Description STG: Pt will be able to chew harder food 5/10 on funcitonal scale. 20  Rated 7-8/10.  NOT MET    Target Date 20   Date Met      Progress:     Goal Identifier 3   Goal Description STG: Pt will be able to Open wide to bite an apple or snadwich 6/10 on functional scale.  20  Rated 8-9/10   NOT MET today    Target Date 20   Date Met      Progress:     Goal Identifier 4   Goal Description LTG: Pt will be independent w/ self cares and HEP to restore better function of TMJ.    Target Date 20   Date Met      Progress:     Progress  Toward Goals:   Progress this reporting period: Pt has met 1 STG.     Plan:  Continue therapy per current plan of care.    Discharge:  No  RECERTIFICATION    Terese Bell  1950  MRN: [id number]    Session Number: 21/20 /[facility] since start of care.    Diagnosis: Neck Stiffness, Scar of Neck, Malignant Neoplasm of Base of Tongue.   Onset Date: 2/25/20  Start of Care: 3/9/20    Reasons for Continuing Treatment:   Pt continues to have some difficulty eating.  Benefits from Therapy to improve neck and jaw function.     Frequency/Duration  1 times per week for 8 weeks for a total of 7 visits.    Recertification Period  12/9/20 - 2/10/20.     Physician Signature:    Date:    X_______________________________________________________    Physician Name: Darian Angeles    I certify the need for these services furnished under this plan of treatment and while under my care. Physician co-signature of this document indicates review and certification of the therapy plan.  This signature may be written on paper, or electronically signed within [facility].     Melania Nicholas, PT, MTC (#[id number])  [facility]           [phone]  Fax          [phone]  Appt #      [phone]

## 2020-12-30 NOTE — PROGRESS NOTES
"  Dec 31, 2020    PRIOR ONCOLOGIC HISTORY:  Ms. 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 Schumacher.  He ended up doing 13 operations in 11 years, most recently in 2010.      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 Leo on 9/1/2016.  The margins were negative but the lymph nodes were 1/40 positive.  She completed postoperative radiation therapy on 11/29/2016.    HISTORY OF PRESENT ILLNESS:  Ms. Bettie Bell had a chest and neck CT in September 2020 that were both negative.  Those were scans that were roughly at her 4-year annie.  Her last thyroid tests were about a year ago, and were normal at that time.  She does describe feeling a little bit cold from time to time.  She still have some stiffness in her right shoulder and right neck, although she is managing with therapy.    She has done quite well in the interim.  The Covid has been tough for her, as she is not been able to see her boyfriend.  He is currently in [place].  She expressed gratitude that she is at 4 years and became a little bit emotional.  She saw Janay earlier today, and was very happy to see Antonia again today.  She has had no difficulty swallowing.  She does describe a little bit of \"tongue-tie\" but did not discuss that with Janay.     PHYSICAL EXAMINATION:  She looks terrific.  She is in good spirits.  The flap is in good position.  It continues to be a little bit raised, but the entire raised section is palpated carefully and there is absolutely no induration.  There is a little tethering anteriorly with a bit of a scar band, but lateral to the scar band there is tethering laterally as well.  The rest of the oral cavity and oropharynx look good.  There were no areas of " induration or ulceration.      In the neck, there is unchanged point tenderness in the left thyroid ala region, adjacent to the carotid.  The right side is a little less tender but there is symmetrical tenderness.  There are no masses.     Fiberoptic Endoscopy:  Consent for fiberoptic laryngoscopy was obtained, and we confirmed correctness of procedure and identity of patient.  Fiberoptic laryngoscopy was indicated due to the need to evaluate the piriform sinus on the left due to concern of osteoradionecrosis of the left thyroid ala.  The nose was topically decongested and anesthetized.  The fiberoptic laryngoscope was passed under endoscopic vision.  The turbinates were normal.  The inferior and middle meati were clear bilaterally without purulence, masses, or polyps.  The nasopharynx was clear.  The eustachian tubes were clear.  The soft palate appeared normal with good mobility.  The epiglottis was sharp and the visualized portion of the vallecula was clear.  The larynx was clear with mobile cords.  The arytenoids were clear and there was no pooling in the hypopharynx. There is no evidence of inflammation in the left piriform sinus or pooling.      IMPRESSION AND PLAN:  Ms. Bettie Bell is WILMA at 4 years.  There are no signs of osteoradionecrosis, and she is doing generally well.  I will speak to Janay later to see whether or not there are exercises for her tongue tie.  In the meantime, the other issue is some dysfunction of her right shoulder stiffness in her right neck; she was interested in visiting with the [facility] physician and we will ask her to visit with Dr. Patel.         Thomas Ferris MD  Otolaryngology/Head & Neck Surgery  [phone]                  Nicole Mcdonald NP    [facility]    [address], [place]  [address]       Pattie Natarajan MD    Radiation Therapy     [address]    [address]    [place], [place]  [address]     Moreno Leo MD  Otolaryngology/Head  & Neck Surgery

## 2020-12-31 ENCOUNTER — OFFICE VISIT (OUTPATIENT)
Dept: OTOLARYNGOLOGY | Facility: CLINIC | Age: 70
End: 2020-12-31
Payer: MEDICARE

## 2020-12-31 VITALS
HEIGHT: 64 IN | WEIGHT: 156 LBS | BODY MASS INDEX: 26.63 KG/M2 | HEART RATE: 73 BPM | OXYGEN SATURATION: 98 % | SYSTOLIC BLOOD PRESSURE: 109 MMHG | TEMPERATURE: 96.2 F | DIASTOLIC BLOOD PRESSURE: 74 MMHG

## 2020-12-31 DIAGNOSIS — C02.2 SQUAMOUS CELL CARCINOMA OF VENTRAL TONGUE (H): ICD-10-CM

## 2020-12-31 DIAGNOSIS — E03.4 HYPOTHYROIDISM DUE TO ACQUIRED ATROPHY OF THYROID: ICD-10-CM

## 2020-12-31 DIAGNOSIS — C02.2 SQUAMOUS CELL CARCINOMA OF VENTRAL TONGUE (H): Primary | ICD-10-CM

## 2020-12-31 LAB — TSH SERPL DL<=0.005 MIU/L-ACNC: 1.63 MU/L (ref 0.4–4)

## 2020-12-31 PROCEDURE — 84443 ASSAY THYROID STIM HORMONE: CPT | Performed by: PATHOLOGY

## 2020-12-31 PROCEDURE — 31575 DIAGNOSTIC LARYNGOSCOPY: CPT | Performed by: OTOLARYNGOLOGY

## 2020-12-31 PROCEDURE — 99213 OFFICE O/P EST LOW 20 MIN: CPT | Mod: 25 | Performed by: OTOLARYNGOLOGY

## 2020-12-31 PROCEDURE — 36415 COLL VENOUS BLD VENIPUNCTURE: CPT | Performed by: PATHOLOGY

## 2020-12-31 ASSESSMENT — PAIN SCALES - GENERAL: PAINLEVEL: NO PAIN (0)

## 2020-12-31 ASSESSMENT — MIFFLIN-ST. JEOR: SCORE: 1204.67

## 2020-12-31 NOTE — NURSING NOTE
"Chief Complaint   Patient presents with     RECHECK     yearly follow up       Blood pressure 109/74, pulse 73, temperature 96.2  F (35.7  C), temperature source Temporal, height 1.613 m (5' 3.5\"), weight 70.8 kg (156 lb), SpO2 98 %, not currently breastfeeding.    Farideh Guillaume, EMT    "

## 2020-12-31 NOTE — LETTER
"12/31/2020       RE: Terese Bell  [address]  [facility] [address]     Dear Colleague,    Thank you for referring your patient, Terese Bell, to the [facility] EAR NOSE AND THROAT CLINIC [place] at [facility]. Please see a copy of my visit note below.      Dec 31, 2020    PRIOR ONCOLOGIC HISTORY:  Ms. 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 Schumacher.  He ended up doing 13 operations in 11 years, most recently in 2010.      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 Leo on 9/1/2016.  The margins were negative but the lymph nodes were 1/40 positive.  She completed postoperative radiation therapy on 11/29/2016.    HISTORY OF PRESENT ILLNESS:  Ms. Bettie Bell had a chest and neck CT in September 2020 that were both negative.  Those were scans that were roughly at her 4-year annie.  Her last thyroid tests were about a year ago, and were normal at that time.  She does describe feeling a little bit cold from time to time.  She still have some stiffness in her right shoulder and right neck, although she is managing with therapy.    She has done quite well in the interim.  The Covid has been tough for her, as she is not been able to see her boyfriend.  He is currently in [place].  She expressed gratitude that she is at 4 years and became a little bit emotional.  She saw Janay earlier today, and was very happy to see Antonia again today.  She has had no difficulty swallowing.  She does describe a little bit of \"tongue-tie\" but did not discuss that with Janay.     PHYSICAL EXAMINATION:  She looks terrific.  She is in good spirits.  The flap is in good position.  It " continues to be a little bit raised, but the entire raised section is palpated carefully and there is absolutely no induration.  There is a little tethering anteriorly with a bit of a scar band, but lateral to the scar band there is tethering laterally as well.  The rest of the oral cavity and oropharynx look good.  There were no areas of induration or ulceration.      In the neck, there is unchanged point tenderness in the left thyroid ala region, adjacent to the carotid.  The right side is a little less tender but there is symmetrical tenderness.  There are no masses.     Fiberoptic Endoscopy:  Consent for fiberoptic laryngoscopy was obtained, and we confirmed correctness of procedure and identity of patient.  Fiberoptic laryngoscopy was indicated due to the need to evaluate the piriform sinus on the left due to concern of osteoradionecrosis of the left thyroid ala.  The nose was topically decongested and anesthetized.  The fiberoptic laryngoscope was passed under endoscopic vision.  The turbinates were normal.  The inferior and middle meati were clear bilaterally without purulence, masses, or polyps.  The nasopharynx was clear.  The eustachian tubes were clear.  The soft palate appeared normal with good mobility.  The epiglottis was sharp and the visualized portion of the vallecula was clear.  The larynx was clear with mobile cords.  The arytenoids were clear and there was no pooling in the hypopharynx. There is no evidence of inflammation in the left piriform sinus or pooling.      IMPRESSION AND PLAN:  Ms. Bettie Bell is WILMA at 4 years.  There are no signs of osteoradionecrosis, and she is doing generally well.  I will speak to Janay later to see whether or not there are exercises for her tongue tie.  In the meantime, the other issue is some dysfunction of her right shoulder stiffness in her right neck; she was interested in visiting with the [facility] physician and we will ask her to visit with Dr. Patel.          Thomas Ferris MD  Otolaryngology/Head & Neck Surgery  [phone]    CC    Nicole Mcdonald NP    [facility]    [address], [place]  [address]       Pattie Natarajan MD    Radiation Therapy     [address]    [address]    [place], [place]  [address]      Moreno Leo MD   Otolaryngology/Head & Neck Surgery   [place] [address]

## 2020-12-31 NOTE — LETTER
"12/31/2020       RE: Terese Bell  [address]  [facility] [address]     Dear Colleague,    Thank you for referring your patient, Terese Bell, to the [facility] EAR NOSE AND THROAT CLINIC [place] at [facility]. Please see a copy of my visit note below.      Dec 31, 2020    PRIOR ONCOLOGIC HISTORY:  Ms. 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 Schumacher.  He ended up doing 13 operations in 11 years, most recently in 2010.      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 Leo on 9/1/2016.  The margins were negative but the lymph nodes were 1/40 positive.  She completed postoperative radiation therapy on 11/29/2016.    HISTORY OF PRESENT ILLNESS:  Ms. Bettie Bell had a chest and neck CT in September 2020 that were both negative.  Those were scans that were roughly at her 4-year annie.  Her last thyroid tests were about a year ago, and were normal at that time.  She does describe feeling a little bit cold from time to time.  She still have some stiffness in her right shoulder and right neck, although she is managing with therapy.    She has done quite well in the interim.  The Covid has been tough for her, as she is not been able to see her boyfriend.  He is currently in [place].  She expressed gratitude that she is at 4 years and became a little bit emotional.  She saw Janay earlier today, and was very happy to see Antonia again today.  She has had no difficulty swallowing.  She does describe a little bit of \"tongue-tie\" but did not discuss that with Janay.     PHYSICAL EXAMINATION:  She looks terrific.  She is in good spirits.  The flap is in good position.  It " continues to be a little bit raised, but the entire raised section is palpated carefully and there is absolutely no induration.  There is a little tethering anteriorly with a bit of a scar band, but lateral to the scar band there is tethering laterally as well.  The rest of the oral cavity and oropharynx look good.  There were no areas of induration or ulceration.      In the neck, there is unchanged point tenderness in the left thyroid ala region, adjacent to the carotid.  The right side is a little less tender but there is symmetrical tenderness.  There are no masses.     Fiberoptic Endoscopy:  Consent for fiberoptic laryngoscopy was obtained, and we confirmed correctness of procedure and identity of patient.  Fiberoptic laryngoscopy was indicated due to the need to evaluate the piriform sinus on the left due to concern of osteoradionecrosis of the left thyroid ala.  The nose was topically decongested and anesthetized.  The fiberoptic laryngoscope was passed under endoscopic vision.  The turbinates were normal.  The inferior and middle meati were clear bilaterally without purulence, masses, or polyps.  The nasopharynx was clear.  The eustachian tubes were clear.  The soft palate appeared normal with good mobility.  The epiglottis was sharp and the visualized portion of the vallecula was clear.  The larynx was clear with mobile cords.  The arytenoids were clear and there was no pooling in the hypopharynx. There is no evidence of inflammation in the left piriform sinus or pooling.      IMPRESSION AND PLAN:  Ms. Bettie Bell is WILMA at 4 years.  There are no signs of osteoradionecrosis, and she is doing generally well.  I will speak to Janay later to see whether or not there are exercises for her tongue tie.  In the meantime, the other issue is some dysfunction of her right shoulder stiffness in her right neck; she was interested in visiting with the [facility] physician and we will ask her to visit with Dr. Patel.          Thomas Ferris MD  Otolaryngology/Head & Neck Surgery  [phone]              CC    Nicole Mcdonald NP    [facility]    [address] [place] [place], [place]  [address]       Pattie Natarajan MD    Radiation Therapy     [address]    [address]    [place], [place]  [address]     Moreno Leo MD  Otolaryngology/Head & Neck Surgery  [facility] [address]                  Again, thank you for allowing me to participate in the care of your patient.      Sincerely,    Thomas Ferris MD

## 2021-01-07 ENCOUNTER — HOSPITAL ENCOUNTER (OUTPATIENT)
Dept: PHYSICAL THERAPY | Facility: CLINIC | Age: 71
Setting detail: THERAPIES SERIES
End: 2021-01-07
Attending: RADIOLOGY
Payer: MEDICARE

## 2021-01-07 PROCEDURE — 97140 MANUAL THERAPY 1/> REGIONS: CPT | Mod: GP | Performed by: PHYSICAL THERAPIST

## 2021-01-21 ENCOUNTER — HOSPITAL ENCOUNTER (OUTPATIENT)
Dept: PHYSICAL THERAPY | Facility: CLINIC | Age: 71
Setting detail: THERAPIES SERIES
End: 2021-01-21
Attending: RADIOLOGY
Payer: MEDICARE

## 2021-01-21 PROCEDURE — 97140 MANUAL THERAPY 1/> REGIONS: CPT | Mod: GP | Performed by: PHYSICAL THERAPIST

## 2021-01-26 ENCOUNTER — VIRTUAL VISIT (OUTPATIENT)
Dept: RADIATION THERAPY | Facility: OUTPATIENT CENTER | Age: 71
End: 2021-01-26
Payer: COMMERCIAL

## 2021-01-26 DIAGNOSIS — C02.9 MALIGNANT NEOPLASM OF TONGUE (H): Primary | ICD-10-CM

## 2021-01-26 NOTE — LETTER
2021         RE: Terese Bell  [address]  [facility] [address]        Dear Colleague,    Thank you for referring your patient, Terese Bell, to the RADIATION THERAPY CENTER. Please see a copy of my visit note below.       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     TYPE OF RADIATION THERAPY ADMINISTERED: 6000 cGy in 30 fractions         Oncologic history : Mrs. 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 She had radiation therapy in our clinic to a total dose of 6000 cGy in 30 treatments at 200 cGy each fraction from 10/17/2016 to 2016.  She tolerated radiation therapy reasonably well.  The patient did have some significant sore throat and dysphagia toward the end of the therapy, which required a PEG tube for nutritional support.  She otherwise was reasonably stable at the end of the therapy.     Interval history:    The patient underwent exam by Dr. Ferris on 20 and has remained WILMA. Last scans on 9/15/20 were WILMA.    Patient otherwise reports she is doing fairly well today. She continues her daily mouth care - using new soft toothbrush she received from Dental. Continues to follow-up with them routinely ~ q 3 months. Continues to do fluoride trays routinely and doing gargles. 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 Has been prescribed salagen, but not tried yet. Using biotene rinses and ATC capsules with some alleviation.    She continues to use  therabite for Trismus. She feels this is stable.  She also reports continued difficulty with swallowing certain foods which she has been avoiding.    She is doing her head and neck exercises routinely.     Last TSH on 20 was WNL.    The patient denies any hoarseness of voice, referred otalgia, dysphagia, facial paresthesias, bleeding, other pain, and recurrent/new lumps or bumps. Reported weight is stable.    ROS otherwise negative on a 12-system review.       IMAGIN/15/20  CTC                                                                    IMPRESSION:   1.  No findings specific for metastatic disease to the chest.  2.  Similar-appearing 3-4 mm and smaller pulmonary nodules.  3.  No new or enlarging pulmonary nodules.    CT neck  IMPRESSION:    1. Postoperative and treatment changes at the right floor of the mouth  and right neck that appears similar to prior.  2. No new suspicious masses suggest recurrent tumor noting that  evaluation is difficult with lack of intravenous contrast. No new  suspicious cervical lymph nodes.   3. No significant change since prior.    IMPRESSION:   Ms. 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      RECOMMENDATIONS:    1. Remains clinically and radi graphically WILMA.  Exam by Dr. Ferris on 20 and has remained WILMA. Last scans on 9/15/20 were WILMA.    2.  Continue to follow-up with ENT.     3. Continue to follow-up with Dental.     4. Continue oral nutrition and continue swallowing and stretching exercises. Continue Therabite for Trismus.  Continue to follow-up with speech.    5. Xerostomia:  patient to continue products for dry mouth including Biotene , ATC capsules, Xylitol/Theramints gum. Reports cool mist vaporizer effective. Discussed considering Salagen trial, agrees to try.    6.  RTC in 12 months.       Due to the concerns around COVID-19 and adhering to social distancing we conducted this visit over the telephone. Telephone call lasted 20 minutes.       Juwan Zhang M.D.  Department of Radiation Oncology  [facility]

## 2021-01-26 NOTE — PROGRESS NOTES
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     TYPE OF RADIATION THERAPY ADMINISTERED: 6000 cGy in 30 fractions         Oncologic history : Mrs. 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 She had radiation therapy in our clinic to a total dose of 6000 cGy in 30 treatments at 200 cGy each fraction from 10/17/2016 to 2016.  She tolerated radiation therapy reasonably well.  The patient did have some significant sore throat and dysphagia toward the end of the therapy, which required a PEG tube for nutritional support.  She otherwise was reasonably stable at the end of the therapy.     Interval history:    The patient underwent exam by Dr. Ferris on 20 and has remained WILMA. Last scans on 9/15/20 were WILMA.    Patient otherwise reports she is doing fairly well today. She continues her daily mouth care - using new soft toothbrush she received from Dental. Continues to follow-up with them routinely ~ q 3 months. Continues to do fluoride trays routinely and doing gargles. 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 Has been prescribed salagen, but not tried yet. Using biotene rinses and ATC capsules with some alleviation.    She continues to use therabite for Trismus. She feels this is stable.  She also reports continued difficulty with swallowing certain foods which she has been avoiding.    She is doing her head and neck exercises routinely.     Last TSH on 20 was WNL.    The patient denies any  hoarseness of voice, referred otalgia, dysphagia, facial paresthesias, bleeding, other pain, and recurrent/new lumps or bumps. Reported weight is stable.    ROS otherwise negative on a 12-system review.       IMAGIN/15/20  CTC                                                                    IMPRESSION:   1.  No findings specific for metastatic disease to the chest.  2.  Similar-appearing 3-4 mm and smaller pulmonary nodules.  3.  No new or enlarging pulmonary nodules.    CT neck  IMPRESSION:    1. Postoperative and treatment changes at the right floor of the mouth  and right neck that appears similar to prior.  2. No new suspicious masses suggest recurrent tumor noting that  evaluation is difficult with lack of intravenous contrast. No new  suspicious cervical lymph nodes.   3. No significant change since prior.    IMPRESSION:   Ms. 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      RECOMMENDATIONS:    1. Remains clinically and radi graphically WILMA.  Exam by Dr. Ferris on 20 and has remained WILMA. Last scans on 9/15/20 were WILMA.    2.  Continue to follow-up with ENT.     3. Continue to follow-up with Dental.     4. Continue oral nutrition and continue swallowing and stretching exercises. Continue Therabite for Trismus.  Continue to follow-up with speech.    5. Xerostomia:  patient to continue products for dry mouth including Biotene , ATC capsules, Xylitol/Theramints gum. Reports cool mist vaporizer effective. Discussed considering Salagen trial, agrees to try.    6. RTC in 12 months.       Due to the concerns around COVID-19 and adhering to social distancing we conducted this visit over the telephone. Telephone call lasted 20 minutes.       Juwan Zhang M.D.  Department of Radiation Oncology  [facility]

## 2021-01-26 NOTE — NURSING NOTE
Sarah is a 70 year old who is being evaluated via a billable telephone visit.      What phone number would you like to be contacted at? home  How would you like to obtain your AVS? ShiraGarner  Phone call duration: 12 minutes RN time    Shira Quigley RN    FOLLOW-UP VISIT    Patient Name: Terese Bell      : 1950     Age: 70 year old        ______________________________________________________________________________     Chief Complaint   Patient presents with     Radiation Therapy     phone follow up     There were no vitals taken for this visit.     Date Radiation Completed: 16    Pain  Current history of pain associated with this visit:   Intensity: Patient is unable to quantify.  Current: neck pain-tightness/stiffness - seeing PT,  As well as chronic shingle pain L face  (burning/itching, shooting pain) has tried acupuncture and topical lidocaine  Location: L face                neck  Treatment:     Meds  Current Med List Reviewed: Yes  Medication Note: no taking salagen = hasn't started     Labs  TSH   Date Value Ref Range Status   2020 1.63 0.40 - 4.00 mU/L Final   ]    Imaging  CT: 9/15/20    Skin:Warm  Dry  Intact  L face chronic shingle discomfort (itch/burn/shooting pain)  Oral Products: xyletol gum, water  Mucositis: No  Dry mouth: Yes: has rx for salagen but hasn't tired this yet  Dental evaluation: Yes: every 4 months. Does fluoride trays every night and has fluoride rx toothpaste  PEG tube: No  Speech therapy: No.  Doing home therabite 3x/week  Lymphedema: No  Energy Level:normal - goes to gym 2-3x/week and is working with a concha  Appetite: normal  Intake: has to do small frequent snacks vs large meals.  Small snacks due to amoutn of time to chew/swallow doesn't go well trying to do a full meal.  Does 1 protein shake per day.  Weight:  Wt Readings from Last 5 Encounters:   20 70.8 kg (156 lb)   20 72.1 kg (159 lb)   20 73.9 kg (163 lb)   20 74  kg (163 lb 3.2 oz)   02/25/20 74.4 kg (164 lb)       Appointments:     DATE  Oncologist: coleen    ENT: Dr. Ferris  12/31/20,   Primary:      Other Notes:

## 2021-02-11 ENCOUNTER — HOSPITAL ENCOUNTER (OUTPATIENT)
Dept: PHYSICAL THERAPY | Facility: CLINIC | Age: 71
Setting detail: THERAPIES SERIES
End: 2021-02-11
Attending: RADIOLOGY
Payer: MEDICARE

## 2021-02-11 PROCEDURE — 97140 MANUAL THERAPY 1/> REGIONS: CPT | Mod: GP | Performed by: PHYSICAL THERAPIST

## 2021-03-03 NOTE — PROGRESS NOTES
Assessment & Plan     Malignant neoplasm of tongue (H) -refill provided.  Certified patient for medical cannabis for both postherpetic neuralgia and chronic pain related to radiation and malignancy  - DENTAGEL 1.1 % GEL topical gel; Apply to affected area At Bedtime Patient will call to fill    Post herpetic neuralgia -discussed gabapentin.  She declines due to concern about side effects such as brain fog and weight gain which is reasonable given that she already struggles with these issues.  Lyrica might be better tolerated?  May also consider tricyclic, but she already struggles with dry mouth and dry mouth is a prominent side effect of tricyclic  - lidocaine (XYLOCAINE) 5 % external ointment; Apply topically as needed for moderate pain    Right tennis elbow  - nitroGLYcerin (NITRO-DUR) 0.4 MG/HR 24 hr patch; Cut into 1/4 and place 1/4 over the area of pain.  Change every 24 hours.    Dermatitis -discussed safe use of potent steroid cream  - augmented betamethasone dipropionate (DIPROLENE-AF) 0.05 % external cream; Apply sparingly to affected area twice daily as needed.  Do not apply to face.    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  Diet exercise has helped.  - albuterol (PROAIR HFA/PROVENTIL HFA/VENTOLIN HFA) 108 (90 Base) MCG/ACT inhaler; Inhale 2 puffs into the lungs every 4 hours as needed for shortness of  "breath / dyspnea or wheezing    CARDIOVASCULAR SCREENING; LDL GOAL LESS THAN 130  - Lipid panel reflex to direct LDL Fasting         BMI:   Estimated body mass index is 28.94 kg/m  as calculated from the following:    Height as of this encounter: 1.613 m (5' 3.5\").    Weight as of this encounter: 75.3 kg (166 lb).   Weight management plan: Patient referred to endocrine and/or weight management specialty    Patient Instructions   History of Cancer/Shingles Pain  1. You will get an email - check junk email folder. Re-certify anually   2. We discussed gabapentin or Lyrica - can be very helpful for nerve pain.  You were worried about side effects         No follow-ups on file.    Vanna Rubio, [facility] SENAIT Smith is a 70 year old who presents for the following health issues     HPI     Establish Care:    Care-everywhere: Need signature = [facility], [facility], [facility]  ACT : done  Cov-19 vaccine: Already schedule today in [place] Medicare visit:   ADV: handle        History of tongue cancer: 2016.   Ms. 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 Schumacher.  He ended up doing 13 operations in 11 years, most recently in 2010.       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 Leo on 9/1/2016.  The margins were negative but the lymph nodes were 1/40 positive.  She completed postoperative radiation therapy on 11/29/2016.    The patient did have some significant sore throat and dysphagia toward the end of the therapy, which required a PEG tube for nutritional support  --still following with rad onc yearly.  ENT yearly.  Dental regularly.  --dry mouth - pilocarpine caused over salivation and urinary " frequency  --Dental Gel would like a rx for it :  1.1%     Post-herpetic neuralgia;Shingles pain still - did accupuncture in March for this.  --uses lidocaine for shingles pain and radiation pain to head/neck  --she wonders about medical cannabis       Medical Cannabis Certification      MEDICAL DECISION MAKING: Terese Bell is a 70 year old female who presents in consultation for certification into the [facility] Medical Cannabis Program.    The known standards of accepted treatment options for the patient's qualifying condition (s) were discussed today. Terese Bell is a 70 year old female medical history including diagnotic tests, past medication and treatment trials, including outcome reviewed for the past 12 months. The patient's current medication and pan of care were also reviewed, The potential risks and benefits of medical cannabis was discussed. The patient is aware, that the scientific evidence of medical cannabis in regards to the therapeutic benefit and risk as well as  interaction with other drugs is limited.  The patient is also aware that  combined with other therapies medical cannabis may or may not  improve the qualifying condition and may also worsen other conditions such as depression, anxiety, insomnia and substance use disorders. Terese Bell is a 70 year old female is aware of the formulations that are currently available. The patient is also aware that the cost of medical cannabis registration and purchase is not covered under medical insurance. The patient was also informed that there is limitations of use of medical cannabis in public places, while on the job, as well as across state lines.  Terese Bell is a 70 year old femaleis aware that this practitioner is under no obligation to to certify for medical marijuana, even if the patient has a qualifying medical condition.    If this practitioner certifies a  qualifying medical condition(s) for this patient, they mutually agree to regular follow up of yearly with the certifying practitioner.  The patient also agrees to periodic review of the medical cannabis registry, documentation of enrollment in the program, and  random urine drug screen      1. Patient presents to the clinic with request for medical cannabis  --is patient a [place] resident? YES    2. Medical Condition: BOLD as below      Cancer associated with severe/chronic pain, nausea or severe vomiting, or cachexia or severe wasting  Glaucoma  HIV/AIDS  Tourette syndrome  Amyotrophic lateral sclerosis (ALS)  Seizures, including those characteristic of epilepsy  Severe and persistent muscle spasms, including those characteristic of multiple sclerosis  Inflammatory bowel disease, including Crohn s disease  Terminal illness, with a life expectancy of less than one year, if the illness or treatment produces severe/chronic pain, nausea or severe vomiting, cachexia or severe wasting  Intractable pain  Post-traumatic stress disorder  Autism  Obstructive sleep apnea  Alzheimer's disease  Chronic pain  Sickle cell disease (effective Aug. 2021)  Chronic motor or vocal tic disorder (effective Aug. 2021)    If Chronic Pain, PEG 3 score 5.3      3.Relevant history and/or tests relating to the above condition: see above    4. Standard Treatments Tried  --multiple topicals as above  --fearful of medications due to potential side effects.  Already has troubles with weight, brain fog, fatigue    5. Discussion about cost - initial registration is $200.  30 day supply is estimated to cost $200-500 which is not covered by health insurance    6. Does the patient have a disability the prevents him/her from self-administering of mediations, or is the patient unable to acquire medical cannabiss from a distribution center?  Yes/No NO       RECOMMENDATIONS/PLAN:   [place] in the [facility] Medical Cannabis Program  "Yes  Patient e-mail collected  Yes  [place] statement given to the patient  Yes  Updated medication and problem list given to the patient   Yes          Gout: indomethacin PRN    OA: knees/elbows.  Uses nitroglycerin patch which is helpful.  --exercising 2 x week in the gym.  Working with .    Asthma: rare use of inhaler.  None in many months.    On 10/10/2016 - PHI signed -- authorizing dtr and nephew to share medical and scheduling info = Patti Fink and Hilton Doss              Review of Systems   Constitutional, HEENT, cardiovascular, pulmonary, gi and gu systems are negative, except as otherwise noted.      Objective    /70   Pulse 80   Temp 97.2  F (36.2  C) (Tympanic)   Resp 16   Ht 1.613 m (5' 3.5\")   Wt 75.3 kg (166 lb)   SpO2 98%   Breastfeeding No   BMI 28.94 kg/m    Body mass index is 28.94 kg/m .  Physical Exam   GENERAL APPEARANCE: alert and no distress              "

## 2021-03-04 ENCOUNTER — OFFICE VISIT (OUTPATIENT)
Dept: FAMILY MEDICINE | Facility: CLINIC | Age: 71
End: 2021-03-04
Payer: MEDICARE

## 2021-03-04 ENCOUNTER — HOSPITAL ENCOUNTER (OUTPATIENT)
Dept: PHYSICAL THERAPY | Facility: CLINIC | Age: 71
Setting detail: THERAPIES SERIES
End: 2021-03-04
Attending: RADIOLOGY
Payer: MEDICARE

## 2021-03-04 ENCOUNTER — IMMUNIZATION (OUTPATIENT)
Dept: FAMILY MEDICINE | Facility: CLINIC | Age: 71
End: 2021-03-04
Payer: MEDICARE

## 2021-03-04 VITALS
HEART RATE: 80 BPM | BODY MASS INDEX: 28.34 KG/M2 | TEMPERATURE: 97.2 F | DIASTOLIC BLOOD PRESSURE: 70 MMHG | SYSTOLIC BLOOD PRESSURE: 126 MMHG | RESPIRATION RATE: 16 BRPM | HEIGHT: 64 IN | OXYGEN SATURATION: 98 % | WEIGHT: 166 LBS

## 2021-03-04 DIAGNOSIS — M10.071 ACUTE IDIOPATHIC GOUT OF RIGHT FOOT: ICD-10-CM

## 2021-03-04 DIAGNOSIS — K11.7 XEROSTOMIA DUE TO RADIOTHERAPY: ICD-10-CM

## 2021-03-04 DIAGNOSIS — Z91.030 ALLERGY TO BEE STING: ICD-10-CM

## 2021-03-04 DIAGNOSIS — M77.11 RIGHT TENNIS ELBOW: ICD-10-CM
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
## 2021-03-04 DIAGNOSIS — B02.29 POST HERPETIC NEURALGIA: ICD-10-CM

## 2021-03-04 DIAGNOSIS — C02.9 MALIGNANT NEOPLASM OF TONGUE (H): Primary | ICD-10-CM

## 2021-03-04 DIAGNOSIS — Z13.6 CARDIOVASCULAR SCREENING; LDL GOAL LESS THAN 130: ICD-10-CM

## 2021-03-04 DIAGNOSIS — J45.20 MILD INTERMITTENT ASTHMA WITHOUT COMPLICATION: ICD-10-CM

## 2021-03-04 DIAGNOSIS — L30.9 DERMATITIS: ICD-10-CM

## 2021-03-04 LAB
ANION GAP SERPL CALCULATED.3IONS-SCNC: 3 MMOL/L (ref 3–14)
BUN SERPL-MCNC: 10 MG/DL (ref 7–30)
CALCIUM SERPL-MCNC: 9.3 MG/DL (ref 8.5–10.1)
CHLORIDE SERPL-SCNC: 105 MMOL/L (ref 94–109)
CHOLEST SERPL-MCNC: 219 MG/DL
CO2 SERPL-SCNC: 31 MMOL/L (ref 20–32)
CREAT SERPL-MCNC: 0.62 MG/DL (ref 0.52–1.04)
GFR SERPL CREATININE-BSD FRML MDRD: >90 ML/MIN/{1.73_M2}
GLUCOSE SERPL-MCNC: 85 MG/DL (ref 70–99)
HDLC SERPL-MCNC: 76 MG/DL
LDLC SERPL CALC-MCNC: 127 MG/DL
NONHDLC SERPL-MCNC: 143 MG/DL
POTASSIUM SERPL-SCNC: 4.2 MMOL/L (ref 3.4–5.3)
SODIUM SERPL-SCNC: 139 MMOL/L (ref 133–144)
TRIGL SERPL-MCNC: 78 MG/DL
URATE SERPL-MCNC: 3.6 MG/DL (ref 2.6–6)

## 2021-03-04 PROCEDURE — 80061 LIPID PANEL: CPT | Performed by: INTERNAL MEDICINE

## 2021-03-04 PROCEDURE — 36415 COLL VENOUS BLD VENIPUNCTURE: CPT | Performed by: INTERNAL MEDICINE

## 2021-03-04 PROCEDURE — 99214 OFFICE O/P EST MOD 30 MIN: CPT | Performed by: INTERNAL MEDICINE

## 2021-03-04 PROCEDURE — 0001A PR COVID VAC PFIZER DIL RECON 30 MCG/0.3 ML IM: CPT

## 2021-03-04 PROCEDURE — 80048 BASIC METABOLIC PNL TOTAL CA: CPT | Performed by: INTERNAL MEDICINE

## 2021-03-04 PROCEDURE — 91300 PR COVID VAC PFIZER DIL RECON 30 MCG/0.3 ML IM: CPT

## 2021-03-04 PROCEDURE — 84550 ASSAY OF BLOOD/URIC ACID: CPT | Performed by: INTERNAL MEDICINE

## 2021-03-04 PROCEDURE — 97140 MANUAL THERAPY 1/> REGIONS: CPT | Mod: GP | Performed by: PHYSICAL THERAPIST

## 2021-03-04 RX ORDER — INDOMETHACIN 25 MG/1
25 CAPSULE ORAL 2 TIMES DAILY WITH MEALS
Qty: 60 CAPSULE | Refills: 3 | Status: SHIPPED | OUTPATIENT
Start: 2021-03-04 | End: 2022-04-28

## 2021-03-04 RX ORDER — PILOCARPINE HYDROCHLORIDE 5 MG/1
5 TABLET, FILM COATED ORAL 3 TIMES DAILY PRN
Qty: 90 TABLET | Refills: 3 | Status: SHIPPED | OUTPATIENT
Start: 2021-03-04 | End: 2022-04-28

## 2021-03-04 RX ORDER — LIDOCAINE 50 MG/G
OINTMENT TOPICAL
Qty: 50 G | Refills: 11 | Status: SHIPPED | OUTPATIENT
Start: 2021-03-04 | End: 2022-03-14

## 2021-03-04 RX ORDER — NITROGLYCERIN 80 MG/1
PATCH TRANSDERMAL
Qty: 30 PATCH | Refills: 11 | Status: SHIPPED | OUTPATIENT
Start: 2021-03-04 | End: 2022-04-28

## 2021-03-04 RX ORDER — BETAMETHASONE DIPROPIONATE 0.5 MG/G
CREAM TOPICAL
Qty: 50 G | Refills: 11 | Status: SHIPPED | OUTPATIENT
Start: 2021-03-04 | End: 2024-04-26

## 2021-03-04 RX ORDER — SODIUM FLUORIDE 1.1 G/100G
GEL ORAL AT BEDTIME
Qty: 125 ML | Refills: 11 | Status: SHIPPED | OUTPATIENT
Start: 2021-03-04 | End: 2022-03-09

## 2021-03-04 RX ORDER — ALBUTEROL SULFATE 90 UG/1
2 AEROSOL, METERED RESPIRATORY (INHALATION) EVERY 4 HOURS PRN
Qty: 1 INHALER | Refills: 11 | Status: SHIPPED | OUTPATIENT
Start: 2021-03-04 | End: 2022-04-28

## 2021-03-04 RX ORDER — EPINEPHRINE 0.3 MG/.3ML
0.3 INJECTION SUBCUTANEOUS
Qty: 0.6 ML | Refills: 3 | Status: SHIPPED | OUTPATIENT
Start: 2021-03-04 | End: 2022-04-28

## 2021-03-04 ASSESSMENT — PAIN SCALES - PAIN ENJOYMENT GENERAL ACTIVITY SCALE (PEG)
INTERFERED_ENJOYMENT_LIFE: 4
INTERFERED_GENERAL_ACTIVITY: 6
AVG_PAIN_PASTWEEK: 6
PEG_TOTALSCORE: 5.33

## 2021-03-04 ASSESSMENT — MIFFLIN-ST. JEOR: SCORE: 1250.03

## 2021-03-04 NOTE — PROGRESS NOTES
Outpatient Physical Therapy Progress Note     Patient: Terese Bell  : 1950    Beginning/End Dates of Reporting Period:  20 to 3/4/2021. Total # of Rx sessions: 3/20 for .     Referring Provider: Juwan Zhang     Therapy Diagnosis: Neck Stiffness, Scar of Neck, Malignant Neoplasm of Base of Tongue.      Client Self Report: Jaw hurts all the time. Has been good at doing Therabite, Therapy helps. Shingles has been really bad. Pain Scale: 1-2/10.     Objective Measurements:  Objective Measure: CROM:   Details: 21  Flex Normal, Ext 60% and tight ant neck, SB R 24, L 29; Rot R 47, L Rot 49 before treatment. 20 Flx Normal, Ext 50% SB R 25, L 23: Rotation R 47, L 52.   INITIALLY:  Flex 1 w/tight B UTrap, Ext 25% w/ neck pain, SB R 21 w/ R neck pain, L 28 w/ L Ear pain; Rot R 40 w/ R neck P near jaw, L 49 w/ pinch just below jaw line.     Objective Measure: TMJ ROM   Details: 21 Opening 34, Laterotrusion R 8, L 7. 21 Opening 32.  3/19/20  Opening 29, Laterotrusion B 8, Protrusion 5. INITIALlY:  Opening 24, Laterotrusion B 5, Protrusion 4.      Objective Measure: Spec tests   Details: 21  B TMJ good mobility, INITIALLY:  R TMJ Hypo, L Post Quad-pain under angle of L jaw, Poor posture.      Goals:  Goal Identifier 1   Goal Description STG: Improve neck rotation to 50 B for improved safety w/ driving.  20  MET   Target Date 20   Date Met  20   Progress:     Goal Identifier 2   Goal Description STG: Pt will be able to chew harder food 5/10 on funcitonal scale. 20  Rated 7-8/10.  NOT MET    Target Date 20   Date Met      Progress:     Goal Identifier 3   Goal Description STG: Pt will be able to Open wide to bite an apple or snadwich 6/10 on functional scale.  20  Rated 8-9/10   NOT MET today    Target Date 20   Date Met      Progress:     Goal Identifier 4   Goal Description LTG: Pt will be independent w/ self cares and HEP to restore  better function of TMJ.    Target Date 04/27/20   Date Met      Progress:     Progress Toward Goals:   Progress this reporting period: Pt has met 1 goal.   Progress limited due to Tightness, pain from Shingles.     Plan:  Continue therapy per current plan of care.  Changes to therapy plan of care: Pt looking into CBD through [facility].     Discharge:  No  RECERTIFICATION    Terese Bell  1950  MRN: [id number]    Session Number: 3/20 /[facility] - New Year since start of care.    Diagnosis: Neck Stiffness, Scar of Neck, Malignant Neoplasm of Base of Tongue.   Onset Date: 2/25/20  Start of Care: 3/9/20    Reasons for Continuing Treatment:   Feels like PT offers her some relief, and keeps her face, jaw, neck more mobile.     Frequency/Duration  1 times per week for 8 weeks for a total of 8 visits.    Recertification Period  3/4/21 - 4/29/21    Physician Signature:    Date:    X_______________________________________________________    Physician Name: Juwan Zhang    I certify the need for these services furnished under this plan of treatment and while under my care. Physician co-signature of this document indicates review and certification of the therapy plan.  This signature may be written on paper, or electronically signed within EPIC.

## 2021-03-04 NOTE — PATIENT INSTRUCTIONS
History of Cancer/Shingles Pain  1. You will get an email - check junk email folder. Re-certify anually   2. We discussed gabapentin or Lyrica - can be very helpful for nerve pain.  You were worried about side effects

## 2021-03-05 ASSESSMENT — ASTHMA QUESTIONNAIRES: ACT_TOTALSCORE: 23

## 2021-03-25 ENCOUNTER — OFFICE VISIT (OUTPATIENT)
Dept: FAMILY MEDICINE | Facility: CLINIC | Age: 71
End: 2021-03-25
Payer: MEDICARE

## 2021-03-25 ENCOUNTER — HOSPITAL ENCOUNTER (OUTPATIENT)
Dept: PHYSICAL THERAPY | Facility: CLINIC | Age: 71
Setting detail: THERAPIES SERIES
End: 2021-03-25
Attending: RADIOLOGY
Payer: MEDICARE

## 2021-03-25 ENCOUNTER — IMMUNIZATION (OUTPATIENT)
Dept: FAMILY MEDICINE | Facility: CLINIC | Age: 71
End: 2021-03-25
Attending: FAMILY MEDICINE
Payer: MEDICARE

## 2021-03-25 VITALS
DIASTOLIC BLOOD PRESSURE: 70 MMHG | SYSTOLIC BLOOD PRESSURE: 120 MMHG | HEART RATE: 71 BPM | WEIGHT: 163 LBS | TEMPERATURE: 97.6 F | BODY MASS INDEX: 28.88 KG/M2 | HEIGHT: 63 IN | OXYGEN SATURATION: 98 % | RESPIRATION RATE: 16 BRPM

## 2021-03-25 DIAGNOSIS — Z00.00 ENCOUNTER FOR MEDICARE ANNUAL WELLNESS EXAM: Primary | ICD-10-CM

## 2021-03-25 DIAGNOSIS — H93.13 TINNITUS, BILATERAL: ICD-10-CM

## 2021-03-25 PROCEDURE — 91300 PR COVID VAC PFIZER DIL RECON 30 MCG/0.3 ML IM: CPT

## 2021-03-25 PROCEDURE — G0439 PPPS, SUBSEQ VISIT: HCPCS | Performed by: INTERNAL MEDICINE

## 2021-03-25 PROCEDURE — 0002A PR COVID VAC PFIZER DIL RECON 30 MCG/0.3 ML IM: CPT

## 2021-03-25 PROCEDURE — 97140 MANUAL THERAPY 1/> REGIONS: CPT | Mod: GP | Performed by: PHYSICAL THERAPIST

## 2021-03-25 ASSESSMENT — MIFFLIN-ST. JEOR: SCORE: 1228.36

## 2021-03-25 ASSESSMENT — ACTIVITIES OF DAILY LIVING (ADL): CURRENT_FUNCTION: NO ASSISTANCE NEEDED

## 2021-03-25 NOTE — PROGRESS NOTES
"SUBJECTIVE:   Terese Bell is a 70 year old female who presents for Preventive Visit.      Patient has been advised of split billing requirements and indicates understanding: Yes   Are you in the first 12 months of your Medicare coverage?  No    Healthy Habits:    In general, how would you rate your overall health?  Good    Frequency of exercise:  2-3 days/week    Duration of exercise:  15-30 minutes    Do you usually eat at least 4 servings of fruit and vegetables a day, include whole grains    & fiber and avoid regularly eating high fat or \"junk\" foods?  No    Taking medications regularly:  Yes    Barriers to taking medications:  Other (Hard to swallow the pills - and very dry mouth)    Medication side effects:  None    Ability to successfully perform activities of daily living:  No assistance needed    Home Safety:  No safety concerns identified    Hearing Impairment:  No hearing concerns    In the past 6 months, have you been bothered by leaking of urine? Yes    In general, how would you rate your overall mental or emotional health?  Good      PHQ-2 Total Score:    Additional concerns today:  Yes       ADV:  Cov- 19 vaccine: 1st shot done (Pfizer)  PHI - on file    On 10/10/2016 - PHI signed -- authorizing dtr and nephew to share medical and scheduling info = Patti Fink and Hilton Doss      Hyperlipidemia Follow-Up      Are you regularly taking any medication or supplement to lower your cholesterol?   No    Are you having muscle aches or other side effects that you think could be caused by your cholesterol lowering medication?  No    Do you feel safe in your environment? Yes    Have you ever done Advance Care Planning? (For example, a Health Directive, POLST, or a discussion with a medical provider or your loved ones about your wishes): No, advance care planning information given to patient to review.  Patient plans to discuss their wishes with loved ones or provider.      Tinnitus: not " improving.  Wonders what else to do.  R>L.  Has had hearing tested.  Following with ENT Q 6 months.     Fall risk  Fallen 2 or more times in the past year?: No  Any fall with injury in the past year?: No    Cognitive Screening   1) Repeat 3 items (Leader, Season, Table)  All 3 words repeated back.  2) Clock draw: NORMAL  3) 3 item recall: Recalls 3 objects  Results: 3 items recalled: COGNITIVE IMPAIRMENT LESS LIKELY    Mini-CogTM Copyright S Emma. Licensed by the author for use in [facility]; reprinted with permission (harshil@[email or web address]). All rights reserved.      Do you have sleep apnea, excessive snoring or daytime drowsiness?: no    Reviewed and updated as needed this visit by clinical staff  Tobacco  Allergies  Meds  Problems             Reviewed and updated as needed this visit by Provider     Problems            Social History     Tobacco Use     Smoking status: Former Smoker     Packs/day: 2.00     Years: 7.00     Pack years: 14.00     Types: Cigarettes     Start date: 1970     Quit date: 1980     Years since quittin.2     Smokeless tobacco: Never Used   Substance Use Topics     Alcohol use: Not Currently     Comment: 4 drinks per year     If you drink alcohol do you typically have >3 drinks per day or >7 drinks per week? No    Alcohol Use 3/25/2021   Prescreen: >3 drinks/day or >7 drinks/week? -   Prescreen: >3 drinks/day or >7 drinks/week? No       Current providers sharing in care for this patient include:   Patient Care Team:  Vanna Rubio DO as PCP - General (Internal Medicine)  Thomas Ferris MD as MD (Otolaryngology)  Jen Rey APRN CNP as Nurse Practitioner (Nurse Practitioner)  Juwan Zhang MD as MD (Radiation Oncology)  Bridgette Farmer PA-C as Assigned Pediatric Specialist Provider  Juwan Zhang MD as Assigned Cancer Care Provider  Thomas Ferris MD as Assigned Surgical Provider    The following health maintenance items are reviewed in Epic and  correct as of today:  Health Maintenance   Topic Date Due     ASTHMA ACTION PLAN  03/02/2021     COVID-19 Vaccine (2 - Pfizer 2-dose series) 03/25/2021     ASTHMA CONTROL TEST  09/04/2021     FALL RISK ASSESSMENT  01/26/2022     MEDICARE ANNUAL WELLNESS VISIT  03/25/2022     MAMMO SCREENING  08/17/2022     DTAP/TDAP/TD IMMUNIZATION (3 - Td) 11/05/2022     COLORECTAL CANCER SCREENING  12/14/2022     LIPID  03/04/2026     ADVANCE CARE PLANNING  03/25/2026     DEXA  10/29/2030     HEPATITIS C SCREENING  Completed     PHQ-2  Completed     INFLUENZA VACCINE  Completed     Pneumococcal Vaccine: Pediatrics (0 to 5 Years) and At-Risk Patients (6 to 64 Years)  Completed     Pneumococcal Vaccine: 65+ Years  Completed     ZOSTER IMMUNIZATION  Completed     IPV IMMUNIZATION  Aged Out     MENINGITIS IMMUNIZATION  Aged Out     HEPATITIS B IMMUNIZATION  Aged Out     Current Outpatient Medications   Medication Sig Dispense Refill     augmented betamethasone dipropionate (DIPROLENE-AF) 0.05 % external cream Apply sparingly to affected area twice daily as needed.  Do not apply to face. 50 g 11     CANNABIDIOL, HEMP OIL/EXTRACT, OR CBD PRODUCT OTHER THAN MEDICAL CANNABIS, Apply topically daily       DENTAGEL 1.1 % GEL topical gel Apply to affected area At Bedtime Patient will call to fill 125 mL 11     lidocaine (XYLOCAINE) 5 % external ointment Apply topically as needed for moderate pain 50 g 11     MAGNESIUM PO        vitamin D3 (CHOLECALCIFEROL) 2000 units tablet Take 1 tablet by mouth daily       albuterol (PROAIR HFA/PROVENTIL HFA/VENTOLIN HFA) 108 (90 Base) MCG/ACT inhaler Inhale 2 puffs into the lungs every 4 hours as needed for shortness of breath / dyspnea or wheezing (Patient not taking: Reported on 3/25/2021) 1 Inhaler 11     EPINEPHrine (ANY BX GENERIC EQUIV) 0.3 MG/0.3ML injection 2-pack Inject 0.3 mLs (0.3 mg) into the muscle once as needed for anaphylaxis (Patient not taking: Reported on 3/25/2021) 0.6 mL 3      "ibuprofen (ADVIL/MOTRIN) 100 MG tablet Take 100 mg by mouth every 4 hours as needed       indomethacin (INDOCIN) 25 MG capsule Take 1 capsule (25 mg) by mouth 2 times daily (with meals) (Patient not taking: Reported on 3/25/2021) 60 capsule 3     nitroGLYcerin (NITRO-DUR) 0.4 MG/HR 24 hr patch Cut into 1/4 and place 1/4 over the area of pain.  Change every 24 hours. (Patient not taking: Reported on 3/25/2021) 30 patch 11     pilocarpine (SALAGEN) 5 MG tablet Take 1 tablet (5 mg) by mouth 3 times daily as needed (dry mouth) (Patient not taking: Reported on 3/25/2021) 90 tablet 3     Mammogram Screening: Recommended mammography every 1-2 years with patient discussion and risk factor consideration    Review of Systems  Constitutional, HEENT, cardiovascular, pulmonary, GI, , musculoskeletal, neuro, skin, endocrine and psych systems are negative, except as otherwise noted.    OBJECTIVE:   /70   Pulse 71   Temp 97.6  F (36.4  C) (Tympanic)   Resp 16   Ht 1.6 m (5' 2.99\")   Wt 73.9 kg (163 lb)   SpO2 98%   Breastfeeding No   BMI 28.88 kg/m   Estimated body mass index is 28.88 kg/m  as calculated from the following:    Height as of this encounter: 1.6 m (5' 2.99\").    Weight as of this encounter: 73.9 kg (163 lb).  Physical Exam  GENERAL: alert and no distress  EYES: Eyes grossly normal to inspection, PERRL and conjunctivae and sclerae normal  HENT: normal cephalic/atraumatic, ear canals and TM's normal.  Dry mucus membranes.  Post-surgical changes of tongue  NECK: no enlarged lymph nodes  RESP: lungs clear to auscultation - no rales, rhonchi or wheezes  CV: regular rate and rhythm, normal S1 S2, no S3 or S4, no murmur, click or rub, no peripheral edema and peripheral pulses strong  ABDOMEN: soft, nontender, no hepatosplenomegaly, no masses and bowel sounds normal  MS: no gross musculoskeletal defects noted, no edema  SKIN: no suspicious lesions or rashes  NEURO: Normal strength and tone, mentation intact " "and speech normal  PSYCH: mentation appears normal, affect normal/bright        ASSESSMENT / PLAN:   1. Encounter for Medicare annual wellness exam    2. Tinnitus, bilateral -she has discussed with her ear nose and throat and was told there is no specific therapy.      Patient has been advised of split billing requirements and indicates understanding: Yes  COUNSELING:  Reviewed preventive health counseling, as reflected in patient instructions    Estimated body mass index is 28.88 kg/m  as calculated from the following:    Height as of this encounter: 1.6 m (5' 2.99\").    Weight as of this encounter: 73.9 kg (163 lb).        She reports that she quit smoking about 41 years ago. Her smoking use included cigarettes. She started smoking about 51 years ago. She has a 14.00 pack-year smoking history. She has never used smokeless tobacco.      Appropriate preventive services were discussed with this patient, including applicable screening as appropriate for cardiovascular disease, diabetes, osteopenia/osteoporosis, and glaucoma.  As appropriate for age/gender, discussed screening for colorectal cancer, prostate cancer, breast cancer, and cervical cancer. Checklist reviewing preventive services available has been given to the patient.    Reviewed patients plan of care and provided an AVS. The Complex Care Plan (for patients with higher acuity and needing more deliberate coordination of services) for Terese meets the Care Plan requirement. This Care Plan has been established and reviewed with the Patient.    Counseling Resources:  ATP IV Guidelines  Pooled Cohorts Equation Calculator  Breast Cancer Risk Calculator  Breast Cancer: Medication to Reduce Risk  FRAX Risk Assessment  ICSI Preventive Guidelines  Dietary Guidelines for Americans, 2010  USDA's MyPlate  ASA Prophylaxis  Lung CA Screening    Vanna Rubio DO  [facility]    Identified Health Risks:  "

## 2021-03-25 NOTE — PATIENT INSTRUCTIONS
Patient Education   Personalized Prevention Plan  You are due for the preventive services outlined below.  Your care team is available to assist you in scheduling these services.  If you have already completed any of these items, please share that information with your care team to update in your medical record.  Health Maintenance Due   Topic Date Due     Annual Wellness Visit  03/02/2021     Asthma Action Plan - yearly  03/02/2021     COVID-19 Vaccine (2 - Pfizer 2-dose series) 03/25/2021           1. Fill out advance directive

## 2021-04-08 ENCOUNTER — HOSPITAL ENCOUNTER (OUTPATIENT)
Dept: PHYSICAL THERAPY | Facility: CLINIC | Age: 71
Setting detail: THERAPIES SERIES
End: 2021-04-08
Attending: RADIOLOGY
Payer: MEDICARE

## 2021-04-08 PROCEDURE — 97140 MANUAL THERAPY 1/> REGIONS: CPT | Mod: GP | Performed by: PHYSICAL THERAPIST

## 2021-04-28 ENCOUNTER — HOSPITAL ENCOUNTER (OUTPATIENT)
Dept: PHYSICAL THERAPY | Facility: CLINIC | Age: 71
Setting detail: THERAPIES SERIES
End: 2021-04-28
Attending: RADIOLOGY
Payer: MEDICARE

## 2021-04-28 PROCEDURE — 97140 MANUAL THERAPY 1/> REGIONS: CPT | Mod: GP | Performed by: PHYSICAL THERAPIST

## 2021-04-28 NOTE — PROGRESS NOTES
"Outpatient Physical Therapy Progress Note     Patient: Terese Bell  : 1950    Beginning/End Dates of Reporting Period:  3/4/21 to 2021.  Total # of Rx sessions:  for 2021.     Referring Provider: Juwan Zhang Diagnosis: Neck Stiffness, Scar of Neck, Malignant Neoplasm of Base of Tongue      Client Self Report: Neck is just stiff. Been working on it. Saw Dentist and she did not want front teeth distubed.  Pain Scale: 3/10.  Does the Therabite about every 3rd day.     Objective Measurements:  Objective Measure: CROM:   Details: 21  Flex Normal, Ext 75%, SB R 22, L 29; rot R 53, L 49.   20  Flex 1/2\", Ext 65%, SB R 27, L 31; Rot R 45, L 48.   INITIALLY:  Flex 1 w/tight B UTrap, Ext 25% w/ neck pain, SB R 21 w/ R neck pain, L 28 w/ L Ear pain; Rot R 40 w/ R neck P near jaw, L 49 w/ pinch just below jaw line.     Objective Measure: TMJ ROM   Details: 21  Opening 32, Laterotrusion 10 B. 3/25/21  Opening 31, b Laterotrusion 9 mm.  21 Opening 34, Laterotrusion R 8, L 7. 21 Opening 32.  3/19/20  Opening 29, Laterotrusion B 8, Protrusion 5. INITIALlY:  Opening 24, Laterotrusion B 5, Protrusion 4.      Objective Measure: Spec tests   Details: 21  R TMJ slightly tight. 21  B TMJ good mobility, INITIALLY:  R TMJ Hypo, L Post Quad-pain under angle of L jaw, Poor posture.      Goals:  Goal Identifier 1   Goal Description STG: Improve neck rotation to 50 B for improved safety w/ driving.  20  MET   Target Date 20   Date Met  20   Progress:     Goal Identifier 2   Goal Description STG: Pt will be able to chew harder food 5/10 on funcitonal scale. 21  Rated 8-9/10.  NOT MET    Target Date 20   Date Met      Progress:     Goal Identifier 3   Goal Description STG: Pt will be able to Open wide to bite an apple or snadwich 6/10 on functional scale.  21  Rated 9-10/10   NOT MET today    Target Date 20   Date Met    "   Progress:     Goal Identifier 4   Goal Description LTG: Pt will be independent w/ self cares and HEP to restore better function of TMJ.    Target Date 04/27/20   Date Met      Progress:     Progress Toward Goals:   Progress this reporting period: Pt has met 1/3 STG's.   Progress limited due to continued tightness. Seeing patient every other week to keep neck and jaw mobile.    Plan:  Continue therapy per current plan of care.  Changes to therapy plan of care: [place] and stretch.     Discharge:  No  RECERTIFICATION    Terese Bell  1950  MRN: [id number]    Session Number: 6/20 /[facility] - since New Year since start of care.    Diagnosis: Neck Stiffness, Scar of Neck, Malignant Neoplasm of Base of Tongue   Onset Date: 2/25/20  (MD Order date)   Start of Care: 3/9/30    Reasons for Continuing Treatment:   Treatments give some relief of pain, keeps jaw, neck more functional.     Frequency/Duration  1 times per week for 8 weeks for a total of 6 visits.    Recertification Period  4/28/21 - 6/23/21    Physician Signature:    Date:    X_______________________________________________________    Physician Name: Juwan Zhang    I certify the need for these services furnished under this plan of treatment and while under my care. Physician co-signature of this document indicates review and certification of the therapy plan.  This signature may be written on paper, or electronically signed within EPIC.

## 2021-05-14 ENCOUNTER — TELEPHONE (OUTPATIENT)
Dept: OTOLARYNGOLOGY | Facility: CLINIC | Age: 71
End: 2021-05-14

## 2021-05-17 ENCOUNTER — HOSPITAL ENCOUNTER (OUTPATIENT)
Dept: PHYSICAL THERAPY | Facility: CLINIC | Age: 71
Setting detail: THERAPIES SERIES
End: 2021-05-17
Attending: RADIOLOGY
Payer: MEDICARE

## 2021-05-17 PROCEDURE — 97140 MANUAL THERAPY 1/> REGIONS: CPT | Mod: GP | Performed by: PHYSICAL THERAPIST

## 2021-06-03 ENCOUNTER — HOSPITAL ENCOUNTER (OUTPATIENT)
Dept: PHYSICAL THERAPY | Facility: CLINIC | Age: 71
Setting detail: THERAPIES SERIES
End: 2021-06-03
Attending: RADIOLOGY
Payer: MEDICARE

## 2021-06-03 PROCEDURE — 97140 MANUAL THERAPY 1/> REGIONS: CPT | Mod: GP | Performed by: PHYSICAL THERAPIST

## 2021-06-03 NOTE — PROGRESS NOTES
Jun 4, 2021    PRIOR ONCOLOGIC HISTORY:  Ms. 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 Schumacher.  He ended up doing 13 operations in 11 years, most recently in 2010.      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 Leo on 9/1/2016.  The margins were negative but the lymph nodes were 1/40 positive.  She completed postoperative radiation therapy on 11/29/2016.    HISTORY OF PRESENT ILLNESS:  Ms. Bettie Bell had a chest and neck CT in September 2020 that were both negative.  Those were scans that were roughly at her 4-year annie.  I last saw her in December 2020.      She has continued to do well since that time.  However, she does miss her brother/[place] boyfriend.  They have been trying to find time together but because [place] shutdown again, she is not been able to be successful.  She told me in June of the story of how did not and how they will continue their relationship.  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    Since the last visit, she obtained help from physical therapist and she has made great progress with her shoulder.  She was able to sink six 4 x 4 x 8 post 2 feet into the ground recently to build her fence.  She has been working very hard on her Ther[employer]icalte as well.  She feels like her mouth is more wide open.      PHYSICAL EXAMINATION:  She looks terrific, and I saw her with Maryann.  Her mouth opening is 31 mm.  There is a little tenderness behind the retromolar trigone, but there are no palpable lesions.  This may have been pressure on some of the pterygoids.  The rest of the oral cavity oropharynx look and feel very healthy.  The flap is in good position.  The neck is  unchanged and without concern.  We deferred the fiberoptic endoscopy since it was done just 6 months ago.       IMPRESSION AND PLAN:  Ms. Bettie Bell is WILMA at 4.5 years.  She is doing great and her shoulder is better.  She expressed gratitude to me and to Dr Leo for her care.  She will work with Maryann to improve her oral strength and mouth opening.  I will see her back in 6 months time with her 5-year scans.           Thomas Ferris MD  Otolaryngology/Head & Neck Surgery  [phone]                  Nicole Mcdonald NP    [facility]    [address] [place] [address]  [id number]       Pattie Natarajan MD    Radiation Therapy     [address]    [address]    [place], [place]  [address]     Moreno Leo MD  Otolaryngology/Head & Neck Surgery  [place] [address]

## 2021-06-04 ENCOUNTER — OFFICE VISIT (OUTPATIENT)
Dept: OTOLARYNGOLOGY | Facility: CLINIC | Age: 71
End: 2021-06-04
Payer: MEDICARE

## 2021-06-04 ENCOUNTER — ALLIED HEALTH/NURSE VISIT (OUTPATIENT)
Dept: SPEECH THERAPY | Facility: CLINIC | Age: 71
End: 2021-06-04

## 2021-06-04 VITALS
BODY MASS INDEX: 28.53 KG/M2 | OXYGEN SATURATION: 97 % | HEART RATE: 73 BPM | DIASTOLIC BLOOD PRESSURE: 76 MMHG | WEIGHT: 161 LBS | TEMPERATURE: 98.4 F | HEIGHT: 63 IN | SYSTOLIC BLOOD PRESSURE: 130 MMHG

## 2021-06-04 DIAGNOSIS — C02.2 SQUAMOUS CELL CARCINOMA OF VENTRAL TONGUE (H): Primary | ICD-10-CM

## 2021-06-04 DIAGNOSIS — R13.12 OROPHARYNGEAL DYSPHAGIA: ICD-10-CM

## 2021-06-04 PROCEDURE — 99213 OFFICE O/P EST LOW 20 MIN: CPT | Performed by: OTOLARYNGOLOGY

## 2021-06-04 ASSESSMENT — PAIN SCALES - GENERAL: PAINLEVEL: NO PAIN (0)

## 2021-06-04 ASSESSMENT — MIFFLIN-ST. JEOR: SCORE: 1219.42

## 2021-06-04 NOTE — LETTER
6/4/2021       RE: Terese Bell  [address]  [place] [address]     Dear Colleague,    Thank you for referring your patient, Terese Bell, to the [facility] [place] at [facility]. Please see a copy of my visit note below.      Jun 4, 2021    PRIOR ONCOLOGIC HISTORY:  Ms. 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 Schumacher.  He ended up doing 13 operations in 11 years, most recently in 2010.      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 Leo on 9/1/2016.  The margins were negative but the lymph nodes were 1/40 positive.  She completed postoperative radiation therapy on 11/29/2016.    HISTORY OF PRESENT ILLNESS:  Ms. Bettie Bell had a chest and neck CT in September 2020 that were both negative.  Those were scans that were roughly at her 4-year annie.  I last saw her in December 2020.      She has continued to do well since that time.  However, she does miss her brother/Bhutanese boyfriend.  They have been trying to find time together but because [place] shutdown again, she is not been able to be successful.  She told me in June of the story of how did not and how they will continue their relationship.  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    Since the last visit, she obtained help from physical therapist and she has made great progress with her shoulder.  She was able to sink six 4 x 4 x 8 post 2 feet into the ground recently to build her fence.  She has been working very hard on her [employer]idgete as well.  She feels like her mouth is more wide open.      PHYSICAL  EXAMINATION:  She looks terrific, and I saw her with Maryann.  Her mouth opening is 31 mm.  There is a little tenderness behind the retromolar trigone, but there are no palpable lesions.  This may have been pressure on some of the pterygoids.  The rest of the oral cavity oropharynx look and feel very healthy.  The flap is in good position.  The neck is unchanged and without concern.  We deferred the fiberoptic endoscopy since it was done just 6 months ago.       IMPRESSION AND PLAN:  Ms. Bettie Bell is WILMA at 4.5 years.  She is doing great and her shoulder is better.  She expressed gratitude to me and to Dr Leo for her care.  She will work with Maryann to improve her oral strength and mouth opening.  I will see her back in 6 months time with her 5-year scans.           Thomas Ferris MD  Otolaryngology/Head & Neck Surgery  [phone]       Nicole Mcdonald NP   [facility]   [address] [place], [place]  [address]      Pattie Natarajan MD   Radiation Therapy    [address] [facility]   [address]   [place], [place]  [address]     Moreno Leo MD  Otolaryngology/Head & Neck Surgery  [facility] [address]                  Again, thank you for allowing me to participate in the care of your patient.      Sincerely,    Thomas Ferris MD

## 2021-06-04 NOTE — PROGRESS NOTES
Pt seen for brief allied health visit today per MD request. Reports she is using the Therabite 1x/3 days and finds it to be most beneficial to complete in the morning. Jaw ROM measures 31mm today. Reports no change in swallowing. No formal SLP services received today and thus no charge for today's visit.     SAÚL Galvez MA (music), CCC-SLP   Speech Language Pathologist  [facility] Trained Vocologist   [facility] [place]  Dept. of Otolaryngology  Department of Rehabilitation Services  [address] [place] [address]  Email: [email or web address]   Phone: [phone]  Pronouns: she/her/hers

## 2021-06-21 ENCOUNTER — HOSPITAL ENCOUNTER (OUTPATIENT)
Dept: PHYSICAL THERAPY | Facility: CLINIC | Age: 71
Setting detail: THERAPIES SERIES
End: 2021-06-21
Attending: RADIOLOGY
Payer: MEDICARE

## 2021-06-21 PROCEDURE — 97140 MANUAL THERAPY 1/> REGIONS: CPT | Mod: GP | Performed by: PHYSICAL THERAPIST

## 2021-06-21 NOTE — PROGRESS NOTES
"Outpatient Physical Therapy Progress Note     Patient: Terese Bell  : 1950    Beginning/End Dates of Reporting Period:  21 to 2021.  Total # of Rx sessions: 9 since Beginning of year.  Being seen every 2-3 weeks.     Referring Provider: Juwan Zhang Diagnosis: Neck Stiffness, Scar of Neck, Malignant Neoplasm of Base of Tongue.      Client Self Report: Has been taking cannibus topical that has helped a lot with Shingles symptoms  Feels more relaxed after cream application. Using once/day. has done therabite every 4-5 days. Measures 31 mm at home. Discussed having her try therabite and stretches after applying cannibis topical cream.     Objective Measurements:  Objective Measure: CROM:   Details: 21  Flex Normal, Ext 75%, SB R 22, L 30; rot R 50, L 47.   20  Flex 1/2\", Ext 65%, SB R 27, L 31; Rot R 45, L 48.   INITIALLY:  Flex 1 w/tight B UTrap, Ext 25% w/ neck pain, SB R 21 w/ R neck pain, L 28 w/ L Ear pain; Rot R 40 w/ R neck P near jaw, L 49 w/ pinch just below jaw line.     Objective Measure: TMJ ROM   Details: 21  Opening 30, Laterotrusion 10 B.  3/25/21  Opening 31, b Laterotrusion 9 mm.  21 Opening 34, Laterotrusion R 8, L 7. 21 Opening 32.  3/19/20  Opening 29, Laterotrusion B 8, Protrusion 5. INITIALlY:  Opening 24, Laterotrusion B 5, Protrusion 4.      Objective Measure: Spec tests   Details: 21  Good mobility B TM. 21  R TMJ slightly tight. 21  B TMJ good mobility, INITIALLY:  R TMJ Hypo, L Post Quad-pain under angle of L jaw, Poor posture.      Goals:  Goal Identifier 1   Goal Description STG: Improve neck rotation to 50 B for improved safety w/ driving.  20  MET   Target Date 20   Date Met  20   Progress:     Goal Identifier 2   Goal Description STG: Pt will be able to chew harder food 5/10 on funcitonal scale. 21  Rated 8/10.  NOT MET    Target Date 20   Date Met      Progress:     Goal " Identifier 3   Goal Description STG: Pt will be able to Open wide to bite an apple or snadwich 6/10 on functional scale.  6/21/21  Rated 7-10/10   NOT MET today    Target Date 04/13/20   Date Met      Progress:     Goal Identifier 4   Goal Description LTG: Pt will be independent w/ self cares and HEP to restore better function of TMJ.    Target Date 04/27/20   Date Met      Progress:     Progress Toward Goals:   Progress this reporting period: Pt has met 1 STG.   Progress limited due to continued tightness of neck and jaw area d/t shingles, surgeries.     Plan:  Continue therapy per current plan of care.  Changes to therapy plan of care: Will emphasize neck mobility, flexibility more.     Discharge:  No  RECERTIFICATION    Terese Bell  1950  MRN: [id number]    Session Number: 9/20 /[facility] - since New Year.    Diagnosis: Neck Stiffness, Scar of Neck, Malignant Neoplasm of Base of Tongue.  Onset Date: 2/25/20  (MD Order date)   Start of Care: 3/9/20    Reasons for Continuing Treatment:   Pt continues to benefit from Rx sessions: Keeping her able to eat, drive, work in garden, etc.     Frequency/Duration  1 times per week for 8 weeks for a total of 6 visits.    Recertification Period  6/21/21 - 8/18/21    Physician Signature:    Date:    X_______________________________________________________    Physician Name: Juwan Zhang     I certify the need for these services furnished under this plan of treatment and while under my care. Physician co-signature of this document indicates review and certification of the therapy plan.  This signature may be written on paper, or electronically signed within EPIC.

## 2021-07-05 ENCOUNTER — HOSPITAL ENCOUNTER (OUTPATIENT)
Dept: PHYSICAL THERAPY | Facility: CLINIC | Age: 71
Setting detail: THERAPIES SERIES
End: 2021-07-05
Attending: RADIOLOGY
Payer: MEDICARE

## 2021-07-05 PROCEDURE — 97140 MANUAL THERAPY 1/> REGIONS: CPT | Mod: GP | Performed by: PHYSICAL THERAPIST

## 2021-07-21 ENCOUNTER — HOSPITAL ENCOUNTER (OUTPATIENT)
Dept: PHYSICAL THERAPY | Facility: CLINIC | Age: 71
Setting detail: THERAPIES SERIES
End: 2021-07-21
Attending: RADIOLOGY
Payer: MEDICARE

## 2021-07-21 PROCEDURE — 97140 MANUAL THERAPY 1/> REGIONS: CPT | Mod: GP | Performed by: PHYSICAL THERAPIST

## 2021-08-09 ENCOUNTER — MYC MEDICAL ADVICE (OUTPATIENT)
Dept: FAMILY MEDICINE | Facility: CLINIC | Age: 71
End: 2021-08-09

## 2021-08-09 DIAGNOSIS — C02.9 MALIGNANT NEOPLASM OF TONGUE (H): Primary | ICD-10-CM

## 2021-08-09 DIAGNOSIS — E46 MALNUTRITION, UNSPECIFIED TYPE (H): ICD-10-CM

## 2021-08-11 ENCOUNTER — HOSPITAL ENCOUNTER (OUTPATIENT)
Dept: PHYSICAL THERAPY | Facility: CLINIC | Age: 71
Setting detail: THERAPIES SERIES
End: 2021-08-11
Attending: RADIOLOGY
Payer: MEDICARE

## 2021-08-11 PROCEDURE — 97140 MANUAL THERAPY 1/> REGIONS: CPT | Mod: GP | Performed by: PHYSICAL THERAPIST

## 2021-08-11 NOTE — TELEPHONE ENCOUNTER
Appears it is not covered under medicare.  She can double check.  Typically medicare only pays for nutrition consult for diabetes or CKD.  I placed referral, patient can always pay out of pocket

## 2021-08-16 ENCOUNTER — VIRTUAL VISIT (OUTPATIENT)
Dept: ONCOLOGY | Facility: CLINIC | Age: 71
End: 2021-08-16
Attending: INTERNAL MEDICINE
Payer: MEDICARE

## 2021-08-16 DIAGNOSIS — C02.9 MALIGNANT NEOPLASM OF TONGUE (H): ICD-10-CM

## 2021-08-16 DIAGNOSIS — E46 MALNUTRITION, UNSPECIFIED TYPE (H): ICD-10-CM

## 2021-08-16 PROCEDURE — 97802 MEDICAL NUTRITION INDIV IN: CPT | Mod: TEL,GZ | Performed by: DIETITIAN, REGISTERED

## 2021-08-16 NOTE — PROGRESS NOTES
"The patient has been notified of the following:      \"We have found that certain health care needs can be provided without the need for a face to face visit.  This service lets us provide the care you need with a phone conversation.       I will have full access to your [place] medical record during this entire phone call.   I will be taking notes for your medical record.      Since this is like an office visit, we will bill your insurance company for this service.       There are potential benefits and risks of telephone visits (e.g. limits to patient confidentiality) that differ from in-person visits.?  Confidentiality still applies for telephone services, and nobody will record the visit.  It is important to be in a quiet, private space that is free of distractions (including cell phone or other devices) during the visit.??      If during the course of the call I believe a telephone visit is not appropriate, you will not be charged for this service\"     Consent has been obtained for this service by care team member: Yes     CLINICAL NUTRITION SERVICES - ASSESSMENT NOTE    Terese REGINA Bell 71 year old referred for MNT related to tongue cancer    Time Spent: 45 minutes  Visit Type: phone  Pt accompanied by: self  Referring Physician: Vanna Rubio 8/11/21  C02.9 (ICD-10-CM) - Malignant neoplasm of tongue (H)  E46 (ICD-10-CM) - Malnutrition, unspecified type (H)      Surgery hx: Per MD (Dr. Ferris) - Ms. 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 Schumacher.  He ended up doing 13 operations in 11 years, most recently in 2010.       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 Leo on 9/1/2016.  The margins were negative but the lymph nodes " "were 1/40 positive.  She completed postoperative radiation therapy on 11/29/2016.    NUTRITION HISTORY  Factors affecting nutrition intake include: difficulties swallowing; taste aversions with removal of >50% of her tongue.    Current diet/appetite: soft foods, good appetite  -Sarah inquires about food choice and optimal nutrition with her barriers to eating.    -She tells me that she has been having a lot of fatigue and wonders if she's getting enough nutrition.   -She has a history of feeding tube dependency ~4 years ago.    -She continues to take ~1/2 - 2/3 can of TwoCal HN daily added to her foods.   -She has been very active this summer with virtual walking events and putting up posts/fence in her yard.    -She has been focused mostly on soft foods.   -She cannot eat bread but she can eat tortillas toasted   -She eats mostly yogurt, deli meat, cheese and some eggs for her protein sources  -She does have meat in casseroles on occasion, but meat takes her a long time to eat.     Diet Recall  Breakfast Coffee, granola and yogurt 2/3 cup (overnight); cucumbers OR instant oatmeal with protein shake OR Maori toast with butter   Lunch 1/2 cup yogurt granola OR meat/cheese roll-up and potato salad with eggs   Dinner 1 1/2 cup tator tot hotdish OR bowl yogurt with dry cereal    Snacks 7 pistachios; 2 mini cups cakes OR ice cream   Beverages Coffee with Protein shake - TwoCal HN vanilla; drinks a lot of water     ANTHROPOMETRICS  Height: 63\"  Weight: 161 lbs  BMI: 28  Weight Status:  Overweight BMI 25-29.9  IBW: 115 lb (140%)  Weight History: stable; home scale 148 lb  Wt Readings from Last 7 Encounters:   06/04/21 73 kg (161 lb)   03/25/21 73.9 kg (163 lb)   03/04/21 75.3 kg (166 lb)   12/31/20 70.8 kg (156 lb)   06/24/20 72.1 kg (159 lb)   03/19/20 73.9 kg (163 lb)   03/02/20 74 kg (163 lb 3.2 oz)     Dosing Weight: 57kg (adjusted for overweight)    Medications/vitamins/minerals/herbals:   Reviewed   MVI, turmeric, " vitamin C (500mg), D3 (2000IU), Mg (250mg), Ca    Labs:   Labs reviewed    NUTRITION FOCUSED PHYSICAL ASSESSMENT FOR DIAGNOSING MALNUTRITION:  Not observed due to Covid 19 pandemic - no clinic contact  Consult for education only      ASSESSED NUTRITION NEEDS:  Estimated Energy Needs: 8613-3168 kcals (25-30 Kcal/Kg)  Justification: maintenance  Estimated Protein Needs: 57-68 grams protein (1-1.2 g pro/Kg)  Justification: maintenance  Estimated Fluid Needs: 1700  mL   Justification: maintenance    NUTRITION DIAGNOSIS:  Swallowing and chewing difficulty related to history of mouth surgeries as evidenced by pt reliant on soft/moist foods, per diet recall, consuming ~75% of estimated nutrition needs.     INTERVENTIONS  Provided written & verbal education:     - Reviewed nutrition needs. Advised pt to aim for at least 1700kcal and 60g protein daily.  Discussed strategies to help fortify meals and snacks with calories and protein via soft foods.  Encouraged to have a protein source with each meal and snack. Reviewed soft/moist protein sources and healthy fat sources  - Reviewed ONS, TwoCal and Fairlife provisions. Encouraged to have at least 1 ONS/day. Encouraged utilizing these ONS in home made shakes/smoothies to prevent flavor fatigue.      Provided pt with corresponding education materials/handouts on:  High Calorie/High Protein Recipe booklet, Tips to Increase the Calories in Your Diet and Sources of Protein    Pt verbalize understanding of materials provided during consult.   Patient Understanding: Excellent  Expected patient engagement: Excellent    Goals  1.  Aim for 5-6 small frequent meals  2.  Aim for 1700kcal and 60-75g protein/day  3. Weight maintenance     Follow-Up Plans: Pt has RD contact information for questions.      Anette Trinidad RD, LD      Email sent to Sarah:  Anette Segura 8/16/2021 11:34 AM   To:   [email or web address]  high protein high calorie recipes.pdf 106 KB    [email or web address].pdf 76 KB   TipstoIncreaseCalories.pdf 75 KB3 attachments (257 KB)Download allSave all to [facility]   Anna Smith,   It was nice to meet you today. I have attached the resources that will be helpful for you:   --High calorie/High protein recipes   --Tips to increase protein in your diet   --Tips to increase calories in your diet   Here are your nutrition needs:   --Calories: 1700/day   --Protein: 75 grams/day   Please reach out with further nutrition questions or concerns along the way.   Be well,   Anette Trinidad RD, LD   [facility]  Oncology Services   [address] [place] [address]   [email or web address]  [email or web address]   Office: [phone]  Fax: [phone]

## 2021-09-08 ENCOUNTER — HOSPITAL ENCOUNTER (OUTPATIENT)
Dept: PHYSICAL THERAPY | Facility: CLINIC | Age: 71
Setting detail: THERAPIES SERIES
End: 2021-09-08
Attending: RADIOLOGY
Payer: MEDICARE

## 2021-09-08 PROCEDURE — 97140 MANUAL THERAPY 1/> REGIONS: CPT | Mod: GP | Performed by: PHYSICAL THERAPIST

## 2021-09-08 NOTE — PROGRESS NOTES
"Outpatient Physical Therapy Progress Note     Patient: Terese Bell  : 1950    Beginning/End Dates of Reporting Period:  21 to 21.  Total # of Rx sessions: 14    Referring Provider: Juwan Zhang Diagnosis: Neck Stiffness, Scar of Neck, Malignant Neoplasm of Base of Tongue.      Client Self Report: Still been doing Therabite w/ Cannibus Topical beforehand.  Pain Scale: 7-8/10. Not good this week. Having some allergy issues [place]     Objective Measurements:  Objective Measure: CROM:   Details: 21  Flex Normal, Ext 75%, SB R 20, L 25; rot R 53, L 44.   20  Flex 1/2\", Ext 65%, SB R 27, L 31; Rot R 45, L 48.   INITIALLY:  Flex 1 w/tight B UTrap, Ext 25% w/ neck pain, SB R 21 w/ R neck pain, L 28 w/ L Ear pain; Rot R 40 w/ R neck P near jaw, L 49 w/ pinch just below jaw line.     Objective Measure: TMJ ROM   Details: 21  Opening 27.  21  Opening 31.  21  Opening 30, Laterotrusion 10 B.  3/25/21  Opening 31, b Laterotrusion 9 mm.  21 Opening 34, Laterotrusion R 8, L 7. 21 Opening 32.  3  Opening 29, Laterotrusion B 8, Protrusion 5. INITIALlY:  Opening 24, Laterotrusion B 5, Protrusion 4.      Objective Measure: Spec tests   Details: 21  Good mobility B TM. 21  R TMJ slightly tight. 21  B TMJ good mobility, INITIALLY:  R TMJ Hypo, L Post Quad-pain under angle of L jaw, Poor posture.     Objective Measure: TMJ Functional Scale:   Details: 21  Scanned into chart.      Goals:  Goal Identifier 1   Goal Description STG: Improve neck rotation to 50 B for improved safety w/ driving.  20  MET   Target Date 20   Date Met  20   Progress (detail required for progress note): 50 and 47 degrees B.      Goal Identifier 2   Goal Description STG: Pt will be able to chew harder food 5/10 on funcitonal scale. 21  Rated 8/10.  NOT MET    Target Date 20   Date Met      Progress (detail " required for progress note): 9/8/21 RAted 7/10      Goal Identifier 3   Goal Description STG: Pt will be able to Open wide to bite an apple or snadwich 6/10 on functional scale.  6/21/21  Rated 7-10/10   NOT MET today    Target Date 04/13/20   Date Met      Progress (detail required for progress note): 9/8/21  Rated 8-10/10      Goal Identifier 4   Goal Description LTG: Pt will be independent w/ self cares and HEP to restore better function of TMJ.    Target Date 04/27/20   Date Met      Progress (detail required for progress note):       Plan:  Continue therapy per current plan of care.  Changes to therapy plan of care: Pt to be more consistent w/ stretches at home. Lost ROM recently d/t not stretching as much.     Discharge:  No  RECERTIFICATION    Terese Bell  1950  MRN: [id number]    Session Number: 14/20 [facility] - since New Year since start of care.    Diagnosis: Neck Stiffness, Scar of Neck, Malignant Neoplasm of Base of Tongue.   Onset Date: 2/25/20  MD order date   Start of Care: 3/9/21     Reasons for Continuing Treatment:   Pt continues to benefit from her Rx sessions:  She just needs to be more consistent w/stretches at home, but PT keeps her functioning better.     Frequency/Duration  1 times per week for 9 weeks for a total of 8 visits.  Continue Every other week.     Recertification Period  9/8/21 - 11/22/21     Physician Signature:    Date:    X_______________________________________________________    Physician Name: Juwan Zhang    I certify the need for these services furnished under this plan of treatment and while under my care. Physician co-signature of this document indicates review and certification of the therapy plan.  This signature may be written on paper, or electronically signed within [facility].

## 2021-09-29 ENCOUNTER — HOSPITAL ENCOUNTER (OUTPATIENT)
Dept: PHYSICAL THERAPY | Facility: CLINIC | Age: 71
Setting detail: THERAPIES SERIES
End: 2021-09-29
Attending: RADIOLOGY
Payer: MEDICARE

## 2021-09-29 PROCEDURE — 97140 MANUAL THERAPY 1/> REGIONS: CPT | Mod: GP | Performed by: PHYSICAL THERAPIST

## 2021-10-20 ENCOUNTER — HOSPITAL ENCOUNTER (OUTPATIENT)
Dept: PHYSICAL THERAPY | Facility: CLINIC | Age: 71
Setting detail: THERAPIES SERIES
End: 2021-10-20
Attending: RADIOLOGY
Payer: MEDICARE

## 2021-10-20 PROCEDURE — 97140 MANUAL THERAPY 1/> REGIONS: CPT | Mod: GP | Performed by: PHYSICAL THERAPIST

## 2021-11-10 ENCOUNTER — HOSPITAL ENCOUNTER (OUTPATIENT)
Dept: PHYSICAL THERAPY | Facility: CLINIC | Age: 71
Setting detail: THERAPIES SERIES
End: 2021-11-10
Attending: RADIOLOGY
Payer: MEDICARE

## 2021-11-10 PROCEDURE — 97140 MANUAL THERAPY 1/> REGIONS: CPT | Mod: GP | Performed by: PHYSICAL THERAPIST

## 2021-11-24 ENCOUNTER — HOSPITAL ENCOUNTER (OUTPATIENT)
Dept: PHYSICAL THERAPY | Facility: CLINIC | Age: 71
Setting detail: THERAPIES SERIES
End: 2021-11-24
Attending: RADIOLOGY
Payer: MEDICARE

## 2021-11-24 PROCEDURE — 97140 MANUAL THERAPY 1/> REGIONS: CPT | Mod: GP | Performed by: PHYSICAL THERAPIST

## 2021-11-24 NOTE — PROGRESS NOTES
"[facility]    Outpatient Physical Therapy Progress Note  Patient: Terese Bell  : 1950    Beginning/End Dates of Reporting Period:  21 to 21. Total # of Rx sessions: 18    Referring Provider: Juwan Zhang Diagnosis: Neck Stiffness, Scar of Neck, Malignant Neoplasm of Base of Tongue.      Client Self Report: Had a bad flare up about 10 days ago, Sharp pain, like an ice pick being put into sdie of head, L side of head, neck. Lasted for hours. Was hard to get under control. Felt like when she had shingles, but much worse. Used to get these sx's after Chemo.     Objective Measurements:  Objective Measure: CROM:   Details: 21  Flex Normal, Ext 75%, SB R 21, L 26; rot R 47, L 48.   20  Flex 1/2\", Ext 65%, SB R 27, L 31; Rot R 45, L 48.   INITIALLY:  Flex 1 w/tight B UTrap, Ext 25% w/ neck pain, SB R 21 w/ R neck pain, L 28 w/ L Ear pain; Rot R 40 w/ R neck P near jaw, L 49 w/ pinch just below jaw line.     Objective Measure: TMJ ROM   Details: 21  Opening 30, Laterotrusion L 10, R 8. . 21  Opening 30.  21  Opening 31.  6  Opening 30, Laterotrusion 10 B.  3/25/21  Opening 31, b Laterotrusion 9 mm.  21 Opening 34, Laterotrusion R 8, L 7. 1 Opening 32.  3  Opening 29, Laterotrusion B 8, Protrusion 5. INITIALlY:  Opening 24, Laterotrusion B 5, Protrusion 4.      Objective Measure: Spec tests   Details: 21  Good mobility B TM. 21  R TMJ slightly tight. 21  B TMJ good mobility, INITIALLY:  R TMJ Hypo, L Post Quad-pain under angle of L jaw, Poor posture.     Objective Measure: TMJ Functional Scale:   Details: 21  Scanned into chart. 21  Scanned into chart.      Goals:  Goal Identifier 1   Goal Description STG: Improve neck rotation to 50 B for improved safety w/ driving.  20  MET   Target Date 20 "   Date Met  05/01/20   Progress (detail required for progress note): 50 and 47 degrees B.      Goal Identifier 2   Goal Description STG: Pt will be able to chew harder food 5/10 on funcitonal scale. 6/21/21  Rated 8/10.  NOT MET    Target Date 04/13/20   Date Met      Progress (detail required for progress note): 11/24/21 Rated 9/10      Goal Identifier 3   Goal Description STG: Pt will be able to Open wide to bite an apple or snadwich 6/10 on functional scale.  6/21/21  Rated 7-10/10   NOT MET today    Target Date 04/13/20   Date Met      Progress (detail required for progress note): 9/8/21  Rated 8-9/10      Goal Identifier 4   Goal Description LTG: Pt will be independent w/ self cares and HEP to restore better function of TMJ.    Target Date 04/27/20   Date Met      Progress (detail required for progress note):       Plan:  Continue therapy per current plan of care.    Discharge:  No  RECERTIFICATION    Terese Bell  1950  MRN: [id number]    Session Number: 18/20 /[facility] - since New Year since start of care.    Diagnosis: Neck Stiffness, Scar of Neck, Malignant Neoplasm of Base of Tongue.   Onset Date: 2/25/20  MD Order date.   Start of Care: 3/9/21    Reasons for Continuing Treatment:   Patient continues to have difficulty w/ Oral functions, Tightness.     Frequency/Duration  1 times per week for 8 weeks for a total of 6 visits.    Recertification Period  11/24/21 - 1/17/21    Physician Signature:    Date:    X_______________________________________________________    Physician Name: Juwan Zhang     I certify the need for these services furnished under this plan of treatment and while under my care. Physician co-signature of this document indicates review and certification of the therapy plan.  This signature may be written on paper, or electronically signed within [facility].     Melania Nicholas, PT, [facility] (#[id number])  [facility]           [phone]  Fax           [phone]  Appt #      [phone]

## 2021-11-29 DIAGNOSIS — C02.2 SQUAMOUS CELL CARCINOMA OF VENTRAL TONGUE (H): Primary | ICD-10-CM

## 2021-12-01 ENCOUNTER — TELEPHONE (OUTPATIENT)
Dept: OTOLARYNGOLOGY | Facility: CLINIC | Age: 71
End: 2021-12-01
Payer: MEDICARE

## 2021-12-01 DIAGNOSIS — Z29.9 PREVENTIVE MEDICATION THERAPY NEEDED: Primary | ICD-10-CM

## 2021-12-01 RX ORDER — METHYLPREDNISOLONE 32 MG/1
TABLET ORAL
Qty: 2 TABLET | Refills: 0 | Status: SHIPPED | OUTPATIENT
Start: 2021-12-01 | End: 2022-04-28

## 2021-12-01 NOTE — TELEPHONE ENCOUNTER
[facility] Call Center    Phone Message    May a detailed message be left on voicemail: yes     Reason for Call: Other:   Delilah from [facility] is calling in regards to CT order. Please clarify if provider wants ct scan with contrast and pre medicated, or without contrast?     Please call Delilah back.     Action Taken: Other:  ent    Travel Screening: Not Applicable

## 2021-12-01 NOTE — TELEPHONE ENCOUNTER
Spoke to arina at [facility] and advised CT scan should be done with contrast and patient will be pre medicated. Also contacted patient to advise of pre medication being sent to her pharmacy and instructions on how take medication.  Pt verbalized understanding.

## 2021-12-09 ENCOUNTER — HOSPITAL ENCOUNTER (OUTPATIENT)
Dept: CT IMAGING | Facility: CLINIC | Age: 71
End: 2021-12-09
Attending: OTOLARYNGOLOGY
Payer: MEDICARE

## 2021-12-09 ENCOUNTER — HOSPITAL ENCOUNTER (OUTPATIENT)
Dept: MAMMOGRAPHY | Facility: CLINIC | Age: 71
End: 2021-12-09
Attending: INTERNAL MEDICINE
Payer: MEDICARE

## 2021-12-09 ENCOUNTER — HOSPITAL ENCOUNTER (OUTPATIENT)
Dept: PHYSICAL THERAPY | Facility: CLINIC | Age: 71
Setting detail: THERAPIES SERIES
End: 2021-12-09
Attending: RADIOLOGY
Payer: MEDICARE

## 2021-12-09 DIAGNOSIS — C02.2 SQUAMOUS CELL CARCINOMA OF VENTRAL TONGUE (H): ICD-10-CM

## 2021-12-09 DIAGNOSIS — Z12.31 BREAST CANCER SCREENING BY MAMMOGRAM: ICD-10-CM

## 2021-12-09 PROCEDURE — 97140 MANUAL THERAPY 1/> REGIONS: CPT | Mod: GP | Performed by: PHYSICAL THERAPIST

## 2021-12-09 PROCEDURE — 250N000009 HC RX 250: Performed by: RADIOLOGY

## 2021-12-09 PROCEDURE — 77063 BREAST TOMOSYNTHESIS BI: CPT

## 2021-12-09 PROCEDURE — G1004 CDSM NDSC: HCPCS

## 2021-12-09 PROCEDURE — 250N000011 HC RX IP 250 OP 636: Performed by: RADIOLOGY

## 2021-12-09 RX ORDER — IOPAMIDOL 755 MG/ML
80 INJECTION, SOLUTION INTRAVASCULAR ONCE
Status: COMPLETED | OUTPATIENT
Start: 2021-12-09 | End: 2021-12-09

## 2021-12-09 RX ADMIN — IOPAMIDOL 80 ML: 755 INJECTION, SOLUTION INTRAVENOUS at 12:31

## 2021-12-09 RX ADMIN — SODIUM CHLORIDE 80 ML: 9 INJECTION, SOLUTION INTRAVENOUS at 12:31

## 2021-12-14 ENCOUNTER — TELEPHONE (OUTPATIENT)
Dept: FAMILY MEDICINE | Facility: CLINIC | Age: 71
End: 2021-12-14
Payer: MEDICARE

## 2021-12-15 NOTE — TELEPHONE ENCOUNTER
PA Initiation    Medication: lidocaine oint  Insurance Company: [employer] - Phone [phone] Fax [phone]  Pharmacy Filling the Rx: [id number] - [place], [place] - [address] [place] [address]  Filling Pharmacy Phone: [phone]  Filling Pharmacy Fax: [phone]  Start Date: 12/15/2021

## 2021-12-15 NOTE — TELEPHONE ENCOUNTER
Prior Authorization Approval    Authorization Effective Date: 9/16/2021  Authorization Expiration Date: 3/15/2022  Medication: lidocaine oint  Approved Dose/Quantity:   Reference #: [id number]   Insurance Company: [employer] - Phone [phone] Fax [phone]  Which Pharmacy is filling the prescription (Not needed for infusion/clinic administered): [id number] - [place], [place] - [address] [place] [address]  Pharmacy Notified: Yes  Patient Notified: Yes

## 2021-12-15 NOTE — TELEPHONE ENCOUNTER
Prior Authorization Retail Medication Request    Medication/Dose: lidocaine oint  ICD code (if different than what is on RX):    Previously Tried and Failed:  lidoderm cream; lidocaine patch; lidocaine jelly  Rationale:      Insurance Name:  [phone]  Insurance ID:  [id number]  [facility] Key: [id number]    Pharmacy Information (if different than what is on RX)  Name:    Phone:

## 2021-12-17 ENCOUNTER — OFFICE VISIT (OUTPATIENT)
Dept: OTOLARYNGOLOGY | Facility: CLINIC | Age: 71
End: 2021-12-17
Payer: MEDICARE

## 2021-12-17 ENCOUNTER — ALLIED HEALTH/NURSE VISIT (OUTPATIENT)
Dept: SPEECH THERAPY | Facility: CLINIC | Age: 71
End: 2021-12-17

## 2021-12-17 VITALS
SYSTOLIC BLOOD PRESSURE: 124 MMHG | HEIGHT: 63 IN | OXYGEN SATURATION: 99 % | BODY MASS INDEX: 27.64 KG/M2 | TEMPERATURE: 97.4 F | HEART RATE: 89 BPM | DIASTOLIC BLOOD PRESSURE: 80 MMHG | WEIGHT: 156 LBS

## 2021-12-17 DIAGNOSIS — C02.2 SQUAMOUS CELL CARCINOMA OF VENTRAL TONGUE (H): Primary | ICD-10-CM

## 2021-12-17 PROCEDURE — 99213 OFFICE O/P EST LOW 20 MIN: CPT | Mod: 25 | Performed by: OTOLARYNGOLOGY

## 2021-12-17 PROCEDURE — 31575 DIAGNOSTIC LARYNGOSCOPY: CPT | Performed by: OTOLARYNGOLOGY

## 2021-12-17 ASSESSMENT — MIFFLIN-ST. JEOR: SCORE: 1191.74

## 2021-12-17 ASSESSMENT — PAIN SCALES - GENERAL: PAINLEVEL: NO PAIN (0)

## 2021-12-17 NOTE — LETTER
"12/17/2021       RE: Terese Bell  [address] [facility] [address]     Dear Colleague,    Thank you for referring your patient, Terese Bell, to the [facility] [place] [facility]. Please see a copy of my visit note below.      Dec 17, 2021      PRIOR ONCOLOGIC HISTORY:  Ms. 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 Schumacher.  He ended up doing 13 operations in 11 years, most recently in 2010.      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 Leo on 9/1/2016.  The margins were negative but the lymph nodes were 1/40 positive.  She completed postoperative radiation therapy on 11/29/2016.    HISTORY OF PRESENT ILLNESS:  Ms. Bettie Bell was last seen 6/4/21, and today is her 5-year annie after recurrent cancer treatment..      She had imaging studies last week that were clear.  Her CT neck showed stable posttreatment changes, without evidence of recurrent disease.  She also had a mammogram that was negative. However, she had been concerned about some aspects of her reports; she was relieved to hear that the 'streaks' were just scatter from her metal teeth fillings, and that the \"C4\" findings just represented arthritis.  She was delighted to hear the good news.     She is otherwise well.  She is doing jaw and swallowing exercises religiously.  She has been getting myofascial release treatment at [facility] and finding that very helpful.  She feels like her mouth opens up better.  She also reports that she has managed her headaches and the neuropathy from her shingles episode very effectively with CBD ointment.    There are no " lumps or bumps.  There is been no bleeding.  She continues to miss her Filipino/Fijian boyfriend.  Because of Covid, it has been now another year that she has not seen him in person.  She also expresses some frustration that her sons are not vaccinated.    EXAMINATION:  She looks great.  The oral cavity & oropharynx are unchanged.  The mouth opening has diminished from 31 to 30 mm.  Palpation of the tongue and floor of mouth and base of tongue is soft.  There is a little bit of an uneven contour in the tongue reconstruction, but there is no evidence of cancer.  The neck has no masses.  There is some diffuse firmness in the area to be expected from all of her prior treatment.    Fiberoptic Endoscopy:  Consent for fiberoptic laryngoscopy was obtained, and we confirmed correctness of procedure and identity of patient.  Fiberoptic laryngoscopy was indicated to complete a full 5-year clearance.  The nose was topically decongested and anesthetized.  The fiberoptic laryngoscope was passed under endoscopic vision.  The turbinates were normal.  The inferior and middle meati were clear bilaterally without purulence, masses, or polyps.  The nasopharynx was clear.  The Eustachian tubes were clear.  The soft palate appeared normal with good mobility.  The epiglottis was sharp and the visualized portion of the vallecula was clear.  The larynx was clear with mobile cords.  The arytenoids were clear and there was no pooling in the hypopharynx.      IMPRESSION:  I congratulated Ms. Samantha Bell on reaching 5 years of WILMA status after recurrent disease.  She was a bit emotional, and was delighted and indicated that she would be celebrating tonight with her sons.  She is determined to get to [place] to see her boyfriend again and hopes to do that before another year has elapsed. She wants to keep coming to check in and I will be happy to see her on an annual basis which she likes.    She will have her TSH checked by her regular  physician in the spring.  She does need to see Janay Rojas for swallowing and for trismus, and will do so today.        Thomas Ferris MD  Otolaryngology/Head & Neck Surgery  [phone]    CC    Nicole Mcdonald NP    [facility]    [address] [place], [place]  [address]       Pattie Natarajan MD    Radiation Therapy     [address] [facility]    [address]    [place], [place]  [address]     Moerno Leo MD  Otolaryngology/Head & Neck Surgery  [place] [address]

## 2021-12-17 NOTE — PROGRESS NOTES
"  Dec 17, 2021      PRIOR ONCOLOGIC HISTORY:  Ms. 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 Schumacher.  He ended up doing 13 operations in 11 years, most recently in 2010.      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 Leo on 9/1/2016.  The margins were negative but the lymph nodes were 1/40 positive.  She completed postoperative radiation therapy on 11/29/2016.    HISTORY OF PRESENT ILLNESS:  Ms. Bettie Bell was last seen 6/4/21, and today is her 5-year annie after recurrent cancer treatment..      She had imaging studies last week that were clear.  Her CT neck showed stable posttreatment changes, without evidence of recurrent disease.  She also had a mammogram that was negative. However, she had been concerned about some aspects of her reports; she was relieved to hear that the 'streaks' were just scatter from her metal teeth fillings, and that the \"C4\" findings just represented arthritis.  She was delighted to hear the good news.     She is otherwise well.  She is doing jaw and swallowing exercises religiously.  She has been getting myofascial release treatment at [facility] and finding that very helpful.  She feels like her mouth opens up better.  She also reports that she has managed her headaches and the neuropathy from her shingles episode very effectively with CBD ointment.    There are no lumps or bumps.  There is been no bleeding.  She continues to miss her Greek/[place]banian boyfriend.  Because of Covid, it has been now another year that she has not seen him in person.  She also expresses some frustration that her sons are not vaccinated.    EXAMINATION:  She looks great.  The oral cavity & oropharynx are unchanged.  The mouth " opening has diminished from 31 to 30 mm.  Palpation of the tongue and floor of mouth and base of tongue is soft.  There is a little bit of an uneven contour in the tongue reconstruction, but there is no evidence of cancer.  The neck has no masses.  There is some diffuse firmness in the area to be expected from all of her prior treatment.    Fiberoptic Endoscopy:  Consent for fiberoptic laryngoscopy was obtained, and we confirmed correctness of procedure and identity of patient.  Fiberoptic laryngoscopy was indicated to complete a full 5-year clearance.  The nose was topically decongested and anesthetized.  The fiberoptic laryngoscope was passed under endoscopic vision.  The turbinates were normal.  The inferior and middle meati were clear bilaterally without purulence, masses, or polyps.  The nasopharynx was clear.  The Eustachian tubes were clear.  The soft palate appeared normal with good mobility.  The epiglottis was sharp and the visualized portion of the vallecula was clear.  The larynx was clear with mobile cords.  The arytenoids were clear and there was no pooling in the hypopharynx.      IMPRESSION:  I congratulated Ms. Samantha Bell on reaching 5 years of WILMA status after recurrent disease.  She was a bit emotional, and was delighted and indicated that she would be celebrating tonight with her sons.  She is determined to get to [place] to see her boyfriend again and hopes to do that before another year has elapsed. She wants to keep coming to check in and I will be happy to see her on an annual basis which she likes.    She will have her TSH checked by her regular physician in the spring.  She does need to see Janay Rojas for swallowing and for trismus, and will do so today.        Thomas Ferris MD  Otolaryngology/Head & Neck Surgery  [phone]              CC    Nicole Mcdonald NP    [address]  [address]       Pattie Natarajan MD    Radiation  Therapy     [address] [facility] [facility]    [address]    [place], [place]  [address]     Moreno Leo MD  Otolaryngology/Head & Neck Surgery  [place] [address]

## 2021-12-17 NOTE — NURSING NOTE
"Chief Complaint   Patient presents with     RECHECK     follow up       Blood pressure 124/80, pulse 89, temperature 97.4  F (36.3  C), temperature source Temporal, height 1.6 m (5' 3\"), weight 70.8 kg (156 lb), SpO2 99 %, not currently breastfeeding.    Farideh Guillaume, EMT    "

## 2021-12-17 NOTE — PROGRESS NOTES
Terese TAPIA Mariyamariama Bell was seen for allied health care provider visit in conjunction with ENT clinic follow up visit. Speaking is stable and doing wll Swallowing continues to go well. Her jaw ROM was measured on the upper and lower right central incisors. All questions answered within scope of practice for pt. She is working with PT to help with ROM and strenght of her neck muscles which she notes improvement in head mobility. Encouraged pt to reach out with any questions or concerns. Time spent with patient 15 minutes.       Janay Rojas MS, CCC-SLP  Speech-Language Pathology  [facility] [place]  Department of Otolaryngology/D&T - 4th floor  Pager: [phone]  Phone: [phone]  Email: [email or web address]

## 2022-02-14 ENCOUNTER — TELEPHONE (OUTPATIENT)
Dept: FAMILY MEDICINE | Facility: CLINIC | Age: 72
End: 2022-02-14
Payer: MEDICARE

## 2022-02-14 ENCOUNTER — OFFICE VISIT (OUTPATIENT)
Dept: RADIATION THERAPY | Facility: OUTPATIENT CENTER | Age: 72
End: 2022-02-14
Payer: MEDICARE

## 2022-02-14 VITALS
SYSTOLIC BLOOD PRESSURE: 106 MMHG | RESPIRATION RATE: 18 BRPM | DIASTOLIC BLOOD PRESSURE: 62 MMHG | BODY MASS INDEX: 28.24 KG/M2 | WEIGHT: 159.4 LBS | HEART RATE: 69 BPM | OXYGEN SATURATION: 96 %

## 2022-02-14 DIAGNOSIS — C02.9 MALIGNANT NEOPLASM OF TONGUE (H): Primary | ICD-10-CM

## 2022-02-14 DIAGNOSIS — I89.0 LYMPHEDEMA: ICD-10-CM

## 2022-02-14 DIAGNOSIS — M54.12 CERVICAL RADICULOPATHY: Primary | ICD-10-CM

## 2022-02-14 DIAGNOSIS — C02.9 MALIGNANT NEOPLASM OF TONGUE (H): ICD-10-CM

## 2022-02-14 DIAGNOSIS — E78.5 HYPERLIPIDEMIA LDL GOAL <130: ICD-10-CM

## 2022-02-14 NOTE — NURSING NOTE
FOLLOW-UP VISIT    Patient Name: Terese Bell      : 1950     Age: 71 year old        ______________________________________________________________________________     Chief Complaint   Patient presents with     Radiation Therapy     follow-up     /62   Pulse 69   Resp 18   Wt 72.3 kg (159 lb 6.4 oz)   SpO2 96%   BMI 28.24 kg/m       Date Radiation Completed: 16    Pain  Current history of pain associated with this visit: Mouth/jaw   Intensity: 4/10  Current: aching  Location: mouth/jaw  Treatment: ibuprophen, cannabis ointment    Meds  Current Med List Reviewed: Yes  Medication Note:     Labs  TSH   Date Value Ref Range Status   2020 1.63 0.40 - 4.00 mU/L Final   ]    Imaging  {RAD ONC IMAGING TEST: 21 CT Soft tissue/Neck    Skin:Warm  Dry  Intact   Oral Products:biotene, Salagen  Mucositis: No  Dry mouth: Yes:   Dental evaluation: Yes:   PEG tube: No  Speech therapy: Yes:  Pt does daily exercises at home  Lymphedema: Yes  Energy Level:normal  Appetite: normal  Intake: tolerates soft foods well.  Has family supervision at meals for choking.  Weight:  Wt Readings from Last 5 Encounters:   22 72.3 kg (159 lb 6.4 oz)   21 70.8 kg (156 lb)   21 73 kg (161 lb)   21 73.9 kg (163 lb)   21 75.3 kg (166 lb)       Appointments:     DATE  Oncologist:    ENT: Josy 21   Primary:      Other Notes: Pt notes she has chronic shingles on the back of her neck.

## 2022-02-14 NOTE — TELEPHONE ENCOUNTER
Reason for Call: Request for an order or referral:    Order or referral being requested: Patient is asking for a new order for PT she has an appointment on 2/21/2022 with PT and with new year and insurance they need a referral    Date needed: at your convenience    Has the patient been seen by the PCP for this problem? YES    Phone number Patient can be reached at:  Home number on file [phone] (home)    Best Time:  any    Can we leave a detailed message on this number?  YES    Call taken on 2/14/2022 at 11:59 AM by Dyana Manrique

## 2022-02-14 NOTE — LETTER
2022         RE: Terese Bell  [address]  [place] [address]        Dear Colleague,    Thank you for referring your patient, Terese Bell, to the [facility]. Please see a copy of my visit note below.       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     TYPE OF RADIATION THERAPY ADMINISTERED: 6000 cGy in 30 fractions         Oncologic history : Mrs. 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 She had radiation therapy in our clinic to a total dose of 6000 cGy in 30 treatments at 200 cGy each fraction from 10/17/2016 to 2016.  She tolerated radiation therapy reasonably well.  The patient did have some significant sore throat and dysphagia toward the end of the therapy, which required a PEG tube for nutritional support.  She otherwise was reasonably stable at the end of the therapy.     Interval history:    The patient underwent exam by Dr. Ferris on 21 and has remained WILMA. Last scans on 21 were WILMA.    Patient otherwise reports she is doing fairly well today. She continues her daily mouth care. Continues to follow-up with dental team routinely ~ q 4 months. Continues to do fluoride trays routinely and doing gargles. 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 Using salagen intermittently with some benefit. Using biotene rinses and ATC capsules with some alleviation.    She continues to use therabite for Trismus. She feels this is stable.   She also reports continued difficulty with swallowing certain foods which she has been avoiding.    She is doing her head and neck exercises routinely. Does notice intermittent lymphedema in neck at times. This fluctuates.     Last TSH on 20 was WNL.    The patient denies any hoarseness of voice, referred otalgia, dysphagia, facial paresthesias, bleeding, other pain, and recurrent/new lumps or bumps. Reported weight is stable.    ROS otherwise negative on a 12-system review.       IMAGIN/9/21  CT Neck    FINDINGS:  Postoperative change of tongue mass resection and  right-sided neck dissection. Oral cavity evaluation is limited due to  extensive streak artifact. No convincing tumor is identified. No  evidence of cervical lymphadenopathy. Posttreatment changes are  present. The oral cavity, oropharynx, hypopharynx, and larynx are  otherwise unremarkable. The left submandibular gland is small  consistent with posttreatment change. Right subareolar gland is  absent. Parotid glands are unremarkable. Thyroid gland is  unremarkable.     Major neck vasculature is patent. Cervical spine degenerative change  is present, worst at C4-C5 and C5-C6. Few areas of pulmonary  emphysema.                                                                      IMPRESSION:    1. Stable posttreatment change.  2. No convincing evidence of recurrent disease.    IMPRESSION:   Ms. 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      RECOMMENDATIONS:    1. Remains clinically and radi graphically WILMA.  Exam by Dr. Ferris on 21 and has remained WILMA. Last scans on 21 were WILMA.    2.  Continue to follow-up with ENT.     3. Continue to follow-up with Dental.     4. Continue oral nutrition and continue swallowing and stretching exercises. Continue  Therabite for Trismus.  Continue to follow-up with speech. Place re-referral for lymphedema at times, per patient request.     5. Xerostomia:  patient to continue products for dry mouth including Biotene , ATC capsules, Xylitol/Theramints gum. Reports cool mist vaporizer effective. She will also continue Salagen intermittently.     6. RTC in 12 months.       Juwan Zhang M.D.  Department of Radiation Oncology  [facility]

## 2022-02-14 NOTE — TELEPHONE ENCOUNTER
Pt called back to given more information about PT referral request. . Pt has had PT in the past for lymphedema in her neck and problems with neck muscles after surgery and radiation therapy for cancer. She is having difficulty swallowing some foods. Pt says she needs a new referral. Okay to leave message on her voicemail.  Apolonia New RN

## 2022-02-14 NOTE — PROGRESS NOTES
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     TYPE OF RADIATION THERAPY ADMINISTERED: 6000 cGy in 30 fractions         Oncologic history : Mrs. 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 She had radiation therapy in our clinic to a total dose of 6000 cGy in 30 treatments at 200 cGy each fraction from 10/17/2016 to 2016.  She tolerated radiation therapy reasonably well.  The patient did have some significant sore throat and dysphagia toward the end of the therapy, which required a PEG tube for nutritional support.  She otherwise was reasonably stable at the end of the therapy.     Interval history:    The patient underwent exam by Dr. Ferris on 21 and has remained WILMA. Last scans on 21 were WILMA.    Patient otherwise reports she is doing fairly well today. She continues her daily mouth care. Continues to follow-up with dental team routinely ~ q 4 months. Continues to do fluoride trays routinely and doing gargles. 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 Using salagen intermittently with some benefit. Using biotene rinses and ATC capsules with some alleviation.    She continues to use therabite for Trismus. She feels this is stable.  She also reports continued difficulty with swallowing certain foods which she has been avoiding.    She is doing her head and neck exercises routinely. Does notice intermittent lymphedema in neck at times. This fluctuates.     Last TSH on 20 was WNL.    The  patient denies any hoarseness of voice, referred otalgia, dysphagia, facial paresthesias, bleeding, other pain, and recurrent/new lumps or bumps. Reported weight is stable.    ROS otherwise negative on a 12-system review.       IMAGIN/9/21  CT Neck    FINDINGS:  Postoperative change of tongue mass resection and  right-sided neck dissection. Oral cavity evaluation is limited due to  extensive streak artifact. No convincing tumor is identified. No  evidence of cervical lymphadenopathy. Posttreatment changes are  present. The oral cavity, oropharynx, hypopharynx, and larynx are  otherwise unremarkable. The left submandibular gland is small  consistent with posttreatment change. Right subareolar gland is  absent. Parotid glands are unremarkable. Thyroid gland is  unremarkable.     Major neck vasculature is patent. Cervical spine degenerative change  is present, worst at C4-C5 and C5-C6. Few areas of pulmonary  emphysema.                                                                      IMPRESSION:    1. Stable posttreatment change.  2. No convincing evidence of recurrent disease.    IMPRESSION:   Ms. 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      RECOMMENDATIONS:    1. Remains clinically and radi graphically WILMA.  Exam by Dr. Ferris on 21 and has remained WILMA. Last scans on 21 were WILMA.    2.  Continue to follow-up with ENT.     3. Continue to follow-up with Dental.     4. Continue oral nutrition and continue swallowing and stretching exercises. Continue Therabite for Trismus.  Continue to follow-up with speech. Place re-referral for lymphedema at times, per patient request.     5. Xerostomia:  patient to continue products for dry mouth including Biotene , ATC capsules, Xylitol/Theramints gum. Reports cool mist  vaporizer effective. She will also continue Salagen intermittently.     6. RTC in 12 months.       Juwan Zhang M.D.  Department of Radiation Oncology  [facility]

## 2022-02-15 DIAGNOSIS — M43.6 NECK STIFFNESS: ICD-10-CM

## 2022-02-15 DIAGNOSIS — R13.10 DIFFICULTY IN SWALLOWING: ICD-10-CM

## 2022-02-15 DIAGNOSIS — C01 MALIGNANT NEOPLASM OF BASE OF TONGUE (H): Primary | ICD-10-CM

## 2022-02-15 NOTE — TELEPHONE ENCOUNTER
Patient notified of Dr. Rubio's message. Patient also requests a referral to orthopedics/[facility] for cervical compression issues r/t radiation treatments. Says that she has seen them in the past, but has to have referral orders renewed every year. Patient also inquiring if labs can be drawn prior to her scheduled appointment with Dr. Rubio on 3/29/22, so that she'll be able to discuss results at the visit. Expresses specific concerns about iron levels; said that she was unable to give blood x2 recently due to low iron levels, and would like to follow up on this. Routed to PCP for review.    Mel Callahan RN  [facility]

## 2022-02-16 NOTE — PROGRESS NOTES
"[facility] Rehabilitation Service    Outpatient Physical Therapy Discharge Note  Patient: Terese Bell  : 1950    Beginning/End Dates of Reporting Period:  21 to 21. Total # of Rx sessions: 19    Referring Provider: Juwan Zhang Diagnosis:Pain behind L ear, along front of ear and into jaw and then up and around ear. Has been having little mini flares, doesn't know what is causing it. Using therabite and sometimes thinks that aggraavates it. Using it less help. Did have dental work done this week in the front, but had to have jaw open for prolonged periods.      Client Self Report: Had a bad flare up about 10 days ago, Sharp pain, like an ice pick being put into sdie of head, L side of head, neck. Lasted for hours. Was hard to get under control. Felt like when she had shingles, but much worse. Used to get these sx's after Chemo.     Objective Measurements:  Objective Measure: CROM:   Details: 21  Flex Normal, Ext 75%, SB R 21, L 26; rot R 47, L 48.   20  Flex 1/2\", Ext 65%, SB R 27, L 31; Rot R 45, L 48.   INITIALLY:  Flex 1 w/tight B UTrap, Ext 25% w/ neck pain, SB R 21 w/ R neck pain, L 28 w/ L Ear pain; Rot R 40 w/ R neck P near jaw, L 49 w/ pinch just below jaw line.     Objective Measure: TMJ ROM   Details: 21  Opening 30, Laterotrusion L 10, R 8. . 21  Opening 30.  21  Opening 31.  6  Opening 30, Laterotrusion 10 B.  3/25/21  Opening 31, b Laterotrusion 9 mm.  21 Opening 34, Laterotrusion R 8, L 7. 1 Opening 32.  3  Opening 29, Laterotrusion B 8, Protrusion 5. INITIALlY:  Opening 24, Laterotrusion B 5, Protrusion 4.      Objective Measure: Spec tests   Details: 21  Good mobility B TM. 21  R TMJ slightly tight. 21  B TMJ good mobility, INITIALLY:  R TMJ Hypo, L Post Quad-pain under angle of L jaw, Poor posture.     Objective " Measure: TMJ Functional Scale:   Details: 11/24/21  Scanned into chart. 9/8/21  Scanned into chart.      Goals:  Goal Identifier 1   Goal Description STG: Improve neck rotation to 50 B for improved safety w/ driving.  5/1/20  MET   Target Date 04/13/20   Date Met  05/01/20   Progress (detail required for progress note): 50 and 47 degrees B.      Goal Identifier 2   Goal Description STG: Pt will be able to chew harder food 5/10 on funcitonal scale. 6/21/21  Rated 8/10.  NOT MET    Target Date 04/13/20   Date Met      Progress (detail required for progress note): 11/24/21 Rated 9/10      Goal Identifier 3   Goal Description STG: Pt will be able to Open wide to bite an apple or snadwich 6/10 on functional scale.  6/21/21  Rated 7-10/10   NOT MET today    Target Date 04/13/20   Date Met      Progress (detail required for progress note): 9/8/21  Rated 8-9/10      Goal Identifier 4   Goal Description LTG: Pt will be independent w/ self cares and HEP to restore better function of TMJ.    Target Date 04/27/20   Date Met      Progress (detail required for progress note):       Plan:  Discharge from therapy. Pt to continue w/ ex's at home and follow up as needed w/ MD.     Discharge:  Yes    Melania Nicholas, PT, MTC (#[id number])  [facility]           [phone]  Fax          [phone]  Appt #      [phone]

## 2022-02-16 NOTE — TELEPHONE ENCOUNTER
Dr. Rubio,    PT for her neck.  Related to the cancer and radiation she has had. Apolonia GUZMAN RN

## 2022-02-21 ENCOUNTER — HOSPITAL ENCOUNTER (OUTPATIENT)
Dept: PHYSICAL THERAPY | Facility: CLINIC | Age: 72
Setting detail: THERAPIES SERIES
End: 2022-02-21
Attending: RADIOLOGY
Payer: MEDICARE

## 2022-02-21 DIAGNOSIS — M43.6 NECK STIFFNESS: ICD-10-CM

## 2022-02-21 DIAGNOSIS — R13.10 DIFFICULTY IN SWALLOWING: ICD-10-CM

## 2022-02-21 DIAGNOSIS — M54.12 CERVICAL RADICULOPATHY: ICD-10-CM

## 2022-02-21 DIAGNOSIS — C01 MALIGNANT NEOPLASM OF BASE OF TONGUE (H): ICD-10-CM

## 2022-02-21 PROCEDURE — 97162 PT EVAL MOD COMPLEX 30 MIN: CPT | Mod: GP | Performed by: PHYSICAL THERAPIST

## 2022-02-21 PROCEDURE — 97140 MANUAL THERAPY 1/> REGIONS: CPT | Mod: GP | Performed by: PHYSICAL THERAPIST

## 2022-02-21 NOTE — PROGRESS NOTES
[facility]    OUTPATIENT PHYSICAL THERAPY ORTHOPEDIC EVALUATION  PLAN OF TREATMENT FOR OUTPATIENT REHABILITATION  (COMPLETE FOR INITIAL CLAIMS ONLY)  Patient's Last Name, First Name, M.I.  YOB: 1950  Terese Fleming    Provider s Name:  [facility]   Medical Record No.  [id number]   Start of Care Date:  02/21/22   Onset Date:  02/15/22 (Most recent MD order date. )   Type:     _X__PT   ___OT   ___SLP Medical Diagnosis:  Neck STiffness, Neck/TMJ issues      PT Diagnosis:  Neck/TMJ stiffness, loss of ROM.    Visits from SOC:  1      _________________________________________________________________________________  Plan of Treatment/Functional Goals:     Jt mobs, STM, S&S, REvise HEP.      Contraindicated.   Goals  Goal Identifier: 1  Goal Description: STG: Pt will note less pain w/ bending over to do HH chores.   Target Date: 03/30/22    Goal Identifier: 2  Goal Description: STG: Pt will note less pain w/ rolling over in bed.   Target Date: 03/30/22    Goal Identifier: 3  Goal Description: STG: Improve Rotation to 55 or better for better safety w/ driving.   Target Date: 03/30/22    Goal Identifier: 4  Goal Description: LTG: Pt will return to management of sx's, ROM.   Target Date: 04/06/22    Therapy Frequency:  2 times/Week  Predicted Duration of Therapy Intervention:  5 weeks, decreasing frequency as appropriate. 10 visits.     Melania Nicholas, PT, MTC                  I CERTIFY THE NEED FOR THESE SERVICES FURNISHED UNDER        THIS PLAN OF TREATMENT AND WHILE UNDER MY CARE     (Physician co-signature of this document indicates review and certification of the therapy plan).                     Certification Date From:  02/21/22   Certification Date To:  04/06/22    Referring Provider:  Juwan Zhang     Initial Assessment        See Epic Evaluation Start of  Care Date: 02/21/22

## 2022-02-21 NOTE — PROGRESS NOTES
Ortho Evaluation 02/21/22 1100   General Information   Type of Visit Initial OP Ortho PT Evaluation   Start of Care Date 02/21/22   Referring Physician Juwan Zhang    Patient/Family Goals Statement Increased pain w/ bending over or rolling over in bed.    Orders Evaluate and Treat   Orders Comment Neck/TMJ issues.    Date of Order 02/15/22   Certification Required? Yes  (/[employer] - Auth'd )   Medical Diagnosis Neck STiffness, Neck/TMJ issues    Surgical/Medical history reviewed   (Ca of Tongue/Lymph/ Mm's of L neck w/mult surg x 16, OA, )   Precautions/Limitations other (see comments)  (CA precautions L neck. )   General Information Comments CBD Oil, Vitamins, Lidocaine patches.    Body Part(s)   Body Part(s) Cervical Spine   Presentation and Etiology   Pertinent history of current problem (include personal factors and/or comorbidities that impact the POC) Had Tongue/Lymph, Mm CA of L side, underwent 16 surgeries from 98 to 2016. They rebuilt mm from in this area using mm's and nn's from R arm towards end of radiation treatments.    Impairments A. Pain;D. Decreased ROM;E. Decreased flexibility;J. Burning;L. Tingling   Functional Limitations perform required work activities   Symptom Location Constant L ear/ near area pain, but has gotten used to it. Using therabite that helps.    How/Where did it occur Other  (CA surgeries and Radiation)   Onset date of current episode/exacerbation 02/15/22  (Most recent MD order date. )   Chronicity Chronic   Pain rating (0-10 point scale)   (2-3/10 )   Pain quality A. Sharp;E. Shooting;F. Stabbing   Frequency of pain/symptoms B. Intermittent   Pain/symptoms are: The same all the time   Pain/symptoms exacerbated by C. Lifting;I. Bending;G. Certain positions;M. Other   Pain exacerbation comment Rolling over in bed.    Pain/symptoms eased by C. Rest;K. Other   Pain eased by comment CBD Oil    Progression of symptoms since onset: Improved   Prior Level of Function   Functional Level  Prior Comment Independent    Current Level of Function   Current Community Support Family/friend caregiver   Patient role/employment history F. Retired   Living environment [facility]   Fall Risk Screen   Fall screen completed by PT   Have you fallen 2 or more times in the past year? No   Have you fallen and had an injury in the past year? No   Timed Up and Go score (seconds) No balance issues and walks quickly into dept.    Is patient a fall risk? No   System Outcome Measures   Outcome Measures   (NDI  14 )   Functional Scales   Functional Scales OPTIMAL   Optimal (1=able to do without difficulty, 2=able to do with little difficulty, 3=able to do with moderate difficulty, 4=able to do with much difficulty, 5=unable to do, 9=NA) Activity 1;Activity 2   Activity 1 comment Pain w/ rolling over in bed    Activity 2 comment Pain w/ bending over.    Cervical Spine   Observation No acute distress.    Integumentary  Scars neck and under chin, etc.    Posture Head forward   Cervical Flexion ROM Normal    Cervical Extension ROM 75%   Cervical Right Side Bending ROM 21 w/ ipsilateral compression    Cervical Left Side Bending ROM 26 w/contral tightness and compression ipsilateralll=y.    Cervical Right Rotation ROM 43 w/ipsilateral crunching   Cervical Left Rotation ROM 49 w/ contral pull and compression very upper C.Spine.    Spurling Test B increases crunching/pain under base of skull.    Segmental Mobility-Cervical OA tight B, A/A Rot B, Downglide/Upglide B throughout C.Spine. Tender over R TP at C6/7.    Palpation Tender B Subocc, Scalenes, Lev's, UTraps,. Knot L subocc area.    Planned Therapy Interventions   Planned Therapy Interventions Comment Jt mobs, STM, S&S, REvise HEP.    Planned Modality Interventions   Planned Modality Interventions Comments Contraindicated.    Clinical Impression   Criteria for Skilled Therapeutic Interventions Met yes, treatment indicated   PT Diagnosis Neck/TMJ stiffness, loss of ROM.     Influenced by the following impairments Pain, Loss of ROM, Burning/ Tingling.    Functional limitations due to impairments ROM for safe driving, Bending over for HH duties, Rolling over in bed painful.    Clinical Presentation Evolving/Changing   Clinical Presentation Rationale NDI, CROM, Neck mobility/PIVM.    Clinical Decision Making (Complexity) Low complexity   Therapy Frequency 2 times/Week   Predicted Duration of Therapy Intervention (days/wks) 5 weeks, decreasing frequency as appropriate. 10 visits.    Risk & Benefits of therapy have been explained Yes   Patient, Family & other staff in agreement with plan of care Yes   Clinical Impression Comments Neck/TMJ stiffness, loss of ROM.    Education Assessment   Preferred Learning Style Listening;Demonstration;Pictures/video   Barriers to Learning No barriers   ORTHO GOALS   PT Ortho Eval Goals 1;2;3;4   Ortho Goal 1   Goal Identifier 1   Goal Description STG: Pt will note less pain w/ bending over to do HH chores.    Target Date 03/30/22   Ortho Goal 2   Goal Identifier 2   Goal Description STG: Pt will note less pain w/ rolling over in bed.    Target Date 03/30/22   Ortho Goal 3   Goal Identifier 3   Goal Description STG: Improve Rotation to 55 or better for better safety w/ driving.    Target Date 03/30/22   Ortho Goal 4   Goal Identifier 4   Goal Description LTG: Pt will return to management of sx's, ROM.    Target Date 04/06/22   Total Evaluation Time   PT Tracie, Moderate Complexity Minutes (95341) 35   Therapy Certification   Certification date from 02/21/22   Certification date to 04/06/22   Medical Diagnosis Neck STiffness, Neck/TMJ issues    Melania Nicholsa, PT, [facility] (#[id number])  [facility]           [phone]  Fax          [phone]  Appt #      [phone]

## 2022-03-02 ENCOUNTER — HOSPITAL ENCOUNTER (OUTPATIENT)
Dept: PHYSICAL THERAPY | Facility: CLINIC | Age: 72
Setting detail: THERAPIES SERIES
End: 2022-03-02
Attending: RADIOLOGY
Payer: MEDICARE

## 2022-03-02 PROCEDURE — 97140 MANUAL THERAPY 1/> REGIONS: CPT | Mod: GP | Performed by: PHYSICAL THERAPIST

## 2022-03-07 ENCOUNTER — LAB (OUTPATIENT)
Dept: LAB | Facility: CLINIC | Age: 72
End: 2022-03-07
Payer: MEDICARE

## 2022-03-07 ENCOUNTER — HOSPITAL ENCOUNTER (OUTPATIENT)
Dept: PHYSICAL THERAPY | Facility: CLINIC | Age: 72
Setting detail: THERAPIES SERIES
End: 2022-03-07
Attending: RADIOLOGY
Payer: MEDICARE

## 2022-03-07 DIAGNOSIS — E78.5 HYPERLIPIDEMIA LDL GOAL <130: ICD-10-CM

## 2022-03-07 DIAGNOSIS — C02.9 MALIGNANT NEOPLASM OF TONGUE (H): ICD-10-CM

## 2022-03-07 DIAGNOSIS — I89.0 LYMPHEDEMA: ICD-10-CM

## 2022-03-07 LAB
ANION GAP SERPL CALCULATED.3IONS-SCNC: 4 MMOL/L (ref 3–14)
BUN SERPL-MCNC: 11 MG/DL (ref 7–30)
CALCIUM SERPL-MCNC: 9.2 MG/DL (ref 8.5–10.1)
CHLORIDE BLD-SCNC: 106 MMOL/L (ref 94–109)
CHOLEST SERPL-MCNC: 237 MG/DL
CO2 SERPL-SCNC: 30 MMOL/L (ref 20–32)
CREAT SERPL-MCNC: 0.64 MG/DL (ref 0.52–1.04)
ERYTHROCYTE [DISTWIDTH] IN BLOOD BY AUTOMATED COUNT: 12.9 % (ref 10–15)
FASTING STATUS PATIENT QL REPORTED: YES
FERRITIN SERPL-MCNC: 29 NG/ML (ref 8–252)
GFR SERPL CREATININE-BSD FRML MDRD: >90 ML/MIN/1.73M2
GLUCOSE BLD-MCNC: 83 MG/DL (ref 70–99)
HCT VFR BLD AUTO: 38.6 % (ref 35–47)
HDLC SERPL-MCNC: 81 MG/DL
HGB BLD-MCNC: 12.1 G/DL (ref 11.7–15.7)
IRON SERPL-MCNC: 113 UG/DL (ref 35–180)
LDLC SERPL CALC-MCNC: 138 MG/DL
MCH RBC QN AUTO: 31 PG (ref 26.5–33)
MCHC RBC AUTO-ENTMCNC: 31.3 G/DL (ref 31.5–36.5)
MCV RBC AUTO: 99 FL (ref 78–100)
NONHDLC SERPL-MCNC: 156 MG/DL
PLATELET # BLD AUTO: 239 10E3/UL (ref 150–450)
POTASSIUM BLD-SCNC: 4 MMOL/L (ref 3.4–5.3)
RBC # BLD AUTO: 3.9 10E6/UL (ref 3.8–5.2)
SODIUM SERPL-SCNC: 140 MMOL/L (ref 133–144)
TRIGL SERPL-MCNC: 88 MG/DL
WBC # BLD AUTO: 4.7 10E3/UL (ref 4–11)

## 2022-03-07 PROCEDURE — 97161 PT EVAL LOW COMPLEX 20 MIN: CPT | Mod: GP | Performed by: PHYSICAL THERAPIST

## 2022-03-07 PROCEDURE — 85027 COMPLETE CBC AUTOMATED: CPT

## 2022-03-07 PROCEDURE — 80061 LIPID PANEL: CPT

## 2022-03-07 PROCEDURE — 97140 MANUAL THERAPY 1/> REGIONS: CPT | Mod: GP | Performed by: PHYSICAL THERAPIST

## 2022-03-07 PROCEDURE — 82728 ASSAY OF FERRITIN: CPT

## 2022-03-07 PROCEDURE — 83540 ASSAY OF IRON: CPT

## 2022-03-07 PROCEDURE — 80048 BASIC METABOLIC PNL TOTAL CA: CPT

## 2022-03-07 PROCEDURE — 36415 COLL VENOUS BLD VENIPUNCTURE: CPT

## 2022-03-07 NOTE — PROGRESS NOTES
"Outpatient Lymphedema Therapy Evaluation       03/07/22 0900   Rehab Discipline   Discipline PT   Type of Visit   Type of visit Initial Edema Evaluation       present No   General Information   Start of care 03/07/22   Referring physician Dr. Vicente MD   Orders Evaluate and treat as indicated   Order date 02/21/22   Medical diagnosis H&N lymphedema, chronic   Onset of illness / date of surgery 09/01/16   Edema onset   (2/21/22 - date of referral)   Affected body parts Head / Neck   Edema etiology Cancer with lymph node dissection;Radiation;Chemo;Surgery   Location - Cancer with lymph node dissection 1/40 positive nodes   Location - Radiation neck   Radiation comments completed 11/28/16 (a total of 30 treatments)   Chemotherapy comments completed   Edema etiology comments s/p right superficial parotidectomy, right hemiglossectomy, right neck dissection and right RFFF on 9/1/2016;  surgical site dehisced and developed wound/fistula ; has had numerous H&N surgeries related to cancer (underwent 16 surgeries from 1998 to 2016)   Pertinent history of current problem (PT: include personal factors and/or comorbidities that impact the POC; OT: include additional occupational profile info) pt was seen at OP lymphedema clinic from 10/6/16 - 4/20/17, 5/17/17 - 6/21/17 and again from February 2018-August 2018 and at time of discharge was wearing a compression garment and performing self MLD daily;  since patient was last seen in clinic she reports being very compliant with home program including MLD, compression wear and exercises daily;  Pt has a history of recurrent shingles that affects her L-side of face and continues to have residual pain, when this happens pt isn't able to wear her compression garment;  pt was seeing an OP PT for TMJ issues in February 2022 but that PT has since retired; pt reports very little swelling on the outside, pt and \"Dr. Zhang think there is swelling on the inside\", pt reports " "everything seems to be better since starting cristopher (started June 2021), reports doesn't see a speech therapist, does use a Therabite at home \"every 3rd day\"; pt feels like when she's swallowing she's coughing, choking and difficulty eating; pt reports her neck and associated issues have slowly become more problematic for ~6 months   Surgical / medical history reviewed Yes   Edema special tests   (no history of DVTs)   Prior level of functional mobility Independent with functional mobility and ADL   Prior treatment Complete decongestive therapy;Compression garments;Exercise;MLD   Patient role / employment history Retired   Living environment [facility] / [facility]   Living environment comments with great nephews (adults)   Assistive device comments none   Fall Risk Screen   Fall screen completed by PT   Have you fallen 2 or more times in the past year? No   Have you fallen and had an injury in the past year? No   Timed Up and Go score (seconds) No balance issues and walks quickly into dept.    Is patient a fall risk? No   Abuse Screen (yes response referral indicated)   Feels Unsafe at Home or Work/School no   Feels Threatened by Someone no   Does Anyone Try to Keep You From Having Contact with Others or Doing Things Outside Your Home? no   Physical Signs of Abuse Present no   Subjective Report   Patient report of symptoms neck is very tight and stiff on R-side   Precautions   Precautions comments no precautions; pt hit 5 year annie of being cancer free   Patient / Family Goals   Patient / family goals statement decrease pain and stiffness   Pain   Patient currently in pain Yes   Pain comments constant discomfort on R-side of neck; feels like the R-side of neck is being twisted   Cognitive Status   Orientation Orientation to person, place and time   Level of consciousness Alert   Follows commands and answers questions 100% of the time   Personal safety and judgement Intact   Memory Intact   Edema Exam / Assessment   Skin " condition Intact   Skin condition comments skin at neck all intact, extreme tightness on R-side of neck   Scar Yes   Location R-neck   Mobility fair at best   Scar comments closed/healed   Ulceration No   Girth Measurements   Girth Measurements Refer to separate girth measurement flowsheet  (stable since 2018)   Range of Motion   ROM   (cervical range)   ROM comments R-rotation 43deg and L-rotation 49 deg   Strength   Strength comments functional    Posture   Posture Forward head position   Palpation   Palpation denies hypersensitivities   Sensory   Sensory perception Light touch   Light touch Intact   Vascular Assessment   Vascular Assessment Comments no concerns   Coordination   Coordination Gross motor coordination appropriate   Muscle Tone   Muscle tone No deficits were identified   Planned Edema Interventions   Planned edema interventions Exercises;Precautions to prevent infection / exacerbation;Manual therapy;Scar mobilization;Myofascial release;Home management program development   Clinical Impression   Criteria for skilled therapeutic intervention met Yes   Therapy diagnosis R-cervical myofascial tightnes and scarring, decreased ROM   Influenced by the following impairments / conditions Other  (myofascial tightness; internal swelling)   Functional limitations due to impairments / conditions not sleeping, coughing and choking on foods, reports her family has to sit by her during meals incase she chokes to help her   Clinical Presentation Stable/Uncomplicated   Clinical Presentation Rationale clinical judgement; chronic issues   Clinical Decision Making (Complexity) Low complexity   Treatment Frequency 2x/week   Treatment duration 12 weeks   Patient / family and/or staff in agreement with plan of care Yes   Risks and benefits of therapy have been explained Yes   Education Assessment   Preferred learning style Listening   Barriers to learning No barriers   Goals   Edema Eval Goals 1;2   Goal 1   Goal identifier  1   Goal description pt to be independent with longterm management of neck via HEP (including ROM exercises and self-MFR) to increase ROM and comfort during functional/daily activities    Target date 06/05/22   Goal 2   Goal identifier 2   Goal description pt to increase B cervical rotation to at least 50-55 deg to improve functional range for driving, gardening and house hold tasks/activities   Target date 06/05/22   Total Evaluation Time   PT Eval, Low Complexity Minutes ([id number]) 5

## 2022-03-07 NOTE — PROGRESS NOTES
[facility]        OUTPATIENT PHYSICAL THERAPY EDEMA EVALUATION  PLAN OF TREATMENT FOR OUTPATIENT REHABILITATION  (COMPLETE FOR INITIAL CLAIMS ONLY)  Patient's Last Name, First Name, Terese Torres                           Provider s Name:   [facility] Medical Record No.  [id number]     Start of Care Date:  03/07/22   Onset Date:   (2/21/22 - date of referral)   Type:  PT   Medical Diagnosis:      Therapy Diagnosis:  R-cervical myofascial tightnes and scarring, decreased ROM Visits from SOC:  1                                     __________________________________________________________________________________   Plan of Treatment/Functional Goals:    Exercises, Precautions to prevent infection / exacerbation, Manual therapy, Scar mobilization, Myofascial release, Home management program development        GOALS  1. Goal description: pt to be independent with longterm management of neck via HEP (including ROM exercises and self-MFR) to increase ROM and comfort during functional/daily activities        Target date: 06/05/22  2. Goal description: pt to increase B cervical rotation to at least 50-55 deg to improve functional range for driving, gardening and house hold tasks/activities       Target date: 06/05/22    Treatment Frequency: 2x/week   Treatment duration: 12 weeks (3/7/22 - 6/5/22)    Rachel Richey, PT, DPT, CLT                                    I CERTIFY THE NEED FOR THESE SERVICES FURNISHED UNDER        THIS PLAN OF TREATMENT AND WHILE UNDER MY CARE     (Physician co-signature of this document indicates review and certification of the therapy plan).                          Referring physician: Dr. Vicente MD   Initial Assessment  See Epic Evaluation- Start of care: 03/07/22

## 2022-03-08 DIAGNOSIS — C76.0 HEAD AND NECK CANCER (H): Primary | ICD-10-CM

## 2022-03-08 DIAGNOSIS — R13.10 DYSPHAGIA: ICD-10-CM

## 2022-03-09 DIAGNOSIS — C02.9 MALIGNANT NEOPLASM OF TONGUE (H): ICD-10-CM

## 2022-03-09 RX ORDER — SODIUM FLUORIDE 1.1 G/100G
GEL ORAL
Qty: 112 G | Refills: 0 | Status: SHIPPED | OUTPATIENT
Start: 2022-03-09 | End: 2022-09-13

## 2022-03-10 ENCOUNTER — HOSPITAL ENCOUNTER (OUTPATIENT)
Dept: SPEECH THERAPY | Facility: CLINIC | Age: 72
Setting detail: THERAPIES SERIES
Discharge: HOME | End: 2022-03-10
Attending: RADIOLOGY
Payer: MEDICARE

## 2022-03-10 ENCOUNTER — HOSPITAL ENCOUNTER (OUTPATIENT)
Dept: PHYSICAL THERAPY | Facility: CLINIC | Age: 72
Setting detail: THERAPIES SERIES
Discharge: HOME | End: 2022-03-10
Attending: RADIOLOGY
Payer: MEDICARE

## 2022-03-10 DIAGNOSIS — C76.0 HEAD AND NECK CANCER (H): ICD-10-CM

## 2022-03-10 DIAGNOSIS — R13.10 DYSPHAGIA: ICD-10-CM

## 2022-03-10 PROCEDURE — 92610 EVALUATE SWALLOWING FUNCTION: CPT | Mod: GN | Performed by: SPEECH-LANGUAGE PATHOLOGIST

## 2022-03-10 PROCEDURE — 97140 MANUAL THERAPY 1/> REGIONS: CPT | Mod: GP | Performed by: PHYSICAL THERAPIST

## 2022-03-10 PROCEDURE — 92526 ORAL FUNCTION THERAPY: CPT | Mod: GN | Performed by: SPEECH-LANGUAGE PATHOLOGIST

## 2022-03-12 DIAGNOSIS — B02.29 POST HERPETIC NEURALGIA: ICD-10-CM

## 2022-03-14 RX ORDER — LIDOCAINE 50 MG/G
OINTMENT TOPICAL
Qty: 50 G | Refills: 0 | Status: SHIPPED | OUTPATIENT
Start: 2022-03-14 | End: 2024-04-26

## 2022-03-16 ENCOUNTER — HOSPITAL ENCOUNTER (OUTPATIENT)
Dept: PHYSICAL THERAPY | Facility: CLINIC | Age: 72
Setting detail: THERAPIES SERIES
Discharge: HOME | End: 2022-03-16
Attending: RADIOLOGY
Payer: MEDICARE

## 2022-03-16 PROCEDURE — 97140 MANUAL THERAPY 1/> REGIONS: CPT | Mod: GP | Performed by: PHYSICAL THERAPIST

## 2022-03-17 NOTE — PROGRESS NOTES
OUTPATIENT SWALLOW  EVALUATION  PLAN OF TREATMENT FOR OUTPATIENT REHABILITATION  (COMPLETE FOR INITIAL CLAIMS ONLY)  Patient's Last Name, First Name, M.I.  YOB: 1950  Terese Fleming     Provider's Name   AYAD Mazariegos   Medical Record No.  [id number]     Start of Care Date:  03/10/22   Onset Date:   3/8/22 (order's date)   Type:     ___PT   ____OT  ___X_SLP Medical Diagnosis:  C76.0 (ICD-10-CM) - Head and neck cancer (H); R13.10 (ICD-10-CM) - Dysphagia     Treatment Diagnosis:  moderate oral/pharyngeal dysphagia Visits from SOC:  1     _________________________________________________________________________________  Plan of Treatment/Functional Goals:  Planned Therapy Interventions: Dysphagia Treatment  Dysphagia treatment: Oropharyngeal exercise training, Instruction of safe swallow strategies            Goals   1. Goal Identifier: Safe Swallow Strategies       Goal Description: Patient will report using safe swallow strategies to reduce instances of choking.       Target Date: 04/09/22           2. Goal Identifier: phayngeal strengthening exercises       Goal Description: Patient will perform PSE with min cues and 90% accuracy to increase pharyngeal strength of swallow.       Target Date: 04/09/22                         Therapy Frequency: other (see comments) (1x every other week)  Predicted Duration of Therapy Intervention (days/wks): 6 weeks    AYAD Mazariegos       I CERTIFY THE NEED FOR THESE SERVICES FURNISHED UNDER        THIS PLAN OF TREATMENT AND WHILE UNDER MY CARE     (Physician co-signature of this document indicates review and certification of the therapy plan).                  Certification date from: 03/10/22 Certification date to: 04/09/22          Referring Physician: Juwan Zhang MD    Initial Assessment        See Epic Evaluation Start Of Care Date: 03/10/22

## 2022-03-17 NOTE — PROGRESS NOTES
Clinical Swallow Evaluation  03/10/22   General Information   Type Of Visit Initial   Start Of Care Date 03/10/22   Referring Physician Juwan Zhang MD   Orders Evaluate And Treat   Medical Diagnosis C76.0 (ICD-10-CM) - Head and neck cancer (H); R13.10 (ICD-10-CM) - Dysphagia   Precautions/limitations Swallowing Precautions   Hearing WFL for exam   Pertinent History of Current Problem/OT: Additional Occupational Profile Info Patient is referred for clinical swallow evaluation with chronic dysphagia. She reports increased choking episodes the past few months and has had to 'fall on a chair' to dislodge bolus or have someone perform the Heimlich maneuver on her. Patient is s/p RXT and multiple oral surgeries for tongue cancer. Partial glossectomy. Last VFSS was performed 3/3/17 showing no penetration or aspiration and mild residue that clears with liquid wash She reports he weight has been stable.  She continues to do some oral exercises that were given to her from previous therapy.  Patient also reports interest in finding a dysphagia support group.   Respiratory Status Room air   Patient Role/employment History Retired   Living Environment [place]   General Observations Patient is pleasant and cooperative during evaluation   Patient/family Goals to improve swallowing and not choke   Clinical Swallow Evaluation   Oral Musculature anomalies present   Structural Abnormalities present   Dentition present and adequate   Mucosal Quality adequate   Mandibular Strength and Mobility impaired   Oral Labial Strength and Mobility WFL   Lingual Strength and Mobility impaired left lateral movement;impaired right lateral movement;impaired coordination   Velar Elevation intact   Buccal Strength and Mobility intact   Laryngeal Function Voicing initiated   Clinical Swallow Eval: Thin Liquid Texture Trial   Mode of Presentation, Thin Liquids cup;self-fed   Volume of Liquid or Food Presented 4 oz   Oral Phase of Swallow  WFL   Pharyngeal Phase of Swallow intact   Diagnostic Statement no overt s/s of aspiration   Clinical Swallow Eval: Puree Solid Texture Trial   Mode of Presentation, Puree spoon;self-fed   Volume of Puree Presented 2 tablespoons   Oral Phase, Puree Effortful AP movement   Pharyngeal Phase, Puree intact   Diagnostic Statement no overt s/s of aspiration. Patient reports some difficulty with ap movement on impacted side. able to clear though liquid wash helps.   Clinical Swallow Eval: Regular (Solid)   Mode of Presentation self-fed   Volume Presented 1 bite cracker   Oral Phase WFL   Pharyngeal Phase intact   Diagnostic Statement no overt s/s of aspiration. Patient reports some difficulty with ap movement on impacted side. able to clear though liquid wash helps.   Swallow Compensations   Swallow Compensations Alternate viscosity of consistencies;Pacing;Reduce amounts;Multiple swallow   Results No difficulties noted   Educational Assessment   Barriers to Learning No barriers   Esophageal Phase of Swallow   Patient reports or presents with symptoms of esophageal dysphagia No   General Therapy Interventions   Planned Therapy Interventions Dysphagia Treatment   Dysphagia treatment Oropharyngeal exercise training;Instruction of safe swallow strategies   Swallow Eval: Clinical Impressions   Skilled Criteria for Therapy Intervention Skilled criteria met.  Treatment indicated.   Functional Assessment Scale (FAS) 5   Dysphagia Outcome Severity Scale (GREGG) Level 4 - GREGG   Treatment Diagnosis moderate oral/pharyngeal dysphagia   Diet texture recommendations Thin liquids (level 0);Easy to Chew diet (level 7)   Recommended Feeding/Eating Techniques alternate between small bites and sips of food/liquid;small sips/bites   Rehab Potential good, to achieve stated therapy goals   Therapy Frequency other (see comments)  (1x every other week)   Predicted Duration of Therapy Intervention (days/wks) 6 weeks   Anticipated Discharge  Disposition home   Risks and Benefits of Treatment have been explained. Yes   Patient, family and/or staff in agreement with Plan of Care Yes   Clinical Impression Comments Patient presents with chronic moderate oral pharyngeal dysphagia. Patient reports that recently she has been choking more on solids. PO intake during evaluation demonstrates no choking or s/s of aspiration. Patient reports she vaguely remembers pharyngeal exercises but doesn't do them anymore as she focuses on oral exercises. She is motivated to attempt pharyngeal strengthening to improve swallow function. Recommend skills dysphagia treatment to address choking.   Swallow Goal 1   Goal Identifier Safe Swallow Strategies   Goal Description Patient will report using safe swallow strategies to reduce instances of choking.   Target Date 04/09/22   Swallow Goal 2   Goal Identifier phayngeal strengthening exercises   Goal Description Patient will perform PSE with min cues and 90% accuracy to increase pharyngeal strength of swallow.   Target Date 04/09/22   Total Session Time   Total Evaluation Time 30   Therapy Certification   Certification date from 03/10/22   Certification date to 04/09/22   Medical Diagnosis C76.0 (ICD-10-CM) - Head and neck cancer (H); R13.10 (ICD-10-CM) - Dysphagia   Certification I certify the need for these services furnished under this plan of treatment and while under my care.  (Physician co-signature of this document indicates review and certification of the therapy plan).

## 2022-03-22 ENCOUNTER — HOSPITAL ENCOUNTER (OUTPATIENT)
Dept: PHYSICAL THERAPY | Facility: CLINIC | Age: 72
Setting detail: THERAPIES SERIES
Discharge: HOME | End: 2022-03-22
Attending: RADIOLOGY
Payer: MEDICARE

## 2022-03-22 PROCEDURE — 97140 MANUAL THERAPY 1/> REGIONS: CPT | Mod: GP | Performed by: PHYSICAL THERAPIST

## 2022-03-29 ENCOUNTER — HOSPITAL ENCOUNTER (OUTPATIENT)
Dept: PHYSICAL THERAPY | Facility: CLINIC | Age: 72
Setting detail: THERAPIES SERIES
Discharge: HOME | End: 2022-03-29
Attending: RADIOLOGY
Payer: MEDICARE

## 2022-03-29 PROCEDURE — 97140 MANUAL THERAPY 1/> REGIONS: CPT | Mod: GP,CQ | Performed by: REHABILITATION PRACTITIONER

## 2022-03-29 PROCEDURE — 97140 MANUAL THERAPY 1/> REGIONS: CPT | Mod: GP | Performed by: PHYSICAL THERAPIST

## 2022-03-31 ENCOUNTER — HOSPITAL ENCOUNTER (OUTPATIENT)
Dept: PHYSICAL THERAPY | Facility: CLINIC | Age: 72
Setting detail: THERAPIES SERIES
Discharge: HOME | End: 2022-03-31
Attending: RADIOLOGY
Payer: MEDICARE

## 2022-03-31 ENCOUNTER — HOSPITAL ENCOUNTER (OUTPATIENT)
Dept: SPEECH THERAPY | Facility: CLINIC | Age: 72
Setting detail: THERAPIES SERIES
Discharge: HOME | End: 2022-03-31
Attending: RADIOLOGY
Payer: MEDICARE

## 2022-03-31 PROCEDURE — 92526 ORAL FUNCTION THERAPY: CPT | Mod: GN | Performed by: SPEECH-LANGUAGE PATHOLOGIST

## 2022-03-31 PROCEDURE — 97140 MANUAL THERAPY 1/> REGIONS: CPT | Mod: GP,CQ | Performed by: REHABILITATION PRACTITIONER

## 2022-03-31 PROCEDURE — 97140 MANUAL THERAPY 1/> REGIONS: CPT | Mod: GP | Performed by: PHYSICAL THERAPIST

## 2022-04-05 ENCOUNTER — HOSPITAL ENCOUNTER (OUTPATIENT)
Dept: PHYSICAL THERAPY | Facility: CLINIC | Age: 72
Setting detail: THERAPIES SERIES
Discharge: HOME | End: 2022-04-05
Attending: RADIOLOGY
Payer: MEDICARE

## 2022-04-05 PROCEDURE — 97110 THERAPEUTIC EXERCISES: CPT | Mod: GP | Performed by: PHYSICAL THERAPIST

## 2022-04-05 PROCEDURE — 97140 MANUAL THERAPY 1/> REGIONS: CPT | Mod: GP | Performed by: PHYSICAL THERAPIST

## 2022-04-05 PROCEDURE — 97140 MANUAL THERAPY 1/> REGIONS: CPT | Mod: GP,CQ | Performed by: REHABILITATION PRACTITIONER

## 2022-04-05 NOTE — PROGRESS NOTES
[facility]    OUTPATIENT PHYSICAL THERAPY  PLAN OF TREATMENT FOR OUTPATIENT REHABILITATION AND PROGRESS NOTE           Patient's Last Name, First Name, MTerese Pappas Date of Birth  1950   Provider's Name  [facility] Medical Record No.  [id number]    Onset Date  02/15/22 (Most recent MD order date. ) Start of Care Date  2/21/22   Type:     _X_PT   ___OT   ___SLP Medical Diagnosis  Neck STiffness, Neck/TMJ issues    PT Diagnosis  Neck/TMJ stiffness, loss of ROM.  Plan of Treatment  Frequency/Duration: 1x a week for 6-8 weeks  Certification date from 4/5/22 to 5/31/22     Goals:  Goal Identifier 1   Goal Description STG: Pt will note less pain w/ bending over to do HH chores.    Target Date 03/30/22   Date Met  04/05/22   Progress (detail required for progress note):       Goal Identifier 2   Goal Description STG: Pt will note less pain w/ rolling over in bed.    Target Date 03/30/22   Date Met  04/05/22   Progress (detail required for progress note):       Goal Identifier 3   Goal Description STG: Improve Rotation to 55 or better for better safety w/ driving.    Target Date 03/30/22   Date Met  04/05/22   Progress (detail required for progress note):       Goal Identifier 4   Goal Description LTG: Pt will return to management of sx's, ROM.    Target Date 04/06/22   Date Met      Progress (detail required for progress note): improving, getting close to baseline.      Goal Identifier NEW GOAL   Goal Description Patient will demonstrate at least a 5-/5 on ER strength testing to decrease upper trap substitution with ADLS to decrease pain and tightness.    Target Date 05/31/22   Date Met      Progress (detail required for progress note):           Beginning/End Dates of Progress Note Reporting Period:  2/21/22 to 4/5/22    Progress Toward Goals:    Progress this reporting period: Melania has demonstrated good progress thus far in physical therapy. She has met goals #1-3, and is making progress on goal #4. A new goals was added today to add  Focus to Melania's goal of improving her posture and continuing to decrease neck pain/stiffness.    Client Self (Subjective) Report for Progress Note Reporting Period: Doing ok. Neck feels tight.     Objective Measurements:   Objective Measure: UE strength Testing  Details: ER 4/5 B, IR 5/5 B   Objective Measure: CROM  Details: Rotation R 60 L 55               I CERTIFY THE NEED FOR THESE SERVICES FURNISHED UNDER        THIS PLAN OF TREATMENT AND WHILE UNDER MY CARE     (Physician co-signature of this document indicates review and certification of the therapy plan).                Referring Provider: Juwan Zhang MD Nicole A. Wheeler, PT

## 2022-04-06 NOTE — PROGRESS NOTES
[facility]    Outpatient Physical Therapy Progress Note  Patient: Terese Bell  : 1950    Beginning/End Dates of Reporting Period:  3/7/22 to 22    Referring Provider: Dr Vicente MD    Therapy Diagnosis: H&N lymphedema with myofascial tightness, chronic     Client Self Report:      Objective Measurements:   Objective Measure: CROM  Details: Rotation R 60 L 57    Goals:  Goal Identifier 1   Goal Description pt to be independent with longterm management of neck via HEP (including ROM exercises and self-MFR) to increase ROM and comfort during functional/daily activities    Target Date 22   Date Met      Progress (detail required for progress note):       Goal Identifier 2   Goal Description pt to increase B cervical rotation to at least 50-55 deg to improve functional range for driving, gardening and house hold tasks/activities   Target Date 22   Date Met   3/29/22   Progress (detail required for progress note):           Plan:  Continue therapy per current plan of care.  Patient has increased ROM and reports feeling looser in neck and will continue to benefit from MLD and MFR in clinic as well as continue with current home program. Pt also has started to consistently see ortho PT during this reporting period.    Discharge:  No

## 2022-04-12 ENCOUNTER — HOSPITAL ENCOUNTER (OUTPATIENT)
Dept: PHYSICAL THERAPY | Facility: CLINIC | Age: 72
Setting detail: THERAPIES SERIES
Discharge: HOME | End: 2022-04-12
Attending: RADIOLOGY
Payer: MEDICARE

## 2022-04-12 PROCEDURE — 97140 MANUAL THERAPY 1/> REGIONS: CPT | Mod: GP,CQ | Performed by: REHABILITATION PRACTITIONER

## 2022-04-12 PROCEDURE — 97140 MANUAL THERAPY 1/> REGIONS: CPT | Mod: GP | Performed by: PHYSICAL THERAPIST

## 2022-04-21 ENCOUNTER — HOSPITAL ENCOUNTER (OUTPATIENT)
Dept: PHYSICAL THERAPY | Facility: CLINIC | Age: 72
Setting detail: THERAPIES SERIES
Discharge: HOME | End: 2022-04-21
Attending: RADIOLOGY
Payer: MEDICARE

## 2022-04-21 PROCEDURE — 97140 MANUAL THERAPY 1/> REGIONS: CPT | Mod: GP,CQ | Performed by: REHABILITATION PRACTITIONER

## 2022-04-26 ENCOUNTER — HOSPITAL ENCOUNTER (OUTPATIENT)
Dept: PHYSICAL THERAPY | Facility: CLINIC | Age: 72
Setting detail: THERAPIES SERIES
Discharge: HOME | End: 2022-04-26
Attending: RADIOLOGY
Payer: MEDICARE

## 2022-04-26 PROCEDURE — 97140 MANUAL THERAPY 1/> REGIONS: CPT | Mod: GP | Performed by: PHYSICAL THERAPIST

## 2022-04-26 PROCEDURE — 97012 MECHANICAL TRACTION THERAPY: CPT | Mod: GP | Performed by: PHYSICAL THERAPIST

## 2022-04-26 PROCEDURE — 97140 MANUAL THERAPY 1/> REGIONS: CPT | Mod: GP,CQ | Performed by: REHABILITATION PRACTITIONER

## 2022-04-28 ENCOUNTER — OFFICE VISIT (OUTPATIENT)
Dept: FAMILY MEDICINE | Facility: CLINIC | Age: 72
End: 2022-04-28
Payer: MEDICARE

## 2022-04-28 VITALS
HEIGHT: 63 IN | DIASTOLIC BLOOD PRESSURE: 72 MMHG | BODY MASS INDEX: 29.06 KG/M2 | HEART RATE: 68 BPM | OXYGEN SATURATION: 99 % | SYSTOLIC BLOOD PRESSURE: 120 MMHG | RESPIRATION RATE: 14 BRPM | WEIGHT: 164 LBS | TEMPERATURE: 97.2 F

## 2022-04-28 DIAGNOSIS — Z00.00 ENCOUNTER FOR MEDICARE ANNUAL WELLNESS EXAM: Primary | ICD-10-CM

## 2022-04-28 DIAGNOSIS — M10.071 ACUTE IDIOPATHIC GOUT OF RIGHT FOOT: ICD-10-CM
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
## 2022-04-28 DIAGNOSIS — Z91.030 ALLERGY TO BEE STING: ICD-10-CM

## 2022-04-28 DIAGNOSIS — J45.20 MILD INTERMITTENT ASTHMA WITHOUT COMPLICATION: ICD-10-CM

## 2022-04-28 DIAGNOSIS — G89.4 CHRONIC PAIN SYNDROME: ICD-10-CM

## 2022-04-28 DIAGNOSIS — M77.11 RIGHT TENNIS ELBOW: ICD-10-CM

## 2022-04-28 DIAGNOSIS — K11.7 XEROSTOMIA DUE TO RADIOTHERAPY: ICD-10-CM

## 2022-04-28 DIAGNOSIS — B02.29 POST HERPETIC NEURALGIA: ICD-10-CM

## 2022-04-28 DIAGNOSIS — C02.9 MALIGNANT NEOPLASM OF TONGUE (H): ICD-10-CM

## 2022-04-28 PROCEDURE — 99214 OFFICE O/P EST MOD 30 MIN: CPT | Mod: 25 | Performed by: INTERNAL MEDICINE

## 2022-04-28 PROCEDURE — G0439 PPPS, SUBSEQ VISIT: HCPCS | Performed by: INTERNAL MEDICINE

## 2022-04-28 RX ORDER — NITROGLYCERIN 80 MG/1
PATCH TRANSDERMAL
Qty: 30 PATCH | Refills: 11 | Status: SHIPPED | OUTPATIENT
Start: 2022-04-28 | End: 2023-04-25

## 2022-04-28 RX ORDER — VIT C/B6/B5/MAGNESIUM/HERB 173 50-5-6-5MG
CAPSULE ORAL
COMMUNITY
End: 2024-04-26

## 2022-04-28 RX ORDER — INDOMETHACIN 25 MG/1
25 CAPSULE ORAL 2 TIMES DAILY WITH MEALS
Qty: 60 CAPSULE | Refills: 3 | Status: CANCELLED | OUTPATIENT
Start: 2022-04-28

## 2022-04-28 RX ORDER — PILOCARPINE HYDROCHLORIDE 5 MG/1
5 TABLET, FILM COATED ORAL 3 TIMES DAILY PRN
Qty: 90 TABLET | Refills: 3 | Status: SHIPPED | OUTPATIENT
Start: 2022-04-28 | End: 2023-04-25

## 2022-04-28 RX ORDER — ALBUTEROL SULFATE 90 UG/1
2 AEROSOL, METERED RESPIRATORY (INHALATION) EVERY 4 HOURS PRN
Qty: 18 G | Refills: 11 | Status: SHIPPED | OUTPATIENT
Start: 2022-04-28 | End: 2023-04-25

## 2022-04-28 RX ORDER — EPINEPHRINE 0.3 MG/.3ML
0.3 INJECTION SUBCUTANEOUS
Qty: 0.6 ML | Refills: 3 | Status: SHIPPED | OUTPATIENT
Start: 2022-04-28 | End: 2023-04-25

## 2022-04-28 RX ORDER — LORATADINE 10 MG/1
10 TABLET ORAL DAILY
Qty: 90 TABLET | Refills: 3 | Status: SHIPPED | OUTPATIENT
Start: 2022-04-28 | End: 2023-04-25

## 2022-04-28 ASSESSMENT — PAIN SCALES - GENERAL: PAINLEVEL: MODERATE PAIN (5)

## 2022-04-28 ASSESSMENT — ENCOUNTER SYMPTOMS
JOINT SWELLING: 0
COUGH: 1
HEMATOCHEZIA: 0
NERVOUS/ANXIOUS: 0
DIARRHEA: 0
WEAKNESS: 0
HEADACHES: 1
MYALGIAS: 1
HEARTBURN: 0
DYSURIA: 0
EYE PAIN: 0
FREQUENCY: 1
ARTHRALGIAS: 1
CONSTIPATION: 0
FEVER: 0
CHILLS: 1
HEMATURIA: 0
DIZZINESS: 0
PALPITATIONS: 0
BREAST MASS: 0
SHORTNESS OF BREATH: 0
PARESTHESIAS: 0
NAUSEA: 0
ABDOMINAL PAIN: 0

## 2022-04-28 ASSESSMENT — ACTIVITIES OF DAILY LIVING (ADL): CURRENT_FUNCTION: NO ASSISTANCE NEEDED

## 2022-04-28 NOTE — PATIENT INSTRUCTIONS
Swallowing problems:  Order placed for cervical traction    Shingles pain/Itch:  Can try Hydrocortisone, topical Benadryl, ice to help calm down the itching  Can also try Claritin 10 mg daily; this could also help with cough    Cough:  May be related to swallowing problems?  Try Claritin as above  If not helpful, may try Singulair    Patient Education   Personalized Prevention Plan  You are due for the preventive services outlined below.  Your care team is available to assist you in scheduling these services.  If you have already completed any of these items, please share that information with your care team to update in your medical record.  Health Maintenance Due   Topic Date Due    Asthma Action Plan - yearly  03/02/2021    Asthma Control Test  09/04/2021       Understanding USDA MyPlate  The USDA has guidelines to help you make healthy food choices. These are called MyPlate. MyPlate shows the food groups that make up healthy meals using the image of a place setting. Before you eat, think about the healthiest choices for what to put on your plate or in your cup or bowl. To learn more about building a healthy plate, visit www.choosemyplate.gov.    The food groups  Fruits. Any fruit or 100% fruit juice counts as part of the Fruit Group. Fruits may be fresh, canned, frozen, or dried, and may be whole, cut-up, or pureed. Make 1/2 of your plate fruits and vegetables.  Vegetables. Any vegetable or 100% vegetable juice counts as a member of the Vegetable Group. Vegetables may be fresh, frozen, canned, or dried. They can be served raw or cooked and may be whole, cut-up, or mashed. Make 1/2 of your plate fruits and vegetables.  Grains. All foods made from grains are part of the Grains Group. These include wheat, rice, oats, cornmeal, and barley. Grains are often used to make foods such as bread, pasta, oatmeal, cereal, tortillas, and grits. Grains should be no more than 1/4 of your plate. At least half of your grains  should be whole grains.  Protein. This group includes meat, poultry, seafood, beans and peas, eggs, processed soy products (such as tofu), nuts (including nut butters), and seeds. Make protein choices no more than 1/4 of your plate. Meat and poultry choices should be lean or low fat.  Dairy. The Dairy Group includes all fluid milk products and foods made from milk that contain calcium, such as yogurt and cheese. (Foods that have little calcium, such as cream, butter, and cream cheese, are not part of this group.) Most dairy choices should be low-fat or fat-free.  Oils. Oils aren't a food group, but they do contain essential nutrients. However it's important to watch your intake of oils. These are fats that are liquid at room temperature. They include canola, corn, olive, soybean, vegetable, and sunflower oil. Foods that are mainly oil include mayonnaise, certain salad dressings, and soft margarines. You likely already get your daily oil allowance from the foods you eat.  Things to limit  Eating healthy also means limiting these things in your diet:     Salt (sodium). Many processed foods have a lot of sodium. To keep sodium intake down, eat fresh vegetables, meats, poultry, and seafood when possible. Purchase low-sodium, reduced-sodium, or no-salt-added food products at the store. And don't add salt to your meals at home. Instead, season them with herbs and spices such as dill, oregano, cumin, and paprika. Or try adding flavor with lemon or lime zest and juice.  Saturated fat. Saturated fats are most often found in animal products such as beef, pork, and chicken. They are often solid at room temperature, such as butter. To reduce your saturated fat intake, choose leaner cuts of meat and poultry. And try healthier cooking methods such as grilling, broiling, roasting, or baking. For a simple lower-fat swap, use plain nonfat yogurt instead of mayonnaise when making potato salad or macaroni salad.  Added sugars. These  are sugars added to foods. They are in foods such as ice cream, candy, soda, fruit drinks, sports drinks, energy drinks, cookies, pastries, jams, and syrups. Cut down on added sugars by sharing sweet treats with a family member or friend. You can also choose fruit for dessert, and drink water or other unsweetened beverages.     [employer] last reviewed this educational content on 6/1/2020 2000-2021 [employer]. All rights reserved. This information is not intended as a substitute for professional medical care. Always follow your healthcare professional's instructions.          Signs of Hearing Loss      Hearing much better with one ear can be a sign of hearing loss.   Hearing loss is a problem shared by many people. In fact, it is one of the most common health problems, particularly as people age. Most people age 65 and older have some hearing loss. By age 80, almost everyone does. Hearing loss often occurs slowly over the years. So you may not realize your hearing has gotten worse.  Have your hearing checked  Call your healthcare provider if you:  Have to strain to hear normal conversation  Have to watch other people s faces very carefully to follow what they re saying  Need to ask people to repeat what they ve said  Often misunderstand what people are saying  Turn the volume of the television or radio up so high that others complain  Feel that people are mumbling when they re talking to you  Find that the effort to hear leaves you feeling tired and irritated  Notice, when using the phone, that you hear better with one ear than the other  [employer] last reviewed this educational content on 1/1/2020 2000-2021 [employer]. All rights reserved. This information is not intended as a substitute for professional medical care. Always follow your healthcare professional's instructions.          Urinary Incontinence, Female (Adult)   Urinary incontinence means loss of bladder control. This  problem affects many women, especially as they get older. If you have incontinence, you may be embarrassed to ask for help. But know that this problem can be treated.   Types of Incontinence  There are different types of incontinence. Two of the main types are described here. You can have more than one type.   Stress incontinence. With this type, urine leaks when pressure (stress) is put on the bladder. This may happen when you cough, sneeze, or laugh. Stress incontinence most often occurs because the pelvic floor muscles that support the bladder and urethra are weak. This can happen after pregnancy and vaginal childbirth or a hysterectomy. It can also be due to excess body weight or hormone changes.  Urge incontinence (also called overactive bladder). With this type, a sudden urge to urinate is felt often. This may happen even though there may not be much urine in the bladder. The need to urinate often during the night is common. Urge incontinence most often occurs because of bladder spasms. This may be due to bladder irritation or infection. Damage to bladder nerves or pelvic muscles, constipation, and certain medicines can also lead to urge incontinence.  Treatment depends on the cause. Further evaluation is needed to find the type you have. This will likely include an exam and certain tests. Based on the results, you and your healthcare provider can then plan treatment. Until a diagnosis is made, the home care tips below can help ease symptoms.   Home care  Do pelvic floor muscle exercises, if they are prescribed. The pelvic floor muscles help support the bladder and urethra. Many women find that their symptoms improve when doing special exercises that strengthen these muscles. To do the exercises, contract the muscles you would use to stop your stream of urine. But do this when you re not urinating. Hold for 10 seconds, then relax. Repeat 10 to 20 times in a row, at least 3 times a day. Your healthcare provider  may give you other instructions for how to do the exercises and how often.  Keep a bladder diary. This helps track how often and how much you urinate over a set period of time. Bring this diary with you to your next visit with the provider. The information can help your provider learn more about your bladder problem.  Lose weight, if advised to by your provider. Extra weight puts pressure on the bladder. Your provider can help you create a weight-loss plan that s right for you. This may include exercising more and making certain diet changes.  Don't have foods and drinks that may irritate the bladder. These can include alcohol and caffeinated drinks.  Quit smoking. Smoking and other tobacco use can lead to a long-term (chronic) cough that strains the pelvic floor muscles. Smoking may also damage the bladder and urethra. Talk with your provider about treatments or methods you can use to quit smoking.  If drinking large amounts of fluid makes you have symptoms, you may be advised to limit your fluid intake. You may also be advised to drink most of your fluids during the day and to limit fluids at night.  If you re worried about urine leakage or accidents, you may wear absorbent pads to catch urine. Change the pads often. This helps reduce discomfort. It may also reduce the risk of skin or bladder infections.    Follow-up care  Follow up with your healthcare provider, or as directed. It may take some to find the right treatment for your problem. But healthy lifestyle changes can be made right away. These include such things as exercising on a regular basis, eating a healthy diet, losing weight (if needed), and quitting smoking. Your treatment plan may include special therapies or medicines. Certain procedures or surgery may also be options. Talk about any questions you have with your provider.   When to seek medical advice  Call the healthcare provider right away if any of these occur:  Fever of 100.4 F (38 C) or  higher, or as directed by your provider  Bladder pain or fullness  Belly swelling  Nausea or vomiting  Back pain  Weakness, dizziness, or fainting  [employer] last reviewed this educational content on 1/1/2020 2000-2021 [employer]. All rights reserved. This information is not intended as a substitute for professional medical care. Always follow your healthcare professional's instructions.          Preventing Falls at Home  A person can fall for many reasons. Older adults may fall because reaction time slows down as we age. Your muscles and joints may get stiff, weak, or less flexible because of illness, medicines, or a physical condition.   Other health problems that make falls more likely include:   Arthritis  Dizziness or lightheadedness when you stand up (orthostatic hypotension)  History of a stroke  Dizziness  Anemia  Certain medicines taken for mental illness or to control blood pressure.  Problems with balance or gait  Bladder or urinary problems  History of falling  Changes in vision (vision impairment)  Changes in thinking skills and memory (cognitive impairment)  Injuries from a fall can include serious injuries such as broken bones, dislocated joints, internal bleeding and cuts. Injuries like these can limit your independence.   Prevention tips  To help prevent falls and fall-related injuries, follow the tips below.    Floors  To make floors safer:   Put nonskid pads under area rugs.  Remove small rugs.  Replace worn floor coverings.  Tack carpets firmly to each step on carpeted stairs. Put nonskid strips on the edges of uncarpeted stairs.  Keep floors and stairs free of clutter and cords.  Arrange furniture so there are clear pathways.  Clean up any spills right away.  Bathrooms    To make bathrooms safer:   Install grab bars in the tub or shower.  Apply nonskid strips or put a nonskid rubber mat in the tub or shower.  Sit on a bath chair to bathe.  Use bathmats with nonskid  backing.  Lighting  To improve visibility in your home:    Keep a flashlight in each room. Or put a lamp next to the bed within easy reach.  Put nightlights in the bedrooms, hallways, kitchen, and bathrooms.  Make sure all stairways have good lighting.  Take your time when going up and down stairs.  Put handrails on both sides of stairs and in walkways for more support. To prevent injury to your wrist or arm, don t use handrails to pull yourself up.  Install grab bars to pull yourself up.  Move or rearrange items that you use often. This will make them easier to find or reach.  Look at your home to find any safety hazards. Especially look at doorways, walkways, and the driveway. Remove or repair any safety problems that you find.  Other changes to make  Look around to find any safety hazards. Look closely at doorways, walkways, and the driveway. Remove or repair any safety problems that you find.  Wear shoes that fit well.  Take your time when going up and down stairs.  Put handrails on both sides of stairs and in walkways for more support. To prevent injury to your wrist or arm, don t use handrails to pull yourself up.  Install grab bars wherever needed to pull yourself up.  Arrange items that you use often. This will make them easier to find or reach.    [employer] last reviewed this educational content on 3/1/2020    0273-2342 [employer]. All rights reserved. This information is not intended as a substitute for professional medical care. Always follow your healthcare professional's instructions.

## 2022-04-28 NOTE — PROGRESS NOTES
"SUBJECTIVE:   Terese Bell is a 71 year old female who presents for Preventive Visit.    Patient has been advised of split billing requirements and indicates understanding: Yes  Are you in the first 12 months of your Medicare coverage?  No    Healthy Habits:     In general, how would you rate your overall health?  Good    Frequency of exercise:  2-3 days/week    Duration of exercise:  Greater than 60 minutes    Do you usually eat at least 4 servings of fruit and vegetables a day, include whole grains    & fiber and avoid regularly eating high fat or \"junk\" foods?  No    Taking medications regularly:  No    Barriers to taking medications:  Other    Medication side effects:  None    Ability to successfully perform activities of daily living:  No assistance needed    Home Safety:  Throw rugs in the hallway and lack of grab bars in the bathroom    Hearing Impairment:  Need to ask people to speak up or repeat themselves    In the past 6 months, have you been bothered by leaking of urine? Yes    In general, how would you rate your overall mental or emotional health?  Good      PHQ-2 Total Score: 0    Additional concerns today:  Yes (would like to discuss a neck fraction unit, is going to physical therapy for spine. Needs a referral for that. Renew cannibas. )    Cough:  --hacking cough for a few months;  No fevers  --occ shortness of breath - \"like I need to take a deep breath\"  --no wheezing; uses humidifier at night;  No chest pain, pleuritic or otherwise; rarely feels a chest tightness  --cough is worse at night but doesn't wake her up  --rare heartburn    Neck pain:  --physical therapy is recommending traction for swallowing problems and neck pain  --more difficulty with swallowing  --last saw Speech in March, was recommended to do specific exercises to strengthen the swallowing; no modifications to diet was recommended     Chronic Pain:  --needs renewal of medical cannabis   --finds it helpful; uses it " regularly;  Rarely uses oxycodone;  Helps with left neck/head from shingles  --initially caused drowsiness, this has resolved.  --has post-herpetic neuralgia of the left scalp;  Was told she has 'permement shingles;  Has severe itching over area at times.  --uses over the counter lidocaine, CBD;  Wonders about another topical option    Do you feel safe in your environment? Yes    Have you ever done Advance Care Planning? (For example, a Health Directive, POLST, or a discussion with a medical provider or your loved ones about your wishes): No, advance care planning information given to patient to review.  Patient declined advance care planning discussion at this time.       Fall risk  Fallen 2 or more times in the past year?: No  Any fall with injury in the past year?: Yes (hurt left knee, missed step)    Cognitive Screening   1) Repeat 3 items (Leader, Season, Table)    2) Clock draw: ABNORMAL arrows wrong  3) 3 item recall: Recalls 2 objects   Results: ABNORMAL clock, 1-2 items recalled: PROBABLE COGNITIVE IMPAIRMENT, **INFORM PROVIDER**  Has known cognitive impairment from cancer treatment     Mini-CogTM Copyright JEOVANNY Carter. Licensed by the author for use in [facility]; reprinted with permission (harshil@[email or web address]). All rights reserved.      Do you have sleep apnea, excessive snoring or daytime drowsiness?: no    Reviewed and updated as needed this visit by clinical staff   Tobacco  Allergies  Meds  Problems  Med Hx  Surg Hx  Fam Hx  Soc   Hx          Reviewed and updated as needed this visit by Provider    Allergies  Meds  Problems              Social History     Tobacco Use     Smoking status: Former Smoker     Packs/day: 2.00     Years: 7.00     Pack years: 14.00     Types: Cigarettes     Start date: 1970     Quit date: 1980     Years since quittin.3     Smokeless tobacco: Never Used   Substance Use Topics     Alcohol use: Not Currently     Comment: 4 drinks per year     If you  drink alcohol do you typically have >3 drinks per day or >7 drinks per week? No    Alcohol Use 4/28/2022   Prescreen: >3 drinks/day or >7 drinks/week? Not Applicable   Prescreen: >3 drinks/day or >7 drinks/week? -       Current providers sharing in care for this patient include:   Patient Care Team:  Vanna Rubio DO as PCP - General (Internal Medicine)  Thomas Ferris MD as MD (Otolaryngology)  Juwan Zhang MD as MD (Radiation Oncology)  Juwan Zhang MD as Assigned Cancer Care Provider  Vanna Rubio DO as Assigned PCP  Thomas Ferris MD as Assigned Surgical Provider    The following health maintenance items are reviewed in Epic and correct as of today:  Health Maintenance Due   Topic Date Due     ASTHMA ACTION PLAN  03/02/2021     ASTHMA CONTROL TEST  09/04/2021     Current Outpatient Medications   Medication Sig Dispense Refill     albuterol (PROAIR HFA/PROVENTIL HFA/VENTOLIN HFA) 108 (90 Base) MCG/ACT inhaler Inhale 2 puffs into the lungs every 4 hours as needed for shortness of breath / dyspnea or wheezing 1 Inhaler 11     Ascorbic Acid (VITAMIN C PO)        CALCIUM PO        CANNABIDIOL, HEMP OIL/EXTRACT, OR CBD PRODUCT OTHER THAN MEDICAL CANNABIS, Apply topically daily       lidocaine (XYLOCAINE) 5 % external ointment Apply topically as needed for moderate pain 50 g 0     Multiple Vitamin (MULTIVITAMIN ADULT PO)        Turmeric 500 MG CAPS        vitamin D3 (CHOLECALCIFEROL) 2000 units tablet Take 1 tablet by mouth daily       augmented betamethasone dipropionate (DIPROLENE-AF) 0.05 % external cream Apply sparingly to affected area twice daily as needed.  Do not apply to face. (Patient not taking: Reported on 4/28/2022) 50 g 11     DENTAGEL 1.1 % GEL topical gel Apply to affected area At Bedtime (Patient not taking: Reported on 4/28/2022) 112 g 0     EPINEPHrine (ANY BX GENERIC EQUIV) 0.3 MG/0.3ML injection 2-pack Inject 0.3 mLs (0.3 mg) into the muscle once as needed for anaphylaxis  (Patient not taking: No sig reported) 0.6 mL 3     ibuprofen (ADVIL/MOTRIN) 100 MG tablet Take 100 mg by mouth every 4 hours as needed (Patient not taking: Reported on 4/28/2022)       indomethacin (INDOCIN) 25 MG capsule Take 1 capsule (25 mg) by mouth 2 times daily (with meals) (Patient not taking: No sig reported) 60 capsule 3     MAGNESIUM PO        methylPREDNISolone (MEDROL) 32 MG tablet Take 1 tablet 12 hours before procedure, then take 1 tablet 2 hours before procedure. (Patient not taking: Reported on 4/28/2022) 2 tablet 0     nitroGLYcerin (NITRO-DUR) 0.4 MG/HR 24 hr patch Cut into 1/4 and place 1/4 over the area of pain.  Change every 24 hours. (Patient not taking: No sig reported) 30 patch 11     pilocarpine (SALAGEN) 5 MG tablet Take 1 tablet (5 mg) by mouth 3 times daily as needed (dry mouth) (Patient not taking: No sig reported) 90 tablet 3             Review of Systems   Constitutional: Positive for chills. Negative for fever.   HENT: Positive for congestion, ear pain and hearing loss.    Eyes: Negative for pain and visual disturbance.   Respiratory: Positive for cough. Negative for shortness of breath.    Cardiovascular: Negative for chest pain, palpitations and peripheral edema.   Gastrointestinal: Negative for abdominal pain, constipation, diarrhea, heartburn, hematochezia and nausea.   Breasts:  Negative for tenderness, breast mass and discharge.   Genitourinary: Positive for frequency. Negative for dysuria, genital sores, hematuria, pelvic pain, urgency, vaginal bleeding and vaginal discharge.   Musculoskeletal: Positive for arthralgias and myalgias. Negative for joint swelling.   Skin: Negative for rash.   Neurological: Positive for headaches. Negative for dizziness, weakness and paresthesias.   Psychiatric/Behavioral: Negative for mood changes. The patient is not nervous/anxious.          OBJECTIVE:   /72   Pulse 68   Temp 97.2  F (36.2  C) (Tympanic)   Resp 14   Ht 1.607 m (5'  "3.25\")   Wt 74.4 kg (164 lb)   SpO2 99%   BMI 28.82 kg/m   Estimated body mass index is 28.82 kg/m  as calculated from the following:    Height as of this encounter: 1.607 m (5' 3.25\").    Weight as of this encounter: 74.4 kg (164 lb).  Physical Exam  GENERAL: alert, no distress and elderly  EYES: Eyes grossly normal to inspection, PERRL and conjunctivae and sclerae normal  NECK: no adenopathy, no asymmetry, masses, or scars and thyroid normal to palpation  RESP: lungs clear to auscultation - no rales, rhonchi or wheezes  CV: regular rate and rhythm, normal S1 S2, no S3 or S4, no murmur, click or rub, no peripheral edema and peripheral pulses strong  ABDOMEN: soft, nontender, no hepatosplenomegaly, no masses and bowel sounds normal  MS: no gross musculoskeletal defects noted, no edema  SKIN: Chronic appearing ulcerations and scabbed over lesions in the left postauricular, scalp area.  No primary lesions are seen  NEURO: Normal strength and tone, mentation intact and speech normal  PSYCH: mentation appears normal, affect normal/bright        ASSESSMENT / PLAN:   (Z00.00) Encounter for Medicare annual wellness exam  (primary encounter diagnosis)  Comment:   Plan:     (J45.20) Mild intermittent asthma without complication  Comment:  - stable, refill provided  Plan: OFFICE/OUTPT VISIT,EST,LEVL IV, albuterol         (PROAIR HFA/PROVENTIL HFA/VENTOLIN HFA) 108 (90        Base) MCG/ACT inhaler, Miscellaneous Order for         DME - ONLY FOR DME, loratadine (CLARITIN) 10 MG        tablet            (Z91.030) Allergy to bee sting  Comment:  - stable, refill provided  Plan: OFFICE/OUTPT VISIT,EST,LEVL IV, EPINEPHrine         (ANY BX GENERIC EQUIV) 0.3 MG/0.3ML injection         2-pack, Miscellaneous Order for DME - ONLY FOR         DME            (M10.071) Acute idiopathic gout of right foot  Comment:  - stable, refill provided  Plan: OFFICE/OUTPT VISIT,EST,LEVL IV, Miscellaneous         Order for DME - ONLY FOR DME        " "    (K11.7,  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  Plan: OFFICE/OUTPT VISIT,EST,LEVL IV, Miscellaneous         Order for DME - ONLY FOR DME            (B02.29) Post herpetic neuralgia  Comment: Advised to see dermatology as she has profound itching related to postherpetic neuralgia.  Plan: OFFICE/OUTPT VISIT,EST,LEVL IV, nitroGLYcerin         (NITRO-DUR) 0.4 MG/HR 24 hr patch,         Miscellaneous Order for DME - ONLY FOR DME,         Adult Dermatology Referral            (G89.4) Chronic pain syndrome  Comment:   Plan: nitroGLYcerin (NITRO-DUR) 0.4 MG/HR 24 hr patch              Patient has been advised of split billing requirements and indicates understanding: Yes    COUNSELING:  Reviewed preventive health counseling, as reflected in patient instructions    Estimated body mass index is 28.82 kg/m  as calculated from the following:    Height as of this encounter: 1.607 m (5' 3.25\").    Weight as of this encounter: 74.4 kg (164 lb).        She reports that she quit smoking about 42 years ago. Her smoking use included cigarettes. She started smoking about 52 years ago. She has a 14.00 pack-year smoking history. She has never used smokeless tobacco.      Appropriate preventive services were discussed with this patient, including applicable screening as appropriate for cardiovascular disease, diabetes, osteopenia/osteoporosis, and glaucoma.  As appropriate for age/gender, discussed screening for colorectal cancer, prostate cancer, breast cancer, and cervical cancer. Checklist reviewing " preventive services available has been given to the patient.    Reviewed patients plan of care and provided an AVS. The Complex Care Plan (for patients with higher acuity and needing more deliberate coordination of services) for Terese meets the Care Plan requirement. This Care Plan has been established and reviewed with the Patient.    Counseling Resources:  ATP IV Guidelines  Pooled Cohorts Equation Calculator  Breast Cancer Risk Calculator  Breast Cancer: Medication to Reduce Risk  FRAX Risk Assessment  ICSI Preventive Guidelines  Dietary Guidelines for Americans, 2010  [employer]'s MyPlate  ASA Prophylaxis  Lung CA Screening    Vanna [facility]    Identified Health Risks:    The patient was counseled and encouraged to consider modifying their diet and eating habits. She was provided with information on recommended healthy diet options.  The patient was provided with written information regarding signs of hearing loss.  Information on urinary incontinence and treatment options given to patient.  She is at risk for falling and has been provided with information to reduce the risk of falling at home.

## 2022-05-03 ENCOUNTER — HOSPITAL ENCOUNTER (OUTPATIENT)
Dept: PHYSICAL THERAPY | Facility: CLINIC | Age: 72
Setting detail: THERAPIES SERIES
Discharge: HOME | End: 2022-05-03
Attending: RADIOLOGY
Payer: MEDICARE

## 2022-05-03 PROCEDURE — 97140 MANUAL THERAPY 1/> REGIONS: CPT | Mod: GP | Performed by: PHYSICAL THERAPIST

## 2022-05-03 PROCEDURE — 97012 MECHANICAL TRACTION THERAPY: CPT | Mod: GP | Performed by: PHYSICAL THERAPIST

## 2022-05-03 PROCEDURE — 97140 MANUAL THERAPY 1/> REGIONS: CPT | Mod: GP | Performed by: REHABILITATION PRACTITIONER

## 2022-05-10 NOTE — PROGRESS NOTES
[facility]    Outpatient Physical Therapy Progress Note  Patient: Terese Bell  : 1950    Beginning/End Dates of Reporting Period:  22 to 22      Referring Provider: Dr Vicente MD     Therapy Diagnosis: H&N lymphedema with myofascial tightness, chronic     Client Self Report: I was amazed at how loose my neck felt after the traction machine with Lisa, my L ear is really sensitive today    Objective Measurements:  Objective Measure: girth (from 3/7/22 to 22)  Details: superior neck -1.5cm, middle neck -1.0cm and lower neck -0.2cm      Goals:  Goal Identifier 1   Goal Description pt to be independent with longterm management of neck via HEP (including ROM exercises and self-MFR) to increase ROM and comfort during functional/daily activities    Target Date 22   Date Met      Progress (detail required for progress note):       Goal Identifier 2   Goal Description pt to increase B cervical rotation to at least 50-55 deg to improve functional range for driving, gardening and house hold tasks/activities   Target Date 22   Date Met  22   Progress (detail required for progress note):         Plan:  Continue therapy per current plan of care. Patient has made good reductions in cervical lymphedema, continues to benefit from MLD and MFR in clinic 1x/week. Pt has also started ortho PT which pt feels like the combination of therapies has been helpful. [id number]/week appropriate at this time.    Discharge:  No

## 2022-05-17 ENCOUNTER — HOSPITAL ENCOUNTER (OUTPATIENT)
Dept: PHYSICAL THERAPY | Facility: CLINIC | Age: 72
Setting detail: THERAPIES SERIES
Discharge: HOME | End: 2022-05-17
Attending: RADIOLOGY
Payer: MEDICARE

## 2022-05-17 PROCEDURE — 97140 MANUAL THERAPY 1/> REGIONS: CPT | Mod: GP | Performed by: PHYSICAL THERAPIST

## 2022-05-17 PROCEDURE — 97140 MANUAL THERAPY 1/> REGIONS: CPT | Mod: GP,CQ,59 | Performed by: REHABILITATION PRACTITIONER

## 2022-05-17 PROCEDURE — 97012 MECHANICAL TRACTION THERAPY: CPT | Mod: GP | Performed by: PHYSICAL THERAPIST

## 2022-05-24 ENCOUNTER — HOSPITAL ENCOUNTER (OUTPATIENT)
Dept: PHYSICAL THERAPY | Facility: CLINIC | Age: 72
Setting detail: THERAPIES SERIES
Discharge: HOME | End: 2022-05-24
Attending: RADIOLOGY
Payer: MEDICARE

## 2022-05-24 PROCEDURE — 97140 MANUAL THERAPY 1/> REGIONS: CPT | Mod: GP | Performed by: PHYSICAL THERAPIST

## 2022-05-24 PROCEDURE — 97012 MECHANICAL TRACTION THERAPY: CPT | Mod: GP | Performed by: PHYSICAL THERAPIST

## 2022-05-24 NOTE — PROGRESS NOTES
[facility]    OUTPATIENT PHYSICAL THERAPY  PLAN OF TREATMENT FOR OUTPATIENT REHABILITATION AND PROGRESS NOTE           Patient's Last Name, First Name, MTerese Pappas Date of Birth  1950   Provider's Name  [facility] Medical Record No.  [id number]    Onset Date  2/15/22 Start of Care Date  2/21/22   Type:     _X_PT   ___OT   ___SLP Medical Diagnosis  Neck stiffness, neck/TMJ issues   PT Diagnosis  Neck/TMJ stiffness, loss of ROM Plan of Treatment  Frequency/Duration: 1x a week for 6-8 weeks  Certification date from 5/24/22 to 7/19/22     Goals:  Goal Identifier 1   Goal Description STG: Pt will note less pain w/ bending over to do HH chores.    Target Date 03/30/22   Date Met  04/05/22   Progress (detail required for progress note):       Goal Identifier 2   Goal Description STG: Pt will note less pain w/ rolling over in bed.    Target Date 03/30/22   Date Met  04/05/22   Progress (detail required for progress note):       Goal Identifier 3   Goal Description STG: Improve Rotation to 55 or better for better safety w/ driving.    Target Date 03/30/22   Date Met  04/05/22   Progress (detail required for progress note):       Goal Identifier 4   Goal Description LTG: Pt will return to management of sx's, ROM.    Target Date 07/19/22   Date Met      Progress (detail required for progress note): Feels like she about 75% back towards her baseline     Goal Identifier NEW GOAL   Goal Description Patient will demonstrate at least a 5-/5 on ER strength testing to decrease upper trap substitution with ADLS to decrease pain and tightness.    Target Date 05/31/22   Date Met  05/24/22   Progress (detail required for progress note):         Beginning/End Dates of Progress Note Reporting Period:  4/5/22 to 5/24/22    Progress Toward Goals:   Progress this  reporting period: Sarah continues to show progress in physical therapy and reports that she is about 75% improved towards her baseline. She is reporting less pain and tightness in her jaw. She reports less pain but still some stiffness in the neck. Progress has yet to plateau and skilled physical therapy still appropriate.     Client Self (Subjective) Report for Progress Note Reporting Period: Jaw feels pretty good, feels just a little swollen in the there. Stiff in the upper neck. The traction continues to be really helpful.    Objective Measurements:   Objective Measure: Mouth opening  Details: minimal  S-pattern with mouth opening and closing  Objective Measure: Shoulder strength  Details: ER 5-/5 B.                      I CERTIFY THE NEED FOR THESE SERVICES FURNISHED UNDER        THIS PLAN OF TREATMENT AND WHILE UNDER MY CARE     (Physician co-signature of this document indicates review and certification of the therapy plan).                Referring Provider: Juwan Zhang MD Nicole A. Wheeler, PT

## 2022-05-25 NOTE — PROGRESS NOTES
S: Waxing and waning symptoms since treatment.  Overall she feels that she is responding.  O:Alert NAD  A: Postherpetic neuralgia  P:Acu per note.  Pre-Procedure:  Patient's Name and Date of Birth verified:  YES  Verified By:  brandy (initials)  Diagnosis:  Data Unavailable  Interval History:  1w  Complications and/or Adverse Effects of Last Acupuncture Treatment: 0   Site Marking and Verification:  Verification of site selection (based on symptoms and condition:  YES  Verified by: brandy (initials)  Patient identification verified by provider: YES  Verified by: brandy   (initials)  Pause for the Cause:  YES  Verified by: brandy   (initials)   Procedure Note:  Acupuncture Treatment Number: 4  Acupuncture Points Selected: mixed facial L side dominant, Four Pereyra, AUR:ASP 1-3.    Electrical Stim: No  Frequency 0 HZ    Number of needles:  22    Re-insertion/Manipulation of needles after initial 15 minutes: YES  Time:  30 Minutes   Post-Procedure:  Complication/Adverse Effects: 0      Management:  0      Signature:  Juan Can MD       
[id number]

## 2022-05-31 ENCOUNTER — HOSPITAL ENCOUNTER (OUTPATIENT)
Dept: PHYSICAL THERAPY | Facility: CLINIC | Age: 72
Setting detail: THERAPIES SERIES
Discharge: HOME | End: 2022-05-31
Attending: RADIOLOGY
Payer: MEDICARE

## 2022-05-31 PROCEDURE — 97140 MANUAL THERAPY 1/> REGIONS: CPT | Mod: GP,CQ | Performed by: REHABILITATION PRACTITIONER

## 2022-05-31 PROCEDURE — 97012 MECHANICAL TRACTION THERAPY: CPT | Mod: GP | Performed by: PHYSICAL THERAPIST

## 2022-05-31 PROCEDURE — 97140 MANUAL THERAPY 1/> REGIONS: CPT | Mod: GP,59 | Performed by: PHYSICAL THERAPIST

## 2022-06-07 ENCOUNTER — HOSPITAL ENCOUNTER (OUTPATIENT)
Dept: PHYSICAL THERAPY | Facility: CLINIC | Age: 72
Setting detail: THERAPIES SERIES
Discharge: HOME | End: 2022-06-07
Attending: RADIOLOGY
Payer: MEDICARE

## 2022-06-07 PROCEDURE — 97140 MANUAL THERAPY 1/> REGIONS: CPT | Mod: GP,59 | Performed by: PHYSICAL THERAPIST

## 2022-06-07 PROCEDURE — 97012 MECHANICAL TRACTION THERAPY: CPT | Mod: GP | Performed by: PHYSICAL THERAPIST

## 2022-06-10 ENCOUNTER — HOSPITAL ENCOUNTER (OUTPATIENT)
Dept: PHYSICAL THERAPY | Facility: CLINIC | Age: 72
Setting detail: THERAPIES SERIES
Discharge: HOME | End: 2022-06-10
Attending: RADIOLOGY
Payer: MEDICARE

## 2022-06-10 PROCEDURE — 97140 MANUAL THERAPY 1/> REGIONS: CPT | Mod: GP | Performed by: PHYSICAL THERAPIST

## 2022-06-10 PROCEDURE — 97012 MECHANICAL TRACTION THERAPY: CPT | Mod: GP | Performed by: PHYSICAL THERAPIST

## 2022-06-14 ENCOUNTER — HOSPITAL ENCOUNTER (OUTPATIENT)
Dept: PHYSICAL THERAPY | Facility: CLINIC | Age: 72
Setting detail: THERAPIES SERIES
Discharge: HOME | End: 2022-06-14
Attending: RADIOLOGY
Payer: MEDICARE

## 2022-06-14 PROCEDURE — 97140 MANUAL THERAPY 1/> REGIONS: CPT | Mod: GP | Performed by: PHYSICAL THERAPIST

## 2022-06-14 PROCEDURE — 97012 MECHANICAL TRACTION THERAPY: CPT | Mod: GP,KX | Performed by: PHYSICAL THERAPIST

## 2022-06-15 NOTE — PROGRESS NOTES
[facility]    Outpatient Physical Therapy Progress Note  Patient: Terese Bell  : 1950    Beginning/End Dates of Reporting Period:  22 to 22    Referring Provider: Dr. Vicente MD    Therapy Diagnosis: H&N lymphedema, chronic, with significant fibrosis that directly leads to impaired/decreased cervical ROM     Client Self Report: really sore today I did alot of yard work/garden all weekend    Objective Measurements:  Objective Measure: girth  Details: all cervical girth measurements have decreased     Goals:  Goal Identifier 1   Goal Description pt to be independent with longterm management of neck via HEP (including ROM exercises and self-MFR) to increase ROM and comfort during functional/daily activities    Target Date 22   Date Met      Progress (detail required for progress note):  progressing     Goal Identifier 2   Goal Description pt to increase B cervical rotation to at least 50-55 deg to improve functional range for driving, gardening and house hold tasks/activities   Target Date 22   Date Met  22   Progress (detail required for progress note):         Plan:  Continue therapy per current plan of care of 1x/week with anticipated ability to decreased to 1x every other week over the next reporting period. Pt has met her ROM goal, but continue to work on manual self techniques and exercises for pt's home program for longterm management for maintenance.     Discharge:  No      RECERTIFICATION    Terese Bell  1950  MRN: [id number]    Session Number: [id number]/Vicente/Medicare since start of care.    Diagnosis: H&N lymphedema, chronic, with significant fibrosis that directly leads to impaired/decreased cervical ROM  Onset Date: 22 (date of referral)  Start of Care: 3/7/22 (initial evaluation)    Reasons for Continuing Treatment:   Goals not yet met  - see above    Frequency/Duration  1 times per week for 12 weeks for a total of 12 visits.    Recertification Period  6/5/22 - 9/5/22    Physician Signature:    Date:    X_______________________________________________________    Physician Name: Dr Vicente MD    I certify the need for these services furnished under this plan of treatment and while under my care. Physician co-signature of this document indicates review and certification of the therapy plan.  This signature may be written on paper, or electronically signed within EPIC.

## 2022-06-17 ENCOUNTER — HOSPITAL ENCOUNTER (OUTPATIENT)
Dept: PHYSICAL THERAPY | Facility: CLINIC | Age: 72
Setting detail: THERAPIES SERIES
Discharge: HOME | End: 2022-06-17
Attending: RADIOLOGY
Payer: MEDICARE

## 2022-06-17 PROCEDURE — 97012 MECHANICAL TRACTION THERAPY: CPT | Mod: GP | Performed by: PHYSICAL THERAPIST

## 2022-06-17 PROCEDURE — 97140 MANUAL THERAPY 1/> REGIONS: CPT | Mod: GP,KX,59 | Performed by: PHYSICAL THERAPIST

## 2022-06-21 ENCOUNTER — HOSPITAL ENCOUNTER (OUTPATIENT)
Dept: PHYSICAL THERAPY | Facility: CLINIC | Age: 72
Setting detail: THERAPIES SERIES
Discharge: HOME | End: 2022-06-21
Attending: RADIOLOGY
Payer: MEDICARE

## 2022-06-21 PROCEDURE — 97140 MANUAL THERAPY 1/> REGIONS: CPT | Mod: GP,59,KX | Performed by: PHYSICAL THERAPIST

## 2022-06-21 PROCEDURE — 97012 MECHANICAL TRACTION THERAPY: CPT | Mod: GP,KX | Performed by: PHYSICAL THERAPIST

## 2022-06-24 ENCOUNTER — HOSPITAL ENCOUNTER (OUTPATIENT)
Dept: PHYSICAL THERAPY | Facility: CLINIC | Age: 72
Setting detail: THERAPIES SERIES
Discharge: HOME | End: 2022-06-24
Attending: RADIOLOGY
Payer: MEDICARE

## 2022-06-24 PROCEDURE — 97110 THERAPEUTIC EXERCISES: CPT | Mod: GP | Performed by: PHYSICAL THERAPIST

## 2022-06-24 PROCEDURE — 97140 MANUAL THERAPY 1/> REGIONS: CPT | Mod: GP,KX | Performed by: PHYSICAL THERAPIST

## 2022-07-12 ENCOUNTER — HOSPITAL ENCOUNTER (OUTPATIENT)
Dept: PHYSICAL THERAPY | Facility: CLINIC | Age: 72
Setting detail: THERAPIES SERIES
Discharge: HOME | End: 2022-07-12
Attending: RADIOLOGY
Payer: MEDICARE

## 2022-07-12 PROCEDURE — 97110 THERAPEUTIC EXERCISES: CPT | Mod: GP | Performed by: PHYSICAL THERAPIST

## 2022-07-12 NOTE — PROGRESS NOTES
[facility]    Outpatient Physical Therapy Progress Note  Patient: Terese Bell  : 1950    Beginning/End Dates of Reporting Period:  22 to 22    Referring Provider: Dr. Gume MD    Therapy Diagnosis: H&N lymphedema, chronic, with significant fibrosis that directly leads to impaired/decreased cervical ROM     Client Self Report: my neck is still sore from my fall I think    Objective Measurements:  No meausurements taken this reporting period as patient only seen for 1 session/treatment this reporting period    Goals:  Goal Identifier 1   Goal Description pt to be independent with longterm management of neck via HEP (including ROM exercises and self-MFR) to increase ROM and comfort during functional/daily activities    Target Date 22   Date Met      Progress (detail required for progress note):       Goal Identifier 2   Goal Description pt to increase B cervical rotation to at least 50-55 deg to improve functional range for driving, gardening and house hold tasks/activities   Target Date 22   Date Met  22   Progress (detail required for progress note):         Plan:  Other: Patient currently focusing on her orthopedic PT session but will keep patient's chart open until end of cet (22) so we can continue to work on manual self techniques and exercises for pt's home program for longterm management for maintenance and pt has ability to return to our clinic when/if needed.    Discharge:  No

## 2022-07-12 NOTE — PROGRESS NOTES
[facility]    Outpatient Physical Therapy Discharge Note  Patient: Terese Bell  : 1950    Beginning/End Dates of Reporting Period:  22 to 22    Referring Provider: Juwan Zhang MD     Therapy Diagnosis: Neck/TMJ stiffness, loss of ROM     Client Self Report: Patient reports her neck has been doing pretty good. Has been using the traction machine and that has helped. She reports no jaw issues in the last 2 1/2 weeks. Feels like she can manage on her own now.    Objective Measurements:  Objective Measure: Cervical AROM  Details: SB R 35 L 38, Flexion 48, Extension 28, Rotation R 62 L 59      Goals:  Goal Identifier 1   Goal Description STG: Pt will note less pain w/ bending over to do HH chores.    Target Date 22   Date Met  22   Progress (detail required for progress note):       Goal Identifier 2   Goal Description STG: Pt will note less pain w/ rolling over in bed.    Target Date 22   Date Met  22   Progress (detail required for progress note):       Goal Identifier 3   Goal Description STG: Improve Rotation to 55 or better for better safety w/ driving.    Target Date 22   Date Met  22   Progress (detail required for progress note):       Goal Identifier 4   Goal Description LTG: Pt will return to management of sx's, ROM.    Target Date 22   Date Met      Progress (detail required for progress note): Feels like she about 75% back towards her baseline     Goal Identifier NEW GOAL   Goal Description Patient will demonstrate at least a 5-/5 on ER strength testing to decrease upper trap substitution with ADLS to decrease pain and tightness.    Target Date 22   Date Met  22   Progress (detail required for progress note):         Melania has made significant progress since initiating physical therapy. She is now pain free in her  TMJ. She reports TMJ is popping less and she is able to open her mouth and eat food better. She is also reporting less pain and improved movement in her neck. She states her home traction unit has been helpful to maintain what improvements she gained in physical therapy. Skilled physical is no longer required at this time.        Plan:  Discharge from therapy.    Discharge:    Reason for Discharge: Patient has met all goals.    Equipment Issued: theraband    Discharge Plan: Patient to continue home program.      Please contact me with any questions or concerns.  Thank you for your referral.    Lisa Palma, PT, DPT, OCS  Physical Therapist, Orthopedic Certified Specialist    [facility]  [address] [address]  [email or web address]   Office: [phone]   Employed by [employer]

## 2022-08-04 NOTE — DISCHARGE SUMMARY
[facility]    Outpatient Speech Language Pathology Discharge Note  Patient: Terese Bell  : 1950    Beginning/End Dates of Reporting Period:  3/10/22 to 3/31/22    Referring Provider: Juwan Zhang MD    Therapy Diagnosis: C76.0 (ICD-10-CM) - Head and neck cancer (H); R13.10 (ICD-10-CM) - Dysphagia    Client Self Report: Patient attends session independently. She reports that she has been doing tongue exercises regularly but has been busy and is inconsistent with pharyngeal exercises. She reports some choking over the past two weeks but that she has been experimenting with different solid textures and finding out what works best. Patient reports desire to attend a few more treatments to discuss pharyngeal strengthening.       Outcome Measures (most recent score):        Goals:  Goal Identifier Safe Swallow Strategies   Goal Description Patient will report using safe swallow strategies to reduce instances of choking.   Target Date 22   Date Met      Progress (detail required for progress note): Patient reports feeling her swallowing is improving from safety standpoint and that she has been doing well altering her diet to reduce issues.     Goal Identifier phayngeal strengthening exercises   Goal Description Patient will perform PSE with min cues and 90% accuracy to increase pharyngeal strength of swallow.   Target Date 22   Date Met      Progress (detail required for progress note): Patient and clinician reviewed pharyngeal strengthening exercises and Patient completed with 70% accuracy. Patient educated on EMST 150 as a possible option to try. She is provided handout of pharyngeal exercises and information about EMST 150.         Plan:  Discharge from therapy.    Discharge:    Reason for Discharge: Patient has failed to schedule further appointments.    Discharge Plan:  Unknown. This is last known data. Clinician did call Patient at later date and Patient said she would think about scheduling more but did not.

## 2022-08-16 NOTE — PROGRESS NOTES
[facility]    Outpatient Physical Therapy Discharge Note  Patient: Terese Bell  : 1950    Beginning/End Dates of Reporting Period:  22 to 22    Referring Provider: Dr. Vicente MD    Therapy Diagnosis: H&N lymphedema, chronic, with significant fibrosis that directly leads to impaired/decreased cervical ROM     Client Self Report: Not seen this reporting period    Objective Measurements:  Not seen this reporting period    Goals:  Goal Identifier 1   Goal Description pt to be independent with longterm management of neck via HEP (including ROM exercises and self-MFR) to increase ROM and comfort during functional/daily activities    Target Date 22   Date Met      Progress (detail required for progress note):  Partially met     Goal Identifier 2   Goal Description pt to increase B cervical rotation to at least 50-55 deg to improve functional range for driving, gardening and house hold tasks/activities   Target Date 22   Date Met  22   Progress (detail required for progress note):       Plan:  Discharge from therapy.  Goal #1 partially met - pt was doing a great job with her home program but last seen on 22 (not this reporting period) so not officially able to review home program at length / in detail, however, assume patient is doing well on own at home with lymphedema and myofascial tightness management as she hasn't been seen in clinic in 2 months - will be discharged at this time.    Discharge:    Reason for Discharge: Patient has failed to schedule further appointments.    Equipment Issued: none    Discharge Plan: Patient to continue home program.

## 2022-09-12 DIAGNOSIS — C02.9 MALIGNANT NEOPLASM OF TONGUE (H): ICD-10-CM

## 2022-09-13 RX ORDER — SODIUM FLUORIDE 1.1 G/100G
GEL ORAL
Qty: 112 G | Refills: 11 | Status: SHIPPED | OUTPATIENT
Start: 2022-09-13 | End: 2024-04-26

## 2022-09-13 NOTE — TELEPHONE ENCOUNTER
Routing to ordering provider for consideration, not on refill protocol. Last ordered by past provider.           Agatha Weathers     RN MSN

## 2022-09-23 ENCOUNTER — OFFICE VISIT (OUTPATIENT)
Dept: FAMILY MEDICINE | Facility: CLINIC | Age: 72
End: 2022-09-23

## 2022-09-23 ENCOUNTER — ANCILLARY PROCEDURE (OUTPATIENT)
Dept: GENERAL RADIOLOGY | Facility: CLINIC | Age: 72
End: 2022-09-23
Attending: NURSE PRACTITIONER
Payer: MEDICARE

## 2022-09-23 VITALS
HEIGHT: 63 IN | BODY MASS INDEX: 27.82 KG/M2 | SYSTOLIC BLOOD PRESSURE: 116 MMHG | WEIGHT: 157 LBS | TEMPERATURE: 97.6 F | RESPIRATION RATE: 20 BRPM | HEART RATE: 72 BPM | DIASTOLIC BLOOD PRESSURE: 62 MMHG | OXYGEN SATURATION: 99 %

## 2022-09-23 DIAGNOSIS — M25.562 ACUTE PAIN OF LEFT KNEE: ICD-10-CM

## 2022-09-23 DIAGNOSIS — M25.562 ACUTE PAIN OF LEFT KNEE: Primary | ICD-10-CM

## 2022-09-23 PROCEDURE — 73562 X-RAY EXAM OF KNEE 3: CPT | Mod: TC | Performed by: RADIOLOGY

## 2022-09-23 PROCEDURE — 99213 OFFICE O/P EST LOW 20 MIN: CPT | Performed by: NURSE PRACTITIONER

## 2022-09-23 ASSESSMENT — ASTHMA QUESTIONNAIRES
QUESTION_3 LAST FOUR WEEKS HOW OFTEN DID YOUR ASTHMA SYMPTOMS (WHEEZING, COUGHING, SHORTNESS OF BREATH, CHEST TIGHTNESS OR PAIN) WAKE YOU UP AT NIGHT OR EARLIER THAN USUAL IN THE MORNING: NOT AT ALL
ACT_TOTALSCORE: 25
QUESTION_1 LAST FOUR WEEKS HOW MUCH OF THE TIME DID YOUR ASTHMA KEEP YOU FROM GETTING AS MUCH DONE AT WORK, SCHOOL OR AT HOME: NONE OF THE TIME
QUESTION_2 LAST FOUR WEEKS HOW OFTEN HAVE YOU HAD SHORTNESS OF BREATH: NOT AT ALL
QUESTION_5 LAST FOUR WEEKS HOW WOULD YOU RATE YOUR ASTHMA CONTROL: COMPLETELY CONTROLLED
ACT_TOTALSCORE: 25
QUESTION_4 LAST FOUR WEEKS HOW OFTEN HAVE YOU USED YOUR RESCUE INHALER OR NEBULIZER MEDICATION (SUCH AS ALBUTEROL): NOT AT ALL

## 2022-09-23 ASSESSMENT — PAIN SCALES - GENERAL: PAINLEVEL: SEVERE PAIN (7)

## 2022-09-23 NOTE — PROGRESS NOTES
Assessment & Plan     Acute pain of left knee  - XR Knee Left 3 Views; significant arthritis of left knee, otherwise unremarable  - Knee Supplies Order for DME - ONLY FOR DME - recommended patellar support band  - Physical Therapy Referral; Future       Patient Instructions    the brace.  PT will call you to schedule.  Try diclofenac gel to your knee, can buy over the counter.      Return in about 2 weeks (around 10/7/2022) for worsening or continued symptoms.      Serge Smith is a 72 year old, presenting for the following health issues:  Knee Pain      Knee Pain    History of Present Illness       Reason for visit:  Patella knee injury possibly and meds refill    She eats 2-3 servings of fruits and vegetables daily.She consumes 0 sweetened beverage(s) daily.She exercises with enough effort to increase her heart rate 30 to 60 minutes per day.  She exercises with enough effort to increase her heart rate 7 days per week.   She is taking medications regularly.     Pertinent history includes left torn meniscus knee repair in 2010. About 2.5 weeks ago was standing, turned and felt left knee pop and  buckle. Made homemade supportive knee band for left knee and feels this has helped pain. Pt reports left knee 40% better than it was 2.5 weeks ago after initial injury. Has also been putting lidocaine patches on left knee and finds this is helpful.     Concern - possible left knee injury  Onset: 2 and 1/2 weeks ago  Description: was standing and turned a certain way and felt her left knee do something and has had pain since. Now also has some right knee pain due to overcompensating in her gait.   Intensity: moderate  Progression of Symptoms:  constant  Accompanying Signs & Symptoms: none  Previous history of similar problem: has had both knees replaced but this is new  Precipitating factors:        Worsened by: feels better the more active she is  Alleviating factors:        Improved by: feels fine at rest  "starts to hurt when she stands up to go do things again.  Therapies tried and outcome: has tried icing and wrapping knee       Review of Systems   Constitutional, HEENT, cardiovascular, pulmonary, gi and gu systems are negative, except as otherwise noted.      Objective    /62 (BP Location: Right arm, Patient Position: Sitting, Cuff Size: Adult Regular)   Pulse 72   Temp 97.6  F (36.4  C) (Tympanic)   Resp 20   Ht 1.607 m (5' 3.25\")   Wt 71.2 kg (157 lb)   SpO2 99%   BMI 27.59 kg/m    Body mass index is 27.59 kg/m .  Physical Exam   GENERAL: healthy, alert and no distress  RESP: lungs clear to auscultation - no rales, rhonchi or wheezes  CV: regular rate and rhythm, normal S1 S2, no S3 or S4, no murmur, click or rub, no peripheral edema and peripheral pulses strong  MS: no gross musculoskeletal defects noted, no edema  SKIN: no suspicious lesions or rashes  NEURO: Normal strength and tone, mentation intact and speech normal  PSYCH: mentation appears normal, affect normal/bright    Xray - Reviewed and interpreted by me - findings indicative of significant arthritis in left knee.       Yennifer Cunningham DNP-FNP Student            "

## 2022-09-23 NOTE — PATIENT INSTRUCTIONS
the brace.  PT will call you to schedule.  Try diclofenac gel to your knee, can buy over the counter.

## 2022-10-06 ENCOUNTER — HOSPITAL ENCOUNTER (OUTPATIENT)
Dept: PHYSICAL THERAPY | Facility: CLINIC | Age: 72
Setting detail: THERAPIES SERIES
Discharge: HOME | End: 2022-10-06
Attending: NURSE PRACTITIONER
Payer: MEDICARE

## 2022-10-06 DIAGNOSIS — M25.562 ACUTE PAIN OF LEFT KNEE: ICD-10-CM

## 2022-10-06 PROCEDURE — 97140 MANUAL THERAPY 1/> REGIONS: CPT | Mod: GP,KX | Performed by: PHYSICAL MEDICINE & REHABILITATION

## 2022-10-06 PROCEDURE — 97110 THERAPEUTIC EXERCISES: CPT | Mod: GP,KX | Performed by: PHYSICAL MEDICINE & REHABILITATION

## 2022-10-06 PROCEDURE — 97161 PT EVAL LOW COMPLEX 20 MIN: CPT | Mod: GP,KX | Performed by: PHYSICAL MEDICINE & REHABILITATION

## 2022-10-06 NOTE — PROGRESS NOTES
10/06/22 1000   General Information   Type of Visit Initial OP Ortho PT Evaluation   Start of Care Date 10/06/22   Referring Physician Dr. Lauryn Goodwin   Patient/Family Goals Statement Patient would like to avoid surgery and remain active with yardwork/housework.   Orders Evaluate and Treat   Date of Order 09/23/22   Certification Required? Yes   Medical Diagnosis Acute pain of left knee   Surgical/Medical history reviewed Yes   Precautions/Limitations no known precautions/limitations   General Information Comments PMHx of left meniscus repair in 2010   Body Part(s)   Body Part(s) Knee   Presentation and Etiology   Pertinent history of current problem (include personal factors and/or comorbidities that impact the POC) Patient reports she was standing and pivoting to her left in the yard. She felt a buckle and pop catching herself before falling about a month ago. Has been using patellar strap for about a week and a half. Feels pain right away in the morning, walking, and going up/down steps. Taking OTC meds 2-3x/day.   Impairments A. Pain;B. Decreased WB tolerance;C. Swelling;D. Decreased ROM;E. Decreased flexibility;F. Decreased strength and endurance;G. Impaired balance;H. Impaired gait;M. Locking or catching   Functional Limitations perform activities of daily living;perform desired leisure / sports activities   Symptom Location Left knee   How/Where did it occur Other   Onset date of current episode/exacerbation 09/06/22   Chronicity Recurrent   Pain rating (0-10 point scale) Best (/10);Worst (/10)   Best (/10) 1   Worst (/10) 8   Pain quality A. Sharp;C. Aching;E. Shooting;F. Stabbing   Frequency of pain/symptoms C. With activity   Pain/symptoms are: The same all the time   Pain/symptoms exacerbated by B. Walking;D. Carrying;K. Home tasks;L. Work tasks   Pain/symptoms eased by A. Sitting;C. Rest;E. Changing positions;H. Cold;J. Braces/supports   Progression of symptoms since onset: Improved   Current /  Previous Interventions   Diagnostic Tests: X-ray   X-ray Results Results   X-ray results Moderate-severe tricompartmental degenerative arthritic changes in  the left knee, with joint space narrowing and osteophytes, greatest in  the medial compartment. Normal joint alignment. No fracture or bone  lesion. No significant joint effusion. Soft tissues are  radiographically unremarkable.   Current Level of Function   Current Community Support Family/friend caregiver   Patient role/employment history F. Retired   Living environment House/townhome   Home/community accessibility 3 levels   Fall Risk Screen   Fall screen completed by PT   Have you fallen 2 or more times in the past year? No   Have you fallen and had an injury in the past year? No   Is patient a fall risk? No   Abuse Screen (yes response referral indicated)   Feels Unsafe at Home or Work/School no   Feels Threatened by Someone no   Does Anyone Try to Keep You From Having Contact with Others or Doing Things Outside Your Home? no   Physical Signs of Abuse Present no   Knee Objective Findings   Gait/Locomotion moderate antalgic pattern; apprehensive to weight bearing in stance phase   Balance/Proprioception (Single Leg Stance) poor   Knee ROM Comment stiff and guarded due to pain at end ranges   Knee/Hip Strength Comments pain with all external resistance   External Rotation Test positive   Knee Flexibility Comments very stiff and guarded overall   Palpation TTP at medial patella, patellar tendon, and pes anserine   Observation no signs of acute distress   Integumentary  yunior scars present; no bruising   Posture bent knee posture in standing   Varus Stress Test positive   Valgus Stress Test positive   Apprehension Test postiive   Side (if bilateral, select both right and left) Left   Knee Special Test Comments signs and symptoms consistent with MRI imaging report of tricompartmental degenerative arthritic changes   Left Knee Extension AROM -7*   Left Knee  Flexion AROM 95*   Left Knee Flexion Strength 4-/5   Left Knee Extension Strength 3+/5   Left Hip Abduction Strength 4-/5   Left Quad Set Strength 4-/5   Left Gastrocnemius Flexibility mod stiffness   Left Hamstring Flexibility mod stiffness   Left Quadricep Flexibility mods stiffness with pain   Planned Therapy Interventions   Planned Therapy Interventions stretching;strengthening;ROM;neuromuscular re-education;joint mobilization;manual therapy;gait training;balance training   Planned Modality Interventions   Planned Modality Interventions Biofeedback;Hot packs   Clinical Impression   Criteria for Skilled Therapeutic Interventions Met yes, treatment indicated   PT Diagnosis left knee pain   Influenced by the following impairments pain, hypomobility, weakness   Functional limitations due to impairments squatting, walking, stairs   Clinical Presentation Stable/Uncomplicated   Clinical Presentation Rationale clinical judgement   Clinical Decision Making (Complexity) Low complexity   Therapy Frequency 1 time/week   Predicted Duration of Therapy Intervention (days/wks) 10 weeks   Risk & Benefits of therapy have been explained Yes   Patient, Family & other staff in agreement with plan of care Yes   Clinical Impression Comments Patient presents with left knee pain that has gotten worse recently with extensive history of pain in area with prior meniscus repair in 2010. Will benefit from skilled PT to address functional deficits in walking, squatting, and stairs to return to PLOF participating in hobbies with less pain and more function. PT prognosis fair due to chronicity of pain and imaging results.   Education Assessment   Preferred Learning Style Listening;Reading;Demonstration;Pictures/video   Barriers to Learning No barriers   ORTHO GOALS   PT Ortho Eval Goals 1;2;3;4   Ortho Goal 1   Goal Identifier STG   Goal Description Patient to demonstrate with left knee AROM 5-105*   Target Date 11/03/22   Ortho Goal 2   Goal  Identifier STG   Goal Description Patient to demonstrate with left knee extension strength of 3+/5 to improve squatting.   Target Date 11/03/22   Ortho Goal 3   Goal Identifier LTG   Goal Description Patient to report less than 2/10 with stair climbing and squatting to pick things up from floor.   Target Date 12/15/22   Ortho Goal 4   Goal Identifier LTG   Goal Description Patient to be IND with HEP to aid functional recovery and prevent future occurrance of pain episode.   Target Date 12/15/22   Total Evaluation Time   PT Tracie Low Complexity Minutes (17748) 15   Therapy Certification   Certification date from 10/06/22   Certification date to 12/15/22   Medical Diagnosis Acute pain of left knee

## 2022-10-06 NOTE — PROGRESS NOTES
[facility]    OUTPATIENT PHYSICAL THERAPY ORTHOPEDIC EVALUATION  PLAN OF TREATMENT FOR OUTPATIENT REHABILITATION  (COMPLETE FOR INITIAL CLAIMS ONLY)  Patient's Last Name, First Name, M.I.  YOB: 1950  Terese Fleming    Provider s Name:  [facility]   Medical Record No.  [id number]   Start of Care Date:  10/06/22   Onset Date:  09/06/22   Type:     _X__PT   ___OT   ___SLP Medical Diagnosis:  Acute pain of left knee     PT Diagnosis:  left knee pain   Visits from SOC:  1      _________________________________________________________________________________  Plan of Treatment/Functional Goals:  stretching, strengthening, ROM, neuromuscular re-education, joint mobilization, manual therapy, gait training, balance training     Biofeedback, Hot packs     Goals  Goal Identifier: STG  Goal Description: Patient to demonstrate with left knee AROM 5-105*  Target Date: 11/03/22    Goal Identifier: STG  Goal Description: Patient to demonstrate with left knee extension strength of 3+/5 to improve squatting.  Target Date: 11/03/22    Goal Identifier: LTG  Goal Description: Patient to report less than 2/10 with stair climbing and squatting to pick things up from floor.  Target Date: 12/15/22    Goal Identifier: LTG  Goal Description: Patient to be IND with HEP to aid functional recovery and prevent future occurrance of pain episode.  Target Date: 12/15/22    Therapy Frequency:  1 time/week  Predicted Duration of Therapy Intervention:  10 weeks    Please contact me with any questions/concerns.    Thank you for your referral.      Samm Yu, PT, DPT, CSCS     [facility]  Outpatient Flexible Work Force  [address] [place] [address]  Office: [phone]  Fax: [phone]  [email or web address]     [email or web address]  Gender pronouns: he/him  Employed by [employer]                       I CERTIFY THE NEED FOR THESE SERVICES FURNISHED UNDER        THIS PLAN OF TREATMENT AND WHILE UNDER MY CARE .             Physician Signature               Date    X_____________________________________________________                             Certification Date From:  10/06/22   Certification Date To:  12/15/22    Referring Provider:  Dr. Lauryn Goodwin    Initial Assessment        See Epic Evaluation Start of Care Date: 10/06/22

## 2022-10-06 NOTE — PROGRESS NOTES
[facility]    OUTPATIENT PHYSICAL THERAPY ORTHOPEDIC EVALUATION  PLAN OF TREATMENT FOR OUTPATIENT REHABILITATION  (COMPLETE FOR INITIAL CLAIMS ONLY)  Patient's Last Name, First Name, M.I.  YOB: 1950  Terese Fleming    Provider s Name:  [facility]   Medical Record No.  [id number]   Start of Care Date:  10/06/22   Onset Date:  09/06/22   Type:     _X__PT   ___OT   ___SLP Medical Diagnosis:  Acute pain of left knee     PT Diagnosis:  left knee pain   Visits from SOC:  1      _________________________________________________________________________________  Plan of Treatment/Functional Goals:  stretching, strengthening, ROM, neuromuscular re-education, joint mobilization, manual therapy, gait training, balance training     Biofeedback, Hot packs     Goals  Goal Identifier: STG  Goal Description: Patient to demonstrate with left knee AROM 5-105*  Target Date: 11/03/22    Goal Identifier: STG  Goal Description: Patient to demonstrate with left knee extension strength of 3+/5 to improve squatting.  Target Date: 11/03/22    Goal Identifier: LTG  Goal Description: Patient to report less than 2/10 with stair climbing and squatting to pick things up from floor.  Target Date: 12/15/22    Goal Identifier: LTG  Goal Description: Patient to be IND with HEP to aid functional recovery and prevent future occurrance of pain episode.  Target Date: 12/15/22    Therapy Frequency:  1 time/week  Predicted Duration of Therapy Intervention:  10 weeks    Please contact me with any questions/concerns.    Thank you for your referral.      Samm Yu, PT, DPT, CSCS     [facility]  Outpatient Flexible Work Force  [address] [place] [address]  Office: [phone]  Fax: [phone]  [email or web address]     [email or web address]  Gender pronouns: he/him  Employed by [employer]                     I CERTIFY THE NEED FOR THESE SERVICES FURNISHED UNDER        THIS PLAN OF TREATMENT AND WHILE UNDER MY CARE     (Physician co-signature of this document indicates review and certification of the therapy plan).                       Certification Date From:  10/06/22   Certification Date To:  12/15/22    Referring Provider:  Dr. Lauryn Goodwin    Initial Assessment        See Epic Evaluation Start of Care Date: 10/06/22

## 2022-10-09 ENCOUNTER — HEALTH MAINTENANCE LETTER (OUTPATIENT)
Age: 72
End: 2022-10-09

## 2022-10-12 ENCOUNTER — HOSPITAL ENCOUNTER (OUTPATIENT)
Dept: PHYSICAL THERAPY | Facility: CLINIC | Age: 72
Setting detail: THERAPIES SERIES
Discharge: HOME | End: 2022-10-12
Attending: NURSE PRACTITIONER
Payer: MEDICARE

## 2022-10-12 PROCEDURE — 97110 THERAPEUTIC EXERCISES: CPT | Mod: GP | Performed by: PHYSICAL MEDICINE & REHABILITATION

## 2022-10-20 ENCOUNTER — HOSPITAL ENCOUNTER (OUTPATIENT)
Dept: PHYSICAL THERAPY | Facility: CLINIC | Age: 72
Setting detail: THERAPIES SERIES
Discharge: HOME | End: 2022-10-20
Attending: NURSE PRACTITIONER
Payer: MEDICARE

## 2022-10-20 PROCEDURE — 97110 THERAPEUTIC EXERCISES: CPT | Mod: KX,GP | Performed by: PHYSICAL MEDICINE & REHABILITATION

## 2022-10-20 PROCEDURE — 97140 MANUAL THERAPY 1/> REGIONS: CPT | Mod: KX,GP | Performed by: PHYSICAL MEDICINE & REHABILITATION

## 2022-11-01 ENCOUNTER — HOSPITAL ENCOUNTER (OUTPATIENT)
Dept: PHYSICAL THERAPY | Facility: CLINIC | Age: 72
Setting detail: THERAPIES SERIES
Discharge: HOME | End: 2022-11-01
Attending: NURSE PRACTITIONER
Payer: MEDICARE

## 2022-11-01 PROCEDURE — 97140 MANUAL THERAPY 1/> REGIONS: CPT | Mod: GP | Performed by: PHYSICAL THERAPIST

## 2022-11-01 PROCEDURE — 97110 THERAPEUTIC EXERCISES: CPT | Mod: GP,KX | Performed by: PHYSICAL THERAPIST

## 2022-11-08 ENCOUNTER — HOSPITAL ENCOUNTER (OUTPATIENT)
Dept: PHYSICAL THERAPY | Facility: CLINIC | Age: 72
Setting detail: THERAPIES SERIES
Discharge: HOME | End: 2022-11-08
Attending: NURSE PRACTITIONER
Payer: MEDICARE

## 2022-11-08 PROCEDURE — 97140 MANUAL THERAPY 1/> REGIONS: CPT | Mod: GP,KX | Performed by: PHYSICAL THERAPIST

## 2022-11-11 ENCOUNTER — TELEPHONE (OUTPATIENT)
Dept: FAMILY MEDICINE | Facility: CLINIC | Age: 72
End: 2022-11-11

## 2022-11-11 DIAGNOSIS — M25.562 LEFT KNEE PAIN, UNSPECIFIED CHRONICITY: Primary | ICD-10-CM

## 2022-11-11 NOTE — TELEPHONE ENCOUNTER
"Pt calls. Had OV 9/23 for left knee pain.  States she has done PT & this has helped \"some\" but not getting much relief. Forgot to try the diclofenac but will go today to pick some up. States she discussed cortisone injection with provider.  Pt is leaving the country in 2-3 weeks & would like to get a cortisone injection before leaving.    Routing to provider for review.    Carmen Fields RN    "

## 2022-11-11 NOTE — TELEPHONE ENCOUNTER
Lauryn,    Please see telephone note & advise.  Pt asking for cortisone injection.    Carmen Fields RN

## 2022-11-15 ENCOUNTER — TELEPHONE (OUTPATIENT)
Dept: OTOLARYNGOLOGY | Facility: CLINIC | Age: 72
End: 2022-11-15

## 2022-11-15 ENCOUNTER — TELEPHONE (OUTPATIENT)
Dept: FAMILY MEDICINE | Facility: CLINIC | Age: 72
End: 2022-11-15

## 2022-11-15 NOTE — TELEPHONE ENCOUNTER
Reason for Call:  Medication or medication refill:    Do you use a [facility]?  Name of the pharmacy and phone number for the current request:  [facility]     Name of the medication requested: Pt currently on cannabis program. Pt leaving the country on vacation for 3 months and requesting Oxycodone for [place].       Can we leave a detailed message on this number? YES    Phone number patient can be reached at: Home number on file [phone] (home)    Best Time: any    Call taken on 11/15/2022 at 3:17 PM by Jonelle Robin
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
## 2022-11-16 ENCOUNTER — OFFICE VISIT (OUTPATIENT)
Dept: ORTHOPEDICS | Facility: CLINIC | Age: 72
End: 2022-11-16
Attending: NURSE PRACTITIONER
Payer: MEDICARE

## 2022-11-16 VITALS
DIASTOLIC BLOOD PRESSURE: 73 MMHG | BODY MASS INDEX: 27.82 KG/M2 | HEART RATE: 89 BPM | HEIGHT: 63 IN | WEIGHT: 157 LBS | SYSTOLIC BLOOD PRESSURE: 145 MMHG

## 2022-11-16 DIAGNOSIS — M25.461 EFFUSION OF BOTH KNEE JOINTS: ICD-10-CM

## 2022-11-16 DIAGNOSIS — M17.0 BILATERAL PRIMARY OSTEOARTHRITIS OF KNEE: Primary | ICD-10-CM

## 2022-11-16 DIAGNOSIS — G89.29 CHRONIC PAIN OF BOTH KNEES: ICD-10-CM

## 2022-11-16 DIAGNOSIS — M25.562 CHRONIC PAIN OF BOTH KNEES: ICD-10-CM

## 2022-11-16 DIAGNOSIS — M25.462 EFFUSION OF BOTH KNEE JOINTS: ICD-10-CM

## 2022-11-16 DIAGNOSIS — M25.561 CHRONIC PAIN OF BOTH KNEES: ICD-10-CM

## 2022-11-16 PROCEDURE — 99203 OFFICE O/P NEW LOW 30 MIN: CPT | Mod: 25 | Performed by: FAMILY MEDICINE

## 2022-11-16 PROCEDURE — 20611 DRAIN/INJ JOINT/BURSA W/US: CPT | Mod: 50 | Performed by: FAMILY MEDICINE

## 2022-11-16 RX ORDER — TRIAMCINOLONE ACETONIDE 40 MG/ML
40 INJECTION, SUSPENSION INTRA-ARTICULAR; INTRAMUSCULAR
Status: DISCONTINUED | OUTPATIENT
Start: 2022-11-16 | End: 2024-04-17

## 2022-11-16 RX ORDER — ROPIVACAINE HYDROCHLORIDE 5 MG/ML
3 INJECTION, SOLUTION EPIDURAL; INFILTRATION; PERINEURAL
Status: DISCONTINUED | OUTPATIENT
Start: 2022-11-16 | End: 2024-04-17

## 2022-11-16 RX ADMIN — TRIAMCINOLONE ACETONIDE 40 MG: 40 INJECTION, SUSPENSION INTRA-ARTICULAR; INTRAMUSCULAR at 12:02

## 2022-11-16 RX ADMIN — ROPIVACAINE HYDROCHLORIDE 3 ML: 5 INJECTION, SOLUTION EPIDURAL; INFILTRATION; PERINEURAL at 12:02
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
## 2022-11-16 NOTE — TELEPHONE ENCOUNTER
Spoke to patient in regards to concerns, discussed with patient that per last visit note with provider, it was recommended her TSH be checked by her primary physician in the spring, back in 2021. Pt stated she would arrange this with her primary ASAP as she will be traveling to [place] soon. Writer also advised patient that provider planned on yearly follow up visits, but if pt wanted to see him in spring when she returns for her trip, she should call to schedule at that time as providers clinic schedule is not available yet. Pt verbalized understanding of all information given and was appreciative of call.

## 2022-11-17 NOTE — TELEPHONE ENCOUNTER
This sounds like a reasonable plan, but I would recommend a virtual visit so we can discuss further as far as how often she would need to take, dose, etc.

## 2022-11-17 NOTE — TELEPHONE ENCOUNTER
Writer updated pt of Dr Rubio's message, next available phone visit scheduled for 12.2 at 330 Pt updated of this.    Agatha Weathers RN MSN

## 2022-11-18 ENCOUNTER — LAB (OUTPATIENT)
Dept: LAB | Facility: CLINIC | Age: 72
End: 2022-11-18
Payer: MEDICARE

## 2022-11-18 ENCOUNTER — DOCUMENTATION ONLY (OUTPATIENT)
Dept: LAB | Facility: CLINIC | Age: 72
End: 2022-11-18

## 2022-11-18 DIAGNOSIS — M79.10 MYALGIA: Primary | ICD-10-CM

## 2022-11-18 DIAGNOSIS — M79.10 MYALGIA: ICD-10-CM

## 2022-11-18 LAB — TSH SERPL DL<=0.005 MIU/L-ACNC: 1.36 UIU/ML (ref 0.3–4.2)

## 2022-11-18 PROCEDURE — 36415 COLL VENOUS BLD VENIPUNCTURE: CPT

## 2022-11-18 PROCEDURE — 84443 ASSAY THYROID STIM HORMONE: CPT

## 2022-11-18 NOTE — PROGRESS NOTES
Patient is in for thyroid lab today, there are no orders. Please release orders if needed, thank you.     [facility]

## 2022-12-02 ENCOUNTER — VIRTUAL VISIT (OUTPATIENT)
Dept: FAMILY MEDICINE | Facility: CLINIC | Age: 72
End: 2022-12-02
Payer: MEDICARE

## 2022-12-02 DIAGNOSIS — C02.9 MALIGNANT NEOPLASM OF TONGUE (H): Primary | ICD-10-CM

## 2022-12-02 DIAGNOSIS — B02.29 POST HERPETIC NEURALGIA: ICD-10-CM

## 2022-12-02 PROCEDURE — 99214 OFFICE O/P EST MOD 30 MIN: CPT | Mod: 95 | Performed by: INTERNAL MEDICINE

## 2022-12-02 RX ORDER — OXYCODONE HYDROCHLORIDE 5 MG/1
2.5-5 TABLET ORAL 2 TIMES DAILY PRN
Qty: 60 TABLET | Refills: 0 | Status: SHIPPED | OUTPATIENT
Start: 2022-12-02 | End: 2022-12-05

## 2022-12-02 RX ORDER — GABAPENTIN 300 MG/1
300-600 CAPSULE ORAL 2 TIMES DAILY
Qty: 360 CAPSULE | Refills: 1 | Status: SHIPPED | OUTPATIENT
Start: 2022-12-02 | End: 2023-04-25

## 2022-12-02 NOTE — PROGRESS NOTES
"Sarah is a 72 year old who is being evaluated via a billable telephone visit.      What phone number would you like to be contacted at? [phone]  How would you like to obtain your AVS? MyChart    Assessment & Plan     Malignant neoplasm of tongue (H) -she is not able [place] use medical cannabis [place].  We will prescribe oxycodone, to use for breakthrough pain.  She will be gone for 3 months.  She would like to try topical gabapentin, sent to the compounding pharmacy.  If it is too expensive or not covered, use oral gabapentin.  - oxyCODONE (ROXICODONE) 5 MG tablet; Take 0.5-1 tablets (2.5-5 mg) by mouth 2 times daily as needed for pain  - COMPOUNDED NON-CONTROLLED SUBSTANCE (CMPD RX) - PHARMACY TO MIX COMPOUNDED MEDICATION; Gabapentin 6% cream apply 1/4 gram to affected area three times day as need  - gabapentin (NEURONTIN) 300 MG capsule; Take 1-2 capsules (300-600 mg) by mouth 2 times daily    Post herpetic neuralgia  - oxyCODONE (ROXICODONE) 5 MG tablet; Take 0.5-1 tablets (2.5-5 mg) by mouth 2 times daily as needed for pain  - COMPOUNDED NON-CONTROLLED SUBSTANCE (CMPD RX) - PHARMACY TO MIX COMPOUNDED MEDICATION; Gabapentin 6% cream apply 1/4 gram to affected area three times day as need  - gabapentin (NEURONTIN) 300 MG capsule; Take 1-2 capsules (300-600 mg) by mouth 2 times daily             BMI:   Estimated body mass index is 27.59 kg/m  as calculated from the following:    Height as of 11/16/22: 1.607 m (5' 3.25\").    Weight as of 11/16/22: 71.2 kg (157 lb).       Patient Instructions   Pain  1. Try topicals such as Voltaren, Aspercream with Lidocaine, Capsaicin, Icy Hot, Biofreeze, Salon Pas   2. Order for oxycodone 2.5 - 5 mg up to twice daily as needed  3. Start Gabapentin 300 mg daily x 5 days, then 300 mg twice daily x 5 days.   You can stay at this dose if it is helping.  If it is not helpful, can increase to 300 mg AM, 600 mg PM.  Can increase up to 600 mg twice daily  4. Watch for side effects of " gabapentin - drowsiness, leg swelling.  If side effects, let DR. Rubio know  5. Order placed for topical gabapentin.  6. It appears medical cannabis (not recreational cannabis) is legal in [place].  I will write you a letter stating you have used medical cannabis here in [place].  This is available on My Chart      No follow-ups on file.    Vanna Rubio [facility] SENAIT Smith is a 72 year old, presenting for the following health issues:  Medication Therapy Management (Wants some pain meds as she is going to [place] and is not sure she can bring her medical canabis) and Refill Request (Refill patches )      HPI     Chronic Pain  Discuss pain meds for trip  --going to [place] for 3 months so she cannot bring her medical cannabis  --the medical cannabis works well for her  --doesn't recall being on nerve pain medication  --troubles with swallowing due to her history of tongue cancer  --oxycodone has helped in the past - had side effects - constipation alt with diarrhea, irritability      Current Outpatient Medications   Medication Sig Dispense Refill     Ascorbic Acid (VITAMIN C PO)        CALCIUM PO        CANNABIDIOL, HEMP OIL/EXTRACT, OR CBD PRODUCT OTHER THAN MEDICAL CANNABIS, Apply topically daily       lidocaine (XYLOCAINE) 5 % external ointment Apply topically as needed for moderate pain 50 g 0     MAGNESIUM PO        Multiple Vitamin (MULTIVITAMIN ADULT PO)        nitroGLYcerin (NITRO-DUR) 0.4 MG/HR 24 hr patch Cut into 1/4 and place 1/4 over the area of pain.  Change every 24 hours. 30 patch 11     pilocarpine (SALAGEN) 5 MG tablet Take 1 tablet (5 mg) by mouth 3 times daily as needed (dry mouth) 90 tablet 3     Turmeric 500 MG CAPS        vitamin D3 (CHOLECALCIFEROL) 2000 units tablet Take 1 tablet by mouth daily       albuterol (PROAIR HFA/PROVENTIL HFA/VENTOLIN HFA) 108 (90 Base) MCG/ACT inhaler Inhale 2 puffs into the lungs every 4 hours as needed for shortness  of breath / dyspnea or wheezing (Patient not taking: Reported on 12/2/2022) 18 g 11     augmented betamethasone dipropionate (DIPROLENE-AF) 0.05 % external cream Apply sparingly to affected area twice daily as needed.  Do not apply to face. (Patient not taking: Reported on 12/2/2022) 50 g 11     DENTAGEL 1.1 % GEL topical gel apply to the affected area at bedtime (Patient not taking: Reported on 12/2/2022) 112 g 11     EPINEPHrine (ANY BX GENERIC EQUIV) 0.3 MG/0.3ML injection 2-pack Inject 0.3 mLs (0.3 mg) into the muscle once as needed for anaphylaxis (Patient not taking: Reported on 9/23/2022) 0.6 mL 3     ibuprofen (ADVIL/MOTRIN) 100 MG tablet Take 100 mg by mouth every 4 hours as needed       loratadine (CLARITIN) 10 MG tablet Take 1 tablet (10 mg) by mouth daily (Patient not taking: Reported on 12/2/2022) 90 tablet 3       Review of Systems   Constitutional, HEENT, cardiovascular, pulmonary, gi and gu systems are negative, except as otherwise noted.      Objective           Vitals:  No vitals were obtained today due to virtual visit.    Physical Exam   healthy, alert and no distress  PSYCH: Alert and oriented times 3; coherent speech, normal   rate and volume, able to articulate logical thoughts, able   to abstract reason, no tangential thoughts, no hallucinations   or delusions  Her affect is normal  RESP: No cough, no audible wheezing, able to talk in full sentences  Remainder of exam unable to be completed due to telephone visits                Phone call duration: 30 minutes

## 2022-12-02 NOTE — LETTER
December 2, 2022      Teresetylor Alexandre Ray  [address]  [place] [address]        To Whom It May Concern,        Sarah is under my care.  She has been prescribed medical cannabis since 3/2021 for post-herpetic neuralgia, and chronic head/neck pain related to previous head/neck cancer.  It has worked very well to manage pain, without significant side effects.          Sincerely,        Vanna Rubio, DO

## 2022-12-02 NOTE — PATIENT INSTRUCTIONS
Pain  Try topicals such as Voltaren, Aspercream with Lidocaine, Capsaicin, Icy Hot, Biofreeze, Salon Pas   Order for oxycodone 2.5 - 5 mg up to twice daily as needed  Start Gabapentin 300 mg daily x 5 days, then 300 mg twice daily x 5 days.   You can stay at this dose if it is helping.  If it is not helpful, can increase to 300 mg AM, 600 mg PM.  Can increase up to 600 mg twice daily  Watch for side effects of gabapentin - drowsiness, leg swelling.  If side effects, let DR. Rubio know  Order placed for topical gabapentin.  It appears medical cannabis (not recreational cannabis) is legal in [place].  I will write you a letter stating you have used medical cannabis here in [place].  This is available on My Chart

## 2023-01-12 NOTE — PROGRESS NOTES
[facility]    Outpatient Physical Therapy Discharge Note  Patient: Terese Bell  : 1950    Beginning/End Dates of Reporting Period:  10/6/22 to 22    Referring Provider: Lauryn Canela Diagnosis: left knee pain     Client Self Report: Wishes it would bend a bit better yet but feeling much better in regards to pain. Not needing the brace anymore. leaves for [place] in about 2 weeks    Objective Measurements:  Objective Measure: L knee AROM  Details:  pre and  post      Goals:  Goal Identifier STG   Goal Description Patient to demonstrate with left knee AROM 5-105*   Target Date 22   Date Met      Progress (detail required for progress note): knee flexion progressing, KE lacking 20 today     Goal Identifier STG   Goal Description Patient to demonstrate with left knee extension strength of 3+/5 to improve squatting.   Target Date 22   Date Met      Progress (detail required for progress note):       Goal Identifier LTG   Goal Description Patient to report less than 2/10 with stair climbing and squatting to pick things up from floor.   Target Date 12/15/22   Date Met      Progress (detail required for progress note):       Goal Identifier LTG   Goal Description Patient to be IND with HEP to aid functional recovery and prevent future occurrance of pain episode.   Target Date 12/15/22   Date Met      Progress (detail required for progress note):         Progress towards Goals:   Progress this reporting period: All information listed above was at patient's last attended PT visit. At the last attended session, patient was improving knee pain and function. It was planned for her to attended one more PT session prior to her leaving on her trip; However, pt has failed to schedule f/u visits.  It has now been 30 days from last visit thus is being d/c from therapy  at this time. Objective measures are all taken from last visit. Current status is unknown at this time.    Plan:  Discharge from therapy.    Discharge:    Reason for Discharge: Patient has failed to schedule further appointments.    Equipment Issued: none    Discharge Plan:  continue HEP     Katrina Raymundo  PT, DPT       1/12/2023   [address] [address]   [email or web address]  Voicemail: [phone]

## 2023-03-20 ENCOUNTER — HOSPITAL ENCOUNTER (OUTPATIENT)
Dept: MAMMOGRAPHY | Facility: CLINIC | Age: 73
Discharge: HOME | End: 2023-03-20
Attending: INTERNAL MEDICINE | Admitting: INTERNAL MEDICINE
Payer: MEDICARE

## 2023-03-20 ENCOUNTER — TELEPHONE (OUTPATIENT)
Dept: FAMILY MEDICINE | Facility: CLINIC | Age: 73
End: 2023-03-20
Payer: MEDICARE

## 2023-03-20 DIAGNOSIS — I89.0 LYMPHEDEMA: Primary | ICD-10-CM

## 2023-03-20 DIAGNOSIS — C02.9 MALIGNANT NEOPLASM OF TONGUE (H): ICD-10-CM

## 2023-03-20 DIAGNOSIS — Z12.31 SCREENING MAMMOGRAM FOR BREAST CANCER: ICD-10-CM

## 2023-03-20 PROCEDURE — 77067 SCR MAMMO BI INCL CAD: CPT

## 2023-03-20 NOTE — TELEPHONE ENCOUNTER
"Pt is requesting a referral to Rehab for speech therapy, lymphedema.  The reason is for \"my history of a malignancy on my tongue.\"    She wants to keep \"on top of things\".    FYI, She has a catch in her left knee also and she will make an appt with  Sports Medicine for that.  "

## 2023-03-21 NOTE — TELEPHONE ENCOUNTER
Patient given referral information with acknowledgement.    Anita Mcnally RN  [facility]

## 2023-03-30 ENCOUNTER — OFFICE VISIT (OUTPATIENT)
Dept: RADIATION THERAPY | Facility: OUTPATIENT CENTER | Age: 73
End: 2023-03-30
Payer: MEDICARE

## 2023-03-30 VITALS
OXYGEN SATURATION: 96 % | DIASTOLIC BLOOD PRESSURE: 51 MMHG | WEIGHT: 146.8 LBS | BODY MASS INDEX: 25.8 KG/M2 | HEART RATE: 65 BPM | SYSTOLIC BLOOD PRESSURE: 97 MMHG | RESPIRATION RATE: 16 BRPM
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
## 2023-03-30 DIAGNOSIS — K11.7 XEROSTOMIA DUE TO RADIOTHERAPY: Primary | ICD-10-CM

## 2023-03-30 RX ORDER — PILOCARPINE HYDROCHLORIDE 5 MG/1
TABLET, FILM COATED ORAL
Qty: 30 TABLET | Refills: 3 | Status: SHIPPED | OUTPATIENT
Start: 2023-03-30

## 2023-03-30 NOTE — LETTER
3/30/2023         RE: Terese Bell  [address]  [place] [address]        Dear Colleague,    Thank you for referring your patient, Terese Bell, to the RADIATION TH[facility]APY CENTER. Please see a copy of my visit note below.       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     TYPE OF RADIATION THERAPY ADMINISTERED: 6000 cGy in 30 fractions         Oncologic history : Mrs. 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 She had radiation therapy in our clinic to a total dose of 6000 cGy in 30 treatments at 200 cGy each fraction from 10/17/2016 to 2016.  She tolerated radiation therapy reasonably well.  The patient did have some significant sore throat and dysphagia toward the end of the therapy, which required a PEG tube for nutritional support.  She otherwise was reasonably stable at the end of the therapy.     Interval history:    The patient underwent exam by Dr. Ferris on 21 and has remained WILMA. Last scans on 21 were WILMA.    Patient otherwise reports she is doing fairly well today. She continues her daily mouth care. Continues to follow-up with dental team routinely. 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 Used salagen in the past with some help. Using biotene rinses and xylimelts with some alleviation.    She continues to use therabite for Trismus. She feels this is stable.  She also reports continued difficulty with swallowing certain foods which she has been  avoiding.    She is doing her head and neck exercises routinely. Does notice some  intermittent lymphedema in neck at times. This fluctuates.     Last TSH on 22 was WNL.    The patient denies any hoarseness of voice, referred otalgia, dysphagia, facial paresthesias, bleeding, other pain, and recurrent/new lumps or bumps. Reported weight is stable.    ROS otherwise negative on a 12-system review.       IMAGIN/9/21  CT Neck    FINDINGS:  Postoperative change of tongue mass resection and  right-sided neck dissection. Oral cavity evaluation is limited due to  extensive streak artifact. No convincing tumor is identified. No  evidence of cervical lymphadenopathy. Posttreatment changes are  present. The oral cavity, oropharynx, hypopharynx, and larynx are  otherwise unremarkable. The left submandibular gland is small  consistent with posttreatment change. Right subareolar gland is  absent. Parotid glands are unremarkable. Thyroid gland is  unremarkable.     Major neck vasculature is patent. Cervical spine degenerative change  is present, worst at C4-C5 and C5-C6. Few areas of pulmonary  emphysema.                                                                      IMPRESSION:    1. Stable posttreatment change.  2. No convincing evidence of recurrent disease.    IMPRESSION:   Ms. 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      RECOMMENDATIONS:    1. Remains clinically and radi graphically WILMA.  Exam by Dr. Ferris on 21 and has remained WILMA. Last scans on 21 were WILMA.    2.  Continue to follow-up with ENT.     3. Continue to follow-up with Dental.     4. Continue oral nutrition and continue swallowing and stretching exercises. Continue Therabite for Trismus.  Continue to follow-up with speech and lymphedema team.    5.  Xerostomia: Discussed re-trial of salagen (feels produced too much saliva with full dose, discussed dose reduction). Continue products for dry mouth including Biotene and Xylimelts.    6. RTC in 12 months.       Juwan Zhang M.D.  Department of Radiation Oncology  [facility]

## 2023-03-30 NOTE — PROGRESS NOTES
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     TYPE OF RADIATION THERAPY ADMINISTERED: 6000 cGy in 30 fractions         Oncologic history : Mrs. 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 She had radiation therapy in our clinic to a total dose of 6000 cGy in 30 treatments at 200 cGy each fraction from 10/17/2016 to 2016.  She tolerated radiation therapy reasonably well.  The patient did have some significant sore throat and dysphagia toward the end of the therapy, which required a PEG tube for nutritional support.  She otherwise was reasonably stable at the end of the therapy.     Interval history:    The patient underwent exam by Dr. Ferris on 21 and has remained WILMA. Last scans on 21 were WILMA.    Patient otherwise reports she is doing fairly well today. She continues her daily mouth care. Continues to follow-up with dental team routinely. 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 Used salagen in the past with some help. Using biotene rinses and xylimelts with some alleviation.    She continues to use therabite for Trismus. She feels this is stable.  She also reports continued difficulty with swallowing certain foods which she has been avoiding.    She is doing her head and neck exercises routinely. Does notice some  intermittent lymphedema in neck at times. This fluctuates.     Last TSH on 22 was WNL.    The patient denies any hoarseness of voice, referred otalgia, dysphagia, facial  paresthesias, bleeding, other pain, and recurrent/new lumps or bumps. Reported weight is stable.    ROS otherwise negative on a 12-system review.       IMAGIN/9/21  CT Neck    FINDINGS:  Postoperative change of tongue mass resection and  right-sided neck dissection. Oral cavity evaluation is limited due to  extensive streak artifact. No convincing tumor is identified. No  evidence of cervical lymphadenopathy. Posttreatment changes are  present. The oral cavity, oropharynx, hypopharynx, and larynx are  otherwise unremarkable. The left submandibular gland is small  consistent with posttreatment change. Right subareolar gland is  absent. Parotid glands are unremarkable. Thyroid gland is  unremarkable.     Major neck vasculature is patent. Cervical spine degenerative change  is present, worst at C4-C5 and C5-C6. Few areas of pulmonary  emphysema.                                                                      IMPRESSION:    1. Stable posttreatment change.  2. No convincing evidence of recurrent disease.    IMPRESSION:   Ms. 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      RECOMMENDATIONS:    1. Remains clinically and radi graphically WILMA.  Exam by Dr. Ferris on 21 and has remained WILMA. Last scans on 21 were WILMA.    2.  Continue to follow-up with ENT.     3. Continue to follow-up with Dental.     4. Continue oral nutrition and continue swallowing and stretching exercises. Continue Therabite for Trismus.  Continue to follow-up with speech and lymphedema team.    5. Xerostomia: Discussed re-trial of salagen (feels produced too much saliva with full dose, discussed dose reduction). Continue products for dry mouth including Biotene and Xylimelts.    6. RTC in 12 months.       Juwan Zhang M.D.  Department of Radiation  Oncology  [facility]

## 2023-03-30 NOTE — NURSING NOTE
"Oncology Rooming Note    March 30, 2023 4:20 PM   Terese Bell is a 72 year old female who presents for:    Chief Complaint   Patient presents with     Radiation Therapy     Return visit, head/neck cancer     Initial Vitals: BP 97/51 (BP Location: Left arm, Cuff Size: Adult Regular)   Pulse 65   Resp 16   Wt 66.6 kg (146 lb 12.8 oz)   SpO2 96%   BMI 25.80 kg/m   Estimated body mass index is 25.8 kg/m  as calculated from the following:    Height as of 11/16/22: 1.607 m (5' 3.25\").    Weight as of this encounter: 66.6 kg (146 lb 12.8 oz). Body surface area is 1.72 meters squared.  Data Unavailable Comment: Data Unavailable   No LMP recorded. Patient is postmenopausal.  Allergies reviewed: Yes  Medications reviewed: Yes    Medications: Medication refills not needed today.  Pharmacy name entered into [employer]:    [id number] - [place], [place] - [address] [facility] [place] [address] [facility] [place] [facility] [place], [place] - [address]  [facility] [facility] [place], [place] - [address] [facility] - [place], [place] - [address]    Clinical concerns: follow up today with Dr. Vicente Ashley, RN              "

## 2023-04-03 ENCOUNTER — HOSPITAL ENCOUNTER (OUTPATIENT)
Dept: SPEECH THERAPY | Facility: CLINIC | Age: 73
Setting detail: THERAPIES SERIES
Discharge: HOME | End: 2023-04-03
Attending: INTERNAL MEDICINE
Payer: MEDICARE

## 2023-04-03 ENCOUNTER — HOSPITAL ENCOUNTER (OUTPATIENT)
Dept: PHYSICAL THERAPY | Facility: CLINIC | Age: 73
Setting detail: THERAPIES SERIES
Discharge: HOME | End: 2023-04-03
Attending: INTERNAL MEDICINE
Payer: MEDICARE

## 2023-04-03 DIAGNOSIS — C02.9 MALIGNANT NEOPLASM OF TONGUE (H): ICD-10-CM

## 2023-04-03 DIAGNOSIS — I89.0 LYMPHEDEMA: ICD-10-CM

## 2023-04-03 PROCEDURE — 97161 PT EVAL LOW COMPLEX 20 MIN: CPT | Mod: GP | Performed by: PHYSICAL THERAPIST

## 2023-04-03 PROCEDURE — 92526 ORAL FUNCTION THERAPY: CPT | Mod: GN | Performed by: SPEECH-LANGUAGE PATHOLOGIST

## 2023-04-03 PROCEDURE — 92610 EVALUATE SWALLOWING FUNCTION: CPT | Mod: GN | Performed by: SPEECH-LANGUAGE PATHOLOGIST

## 2023-04-03 PROCEDURE — 97140 MANUAL THERAPY 1/> REGIONS: CPT | Mod: GP | Performed by: PHYSICAL THERAPIST

## 2023-04-03 NOTE — PROGRESS NOTES
Outpatient Lymphedema Therapy Evaluation       04/03/23 0800   Rehab Discipline   Discipline PT   Type of Visit   Type of visit Initial Edema Evaluation       present No   General Information   Start of care 04/03/23   Referring physician Vanna Rubio, DO   Orders Evaluate and treat as indicated   Order date 04/03/23   Medical diagnosis H&N lymphedema with significant myofascial tightness, chronic   Edema onset 04/03/23  (date of referral)   Affected body parts Head / Neck   Edema etiology Cancer with lymph node dissection;Radiation;Chemo;Surgery   Location - Cancer with lymph node dissection 1/40 positive LN   Location - Radiation neck   Radiation comments completed 11/28/16 (a total of 30 treatments)   Chemotherapy comments completed   Edema etiology comments s/p right superficial parotidectomy, right hemiglossectomy, right neck dissection and right RFFF on 9/1/2016;  surgical site dehisced and developed wound/fistula ; has had numerous H&N surgeries related to cancer (underwent 16 surgeries from 1998 to 2016)   Pertinent history of current problem (PT: include personal factors and/or comorbidities that impact the POC; OT: include additional occupational profile info) pt was seen at [facility]ymp[facility] clinic from 10/6/16 - 4/20/17, 5/17/17 - 6/21/17, February 2018-August 2018 and last on 3/7/2022 - 6/7/22 and at time of discharge was wearing a compression garment and performing self MLD daily; since patient was last seen in clinic she reports being very compliant with home program including MLD, compression wear and exercises daily;  Pt has a history of recurrent shingles that affects her L-side of face and continues to have residual pain, when this happens pt isn't able to wear her compression garment;  pt reports very little swelling on the outside, pt still using medical cannibus (started June 2021) which helps with pain;  pt reports she was oversees from December 12th - March 12th and  "started having more pain (couldn't take medical cannibus with her overseas), did get Rx meds (oxycodone), pain did get controlled and then plateau reached and neck pain started getting worse; pt feels the pressure in the airplane cabin made pain worse on flight home (flew for almost 24 hours in total); also started to have swallowing trouble again, felt like maybe tongue was maybe swelling too; pt feels like she just can't stretch her neck like she needs to despite stretching every day; pt reports \"my tongue feels fat\"; ongoing L-sided shingle pain that is constant, also feels tight muscles contributing to pain at base of skull   Surgical / medical history reviewed Yes   Edema special tests   (no history of DVTs)   Prior level of functional mobility independent with functional mobility and ADL   Prior treatment Complete decongestive therapy;Compression garments;Exercise;MLD   Patient role / employment history Retired   Living environment House / [facility]   Living environment comments with great nephews (adults)   Assistive device comments none   Fall Risk Screen   Fall screen completed by PT   Have you fallen 2 or more times in the past year? No   Have you fallen and had an injury in the past year? No   Timed Up and Go score (seconds) no balance issues and walks quickly into dept   Is patient a fall risk? No   Abuse Screen (yes response referral indicated)   Feels Unsafe at Home or Work/School no   Feels Threatened by Someone no   Does Anyone Try to Keep You From Having Contact with Others or Doing Things Outside Your Home? no   Physical Signs of Abuse Present no   Subjective Report   Patient report of symptoms neck is extremely tight   Precautions   Precautions comments no known precautions   Patient / Family Goals   Patient / family goals statement to help my neck tightness   Pain   Patient currently in pain Yes   Pain location neck   Pain rating 6/10   Pain comments pt reports \"it's not too bad today\"   Cognitive " Status   Orientation Orientation to person, place and time   Level of consciousness Alert   Follows commands and answers questions 100% of the time   Personal safety and judgement Intact   Memory Intact   Edema Exam / Assessment   Skin condition Intact   Skin condition comments cervical neck skin all intact, extreme tightness on R side of neck, scar present on R side of neck that is flat but it tight; extreme trap tightness (R>L) that is visibally elevated / tight   Scar Yes   Location R -side of neck   Scar comments significant tightness (see above)   Ulceration No   Girth Measurements   Girth Measurements Refer to separate girth measurement flowsheet  (stable since last year)   Range of Motion   ROM comments R-rotation 35 deg and L totation 40 (compared to 3/7/22: R-rotation 43deg and L-rotation 49deg)   Strength   Strength comments functional   Posture   Posture Forward head position   Palpation   Palpation tenderness with palpation at B traps and B neck (R>L)   Sensory   Sensory perception Light touch   Light touch Intact   Coordination   Coordination Gross motor coordination appropriate   Muscle Tone   Muscle tone No deficits were identified   Planned Edema Interventions   Planned edema interventions Exercises;Precautions to prevent infection / exacerbation;Education;Manual therapy;Scar mobilization;Myofascial release;Home management program development;Other  (therapeutic massage gun)   Clinical Impression   Criteria for skilled therapeutic intervention met Yes   Therapy diagnosis R-cervical tightness and scarred with decreased cervical ROM   Influenced by the following impairments / conditions Other  (significant myofascial tightness)   Functional limitations due to impairments / conditions difficulty sleping, coughing and choking on foods, constant pain throughout her day   Clinical Presentation Stable/Uncomplicated   Clinical Presentation Rationale clinical judgement; chronic issues with acute flareup and  newly reduced ROM   Clinical Decision Making (Complexity) Low complexity   Treatment Frequency 2x/week   Treatment duration 12 weeks   Patient / family and/or staff in agreement with plan of care Yes   Risks and benefits of therapy have been explained Yes   Education Assessment   Preferred learning style Listening   Barriers to learning No barriers   Goals   Edema Eval Goals 1;2   Goal 1   Goal identifier 1   Goal description pt to be independent with longterm management of neck via HEP (including ROM exercies and self-MFR) to increase ROM and comfort during functional/daily activities   Target date 07/02/23   Goal 2   Goal identifier 2   Goal description pt to increase B cervical rotation to at leats 50 degrees to improve functional range for driving, gardening and house hold tasks/activities   Target date 07/02/23   Total Evaluation Time   PT Eval, Low Complexity Minutes (02985) 10

## 2023-04-03 NOTE — PROGRESS NOTES
[facility]      OUTPATIENT PHYSICAL THERAPY EDEMA EVALUATION  PLAN OF TREATMENT FOR OUTPATIENT REHABILITATION  (COMPLETE FOR INITIAL CLAIMS ONLY)  Patient's Last Name, First Name, ANTONIOEnrirqueJONATerese Eaton                           Provider s Name:   [facility] Medical Record No.  [id number]     Start of Care Date:  04/03/23   Onset Date:  04/03/23 (date of referral)   Type:  PT   Medical Diagnosis:      Therapy Diagnosis:  R-cervical tightness and scarred with decreased cervical ROM Visits from SOC:  1                                     __________________________________________________________________________________   Plan of Treatment/Functional Goals:    Exercises, Precautions to prevent infection / exacerbation, Education, Manual therapy, Scar mobilization, Myofascial release, Home management program development, Other (therapeutic massage gun)        GOALS  1. Goal description: pt to be independent with longterm management of neck via HEP (including ROM exercies and self-MFR) to increase ROM and comfort during functional/daily activities       Target date: 07/02/23  2. Goal description: pt to increase B cervical rotation to at leats 50 degrees to improve functional range for driving, gardening and house hold tasks/activities       Target date: 07/02/23    Treatment Frequency: 2x/week   Treatment duration: 12 weeks (4/3/23 - 7/2/23)    Rachel Richey, PT, DPT, CLT                                    I CERTIFY THE NEED FOR THESE SERVICES FURNISHED UNDER        THIS PLAN OF TREATMENT AND WHILE UNDER MY CARE     (Physician co-signature of this document indicates review and certification of the therapy plan).                      Referring physician: Vanna Rubio, DO   Initial Assessment  See Epic Evaluation- Start of care: 04/03/23

## 2023-04-04 NOTE — PROGRESS NOTES
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 She had radiation therapy in our clinic from 10/17/2016 to 11/28/2016. The patient did have some significant sore throat and dysphagia toward the end of the therapy, which required a PEG tube for nutritional support for about 11 months. Pt reports she was oversees from December 12th - March 12th and started having more pain (couldn't take medical cannibus with her overseas), pt feels the pressure in the airplane cabin made pain worse on flight home (flew for almost 24 hours in total); she also started to have swallowing trouble again and felt like her tongue might be swelling.   Respiratory Status Room air   Prior Level Of Function Swallowing   Prior Level Of Function Comment Avoids dry and chewy foods (crackers, dry bread, large chunks of meat, etc.). Eats a soft/bite size diet with thin liquids.   General Observations Pt was pleasant and cooperative.   Patient/family Goals To improve swallowing and minimize choking.   General Information Comments Pt reports she has not been working on oropharyngeal exercises the last three months while she was on vacation in [place]. She also reports she forgot to bring her TheraBite to work on stretching jaw muscles. Before the speech assessment she was seen by PT, and was picked up for edema to work on muscle  tension in her neck x2/week.   Pain Assessment   Pain Reported Yes   Pain Location jaw and base of skull   Pain Scale 7/10   Pain Comments chronic from shingles and RXT   Fall Risk Screen   Fall screen completed by PT   Have you fallen 2 or more times in the past year? No   Have you fallen and had an injury in the past year? No   Timed Up and Go score (seconds) no balance issues and walks quickly into dept   Is patient a fall risk? No   Abuse Screen (yes response referral indicated)   Feels Unsafe at Home or Work/School no   Feels Threatened by Someone no   Does Anyone Try to Keep You From Having Contact with Others or Doing Things Outside Your Home? no   Physical Signs of Abuse Present no   Clinical Swallow Evaluation   Structural Abnormalities present   Dentition present and adequate;other (see comments)  (turbinate on roof of mouth, s/p glossectome repari)   Secretion Management   (Pt reports some right corner drooling/trouble swallowing secretions. None observed today.)   Mandibular Strength and Mobility impaired  (TheraBite measurement of 30mm (average range 35-40 mm))   Lingual Strength and Mobility impaired protrusion;impaired right lateral movement;impaired anterior elevation  (Limited strength on side with flap)   Laryngeal Function Cough;Voicing initiated   Additional Documentation Yes   Swallow Eval   Feeding Assistance no assistance needed   Clinical Swallow Eval: Thin Liquid Texture Trial   Mode of Presentation, Thin Liquids cup;self-fed   Volume of Liquid or Food Presented single sips   Oral Phase of Swallow Delayed AP movement;Effortful AP movement   Pharyngeal Phase of Swallow reduction in laryngeal movement   Successful Strategies Trialed During Procedure chin tuck  (Pt naturally tucked chin. She indicates helps her control the bolus and improve tongue movement.)   Diagnostic Statement Decreased laryngeal elevation during swallow. Effortful swallow initiation.   Clinical Swallow Eval: Puree Solid  Texture Trial   Mode of Presentation, Puree spoon;self-fed   Volume of Puree Presented bite via spoon applesauce   Oral Phase, Puree Effortful AP movement   Pharyngeal Phase, Puree intact   Successful Strategies Trialed During Procedure, Puree chin tuck  (Pt naturally tucked chin. She indicates helps her control the bolus and improve tongue movement.)   Diagnostic Statement Pt reports it is effortful   Clinical Swallow Eval: Soft & Bite-sized   Mode of Presentation spoon;self-fed   Volume Presented canned peach   Oral Phase Effortful AP movement;Residue in oral cavity   Pharyngeal Phase intact   Successful Strategies Trialed During Procedure chin tuck  (Pt naturally tucked chin. She indicates helps her control the bolus and improve tongue movement.)   Diagnostic Statement Pt reports pocketin on roof of mouth by turbinite. Pocketing cleared with swallow of thin liquid.   VFSS Evaluation   VFSS Additional Documentation No   FEES Evaluation   Additional Documentation No   Swallow Compensations   Swallow Compensations Alternate viscosity of consistencies;Chin tuck;Multiple swallow;Reduce amounts   Educational Assessment   Barriers to Learning No barriers   Preferred Learning Style Listening   Esophageal Phase of Swallow   Patient reports or presents with symptoms of esophageal dysphagia No   Swallow Eval: Clinical Impressions   Skilled Criteria for Therapy Intervention Skilled criteria met.  Treatment indicated.   Functional Assessment Scale (FAS) 5   Dysphagia Outcome Severity Scale (GREGG) Level 5 - GREGG   Treatment Diagnosis moderate oral/pharyngeal dysphagia   Diet texture recommendations Thin liquids (level 0);Easy to Chew diet (level 7)   Recommended Feeding/Eating Techniques alternate between small bites and sips of food/liquid;check mouth frequently for oral residue/pocketing;maintain upright posture during/after eating for 30 mins;small sips/bites   Rehab Potential good, to achieve stated therapy goals   Therapy  Frequency other (see comments)  (1x/4weeks)   Predicted Duration of Therapy Intervention (days/wks) 8 weeks   Patient, family and/or staff in agreement with Plan of Care Yes   Clinical Impression Comments Pt assessed with thin liquid, puree, and soft and bite sized solids. She has no signs or symptoms of aspiration during swallow, but has decreased laryngeal elevation, a lot of effort while swallowing, and pocketing on the roof of her mouth with more solid foods (soft and bite sized pears).  Pt reports she has tension in her neck region and she has observable neck muscle tightness during swallow. Pt plans to see edema 2x/week to reduce tension in neck area. Recommend follow up with pt in 1 month after swelling/tension reduced in neck region and pt resumes oropharyngeal exercises for treatment visit.   Swallow Goals   SLP Swallow Goals 1;2   Swallow Goal 1   Goal Identifier Home Program   Goal Description Pt will complete oropharyngeal exercises 3x/day with 80% accuracy and min cues.   Target Date 05/01/23   Swallow Goal 2   Goal Identifier Safe Swallow Strategies   Goal Description Pt will report the use of 2 safe swallow strategies outside of the clinic to reduce instances of choking.   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  (Physician co-signature of this document indicates review and certification of the therapy plan).

## 2023-04-04 NOTE — PROGRESS NOTES
OUTPATIENT SWALLOW  EVALUATION  PLAN OF TREATMENT FOR OUTPATIENT REHABILITATION  (COMPLETE FOR INITIAL CLAIMS ONLY)  Patient's Last Name, First Name, M.I.  YOB: 1950  Terese Fleming     Provider's Name   AYAD Castañeda   Medical Record No.  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 Goal Identifier: Home Program       Goal Description: Pt will complete oropharyngeal exercises 3x/day with 80% accuracy and min cues.       Target Date: 05/01/23           2. Goal Identifier: Safe Swallow Strategies       Goal Description: Pt will report the use of 2 safe swallow strategies outside of the clinic to reduce instances of choking.       Target Date: 05/29/23               Therapy Frequency: other (see comments) (1x/4weeks)  Predicted Duration of Therapy Intervention (days/wks): 8 weeks    AYAD Castañeda       I CERTIFY THE NEED FOR THESE SERVICES FURNISHED UNDER        THIS PLAN OF TREATMENT AND WHILE UNDER MY CARE     (Physician co-signature of this document indicates review and certification of the therapy plan).                  Certification date from: 04/03/23 Certification date to: 06/05/23          Referring Physician: Vanna Rubio, DO    Initial Assessment        See Epic Evaluation Start Of Care Date: 04/03/23

## 2023-04-05 ENCOUNTER — TELEPHONE (OUTPATIENT)
Dept: ORTHOPEDICS | Facility: CLINIC | Age: 73
End: 2023-04-05
Payer: MEDICARE

## 2023-04-05 DIAGNOSIS — M17.0 BILATERAL PRIMARY OSTEOARTHRITIS OF KNEE: Primary | ICD-10-CM

## 2023-04-05 NOTE — TELEPHONE ENCOUNTER
Patient scheduled for appointment on 4/19/2023 @ [facility] for discussion of viscosupplementation injection vs steroid injection of bilateral knee.        Steroid  injection last completed 11/16/22.  Patient reports relief for 3+ months.       Patient has failed trial of OTC NSAIDs/Pain Medication (ibuprofen, tylenol, naproxen,...):  Yes       Patient has completed trial of physical therapy: No    Prior authorization referral for Durolane injection pended.    Please advise    Agustin Betancur ATC

## 2023-04-06 ENCOUNTER — HOSPITAL ENCOUNTER (OUTPATIENT)
Dept: PHYSICAL THERAPY | Facility: CLINIC | Age: 73
Setting detail: THERAPIES SERIES
Discharge: HOME | End: 2023-04-06
Attending: INTERNAL MEDICINE
Payer: MEDICARE

## 2023-04-06 PROCEDURE — 97140 MANUAL THERAPY 1/> REGIONS: CPT | Mod: GP | Performed by: PHYSICAL THERAPIST

## 2023-04-06 PROCEDURE — 97140 MANUAL THERAPY 1/> REGIONS: CPT | Mod: GP,CQ | Performed by: REHABILITATION PRACTITIONER

## 2023-04-10 ENCOUNTER — HOSPITAL ENCOUNTER (OUTPATIENT)
Dept: PHYSICAL THERAPY | Facility: CLINIC | Age: 73
Setting detail: THERAPIES SERIES
Discharge: HOME | End: 2023-04-10
Attending: INTERNAL MEDICINE
Payer: MEDICARE

## 2023-04-10 PROCEDURE — 97140 MANUAL THERAPY 1/> REGIONS: CPT | Mod: GP | Performed by: PHYSICAL THERAPIST

## 2023-04-18 ENCOUNTER — HOSPITAL ENCOUNTER (OUTPATIENT)
Dept: PHYSICAL THERAPY | Facility: CLINIC | Age: 73
Setting detail: THERAPIES SERIES
Discharge: HOME | End: 2023-04-18
Attending: INTERNAL MEDICINE
Payer: MEDICARE

## 2023-04-18 PROCEDURE — 97140 MANUAL THERAPY 1/> REGIONS: CPT | Mod: GP,CQ | Performed by: REHABILITATION PRACTITIONER
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
## 2023-04-19 ENCOUNTER — OFFICE VISIT (OUTPATIENT)
Dept: ORTHOPEDICS | Facility: CLINIC | Age: 73
End: 2023-04-19
Payer: MEDICARE

## 2023-04-19 VITALS
BODY MASS INDEX: 25.87 KG/M2 | HEART RATE: 68 BPM | WEIGHT: 146 LBS | HEIGHT: 63 IN | SYSTOLIC BLOOD PRESSURE: 123 MMHG | DIASTOLIC BLOOD PRESSURE: 77 MMHG

## 2023-04-19 DIAGNOSIS — M17.0 BILATERAL PRIMARY OSTEOARTHRITIS OF KNEE: ICD-10-CM

## 2023-04-19 DIAGNOSIS — M25.561 CHRONIC PAIN OF BOTH KNEES: ICD-10-CM

## 2023-04-19 DIAGNOSIS — M25.562 CHRONIC PAIN OF BOTH KNEES: ICD-10-CM

## 2023-04-19 DIAGNOSIS — M25.461 EFFUSION OF BOTH KNEE JOINTS: Primary | ICD-10-CM

## 2023-04-19 DIAGNOSIS — M25.462 EFFUSION OF BOTH KNEE JOINTS: Primary | ICD-10-CM

## 2023-04-19 DIAGNOSIS — G89.29 CHRONIC PAIN OF BOTH KNEES: ICD-10-CM

## 2023-04-19 PROCEDURE — 20611 DRAIN/INJ JOINT/BURSA W/US: CPT | Mod: 50 | Performed by: FAMILY MEDICINE

## 2023-04-19 RX ORDER — ROPIVACAINE HYDROCHLORIDE 5 MG/ML
3 INJECTION, SOLUTION EPIDURAL; INFILTRATION; PERINEURAL
Status: DISCONTINUED | OUTPATIENT
Start: 2023-04-19 | End: 2024-04-17

## 2023-04-19 RX ORDER — TRIAMCINOLONE ACETONIDE 40 MG/ML
40 INJECTION, SUSPENSION INTRA-ARTICULAR; INTRAMUSCULAR
Status: DISCONTINUED | OUTPATIENT
Start: 2023-04-19 | End: 2024-04-17

## 2023-04-19 RX ADMIN — ROPIVACAINE HYDROCHLORIDE 3 ML: 5 INJECTION, SOLUTION EPIDURAL; INFILTRATION; PERINEURAL at 10:41

## 2023-04-19 RX ADMIN — TRIAMCINOLONE ACETONIDE 40 MG: 40 INJECTION, SUSPENSION INTRA-ARTICULAR; INTRAMUSCULAR at 10:41

## 2023-04-19 ASSESSMENT — PAIN SCALES - GENERAL: PAINLEVEL: MODERATE PAIN (4)
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
## 2023-04-20 ENCOUNTER — HOSPITAL ENCOUNTER (OUTPATIENT)
Dept: PHYSICAL THERAPY | Facility: CLINIC | Age: 73
Setting detail: THERAPIES SERIES
Discharge: HOME | End: 2023-04-20
Attending: INTERNAL MEDICINE
Payer: MEDICARE

## 2023-04-20 PROCEDURE — 97140 MANUAL THERAPY 1/> REGIONS: CPT | Mod: GP,CQ | Performed by: REHABILITATION PRACTITIONER

## 2023-04-24 ENCOUNTER — HOSPITAL ENCOUNTER (OUTPATIENT)
Dept: PHYSICAL THERAPY | Facility: CLINIC | Age: 73
Setting detail: THERAPIES SERIES
Discharge: HOME | End: 2023-04-24
Attending: INTERNAL MEDICINE
Payer: MEDICARE

## 2023-04-24 PROCEDURE — 97140 MANUAL THERAPY 1/> REGIONS: CPT | Mod: GP,CQ | Performed by: REHABILITATION PRACTITIONER

## 2023-04-25 ENCOUNTER — OFFICE VISIT (OUTPATIENT)
Dept: FAMILY MEDICINE | Facility: CLINIC | Age: 73
End: 2023-04-25
Payer: MEDICARE

## 2023-04-25 VITALS
RESPIRATION RATE: 20 BRPM | TEMPERATURE: 97.3 F | BODY MASS INDEX: 24.59 KG/M2 | OXYGEN SATURATION: 98 % | DIASTOLIC BLOOD PRESSURE: 62 MMHG | HEIGHT: 64 IN | HEART RATE: 64 BPM | WEIGHT: 144 LBS | SYSTOLIC BLOOD PRESSURE: 104 MMHG

## 2023-04-25 DIAGNOSIS — E78.5 HYPERLIPIDEMIA LDL GOAL <130: ICD-10-CM

## 2023-04-25 DIAGNOSIS — B02.29 POST HERPETIC NEURALGIA: ICD-10-CM

## 2023-04-25 DIAGNOSIS — Z23 HIGH PRIORITY FOR 2019-NCOV VACCINE: ICD-10-CM

## 2023-04-25 DIAGNOSIS — C02.9 MALIGNANT NEOPLASM OF TONGUE (H): ICD-10-CM

## 2023-04-25 DIAGNOSIS — Z00.00 ENCOUNTER FOR MEDICARE ANNUAL WELLNESS EXAM: Primary | ICD-10-CM

## 2023-04-25 PROCEDURE — G0439 PPPS, SUBSEQ VISIT: HCPCS | Performed by: INTERNAL MEDICINE

## 2023-04-25 PROCEDURE — 99213 OFFICE O/P EST LOW 20 MIN: CPT | Mod: 25 | Performed by: INTERNAL MEDICINE

## 2023-04-25 PROCEDURE — 0124A COVID-19 VACCINE BIVALENT BOOSTER 12+ (PFIZER): CPT | Performed by: INTERNAL MEDICINE

## 2023-04-25 PROCEDURE — 91312 COVID-19 VACCINE BIVALENT BOOSTER 12+ (PFIZER): CPT | Performed by: INTERNAL MEDICINE

## 2023-04-25 RX ORDER — LANOLIN ALCOHOL/MO/W.PET/CERES
400 CREAM (GRAM) TOPICAL DAILY
COMMUNITY

## 2023-04-25 RX ORDER — OXYCODONE HYDROCHLORIDE 5 MG/1
5 TABLET ORAL EVERY 6 HOURS PRN
Qty: 12 TABLET | Refills: 0 | Status: SHIPPED | OUTPATIENT
Start: 2023-04-25 | End: 2023-04-28

## 2023-04-25 RX ORDER — GABAPENTIN 300 MG/1
300-600 CAPSULE ORAL 2 TIMES DAILY
Qty: 360 CAPSULE | Refills: 3 | Status: SHIPPED | OUTPATIENT
Start: 2023-04-25 | End: 2024-04-26

## 2023-04-25 ASSESSMENT — ENCOUNTER SYMPTOMS
FEVER: 0
JOINT SWELLING: 1
COUGH: 0
PARESTHESIAS: 0
DYSURIA: 0
DIZZINESS: 0
MYALGIAS: 0
HEARTBURN: 0
WEAKNESS: 0
HEMATOCHEZIA: 0
PALPITATIONS: 0
SORE THROAT: 0
NERVOUS/ANXIOUS: 0
EYE PAIN: 0
HEMATURIA: 0
CONSTIPATION: 0
HEADACHES: 0
SHORTNESS OF BREATH: 0
CHILLS: 1
DIARRHEA: 0
BREAST MASS: 0
FREQUENCY: 1
ABDOMINAL PAIN: 0
ARTHRALGIAS: 1
NAUSEA: 0

## 2023-04-25 ASSESSMENT — ASTHMA QUESTIONNAIRES
QUESTION_5 LAST FOUR WEEKS HOW WOULD YOU RATE YOUR ASTHMA CONTROL: WELL CONTROLLED
ACT_TOTALSCORE: 24
QUESTION_1 LAST FOUR WEEKS HOW MUCH OF THE TIME DID YOUR ASTHMA KEEP YOU FROM GETTING AS MUCH DONE AT WORK, SCHOOL OR AT HOME: NONE OF THE TIME
QUESTION_4 LAST FOUR WEEKS HOW OFTEN HAVE YOU USED YOUR RESCUE INHALER OR NEBULIZER MEDICATION (SUCH AS ALBUTEROL): NOT AT ALL
QUESTION_3 LAST FOUR WEEKS HOW OFTEN DID YOUR ASTHMA SYMPTOMS (WHEEZING, COUGHING, SHORTNESS OF BREATH, CHEST TIGHTNESS OR PAIN) WAKE YOU UP AT NIGHT OR EARLIER THAN USUAL IN THE MORNING: NOT AT ALL
ACT_TOTALSCORE: 24
QUESTION_2 LAST FOUR WEEKS HOW OFTEN HAVE YOU HAD SHORTNESS OF BREATH: NOT AT ALL

## 2023-04-25 ASSESSMENT — PAIN SCALES - GENERAL: PAINLEVEL: MODERATE PAIN (4)

## 2023-04-25 ASSESSMENT — ACTIVITIES OF DAILY LIVING (ADL): CURRENT_FUNCTION: NO ASSISTANCE NEEDED

## 2023-04-25 NOTE — PATIENT INSTRUCTIONS
Health Care Maintenance  You are due for COVID-19 and Tetanus vaccine(s)    Pain  Will recertify for cannabis  Refill of oxycodone and gabapentin    Abdominal pain  Lump is small hernia.  Can use abdominal binder;  surgery is sometimes considered for more severe symptoms       Patient Education   Personalized Prevention Plan  You are due for the preventive services outlined below.  Your care team is available to assist you in scheduling these services.  If you have already completed any of these items, please share that information with your care team to update in your medical record.  Health Maintenance Due   Topic Date Due    Asthma Action Plan - yearly  03/02/2021    COVID-19 Vaccine (4 - Booster for Pfizer series) 03/08/2022    Diptheria Tetanus Pertussis (DTAP/TDAP/TD) Vaccine (3 - Td or Tdap) 11/05/2022    Colorectal Cancer Screening  12/14/2022    ANNUAL REVIEW OF  ORDERS  02/15/2023       Urinary Incontinence, Female (Adult)   Urinary incontinence means loss of bladder control. This problem affects many women, especially as they get older. If you have incontinence, you may be embarrassed to ask for help. But know that this problem can be treated.   Types of Incontinence  There are different types of incontinence. Two of the main types are described here. You can have more than one type.   Stress incontinence. With this type, urine leaks when pressure (stress) is put on the bladder. This may happen when you cough, sneeze, or laugh. Stress incontinence most often occurs because the pelvic floor muscles that support the bladder and urethra are weak. This can happen after pregnancy and vaginal childbirth or a hysterectomy. It can also be due to excess body weight or hormone changes.  Urge incontinence (also called overactive bladder). With this type, a sudden urge to urinate is felt often. This may happen even though there may not be much urine in the bladder. The need to urinate often during the night is  common. Urge incontinence most often occurs because of bladder spasms. This may be due to bladder irritation or infection. Damage to bladder nerves or pelvic muscles, constipation, and certain medicines can also lead to urge incontinence.  Treatment depends on the cause. Further evaluation is needed to find the type you have. This will likely include an exam and certain tests. Based on the results, you and your healthcare provider can then plan treatment. Until a diagnosis is made, the home care tips below can help ease symptoms.   Home care  Do pelvic floor muscle exercises, if they are prescribed. The pelvic floor muscles help support the bladder and urethra. Many women find that their symptoms improve when doing special exercises that strengthen these muscles. To do the exercises, contract the muscles you would use to stop your stream of urine. But do this when you re not urinating. Hold for 10 seconds, then relax. Repeat 10 to 20 times in a row, at least 3 times a day. Your healthcare provider may give you other instructions for how to do the exercises and how often.  Keep a bladder diary. This helps track how often and how much you urinate over a set period of time. Bring this diary with you to your next visit with the provider. The information can help your provider learn more about your bladder problem.  Lose weight, if advised to by your provider. Extra weight puts pressure on the bladder. Your provider can help you create a weight-loss plan that s right for you. This may include exercising more and making certain diet changes.  Don't have foods and drinks that may irritate the bladder. These can include alcohol and caffeinated drinks.  Quit smoking. Smoking and other tobacco use can lead to a long-term (chronic) cough that strains the pelvic floor muscles. Smoking may also damage the bladder and urethra. Talk with your provider about treatments or methods you can use to quit smoking.  If drinking large  amounts of fluid makes you have symptoms, you may be advised to limit your fluid intake. You may also be advised to drink most of your fluids during the day and to limit fluids at night.  If you re worried about urine leakage or accidents, you may wear absorbent pads to catch urine. Change the pads often. This helps reduce discomfort. It may also reduce the risk of skin or bladder infections.    Follow-up care  Follow up with your healthcare provider, or as directed. It may take some to find the right treatment for your problem. But healthy lifestyle changes can be made right away. These include such things as exercising on a regular basis, eating a healthy diet, losing weight (if needed), and quitting smoking. Your treatment plan may include special therapies or medicines. Certain procedures or surgery may also be options. Talk about any questions you have with your provider.   When to seek medical advice  Call the healthcare provider right away if any of these occur:  Fever of 100.4 F (38 C) or higher, or as directed by your provider  Bladder pain or fullness  Belly swelling  Nausea or vomiting  Back pain  Weakness, dizziness, or fainting  "[employer] last reviewed this educational content on 1/1/2020 2000-2022 [employer]. All rights reserved. This information is not intended as a substitute for professional medical care. Always follow your healthcare professional's instructions.

## 2023-04-25 NOTE — PROGRESS NOTES
"SUBJECTIVE:   Sarah is a 72 year old who presents for Preventive Visit.      4/25/2023     8:14 AM   Additional Questions   Roomed by Giovanna     Patient has been advised of split billing requirements and indicates understanding: Yes  Are you in the first 12 months of your Medicare coverage?  No    Healthy Habits:     In general, how would you rate your overall health?  Good    Frequency of exercise:  6-7 days/week    Duration of exercise:  Greater than 60 minutes    Do you usually eat at least 4 servings of fruit and vegetables a day, include whole grains    & fiber and avoid regularly eating high fat or \"junk\" foods?  Yes    Taking medications regularly:  Yes    Medication side effects:  None    Ability to successfully perform activities of daily living:  No assistance needed    Home Safety:  No safety concerns identified    Hearing Impairment:  No hearing concerns    In the past 6 months, have you been bothered by leaking of urine? Yes    In general, how would you rate your overall mental or emotional health?  Excellent      PHQ-2 Total Score: 0    Additional concerns today:  Yes      Chronic Pain:  --needs renewal of medical cannabis today   --finds it helpful; uses it regularly;  Rarely uses oxycodone;  Helps with left neck/head from shingles  --initially caused drowsiness, this has resolved.  --has post-herpetic neuralgia of the left scalp;  Was told she has 'permement shingles;  Has severe itching over area at times.  --uses over the counter lidocaine, CBD, voltaren; using braces which helps  --doctoring with sports med for bilateral knee pain  --very rare use of oxycodone now that she can use medical cannabis; while in [place], she used 2.5 mg once daily because she cannot bring her medical cannabis to [place]  --she plans to return to [place] and may look into getting a medical supply while in [place]    MSK  --was horsing around with grandkids and has noted a lump in abdomen, thinks is hernia; is " sometimes tender.  Increase in constipation    Swallowing:  --ongoing problems  --wt is down from 1 year ago but has stabilized for the last several months.  --last saw Speech /3, getting ongoing therapy      Dry Mouth:  --feels this contributes to to troubles swallowing  --Rad Onc has Rx pilocarpine for dry mouth; doesn't seem to help  --not using any benadryl or over the counter sleep  --albuterol inhaler cause dry mouth    Have you ever done Advance Care Planning? (For example, a Health Directive, POLST, or a discussion with a medical provider or your loved ones about your wishes): Yes, advance care planning is on file.       Fall risk  Fallen 2 or more times in the past year?: No  Any fall with injury in the past year?: No    Cognitive Screening   1) Repeat 3 items (Leader, Season, Table)    2) Clock draw: ABNORMAL put hand on the 10  3) 3 item recall: Recalls 1 object   Results: ABNORMAL clock, 1-2 items recalled: PROBABLE COGNITIVE IMPAIRMENT, **INFORM PROVIDER**    Mini-CogTM Copyright JEOVANNY Carter. Licensed by the author for use in [facility]; reprinted with permission ([email or web address]). All rights reserved.      Do you have sleep apnea, excessive snoring or daytime drowsiness?: no    Reviewed and updated as needed this visit by clinical staff   Tobacco  Allergies   Problems             Reviewed and updated as needed this visit by Provider      Problems            Social History     Tobacco Use     Smoking status: Former     Packs/day: 2.00     Years: 7.00     Pack years: 14.00     Types: Cigarettes     Start date: 1970     Quit date: 1980     Years since quittin.3     Smokeless tobacco: Never   Vaping Use     Vaping status: Never Used   Substance Use Topics     Alcohol use: Not Currently     Comment: 4 drinks per year             2023     8:10 AM   Alcohol Use   Prescreen: >3 drinks/day or >7 drinks/week? No     Do you have a current opioid prescription? Yes   How severe is your  pain on a scale from 1-10? 1/10         Do you use any other controlled substances or medications that are not prescribed by a provider? Medical Cannabis               Current providers sharing in care for this patient include:   Patient Care Team:  Vanna Rubio DO as PCP - General (Internal Medicine)  Thomas Ferris MD as MD (Otolaryngology)  Juwan Zhang MD as MD (Radiation Oncology)  Vanna Rubio DO as Assigned PCP  Thomas Ferris MD as Assigned Surgical Provider  Suly Hill PA-C as Physician Assistant (Dermatology)  Juwan Zhang MD as Assigned Cancer Care Provider  Wing Londono DO as Assigned Musculoskeletal Provider    The following health maintenance items are reviewed in Epic and correct as of today:  Health Maintenance   Topic Date Due     ASTHMA ACTION PLAN  03/02/2021     COVID-19 Vaccine (4 - Booster for Pfizer series) 03/08/2022     DTAP/TDAP/TD IMMUNIZATION (3 - Td or Tdap) 11/05/2022     COLORECTAL CANCER SCREENING  12/14/2022     ANNUAL REVIEW OF HM ORDERS  02/15/2023     ASTHMA CONTROL TEST  10/25/2023     MEDICARE ANNUAL WELLNESS VISIT  04/25/2024     FALL RISK ASSESSMENT  04/25/2024     MAMMO SCREENING  03/20/2025     LIPID  03/07/2027     ADVANCE CARE PLANNING  04/25/2028     DEXA  10/29/2030     HEPATITIS C SCREENING  Completed     PHQ-2 (once per calendar year)  Completed     INFLUENZA VACCINE  Completed     Pneumococcal Vaccine: 65+ Years  Completed     ZOSTER IMMUNIZATION  Completed     IPV IMMUNIZATION  Aged Out     MENINGITIS IMMUNIZATION  Aged Out     Current Outpatient Medications   Medication Sig Dispense Refill     Ascorbic Acid (VITAMIN C PO)        augmented betamethasone dipropionate (DIPROLENE-AF) 0.05 % external cream Apply sparingly to affected area twice daily as needed.  Do not apply to face. 50 g 11     CALCIUM PO        CANNABIDIOL, HEMP OIL/EXTRACT, OR CBD PRODUCT OTHER THAN MEDICAL CANNABIS, Apply topically daily       DENTAGEL 1.1  "% GEL topical gel apply to the affected area at bedtime 112 g 11     Ferrous Sulfate Dried 45 MG TBCR        folic acid (FOLATE) 400 MCG tablet Take 400 mcg by mouth daily       gabapentin (NEURONTIN) 300 MG capsule Take 1-2 capsules (300-600 mg) by mouth 2 times daily 360 capsule 1     ibuprofen (ADVIL/MOTRIN) 100 MG tablet Take 100 mg by mouth every 4 hours as needed       lidocaine (XYLOCAINE) 5 % external ointment Apply topically as needed for moderate pain 50 g 0     MAGNESIUM PO        Multiple Vitamin (MULTIVITAMIN ADULT PO)        pilocarpine (SALAGEN) 5 MG tablet Take 0.5 tablet orally daily for dry mouth. 30 tablet 3     Turmeric 500 MG CAPS        vitamin D3 (CHOLECALCIFEROL) 2000 units tablet Take 1 tablet by mouth daily           Review of Systems   Constitutional: Positive for chills. Negative for fever.   HENT: Negative for congestion, ear pain, hearing loss and sore throat.    Eyes: Negative for pain and visual disturbance.   Respiratory: Negative for cough and shortness of breath.    Cardiovascular: Negative for chest pain, palpitations and peripheral edema.   Gastrointestinal: Negative for abdominal pain, constipation, diarrhea, heartburn, hematochezia and nausea.   Breasts:  Negative for tenderness, breast mass and discharge.   Genitourinary: Positive for frequency. Negative for dysuria, genital sores, hematuria, pelvic pain, urgency, vaginal bleeding and vaginal discharge.   Musculoskeletal: Positive for arthralgias and joint swelling. Negative for myalgias.   Skin: Negative for rash.   Neurological: Negative for dizziness, weakness, headaches and paresthesias.   Psychiatric/Behavioral: Negative for mood changes. The patient is not nervous/anxious.        OBJECTIVE:   /62   Pulse 64   Temp 97.3  F (36.3  C) (Tympanic)   Resp 20   Ht 1.613 m (5' 3.5\")   Wt 65.3 kg (144 lb)   SpO2 98%   BMI 25.11 kg/m   Estimated body mass index is 25.11 kg/m  as calculated from the following:    " "Height as of this encounter: 1.613 m (5' 3.5\").    Weight as of this encounter: 65.3 kg (144 lb).  Physical Exam  GENERAL: alert, no distress and elderly  EYES: Eyes grossly normal to inspection, PERRL and conjunctivae and sclerae normal  HENT: ear canals and TM's normal, nose and mouth without ulcers or lesions; voice change consistent with know diagnosis   NECK: no adenopathy and changes related to surgery and radiation  RESP: lungs clear to auscultation - no rales, rhonchi or wheezes  CV: regular rate and rhythm, normal S1 S2, no S3 or S4, no murmur, click or rub, no peripheral edema and peripheral pulses strong  ABDOMEN: soft, nontender, without hepatosplenomegaly or masses, bowel sounds normal and ventral wall hernia, non-tender easily reduced  MS: no gross musculoskeletal defects noted, no edema  SKIN: no suspicious lesions or rashes  NEURO: Normal strength and tone, mentation intact and speech normal  PSYCH: mentation appears normal, affect normal/bright        ASSESSMENT / PLAN:       ICD-10-CM    1. Encounter for Medicare annual wellness exam  Z00.00       2. Malignant neoplasm of tongue (H)  C02.9 gabapentin (NEURONTIN) 300 MG capsule     OFFICE/OUTPT VISIT,EST,LEVL IV     oxyCODONE (ROXICODONE) 5 MG tablet     CBC with platelets     Basic metabolic panel  (Ca, Cl, CO2, Creat, Gluc, K, Na, BUN)      3. Post herpetic neuralgia  B02.29 gabapentin (NEURONTIN) 300 MG capsule     OFFICE/OUTPT VISIT,EST,LEVL IV     oxyCODONE (ROXICODONE) 5 MG tablet      4. Hyperlipidemia LDL goal <130  E78.5 Lipid panel reflex to direct LDL Fasting          Health Care Maintenance  1. You are due for COVID-19 and Tetanus vaccine(s)    Pain  1. Will recertify for cannabis  2. Refill of oxycodone and gabapentin    Abdominal pain  Lump is small hernia.  Can use abdominal binder;  surgery is sometimes considered for more severe symptoms     Patient has been advised of split billing requirements and indicates understanding: " "Yes      COUNSELING:  Reviewed preventive health counseling, as reflected in patient instructions      BMI:   Estimated body mass index is 25.11 kg/m  as calculated from the following:    Height as of this encounter: 1.613 m (5' 3.5\").    Weight as of this encounter: 65.3 kg (144 lb).         She reports that she quit smoking about 43 years ago. Her smoking use included cigarettes. She started smoking about 53 years ago. She has a 14.00 pack-year smoking history. She has never used smokeless tobacco.      Appropriate preventive services were discussed with this patient, including applicable screening as appropriate for cardiovascular disease, diabetes, osteopenia/osteoporosis, and glaucoma.  As appropriate for age/gender, discussed screening for colorectal cancer, prostate cancer, breast cancer, and cervical cancer. Checklist reviewing preventive services available has been given to the patient.    Reviewed patients plan of care and provided an AVS. The Complex Care Plan (for patients with higher acuity and needing more deliberate coordination of services) for Terese meets the Care Plan requirement. This Care Plan has been established and reviewed with the Patient.          Vanna Rubio DO  [facility]    Identified Health Risks:    I have reviewed Opioid Use Disorder and Substance Use Disorder risk factors and made any needed referrals.       Information on urinary incontinence and treatment options given to patient.  "

## 2023-04-25 NOTE — PROGRESS NOTES
[facility]    Outpatient Physical Therapy Progress Note  Patient: Terese Bell  : 1950    Beginning/End Dates of Reporting Period:  4/3/23 to 2023     Referring Provider: Vanna Rubio,     Therapy Diagnosis: R-cervical tightness and scarred with decreased cervical ROM     Client Self Report: 2/3-10 pn scale, pt feels she is back to how she felt 7-8 months ago    Objective Measurements:   measured last on 4/3/23-  R-rotation 35 deg and L rotation 40     Goals:  Goal Identifier 1   Goal Description pt to be independent with longterm management of neck via HEP (including ROM exercies and self-MFR) to increase ROM and comfort during functional/daily activities   Target Date 23   Date Met      Progress (detail required for progress note):       Goal Identifier 2   Goal Description pt to increase B cervical rotation to at leats 50 degrees to improve functional range for driving, gardening and house hold tasks/activities   Target Date 23   Date Met      Progress (detail required for progress note):       Plan:  Changes to therapy plan of care: decreasing frequency to 1x a week to cont cervical MFR    Discharge:  No

## 2023-04-27 ENCOUNTER — HOSPITAL ENCOUNTER (OUTPATIENT)
Dept: PHYSICAL THERAPY | Facility: CLINIC | Age: 73
Setting detail: THERAPIES SERIES
Discharge: HOME | End: 2023-04-27
Attending: INTERNAL MEDICINE
Payer: MEDICARE

## 2023-04-27 ENCOUNTER — HOSPITAL ENCOUNTER (OUTPATIENT)
Dept: SPEECH THERAPY | Facility: CLINIC | Age: 73
Setting detail: THERAPIES SERIES
Discharge: HOME | End: 2023-04-27
Attending: INTERNAL MEDICINE
Payer: MEDICARE

## 2023-04-27 PROCEDURE — 92526 ORAL FUNCTION THERAPY: CPT | Mod: GN | Performed by: SPEECH-LANGUAGE PATHOLOGIST

## 2023-04-27 PROCEDURE — 97140 MANUAL THERAPY 1/> REGIONS: CPT | Mod: GP,CQ | Performed by: REHABILITATION PRACTITIONER

## 2023-04-27 NOTE — PROGRESS NOTES
"  Terese Bell  1950  Vannaantony Rubio, DO  [address] [place] [address]     Speech Therapy Discharge Note  04/27/23    Signing Clinician's Name / Credentials   Signing clinician's name /credentials Angelita Rey MA, CCC/SLP, Kayla Rivas, Graduate Clinician   Session Number   Session Number 2   Progress/Recertification   Progress note due 05/01/23   Subjective Report   Subjective Report Pt reports improved swallowing.  Had dentition issue and mostly chewing on the right.  Still feels dry mouth and starting a new medication.  \"I feel better than I have in the past 5 years.\"   Swallow Goal 1   Goal Identifier Home Program   Goal Description Pt will complete oropharyngeal exercises 3x/day with 80% accuracy and min cues.   Goal Progress Has been doing effortful swallow but not compliance with the exercises on the handout.  Does exercises during commercials when watching TV.   Target Date 05/01/23   Swallow Goal 2   Goal Identifier Safe Swallow Strategies   Goal Description Pt will report the use of 2 safe swallow strategies outside of the clinic to reduce instances of choking.   Goal Progress Goal Met. Alternate liquids/solids. Masticating more on right side, small bites, effortful swallow.   Target Date 05/29/23   Treatment Swallow/Oral dysfunction   Treatment of Swallowing Dysfunction &/or Oral Function for Feeding Minutes (56707) 30 Minutes   Skilled Intervention Provided written and verbal information on diet modifications.   Patient Response Pt reports swallowing is improved and edema therapy in her neck/throat area has been beneficial with the edema team.  Pt mostly eating IDDSI level 4/5 pureed foods with some minced solids.   Treatment Detail Therabite increased from 30-32mm.  Pt understands recommendation to continue with oropharyngeal ex program as home program and independent with all compensatory strategies.   Progress No further dysphagia therapy indicated as pt reports " improvement over the past month from edema therapy and improved dysphagia.  Pt back to her baseline which was mostly pureed foods with thin liquids with some minced/moist bites for quality of life due to effort to eat.   Education   Learner Patient   Readiness Acceptance   Method Booklet/handout;Explanation;Demonstration   Response Verbalizes understanding;Demonstrates understanding   Education Notes Minced and moist vs pureed solids.   Plan   Home program oropharyngeal ex, jaw stretches- will resume Thera bite when cleared from dentits.   Updates to plan of care Discharge from    Total Session Time   Total Treatment Time (sum of timed and untimed services) 30

## 2023-05-03 ENCOUNTER — HOSPITAL ENCOUNTER (OUTPATIENT)
Dept: PHYSICAL THERAPY | Facility: CLINIC | Age: 73
Setting detail: THERAPIES SERIES
Discharge: HOME | End: 2023-05-03
Attending: INTERNAL MEDICINE
Payer: MEDICARE

## 2023-05-03 PROCEDURE — 97140 MANUAL THERAPY 1/> REGIONS: CPT | Mod: GP | Performed by: PHYSICAL THERAPIST

## 2023-05-09 ENCOUNTER — TELEPHONE (OUTPATIENT)
Dept: RADIATION THERAPY | Facility: OUTPATIENT CENTER | Age: 73
End: 2023-05-09

## 2023-05-16 ENCOUNTER — HOSPITAL ENCOUNTER (OUTPATIENT)
Dept: PHYSICAL THERAPY | Facility: CLINIC | Age: 73
Setting detail: THERAPIES SERIES
Discharge: HOME | End: 2023-05-16
Attending: INTERNAL MEDICINE
Payer: MEDICARE

## 2023-05-16 PROCEDURE — 97140 MANUAL THERAPY 1/> REGIONS: CPT | Mod: GP | Performed by: PHYSICAL THERAPIST

## 2023-05-16 NOTE — PROGRESS NOTES
[facility] Rehabilitation [facility]    Outpatient Physical Therapy Discharge Note  Patient: Terese Bell  : 1950    Beginning/End Dates of Reporting Period:  5/3/23 to 23    Referring Provider: Vanna Rubio,     Therapy Diagnosis: R-cervical tightness and scarred with decreased cervical ROM     Client Self Report: I figured out my routine and I've been using my traction machine everyday for about 14min/day; I do my self stretching constantly - at every commercial break when I'm sitting down; I also think my swallowing is better than it was when we started    Objective Measurements:  Objective Measure: ROM  Details: R-rotation 48 deg and L rotation 50 deg (increased from 35deg and 40deg respectively)    Objective Measure: cervical girth  Details: since 4/3/23: superior, middle and inferior girth each decreased by 0.3cm     Goals:  Goal Identifier 1   Goal Description pt to be independent with longterm management of neck via HEP (including ROM exercies and self-MFR) to increase ROM and comfort during functional/daily activities   Target Date 23   Date Met  23   Progress (detail required for progress note):       Goal Identifier 2   Goal Description pt to increase B cervical rotation to at least 50 degrees to improve functional range for driving, gardening and house hold tasks/activities   Target Date 23   Date Met  23 (met for L rotation; R-rotation able to achieve 48 degrees; despite not reaching goal number, pt reports she notices a dramatic improvement in her B rotation with driving)   Progress (detail required for progress note):       Plan:  Discharge from therapy.    Discharge:    Reason for Discharge: Patient has met all goals - see above    Equipment Issued: none    Discharge Plan: Patient to continue home program.

## 2023-08-19 ENCOUNTER — HEALTH MAINTENANCE LETTER (OUTPATIENT)
Age: 73
End: 2023-08-19

## 2023-09-19 ENCOUNTER — LAB (OUTPATIENT)
Dept: LAB | Facility: CLINIC | Age: 73
End: 2023-09-19
Payer: MEDICARE

## 2023-09-19 DIAGNOSIS — C02.9 MALIGNANT NEOPLASM OF TONGUE (H): ICD-10-CM

## 2023-09-19 DIAGNOSIS — E78.5 HYPERLIPIDEMIA LDL GOAL <130: ICD-10-CM

## 2023-09-19 LAB
ANION GAP SERPL CALCULATED.3IONS-SCNC: 8 MMOL/L (ref 7–15)
BUN SERPL-MCNC: 9.6 MG/DL (ref 8–23)
CALCIUM SERPL-MCNC: 10 MG/DL (ref 8.8–10.2)
CHLORIDE SERPL-SCNC: 104 MMOL/L (ref 98–107)
CHOLEST SERPL-MCNC: 219 MG/DL
CREAT SERPL-MCNC: 0.63 MG/DL (ref 0.51–0.95)
DEPRECATED HCO3 PLAS-SCNC: 30 MMOL/L (ref 22–29)
EGFRCR SERPLBLD CKD-EPI 2021: >90 ML/MIN/1.73M2
ERYTHROCYTE [DISTWIDTH] IN BLOOD BY AUTOMATED COUNT: 12.5 % (ref 10–15)
GLUCOSE SERPL-MCNC: 88 MG/DL (ref 70–99)
HCT VFR BLD AUTO: 36.3 % (ref 35–47)
HDLC SERPL-MCNC: 81 MG/DL
HGB BLD-MCNC: 11.6 G/DL (ref 11.7–15.7)
LDLC SERPL CALC-MCNC: 124 MG/DL
MCH RBC QN AUTO: 31.4 PG (ref 26.5–33)
MCHC RBC AUTO-ENTMCNC: 32 G/DL (ref 31.5–36.5)
MCV RBC AUTO: 98 FL (ref 78–100)
NONHDLC SERPL-MCNC: 138 MG/DL
PLATELET # BLD AUTO: 221 10E3/UL (ref 150–450)
POTASSIUM SERPL-SCNC: 4.2 MMOL/L (ref 3.4–5.3)
RBC # BLD AUTO: 3.7 10E6/UL (ref 3.8–5.2)
SODIUM SERPL-SCNC: 142 MMOL/L (ref 136–145)
TRIGL SERPL-MCNC: 68 MG/DL
WBC # BLD AUTO: 3.9 10E3/UL (ref 4–11)

## 2023-09-19 PROCEDURE — 80061 LIPID PANEL: CPT

## 2023-09-19 PROCEDURE — 80048 BASIC METABOLIC PNL TOTAL CA: CPT

## 2023-09-19 PROCEDURE — 36415 COLL VENOUS BLD VENIPUNCTURE: CPT

## 2023-09-19 PROCEDURE — 85027 COMPLETE CBC AUTOMATED: CPT

## 2023-09-19 NOTE — RESULT ENCOUNTER NOTE
Cholesterol is improved compared to 1 year ago.  Kidney function, electrolytes, blood sugar are normal.  The hemoglobin and white blood cell count are very slightly low but not overly concerning.  We will recheck in 1 year.

## 2024-03-26 ENCOUNTER — PATIENT OUTREACH (OUTPATIENT)
Dept: CARE COORDINATION | Facility: CLINIC | Age: 74
End: 2024-03-26
Payer: MEDICARE

## 2024-04-05 ENCOUNTER — TELEPHONE (OUTPATIENT)
Dept: ORTHOPEDICS | Facility: CLINIC | Age: 74
End: 2024-04-05
Payer: MEDICARE

## 2024-04-05 DIAGNOSIS — M17.0 BILATERAL PRIMARY OSTEOARTHRITIS OF KNEE: Primary | ICD-10-CM

## 2024-04-09 ENCOUNTER — PATIENT OUTREACH (OUTPATIENT)
Dept: CARE COORDINATION | Facility: CLINIC | Age: 74
End: 2024-04-09
Payer: MEDICARE

## 2024-04-17 ENCOUNTER — OFFICE VISIT (OUTPATIENT)
Dept: ORTHOPEDICS | Facility: CLINIC | Age: 74
End: 2024-04-17
Payer: MEDICARE

## 2024-04-17 VITALS
BODY MASS INDEX: 26.98 KG/M2 | WEIGHT: 158 LBS | SYSTOLIC BLOOD PRESSURE: 100 MMHG | HEIGHT: 64 IN | DIASTOLIC BLOOD PRESSURE: 67 MMHG

## 2024-04-17 DIAGNOSIS — M25.562 CHRONIC PAIN OF BOTH KNEES: ICD-10-CM

## 2024-04-17 DIAGNOSIS — M25.561 CHRONIC PAIN OF BOTH KNEES: ICD-10-CM

## 2024-04-17 DIAGNOSIS — G89.29 CHRONIC PAIN OF BOTH KNEES: ICD-10-CM

## 2024-04-17 DIAGNOSIS — M17.0 BILATERAL PRIMARY OSTEOARTHRITIS OF KNEE: Primary | ICD-10-CM

## 2024-04-17 PROCEDURE — 20611 DRAIN/INJ JOINT/BURSA W/US: CPT | Mod: 50 | Performed by: FAMILY MEDICINE

## 2024-04-17 RX ORDER — ROPIVACAINE HYDROCHLORIDE 5 MG/ML
3 INJECTION, SOLUTION EPIDURAL; INFILTRATION; PERINEURAL
Status: SHIPPED | OUTPATIENT
Start: 2024-04-17

## 2024-04-17 RX ORDER — TRIAMCINOLONE ACETONIDE 40 MG/ML
40 INJECTION, SUSPENSION INTRA-ARTICULAR; INTRAMUSCULAR
Status: SHIPPED | OUTPATIENT
Start: 2024-04-17

## 2024-04-17 RX ADMIN — TRIAMCINOLONE ACETONIDE 40 MG: 40 INJECTION, SUSPENSION INTRA-ARTICULAR; INTRAMUSCULAR at 12:28

## 2024-04-17 RX ADMIN — ROPIVACAINE HYDROCHLORIDE 3 ML: 5 INJECTION, SOLUTION EPIDURAL; INFILTRATION; PERINEURAL at 12:28
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
## 2024-04-24 ENCOUNTER — HOSPITAL ENCOUNTER (OUTPATIENT)
Dept: MAMMOGRAPHY | Facility: CLINIC | Age: 74
Discharge: HOME | End: 2024-04-24
Attending: INTERNAL MEDICINE | Admitting: INTERNAL MEDICINE
Payer: MEDICARE

## 2024-04-24 DIAGNOSIS — Z12.31 VISIT FOR SCREENING MAMMOGRAM: ICD-10-CM

## 2024-04-24 PROCEDURE — 77063 BREAST TOMOSYNTHESIS BI: CPT

## 2024-04-26 ENCOUNTER — OFFICE VISIT (OUTPATIENT)
Dept: FAMILY MEDICINE | Facility: CLINIC | Age: 74
End: 2024-04-26
Payer: MEDICARE

## 2024-04-26 VITALS
TEMPERATURE: 97.4 F | RESPIRATION RATE: 16 BRPM | HEART RATE: 68 BPM | WEIGHT: 156.8 LBS | HEIGHT: 63 IN | BODY MASS INDEX: 27.78 KG/M2 | OXYGEN SATURATION: 98 % | DIASTOLIC BLOOD PRESSURE: 60 MMHG | SYSTOLIC BLOOD PRESSURE: 106 MMHG

## 2024-04-26 DIAGNOSIS — G89.4 CHRONIC PAIN SYNDROME: ICD-10-CM

## 2024-04-26 DIAGNOSIS — Z00.00 ENCOUNTER FOR MEDICARE ANNUAL WELLNESS EXAM: Primary | ICD-10-CM

## 2024-04-26 DIAGNOSIS — L30.9 DERMATITIS: ICD-10-CM

## 2024-04-26 DIAGNOSIS — B02.29 POST HERPETIC NEURALGIA: ICD-10-CM

## 2024-04-26 DIAGNOSIS — R13.12 OROPHARYNGEAL DYSPHAGIA: ICD-10-CM

## 2024-04-26 DIAGNOSIS — B02.9 HERPES ZOSTER WITHOUT COMPLICATION: ICD-10-CM

## 2024-04-26 DIAGNOSIS — C02.9 MALIGNANT NEOPLASM OF TONGUE (H): ICD-10-CM

## 2024-04-26 PROCEDURE — 90480 ADMN SARSCOV2 VAC 1/ONLY CMP: CPT | Performed by: INTERNAL MEDICINE

## 2024-04-26 PROCEDURE — G0439 PPPS, SUBSEQ VISIT: HCPCS | Performed by: INTERNAL MEDICINE

## 2024-04-26 PROCEDURE — 91320 SARSCV2 VAC 30MCG TRS-SUC IM: CPT | Performed by: INTERNAL MEDICINE

## 2024-04-26 PROCEDURE — 99214 OFFICE O/P EST MOD 30 MIN: CPT | Mod: 25 | Performed by: INTERNAL MEDICINE

## 2024-04-26 RX ORDER — VALACYCLOVIR HYDROCHLORIDE 500 MG/1
500 TABLET, FILM COATED ORAL DAILY
Qty: 90 TABLET | Refills: 3 | Status: SHIPPED | OUTPATIENT
Start: 2024-04-26

## 2024-04-26 RX ORDER — BETAMETHASONE DIPROPIONATE 0.5 MG/G
CREAM TOPICAL
Qty: 50 G | Refills: 11 | Status: SHIPPED | OUTPATIENT
Start: 2024-04-26

## 2024-04-26 RX ORDER — OXYCODONE HYDROCHLORIDE 5 MG/1
5 TABLET ORAL EVERY 6 HOURS PRN
Qty: 12 TABLET | Refills: 0 | Status: SHIPPED | OUTPATIENT
Start: 2024-04-26 | End: 2024-04-29

## 2024-04-26 RX ORDER — SODIUM FLUORIDE 5 MG/G
GEL, DENTIFRICE DENTAL AT BEDTIME
Qty: 112 G | Refills: 11 | Status: SHIPPED | OUTPATIENT
Start: 2024-04-26

## 2024-04-26 RX ORDER — LIDOCAINE 50 MG/G
OINTMENT TOPICAL
Qty: 50 G | Refills: 11 | Status: SHIPPED | OUTPATIENT
Start: 2024-04-26 | End: 2024-04-30

## 2024-04-26 SDOH — HEALTH STABILITY: PHYSICAL HEALTH: ON AVERAGE, HOW MANY DAYS PER WEEK DO YOU ENGAGE IN MODERATE TO STRENUOUS EXERCISE (LIKE A BRISK WALK)?: 5 DAYS

## 2024-04-26 SDOH — HEALTH STABILITY: PHYSICAL HEALTH: ON AVERAGE, HOW MANY MINUTES DO YOU ENGAGE IN EXERCISE AT THIS LEVEL?: 60 MIN

## 2024-04-26 ASSESSMENT — ASTHMA QUESTIONNAIRES
QUESTION_3 LAST FOUR WEEKS HOW OFTEN DID YOUR ASTHMA SYMPTOMS (WHEEZING, COUGHING, SHORTNESS OF BREATH, CHEST TIGHTNESS OR PAIN) WAKE YOU UP AT NIGHT OR EARLIER THAN USUAL IN THE MORNING: FOUR OR MORE NIGHTS A WEEK
QUESTION_1 LAST FOUR WEEKS HOW MUCH OF THE TIME DID YOUR ASTHMA KEEP YOU FROM GETTING AS MUCH DONE AT WORK, SCHOOL OR AT HOME: A LITTLE OF THE TIME
ACT_TOTALSCORE: 18
QUESTION_5 LAST FOUR WEEKS HOW WOULD YOU RATE YOUR ASTHMA CONTROL: WELL CONTROLLED
QUESTION_2 LAST FOUR WEEKS HOW OFTEN HAVE YOU HAD SHORTNESS OF BREATH: ONCE OR TWICE A WEEK
ACT_TOTALSCORE: 18
QUESTION_4 LAST FOUR WEEKS HOW OFTEN HAVE YOU USED YOUR RESCUE INHALER OR NEBULIZER MEDICATION (SUCH AS ALBUTEROL): NOT AT ALL

## 2024-04-26 ASSESSMENT — SOCIAL DETERMINANTS OF HEALTH (SDOH): HOW OFTEN DO YOU GET TOGETHER WITH FRIENDS OR RELATIVES?: MORE THAN THREE TIMES A WEEK

## 2024-04-26 ASSESSMENT — PAIN SCALES - GENERAL: PAINLEVEL: MODERATE PAIN (4)

## 2024-04-26 NOTE — PATIENT INSTRUCTIONS
Preventive Care Advice   This is general advice given by our system to help you stay healthy. However, your care team may have specific advice just for you. Please talk to your care team about your preventive care needs.  Nutrition  Eat 5 or more servings of fruits and vegetables each day.  Try wheat bread, brown rice and whole grain pasta (instead of white bread, rice, and pasta).  Get enough calcium and vitamin D. Check the label on foods and aim for 100% of the RDA (recommended daily allowance).  Lifestyle  Exercise at least 150 minutes each week   (30 minutes a day, 5 days a week).  Do muscle strengthening activities 2 days a week. These help control your weight and prevent disease.  No smoking.  Wear sunscreen to prevent skin cancer.  Have a dental exam and cleaning every 6 months.  Yearly exams  See your health care team every year to talk about:  Any changes in your health.  Any medicines your care team has prescribed.  Preventive care, family planning, and ways to prevent chronic diseases.  Shots (vaccines)   HPV shots (up to age 26), if you've never had them before.  Hepatitis B shots (up to age 59), if you've never had them before.  COVID-19 shot: Get this shot when it's due.  Flu shot: Get a flu shot every year.  Tetanus shot: Get a tetanus shot every 10 years.  Pneumococcal, hepatitis A, and RSV shots: Ask your care team if you need these based on your risk.  Shingles shot (for age 50 and up).  General health tests  Diabetes screening:  Starting at age 35, Get screened for diabetes at least every 3 years.  If you are younger than age 35, ask your care team if you should be screened for diabetes.  Cholesterol test: At age 39, start having a cholesterol test every 5 years, or more often if advised.  Bone density scan (DEXA): At age 50, ask your care team if you should have this scan for osteoporosis (brittle bones).  Hepatitis C: Get tested at least once in your life.  STIs (sexually transmitted  infections)  Before age 24: Ask your care team if you should be screened for STIs.  After age 24: Get screened for STIs if you're at risk. You are at risk for STIs (including HIV) if:  You are sexually active with more than one person.  You don't use condoms every time.  You or a partner was diagnosed with a sexually transmitted infection.  If you are at risk for HIV, ask about PrEP medicine to prevent HIV.  Get tested for HIV at least once in your life, whether you are at risk for HIV or not.  Cancer screening tests  Cervical cancer screening: If you have a cervix, begin getting regular cervical cancer screening tests at age 21. Most people who have regular screenings with normal results can stop after age 65. Talk about this with your provider.  Breast cancer scan (mammogram): If you've ever had breasts, begin having regular mammograms starting at age 40. This is a scan to check for breast cancer.  Colon cancer screening: It is important to start screening for colon cancer at age 45.  Have a colonoscopy test every 10 years (or more often if you're at risk) Or, ask your provider about stool tests like a FIT test every year or Cologuard test every 3 years.  To learn more about your testing options, visit: https://[email or web address] eVillages/913102.pdf.  For help making a decision, visit: https://bit.ly/tl26766.  Prostate cancer screening test: If you have a prostate and are age 55 to 69, ask your provider if you would benefit from a yearly prostate cancer screening test.  Lung cancer screening: If you are a current or former smoker age 50 to 80, ask your care team if ongoing lung cancer screenings are right for you.  For informational purposes only. Not to replace the advice of your health care provider. Copyright   2023 [employer]. All rights reserved. Clinically reviewed by the [facility] Transitions Program. [employer] 149360 - REV 01/24.    Learning About Activities of Daily Living  What are activities  of daily living?     Activities of daily living (ADLs) are the basic self-care tasks you do every day. These include eating, bathing, dressing, and moving around.  As you age, and if you have health problems, you may find that it's harder to do some of these tasks. If so, your doctor can suggest ideas that may help.  To measure what kind of help you may need, your doctor will ask how well you are able to do ADLs. Let your doctor know if there are any tasks that you are having trouble doing. This is an important first step to getting help. And when you have the help you need, you can stay as independent as possible.  How will a doctor assess your ADLs?  Asking about ADLs is part of a routine health checkup your doctor will likely do as you age. Your health check might be done in a doctor's office, in your home, or at a hospital. The goal is to find out if you are having any problems that could make it hard to care for yourself or that make it unsafe for you to be on your own.  To measure your ADLs, your doctor will ask how hard it is for you to do routine tasks. Your doctor may also want to know if you have changed the way you do a task because of a health problem. Your doctor may watch how you:  Walk back and forth.  Keep your balance while you stand or walk.  Move from sitting to standing or from a bed to a chair.  Button or unbutton a shirt or sweater.  Remove and put on your shoes.  It's common to feel a little worried or anxious if you find you can't do all the things you used to be able to do. Talking with your doctor about ADLs is a way to make sure you're as safe as possible and able to care for yourself as well as you can. You may want to bring a caregiver, friend, or family member to your checkup. They can help you talk to your doctor.  Follow-up care is a key part of your treatment and safety. Be sure to make and go to all appointments, and call your doctor if you are having problems. It's also a good idea  to know your test results and keep a list of the medicines you take.  Current as of: October 24, 2023               Content Version: 14.0    2929-6877 [employer].   Care instructions adapted under license by your healthcare professional. If you have questions about a medical condition or this instruction, always ask your healthcare professional. [employer] disclaims any warranty or liability for your use of this information.      Preventing Falls: Care Instructions  Injuries and health problems such as trouble walking or poor eyesight can increase your risk of falling. So can some medicines. But there are things you can do to help prevent falls. You can exercise to get stronger. You can also arrange your home to make it safer.    Talk to your doctor about the medicines you take. Ask if any of them increase the risk of falls and whether they can be changed or stopped.   Try to exercise regularly. It can help improve your strength and balance. This can help lower your risk of falling.     Practice fall safety and prevention.    Wear low-heeled shoes that fit well and give your feet good support. Talk to your doctor if you have foot problems that make this hard.  Carry a cellphone or wear a medical alert device that you can use to call for help.  Use stepladders instead of chairs to reach high objects. Don't climb if you're at risk for falls. Ask for help, if needed.  Wear the correct eyeglasses, if you need them.    Make your home safer.    Remove rugs, cords, clutter, and furniture from walkways.  Keep your house well lit. Use night-lights in hallways and bathrooms.  Install and use sturdy handrails on stairways.  Wear nonskid footwear, even inside. Don't walk barefoot or in socks without shoes.    Be safe outside.    Use handrails, curb cuts, and ramps whenever possible.  Keep your hands free by using a shoulder bag or backpack.  Try to walk in well-lit areas. Watch out for uneven ground,  "changes in pavement, and debris.  Be careful in the winter. Walk on the grass or gravel when sidewalks are slippery. Use de-icer on steps and walkways. Add non-slip devices to shoes.    Put grab bars and nonskid mats in your shower or tub and near the toilet. Try to use a shower chair or bath bench when bathing.   Get into a tub or shower by putting in your weaker leg first. Get out with your strong side first. Have a phone or medical alert device in the bathroom with you.   Where can you learn more?  Go to https://www.[employer]/patiented  Enter G117 in the search box to learn more about \"Preventing Falls: Care Instructions.\"  Current as of: July 17, 2023               Content Version: 14.0    8791-8778 [employer].   Care instructions adapted under license by your healthcare professional. If you have questions about a medical condition or this instruction, always ask your healthcare professional. [employer] disclaims any warranty or liability for your use of this information.      Learning About Stress  What is stress?     Stress is your body's response to a hard situation. Your body can have a physical, emotional, or mental response. Stress is a fact of life for most people, and it affects everyone differently. What causes stress for you may not be stressful for someone else.  A lot of things can cause stress. You may feel stress when you go on a job interview, take a test, or run a race. This kind of short-term stress is normal and even useful. It can help you if you need to work hard or react quickly. For example, stress can help you finish an important job on time.  Long-term stress is caused by ongoing stressful situations or events. Examples of long-term stress include long-term health problems, ongoing problems at work, or conflicts in your family. Long-term stress can harm your health.  How does stress affect your health?  When you are stressed, your body responds as though " you are in danger. It makes hormones that speed up your heart, make you breathe faster, and give you a burst of energy. This is called the fight-or-flight stress response. If the stress is over quickly, your body goes back to normal and no harm is done.  But if stress happens too often or lasts too long, it can have bad effects. Long-term stress can make you more likely to get sick, and it can make symptoms of some diseases worse. If you tense up when you are stressed, you may develop neck, shoulder, or low back pain. Stress is linked to high blood pressure and heart disease.  Stress also harms your emotional health. It can make you douglas, tense, or depressed. Your relationships may suffer, and you may not do well at work or school.  What can you do to manage stress?  You can try these things to help manage stress:   Do something active. Exercise or activity can help reduce stress. Walking is a great way to get started. Even everyday activities such as housecleaning or yard work can help.  Try yoga or nallely chi. These techniques combine exercise and meditation. You may need some training at first to learn them.  Do something you enjoy. For example, listen to music or go to a movie. Practice your hobby or do volunteer work.  Meditate. This can help you relax, because you are not worrying about what happened before or what may happen in the future.  Do guided imagery. Imagine yourself in any setting that helps you feel calm. You can use online videos, books, or a teacher to guide you.  Do breathing exercises. For example:  From a standing position, bend forward from the waist with your knees slightly bent. Let your arms dangle close to the floor.  Breathe in slowly and deeply as you return to a standing position. Roll up slowly and lift your head last.  Hold your breath for just a few seconds in the standing position.  Breathe out slowly and bend forward from the waist.  Let your feelings out. Talk, laugh, cry, and  "express anger when you need to. Talking with supportive friends or family, a counselor, or a everett leader about your feelings is a healthy way to relieve stress. Avoid discussing your feelings with people who make you feel worse.  Write. It may help to write about things that are bothering you. This helps you find out how much stress you feel and what is causing it. When you know this, you can find better ways to cope.  What can you do to prevent stress?  You might try some of these things to help prevent stress:  Manage your time. This helps you find time to do the things you want and need to do.  Get enough sleep. Your body recovers from the stresses of the day while you are sleeping.  Get support. Your family, friends, and community can make a difference in how you experience stress.  Limit your news feed. Avoid or limit time on social media or news that may make you feel stressed.  Do something active. Exercise or activity can help reduce stress. Walking is a great way to get started.  Where can you learn more?  Go to https://www.[employer].net/patiented  Enter N032 in the search box to learn more about \"Learning About Stress.\"  Current as of: October 24, 2023               Content Version: 14.0    9253-7687 [employer].   Care instructions adapted under license by your healthcare professional. If you have questions about a medical condition or this instruction, always ask your healthcare professional. [employer] disclaims any warranty or liability for your use of this information.      Substance Use Disorder: Care Instructions  Overview     You can improve your life and health by stopping your use of alcohol or drugs. When you don't drink or use drugs, you may feel and sleep better. You may get along better with your family, friends, and coworkers. There are medicines and programs that can help with substance use disorder.  How can you care for yourself at home?  Here are some ways to " help you stay sober and prevent relapse.  If you have been given medicine to help keep you sober or reduce your cravings, be sure to take it exactly as prescribed.  Talk to your doctor about programs that can help you stop using drugs or drinking alcohol.  Do not keep alcohol or drugs in your home.  Plan ahead. Think about what you'll say if other people ask you to drink or use drugs. Try not to spend time with people who drink or use drugs.  Use the time and money spent on drinking or drugs to do something that's important to you.  Preventing a relapse  Have a plan to deal with relapse. Learn to recognize changes in your thinking that lead you to drink or use drugs. Get help before you start to drink or use drugs again.  Try to stay away from situations, friends, or places that may lead you to drink or use drugs.  If you feel the need to drink alcohol or use drugs again, seek help right away. Call a trusted friend or family member. Some people get support from organizations such as Narcotics Anonymous or [facility] or from treatment facilities.  If you relapse, get help as soon as you can. Some people make a plan with another person that outlines what they want that person to do for them if they relapse. The plan usually includes how to handle the relapse and who to notify in case of relapse.  Don't give up. Remember that a relapse doesn't mean that you have failed. Use the experience to learn the triggers that lead you to drink or use drugs. Then quit again. Recovery is a lifelong process. Many people have several relapses before they are able to quit for good.  Follow-up care is a key part of your treatment and safety. Be sure to make and go to all appointments, and call your doctor if you are having problems. It's also a good idea to know your test results and keep a list of the medicines you take.  When should you call for help?   Call 911  anytime you think you may need emergency care. For example, call if  "you or someone else:    Has overdosed or has withdrawal signs. Be sure to tell the emergency workers that you are or someone else is using or trying to quit using drugs. Overdose or withdrawal signs may include:  Losing consciousness.  Seizure.  Seeing or hearing things that aren't there (hallucinations).     Is thinking or talking about suicide or harming others.   Where to get help 24 hours a day, 7 days a week   If you or someone you know talks about suicide, self-harm, a mental health crisis, a substance use crisis, or any other kind of emotional distress, get help right away. You can:    Call the Suicide and Crisis Lifeline at 438.     Call 9-189-181-TALK (1-252.812.9404).     Text HOME to 300220 to access the Crisis Text Line.   Consider saving these numbers in your phone.  Go to [employer] for more information or to chat online.  Call your doctor now or seek immediate medical care if:    You are having withdrawal symptoms. These may include nausea or vomiting, sweating, shakiness, and anxiety.   Watch closely for changes in your health, and be sure to contact your doctor if:    You have a relapse.     You need more help or support to stop.   Where can you learn more?  Go to https://www.[employer].net/patiented  Enter H573 in the search box to learn more about \"Substance Use Disorder: Care Instructions.\"  Current as of: November 15, 2023               Content Version: 14.0    1465-7537 [employer].   Care instructions adapted under license by your healthcare professional. If you have questions about a medical condition or this instruction, always ask your healthcare professional. [employer] disclaims any warranty or liability for your use of this information.        Allergies  Would recommend to stop the over the counter allergy med because dry mouth is a significant side effects  Try Claritin, Allegra, or zyrtec. Not as drying    Pain  Will renew oxycodone and medical " cannabis    Problems swallowing  Is likely a throat problem  Recommend an upper endoscopy to rule out an esophageal problem as the cause of the swallowing problem    Recurrent shingles  Start Valtrex 500 mg once daily to prevent outbreak of shingles

## 2024-04-26 NOTE — H&P (VIEW-ONLY)
Preventive Care Visit  [facility]  Vanna Rubio DO, Internal Medicine  Apr 26, 2024      Assessment & Plan   Problem List Items Addressed This Visit       Dermatitis    Relevant Medications    augmented betamethasone dipropionate (DIPROLENE AF) 0.05 % external cream    sodium fluoride dental gel (DENTAGEL) 1.1 % GEL topical gel    lidocaine (XYLOCAINE) 5 % external ointment    Other Relevant Orders    OFFICE/OUTPT VISIT,EST,LEVL IV (Completed)    Malignant neoplasm of tongue (H)    Relevant Medications    sodium fluoride dental gel (DENTAGEL) 1.1 % GEL topical gel    Other Relevant Orders    OFFICE/OUTPT VISIT,EST,LEVL IV (Completed)    Oropharyngeal dysphagia    Relevant Orders    Adult GI  Referral - Procedure Only    Post herpetic neuralgia    Relevant Medications    lidocaine (XYLOCAINE) 5 % external ointment    Other Relevant Orders    OFFICE/OUTPT VISIT,EST,LEVL IV (Completed)     Other Visit Diagnoses       Encounter for Medicare annual wellness exam    -  Primary    Chronic pain syndrome        Relevant Medications    oxyCODONE (ROXICODONE) 5 MG tablet    Other Relevant Orders    OFFICE/OUTPT VISIT,EST,LEVL IV (Completed)    Herpes zoster without complication        Relevant Medications    valACYclovir (VALTREX) 500 MG tablet           Allergies  Would recommend to stop the over the counter allergy med because dry mouth is a significant side effects  Try Claritin, Allegra, or zyrtec. Not as drying    Pain  Will renew oxycodone and medical cannabis    Problems swallowing  Is likely a throat problem  Recommend an upper endoscopy to rule out an esophageal problem as the cause of the swallowing problem    Recurrent shingles  Start Valtrex 500 mg once daily to prevent outbreak of shingles     Patient has been advised of split billing requirements and indicates understanding: Yes         BMI  Estimated body mass index is 27.78 kg/m  as calculated from the following:     "Height as of this encounter: 1.6 m (5' 3\").    Weight as of this encounter: 71.1 kg (156 lb 12.8 oz).       Counseling  Appropriate preventive services were discussed with this patient, including applicable screening as appropriate for fall prevention, nutrition, physical activity, Tobacco-use cessation, weight loss and cognition.  Checklist reviewing preventive services available has been given to the patient.  Reviewed patient's diet, addressing concerns and/or questions.   She is at risk for psychosocial distress and has been provided with information to reduce risk.   Patient reported safety concerns were addressed today.        Serge Smith is a 73 year old, presenting for the following:  AWV        4/26/2024     9:14 AM   Additional Questions   Roomed by meche   Accompanied by brittany         4/26/2024     9:14 AM   Patient Reported Additional Medications   Patient reports taking the following new medications none         Health Care Directive  Patient does not have a Health Care Directive or Living Will: -not discussed    HPI      Chronic Pain:  --needs renewal of medical cannabis today   --finds it helpful; uses it regularly;  Rarely uses oxycodone;  Helps with left neck/head from shingles  --initially caused drowsiness, this has resolved.  --has post-herpetic neuralgia of the left scalp;  Was told she has 'permement shingles;  Has severe itching over area at times.  --uses over the counter lidocaine, CBD, voltaren; using braces which helps  --doctoring with sports med for bilateral knee pain  --very rare use of oxycodone now that she can use medical cannabis; while in [place], she used 2.5 mg once daily because she cannot bring her medical cannabis to [place]  --she plans to return to [place] and may look into getting a medical supply while in [place]  --stopped gabapentin    Swallowing:  --improved  --wt is improved from 1 year ago  --last saw Speech 1 year ago  --had a lot of dental work this " year  --wonders if she has esophageal stricture because her nephew has this.  --has problems with oropharyngeal dysphagia, not as much esophageal dysphagia     Wt Readings from Last 5 Encounters:   04/26/24 71.1 kg (156 lb 12.8 oz)   04/17/24 71.7 kg (158 lb)   04/25/23 65.3 kg (144 lb)   04/19/23 66.2 kg (146 lb)   03/30/23 66.6 kg (146 lb 12.8 oz)       Dry Mouth:  --feels this contributes to to troubles swallowing  --Rad Onc has Rx pilocarpine for dry mouth; doesn't seem to help  --has been using Benadryl recently for allergies.  --albuterol inhaler cause dry mouth    Shingles   --keeps getting recurring shingles outbreaks, always in the left occiput area  --she has classic shingles symptoms of prodrome, itchy and pain        4/26/2024   General Health   How would you rate your overall physical health? Good   Feel stress (tense, anxious, or unable to sleep) Only a little   (!) STRESS CONCERN      4/26/2024   Nutrition   Diet: I don't know         4/26/2024   Exercise   Days per week of moderate/strenous exercise 5 days   Average minutes spent exercising at this level 60 min         4/26/2024   Social Factors   Frequency of gathering with friends or relatives More than three times a week   Worry food won't last until get money to buy more No   Food not last or not have enough money for food? No   Do you have housing?  Yes   Are you worried about losing your housing? No   Lack of transportation? No   Unable to get utilities (heat,electricity)? No         4/26/2024   Fall Risk   Fallen 2 or more times in the past year? No   Trouble with walking or balance? Yes   Gait Speed Test (Document in seconds) 5.75   Gait Speed Test Interpretation Greater than 5.01 seconds - ABNORMAL          4/26/2024   Activities of Daily Living- Home Safety   Needs help with the following daily activites None of the above   Safety concerns in the home No grab bars in the bathroom    None of the above         4/26/2024   Dental   Dentist two  times every year? Yes         2024   Hearing Screening   Hearing concerns? None of the above         2024   Driving Risk Screening   Patient/family members have concerns about driving No         2024   General Alertness/Fatigue Screening   Have you been more tired than usual lately? No         2024   Urinary Incontinence Screening   Bothered by leaking urine in past 6 months No         2024   TB Screening   Were you born outside of the [place]? No         Today's PHQ-2 Score:       2024     9:12 AM   PHQ-2 (  [employer])   Q1: Little interest or pleasure in doing things 0   Q2: Feeling down, depressed or hopeless 0   PHQ-2 Score 0   Q1: Little interest or pleasure in doing things Not at all   Q2: Feeling down, depressed or hopeless Not at all   PHQ-2 Score 0           2024   Substance Use   Alcohol more than 3/day or more than 7/wk No   Do you have a current opioid prescription? No   How severe/bad is pain from 1 to 10? 4/10   Do you use any other substances recreationally? (!) PRESCRIPTION DRUGS     Social History     Tobacco Use    Smoking status: Former     Current packs/day: 0.00     Average packs/day: 2.0 packs/day for 10.0 years (20.0 ttl pk-yrs)     Types: Cigarettes     Start date: 1970     Quit date: 1980     Years since quittin.3    Smokeless tobacco: Never   Vaping Use    Vaping status: Never Used   Substance Use Topics    Alcohol use: Not Currently     Comment: 4 drinks per year    Drug use: Never           2024   LAST FHS-7 RESULTS   1st degree relative breast or ovarian cancer No   Any relative bilateral breast cancer No   Any male have breast cancer No   Any ONE woman have BOTH breast AND ovarian cancer No   Any woman with breast cancer before 50yrs No   2 or more relatives with breast AND/OR ovarian cancer No   2 or more relatives with breast AND/OR bowel cancer No        Mammogram Screening - Mammogram every 1-2 years updated in Health Maintenance  based on mutual decision making    ASCVD Risk   The 10-year ASCVD risk score (Juan JACOBSON, et al., 2019) is: 9.1%    Values used to calculate the score:      Age: 73 years      Sex: Female      Is Non- : No      Diabetic: No      Tobacco smoker: No      Systolic Blood Pressure: 106 mmHg      Is BP treated: No      HDL Cholesterol: 81 mg/dL      Total Cholesterol: 219 mg/dL            Reviewed and updated as needed this visit by Provider     Meds  Problems               Current Outpatient Medications   Medication Sig Dispense Refill    Ascorbic Acid (VITAMIN C PO)       augmented betamethasone dipropionate (DIPROLENE-AF) 0.05 % external cream Apply sparingly to affected area twice daily as needed.  Do not apply to face. 50 g 11    CALCIUM PO       CANNABIDIOL, HEMP OIL/EXTRACT, OR CBD PRODUCT OTHER THAN MEDICAL CANNABIS, Apply topically daily      DENTAGEL 1.1 % GEL topical gel apply to the affected area at bedtime 112 g 11    Ferrous Sulfate Dried 45 MG TBCR       folic acid (FOLATE) 400 MCG tablet Take 400 mcg by mouth daily      ibuprofen (ADVIL/MOTRIN) 100 MG tablet Take 100 mg by mouth every 4 hours as needed      lidocaine (XYLOCAINE) 5 % external ointment Apply topically as needed for moderate pain 50 g 0    MAGNESIUM PO       Multiple Vitamin (MULTIVITAMIN ADULT PO)       pilocarpine (SALAGEN) 5 MG tablet Take 0.5 tablet orally daily for dry mouth. (Patient taking differently: as needed Take 0.5 tablet orally daily for dry mouth.) 30 tablet 3    vitamin D3 (CHOLECALCIFEROL) 2000 units tablet Take 1 tablet by mouth daily       Current providers sharing in care for this patient include:  Patient Care Team:  Vanna Rubio DO as PCP - General (Internal Medicine)  Thomas Ferris MD as MD (Otolaryngology)  Juwan Zhang MD as MD (Radiation Oncology)  Vanna Rubio DO as Assigned PCP  Suly Hill PA-C as Physician Assistant (Dermatology)  Juwan Zhang MD  "as Assigned Cancer Care Provider  Wing Londono,  as Assigned Musculoskeletal Provider    The following health maintenance items are reviewed in Epic and correct as of today:  Health Maintenance   Topic Date Due    ASTHMA ACTION PLAN  03/02/2021    ANNUAL REVIEW OF HM ORDERS  04/25/2024    RSV VACCINE (Pregnancy & 60+) (1 - 1-dose 60+ series) 05/26/2024 (Originally 8/4/2010)    DTAP/TDAP/TD IMMUNIZATION (3 - Td or Tdap) 04/30/2025 (Originally 11/5/2022)    LIPID  09/19/2024    ASTHMA CONTROL TEST  10/26/2024    MEDICARE ANNUAL WELLNESS VISIT  04/26/2025    FALL RISK ASSESSMENT  04/26/2025    MAMMO SCREENING  04/24/2026    GLUCOSE  09/19/2026    COLORECTAL CANCER SCREENING  12/14/2027    ADVANCE CARE PLANNING  04/25/2028    DEXA  10/29/2030    HEPATITIS C SCREENING  Completed    PHQ-2 (once per calendar year)  Completed    INFLUENZA VACCINE  Completed    Pneumococcal Vaccine: 65+ Years  Completed    ZOSTER IMMUNIZATION  Completed    COVID-19 Vaccine  Completed    IPV IMMUNIZATION  Aged Out    HPV IMMUNIZATION  Aged Out    MENINGITIS IMMUNIZATION  Aged Out    RSV MONOCLONAL ANTIBODY  Aged Out         Review of Systems  Constitutional, neuro, ENT, endocrine, pulmonary, cardiac, gastrointestinal, genitourinary, musculoskeletal, integument and psychiatric systems are negative, except as otherwise noted.     Objective    Exam  /60 (BP Location: Right arm, Patient Position: Sitting, Cuff Size: Adult Regular)   Pulse 68   Temp 97.4  F (36.3  C) (Tympanic)   Resp 16   Ht 1.6 m (5' 3\")   Wt 71.1 kg (156 lb 12.8 oz)   SpO2 98%   BMI 27.78 kg/m     Estimated body mass index is 27.78 kg/m  as calculated from the following:    Height as of this encounter: 1.6 m (5' 3\").    Weight as of this encounter: 71.1 kg (156 lb 12.8 oz).    Physical Exam  GENERAL: alert and no distress  EYES: Eyes grossly normal to inspection, PERRL and conjunctivae and sclerae normal  HENT: ear canals and TM's normal, nose and " mouth without ulcers or lesions  NECK: post-surgical changes in neck  RESP: lungs clear to auscultation - no rales, rhonchi or wheezes  CV: regular rate and rhythm, normal S1 S2, no S3 or S4, no murmur, click or rub, no peripheral edema  ABDOMEN: soft, nontender, without hepatosplenomegaly or masses, bowel sounds normal, and hernia that is reducible                                                                                                                                              MS: no gross musculoskeletal defects noted, no edema  SKIN: no suspicious lesions or rashes  NEURO: Normal strength and tone, mentation intact and speech normal  PSYCH: mentation appears normal, affect normal/bright         4/26/2024   Mini Cog   Clock Draw Score 0 Abnormal   3 Item Recall 2 objects recalled   Mini Cog Total Score 2              Signed Electronically by: Vanna Rubio DO

## 2024-04-26 NOTE — PROGRESS NOTES
Preventive Care Visit  [facility]  Vanna Rubio DO, Internal Medicine  Apr 26, 2024      Assessment & Plan   Problem List Items Addressed This Visit       Dermatitis    Relevant Medications    augmented betamethasone dipropionate (DIPROLENE AF) 0.05 % external cream    sodium fluoride dental gel (DENTAGEL) 1.1 % GEL topical gel    lidocaine (XYLOCAINE) 5 % external ointment    Other Relevant Orders    OFFICE/OUTPT VISIT,EST,LEVL IV (Completed)    Malignant neoplasm of tongue (H)    Relevant Medications    sodium fluoride dental gel (DENTAGEL) 1.1 % GEL topical gel    Other Relevant Orders    OFFICE/OUTPT VISIT,EST,LEVL IV (Completed)    Oropharyngeal dysphagia    Relevant Orders    Adult GI  Referral - Procedure Only    Post herpetic neuralgia    Relevant Medications    lidocaine (XYLOCAINE) 5 % external ointment    Other Relevant Orders    OFFICE/OUTPT VISIT,EST,LEVL IV (Completed)     Other Visit Diagnoses       Encounter for Medicare annual wellness exam    -  Primary    Chronic pain syndrome        Relevant Medications    oxyCODONE (ROXICODONE) 5 MG tablet    Other Relevant Orders    OFFICE/OUTPT VISIT,EST,LEVL IV (Completed)    Herpes zoster without complication        Relevant Medications    valACYclovir (VALTREX) 500 MG tablet           Allergies  Would recommend to stop the over the counter allergy med because dry mouth is a significant side effects  Try Claritin, Allegra, or zyrtec. Not as drying    Pain  Will renew oxycodone and medical cannabis    Problems swallowing  Is likely a throat problem  Recommend an upper endoscopy to rule out an esophageal problem as the cause of the swallowing problem    Recurrent shingles  Start Valtrex 500 mg once daily to prevent outbreak of shingles     Patient has been advised of split billing requirements and indicates understanding: Yes         BMI  Estimated body mass index is 27.78 kg/m  as calculated from the following:     "Height as of this encounter: 1.6 m (5' 3\").    Weight as of this encounter: 71.1 kg (156 lb 12.8 oz).       Counseling  Appropriate preventive services were discussed with this patient, including applicable screening as appropriate for fall prevention, nutrition, physical activity, Tobacco-use cessation, weight loss and cognition.  Checklist reviewing preventive services available has been given to the patient.  Reviewed patient's diet, addressing concerns and/or questions.   She is at risk for psychosocial distress and has been provided with information to reduce risk.   Patient reported safety concerns were addressed today.        Serge Smith is a 73 year old, presenting for the following:  AWV        4/26/2024     9:14 AM   Additional Questions   Roomed by meche   Accompanied by brittany         4/26/2024     9:14 AM   Patient Reported Additional Medications   Patient reports taking the following new medications none         Health Care Directive  Patient does not have a Health Care Directive or Living Will: -not discussed    HPI      Chronic Pain:  --needs renewal of medical cannabis today   --finds it helpful; uses it regularly;  Rarely uses oxycodone;  Helps with left neck/head from shingles  --initially caused drowsiness, this has resolved.  --has post-herpetic neuralgia of the left scalp;  Was told she has 'permement shingles;  Has severe itching over area at times.  --uses over the counter lidocaine, CBD, voltaren; using braces which helps  --doctoring with sports med for bilateral knee pain  --very rare use of oxycodone now that she can use medical cannabis; while in [place], she used 2.5 mg once daily because she cannot bring her medical cannabis to [place]  --she plans to return to [place] and may look into getting a medical supply while in [place]  --stopped gabapentin    Swallowing:  --improved  --wt is improved from 1 year ago  --last saw Speech 1 year ago  --had a lot of dental work this " year  --wonders if she has esophageal stricture because her nephew has this.  --has problems with oropharyngeal dysphagia, not as much esophageal dysphagia     Wt Readings from Last 5 Encounters:   04/26/24 71.1 kg (156 lb 12.8 oz)   04/17/24 71.7 kg (158 lb)   04/25/23 65.3 kg (144 lb)   04/19/23 66.2 kg (146 lb)   03/30/23 66.6 kg (146 lb 12.8 oz)       Dry Mouth:  --feels this contributes to to troubles swallowing  --Rad Onc has Rx pilocarpine for dry mouth; doesn't seem to help  --has been using Benadryl recently for allergies.  --albuterol inhaler cause dry mouth    Shingles   --keeps getting recurring shingles outbreaks, always in the left occiput area  --she has classic shingles symptoms of prodrome, itchy and pain        4/26/2024   General Health   How would you rate your overall physical health? Good   Feel stress (tense, anxious, or unable to sleep) Only a little   (!) STRESS CONCERN      4/26/2024   Nutrition   Diet: I don't know         4/26/2024   Exercise   Days per week of moderate/strenous exercise 5 days   Average minutes spent exercising at this level 60 min         4/26/2024   Social Factors   Frequency of gathering with friends or relatives More than three times a week   Worry food won't last until get money to buy more No   Food not last or not have enough money for food? No   Do you have housing?  Yes   Are you worried about losing your housing? No   Lack of transportation? No   Unable to get utilities (heat,electricity)? No         4/26/2024   Fall Risk   Fallen 2 or more times in the past year? No   Trouble with walking or balance? Yes   Gait Speed Test (Document in seconds) 5.75   Gait Speed Test Interpretation Greater than 5.01 seconds - ABNORMAL          4/26/2024   Activities of Daily Living- Home Safety   Needs help with the following daily activites None of the above   Safety concerns in the home No grab bars in the bathroom    None of the above         4/26/2024   Dental   Dentist two  times every year? Yes         2024   Hearing Screening   Hearing concerns? None of the above         2024   Driving Risk Screening   Patient/family members have concerns about driving No         2024   General Alertness/Fatigue Screening   Have you been more tired than usual lately? No         2024   Urinary Incontinence Screening   Bothered by leaking urine in past 6 months No         2024   TB Screening   Were you born outside of the [place]? No         Today's PHQ-2 Score:       2024     9:12 AM   PHQ-2 (  [employer])   Q1: Little interest or pleasure in doing things 0   Q2: Feeling down, depressed or hopeless 0   PHQ-2 Score 0   Q1: Little interest or pleasure in doing things Not at all   Q2: Feeling down, depressed or hopeless Not at all   PHQ-2 Score 0           2024   Substance Use   Alcohol more than 3/day or more than 7/wk No   Do you have a current opioid prescription? No   How severe/bad is pain from 1 to 10? 4/10   Do you use any other substances recreationally? (!) PRESCRIPTION DRUGS     Social History     Tobacco Use    Smoking status: Former     Current packs/day: 0.00     Average packs/day: 2.0 packs/day for 10.0 years (20.0 ttl pk-yrs)     Types: Cigarettes     Start date: 1970     Quit date: 1980     Years since quittin.3    Smokeless tobacco: Never   Vaping Use    Vaping status: Never Used   Substance Use Topics    Alcohol use: Not Currently     Comment: 4 drinks per year    Drug use: Never           2024   LAST FHS-7 RESULTS   1st degree relative breast or ovarian cancer No   Any relative bilateral breast cancer No   Any male have breast cancer No   Any ONE woman have BOTH breast AND ovarian cancer No   Any woman with breast cancer before 50yrs No   2 or more relatives with breast AND/OR ovarian cancer No   2 or more relatives with breast AND/OR bowel cancer No        Mammogram Screening - Mammogram every 1-2 years updated in Health Maintenance  based on mutual decision making    ASCVD Risk   The 10-year ASCVD risk score (Juan JACOBSON, et al., 2019) is: 9.1%    Values used to calculate the score:      Age: 73 years      Sex: Female      Is Non- : No      Diabetic: No      Tobacco smoker: No      Systolic Blood Pressure: 106 mmHg      Is BP treated: No      HDL Cholesterol: 81 mg/dL      Total Cholesterol: 219 mg/dL            Reviewed and updated as needed this visit by Provider     Meds  Problems               Current Outpatient Medications   Medication Sig Dispense Refill    Ascorbic Acid (VITAMIN C PO)       augmented betamethasone dipropionate (DIPROLENE-AF) 0.05 % external cream Apply sparingly to affected area twice daily as needed.  Do not apply to face. 50 g 11    CALCIUM PO       CANNABIDIOL, HEMP OIL/EXTRACT, OR CBD PRODUCT OTHER THAN MEDICAL CANNABIS, Apply topically daily      DENTAGEL 1.1 % GEL topical gel apply to the affected area at bedtime 112 g 11    Ferrous Sulfate Dried 45 MG TBCR       folic acid (FOLATE) 400 MCG tablet Take 400 mcg by mouth daily      ibuprofen (ADVIL/MOTRIN) 100 MG tablet Take 100 mg by mouth every 4 hours as needed      lidocaine (XYLOCAINE) 5 % external ointment Apply topically as needed for moderate pain 50 g 0    MAGNESIUM PO       Multiple Vitamin (MULTIVITAMIN ADULT PO)       pilocarpine (SALAGEN) 5 MG tablet Take 0.5 tablet orally daily for dry mouth. (Patient taking differently: as needed Take 0.5 tablet orally daily for dry mouth.) 30 tablet 3    vitamin D3 (CHOLECALCIFEROL) 2000 units tablet Take 1 tablet by mouth daily       Current providers sharing in care for this patient include:  Patient Care Team:  Vanna Rubio DO as PCP - General (Internal Medicine)  Thomas Ferris MD as MD (Otolaryngology)  Juwan Zhang MD as MD (Radiation Oncology)  Vanna Rubio DO as Assigned PCP  Suly Hill PA-C as Physician Assistant (Dermatology)  Juwan Zhang MD  "as Assigned Cancer Care Provider  Wing Londono,  as Assigned Musculoskeletal Provider    The following health maintenance items are reviewed in Epic and correct as of today:  Health Maintenance   Topic Date Due    ASTHMA ACTION PLAN  03/02/2021    ANNUAL REVIEW OF HM ORDERS  04/25/2024    RSV VACCINE (Pregnancy & 60+) (1 - 1-dose 60+ series) 05/26/2024 (Originally 8/4/2010)    DTAP/TDAP/TD IMMUNIZATION (3 - Td or Tdap) 04/30/2025 (Originally 11/5/2022)    LIPID  09/19/2024    ASTHMA CONTROL TEST  10/26/2024    MEDICARE ANNUAL WELLNESS VISIT  04/26/2025    FALL RISK ASSESSMENT  04/26/2025    MAMMO SCREENING  04/24/2026    GLUCOSE  09/19/2026    COLORECTAL CANCER SCREENING  12/14/2027    ADVANCE CARE PLANNING  04/25/2028    DEXA  10/29/2030    HEPATITIS C SCREENING  Completed    PHQ-2 (once per calendar year)  Completed    INFLUENZA VACCINE  Completed    Pneumococcal Vaccine: 65+ Years  Completed    ZOSTER IMMUNIZATION  Completed    COVID-19 Vaccine  Completed    IPV IMMUNIZATION  Aged Out    HPV IMMUNIZATION  Aged Out    MENINGITIS IMMUNIZATION  Aged Out    RSV MONOCLONAL ANTIBODY  Aged Out         Review of Systems  Constitutional, neuro, ENT, endocrine, pulmonary, cardiac, gastrointestinal, genitourinary, musculoskeletal, integument and psychiatric systems are negative, except as otherwise noted.     Objective    Exam  /60 (BP Location: Right arm, Patient Position: Sitting, Cuff Size: Adult Regular)   Pulse 68   Temp 97.4  F (36.3  C) (Tympanic)   Resp 16   Ht 1.6 m (5' 3\")   Wt 71.1 kg (156 lb 12.8 oz)   SpO2 98%   BMI 27.78 kg/m     Estimated body mass index is 27.78 kg/m  as calculated from the following:    Height as of this encounter: 1.6 m (5' 3\").    Weight as of this encounter: 71.1 kg (156 lb 12.8 oz).    Physical Exam  GENERAL: alert and no distress  EYES: Eyes grossly normal to inspection, PERRL and conjunctivae and sclerae normal  HENT: ear canals and TM's normal, nose and " mouth without ulcers or lesions  NECK: post-surgical changes in neck  RESP: lungs clear to auscultation - no rales, rhonchi or wheezes  CV: regular rate and rhythm, normal S1 S2, no S3 or S4, no murmur, click or rub, no peripheral edema  ABDOMEN: soft, nontender, without hepatosplenomegaly or masses, bowel sounds normal, and hernia that is reducible                                                                                                                                              MS: no gross musculoskeletal defects noted, no edema  SKIN: no suspicious lesions or rashes  NEURO: Normal strength and tone, mentation intact and speech normal  PSYCH: mentation appears normal, affect normal/bright         4/26/2024   Mini Cog   Clock Draw Score 0 Abnormal   3 Item Recall 2 objects recalled   Mini Cog Total Score 2              Signed Electronically by: Vanna Rubio DO

## 2024-04-29 ENCOUNTER — TELEPHONE (OUTPATIENT)
Dept: FAMILY MEDICINE | Facility: CLINIC | Age: 74
End: 2024-04-29
Payer: MEDICARE

## 2024-04-29 DIAGNOSIS — B02.29 POST HERPETIC NEURALGIA: ICD-10-CM

## 2024-04-30 RX ORDER — LIDOCAINE 50 MG/G
OINTMENT TOPICAL
Qty: 50 G | Refills: 11 | Status: SHIPPED | OUTPATIENT
Start: 2024-04-30

## 2024-05-02 ENCOUNTER — TELEPHONE (OUTPATIENT)
Dept: FAMILY MEDICINE | Facility: CLINIC | Age: 74
End: 2024-05-02
Payer: MEDICARE

## 2024-05-03 NOTE — TELEPHONE ENCOUNTER
Prior Authorization Retail Medication Request    Medication/Dose: Lidocaine 5% ointment  Diagnosis and ICD code (if different than what is on RX):    New/renewal/insurance change PA/secondary ins. PA:  Previously Tried and Failed:  lidocaine cream; lidoderm patch; lidocaine jelly  Rationale:      Insurance   Primary: [phone]  Insurance ID:  [id number]    Secondary (if applicable):  Insurance ID:      Pharmacy Information (if different than what is on RX)  Name:    Phone:    Fax:

## 2024-05-13 ENCOUNTER — ANESTHESIA EVENT (OUTPATIENT)
Dept: GASTROENTEROLOGY | Facility: CLINIC | Age: 74
End: 2024-05-13
Payer: MEDICARE

## 2024-05-13 ASSESSMENT — LIFESTYLE VARIABLES: TOBACCO_USE: 1

## 2024-05-13 NOTE — ANESTHESIA PREPROCEDURE EVALUATION
Anesthesia Pre-Procedure Evaluation    Patient: Terese Bell   MRN: [id number] : 1950        Procedure : Procedure(s):  Esophagoscopy, gastroscopy, duodenoscopy (EGD), combined          Past Medical History:   Diagnosis Date     Allergic rhinitis      Arthritis          Cerebral infarction (H) .    Not sure but dealing with short term memory loss after      Complication of anesthesia     wakes up slow     Depressive disorder 2009    loss of  and brother -     Difficult intubation     needs a 'child's sized tube     Head injury Car and horse back riding     Hearing problem 2016    After surgery noticeable change     Hiatal hernia      History of radiation therapy      Hoarseness      Mild major depression (H24) 2009     Moderate persistent asthma      Obesity, unspecified      Pure hypercholesterolemia      Rosacea 2010     Symptomatic menopausal or female climacteric states      Thrombosis of leg     left leg 30 yrs ago     Tinnitus      Tongue cancer (H)       Past Surgical History:   Procedure Laterality Date     ADENOIDECTOMY           CHOLECYSTECTOMY       COLONOSCOPY  2012    Procedure: COLONOSCOPY;  Colonoscopy;  Surgeon: Yony Garcia MD;  Location: [facility]     COLONOSCOPY N/A 2017    Procedure: COLONOSCOPY;  colonoscopy;  Surgeon: Sterling Mckeon MD;  Location: [facility]     EXAM UNDER ANESTHESIA MOUTH N/A 08/15/2016    Procedure: EXAM UNDER ANESTHESIA MOUTH;  Surgeon: Thomas Ferris MD;  [facility]     GLOSSECTOMY Right 2016    Procedure: GLOSSECTOMY;  Surgeon: Thomas Ferris MD;  [facility]     GLOSSECTOMY PARTIAL       GRAFT FREE VASCULARIZED (LOCATION) Left 2016    Procedure: GRAFT FREE VASCULARIZED (LOCATION);  Surgeon: Moreno Leo MD;  [facility]     GRAFT SKIN SPLIT THICKNESS FROM EXTREMITY Left 2016    Procedure: GRAFT SKIN SPLIT THICKNESS FROM EXTREMITY;  Surgeon: Moreno Leo MD;   [facility]     HC UGI ENDOSCOPY W PLACEMENT GASTROSTOMY TUBE PERCUT N/A 11/07/2016    Procedure: COMBINED ESOPHAGOSCOPY, GASTROSCOPY, DUODENOSCOPY (EGD), PLACE PERCUTANEOUS ENDOSCOPIC GASTROSTOMY TUBE;  Surgeon: Sterling Mckeon MD;  Location: [facility]     HEAD & NECK SURGERY       KNEE SURGERY      bilat     LARYNGOSCOPY WITH BIOPSY(IES) N/A 08/15/2016    Procedure: LARYNGOSCOPY WITH BIOPSY(IES);  Surgeon: Thomas Ferris MD;  [facility]     LUMPECTOMY BREAST       NASAL/SINUS POLYPECTOMY      1990 approx     ORTHOPEDIC SURGERY  1977 and 2010    knee repair (r) and clean out (l)     PAROTIDECTOMY, RADICAL NECK DISSECTION Right 09/01/2016    Procedure: PAROTIDECTOMY, RADICAL NECK DISSECTION;  Surgeon: Thomas Ferris MD;  Location:  OR     TONSILLECTOMY      1970     TRACHEOSTOMY Right 09/01/2016    Procedure: TRACHEOSTOMY;  Surgeon: Thomas Ferris MD;  [facility]     ZZC NONSPECIFIC PROCEDURE      CHOLECYSTECTOMY     ZZC NONSPECIFIC PROCEDURE      SINUS MASS REMOVED     ZZC NONSPECIFIC PROCEDURE      R KNEE SURGERY      Allergies   Allergen Reactions     Banana Swelling and Anaphylaxis     Tongue and lips swell and get itchy     Bee Pollen Anaphylaxis     Bee Venom Anaphylaxis     Blueberry Anaphylaxis and Swelling     Contrast Dye Anaphylaxis     Iodine Anaphylaxis     NOT dietary related.     Penicillins Anaphylaxis     Shellfish-Derived Products Swelling and Difficulty breathing     Sulfa Antibiotics Anaphylaxis     Erythromycin Nausea     Pravastatin Sodium Other (See Comments)     muscle spasam     Simvastatin Cough     Strawberry Extract Hives     Latex Rash     Rash from bandages/wraps/pressure tapes     Tetracycline GI Disturbance     Other reaction(s): GI Upset  And tingling          Social History     Tobacco Use     Smoking status: Former     Current packs/day: 0.00     Average packs/day: 2.0 packs/day for 10.0 years (20.0 ttl pk-yrs)     Types: Cigarettes     Start date: 1/1/1970     Quit date:  1980     Years since quittin.3     Smokeless tobacco: Never   Substance Use Topics     Alcohol use: Not Currently     Comment: 4 drinks per year      Wt Readings from Last 1 Encounters:   24 71.1 kg (156 lb 12.8 oz)        Anesthesia Evaluation   Pt has had prior anesthetic. Type: General.    History of anesthetic complications  - difficult airway.  needs #6.0 ETT or smaller.    ROS/MED HX  ENT/Pulmonary:     (+)                tobacco use, Past use,    Moderate Persistent, asthma                  Neurologic:     (+)          CVA,                      Cardiovascular:     (+) Dyslipidemia - -   -  - -                                      METS/Exercise Tolerance:     Hematologic:     (+) History of blood clots,               Musculoskeletal:       GI/Hepatic: Comment: Oropharyngeal dysphagia    (+)      hiatal hernia,              Renal/Genitourinary:       Endo:     (+)               Obesity,       Psychiatric/Substance Use:     (+) psychiatric history depression       Infectious Disease:       Malignancy:   (+) Malignancy, History of Other.Other CA tongue status post Surgery.    Other:            Physical Exam    Airway        Mallampati: IV   TM distance: > 3 FB   Neck ROM: limited   Mouth opening: < 3 cm    Respiratory Devices and Support         Dental           Cardiovascular   cardiovascular exam normal          Pulmonary               OUTSIDE LABS:  CBC:   Lab Results   Component Value Date    WBC 3.9 (L) 2023    WBC 4.7 2022    HGB 11.6 (L) 2023    HGB 12.1 2022    HCT 36.3 2023    HCT 38.6 2022     2023     2022     BMP:   Lab Results   Component Value Date     2023     2022    POTASSIUM 4.2 2023    POTASSIUM 4.0 2022    CHLORIDE 104 2023    CHLORIDE 106 2022    CO2 30 (H) 2023    CO2 30 2022    BUN 9.6 2023    BUN 11 2022    CR 0.63 2023    CR 0.64  "03/07/2022    GLC 88 09/19/2023    GLC 83 03/07/2022     COAGS:   Lab Results   Component Value Date    PTT 30 03/28/2006    INR 1.03 09/08/2016    FIBR 285 09/01/2016     POC:   Lab Results   Component Value Date     (H) 09/08/2016     HEPATIC:   Lab Results   Component Value Date    ALBUMIN 3.9 11/02/2017    PROTTOTAL 7.4 11/02/2017    ALT 13 03/02/2020    AST 22 11/02/2017    ALKPHOS 76 11/02/2017    BILITOTAL 0.6 11/02/2017     OTHER:   Lab Results   Component Value Date    PH 7.35 09/01/2016    LACT 0.5 (L) 09/01/2016    A1C 5.2 09/04/2016    FAUZIA 10.0 09/19/2023    PHOS 3.7 11/02/2017    MAG 2.5 (H) 11/02/2017    TSH 1.36 11/18/2022       Anesthesia Plan    ASA Status:  3       Anesthesia Type: General.     - Airway: Native airway   Induction: Intravenous.           Consents    Anesthesia Plan(s) and associated risks, benefits, and realistic alternatives discussed. Questions answered and patient/representative(s) expressed understanding.     - Discussed: Risks, Benefits and Alternatives for BOTH SEDATION and the PROCEDURE were discussed     - Discussed with:  Patient            Postoperative Care            Comments:             Travis Chung CRNA, APRN CRNA    I have reviewed the pertinent notes and labs in the chart from the past 30 days and (re)examined the patient.  Any updates or changes from those notes are reflected in this note.              # Overweight: Estimated body mass index is 27.78 kg/m  as calculated from the following:    Height as of 4/26/24: 1.6 m (5' 3\").    Weight as of 4/26/24: 71.1 kg (156 lb 12.8 oz).      "

## 2024-05-14 ENCOUNTER — HOSPITAL ENCOUNTER (OUTPATIENT)
Facility: CLINIC | Age: 74
Discharge: HOME | End: 2024-05-14
Attending: SURGERY | Admitting: SURGERY
Payer: MEDICARE

## 2024-05-14 ENCOUNTER — ANESTHESIA (OUTPATIENT)
Dept: GASTROENTEROLOGY | Facility: CLINIC | Age: 74
End: 2024-05-14
Payer: MEDICARE

## 2024-05-14 VITALS
HEIGHT: 63 IN | HEART RATE: 73 BPM | SYSTOLIC BLOOD PRESSURE: 123 MMHG | DIASTOLIC BLOOD PRESSURE: 74 MMHG | RESPIRATION RATE: 16 BRPM | BODY MASS INDEX: 27.64 KG/M2 | OXYGEN SATURATION: 97 % | TEMPERATURE: 97.8 F | WEIGHT: 156 LBS

## 2024-05-14 LAB — UPPER GI ENDOSCOPY: NORMAL

## 2024-05-14 PROCEDURE — 88305 TISSUE EXAM BY PATHOLOGIST: CPT | Mod: 26 | Performed by: PATHOLOGY

## 2024-05-14 PROCEDURE — 250N000011 HC RX IP 250 OP 636: Performed by: NURSE ANESTHETIST, CERTIFIED REGISTERED

## 2024-05-14 PROCEDURE — 370N000017 HC ANESTHESIA TECHNICAL FEE, PER MIN: Performed by: SURGERY

## 2024-05-14 PROCEDURE — 43249 ESOPH EGD DILATION <30 MM: CPT

## 2024-05-14 PROCEDURE — 250N000009 HC RX 250: Performed by: NURSE ANESTHETIST, CERTIFIED REGISTERED

## 2024-05-14 PROCEDURE — 258N000003 HC RX IP 258 OP 636: Performed by: PHYSICIAN ASSISTANT

## 2024-05-14 PROCEDURE — 43239 EGD BIOPSY SINGLE/MULTIPLE: CPT | Mod: XU | Performed by: SURGERY

## 2024-05-14 PROCEDURE — 88305 TISSUE EXAM BY PATHOLOGIST: CPT | Mod: TC | Performed by: SURGERY

## 2024-05-14 RX ORDER — PROPOFOL 10 MG/ML
INJECTION, EMULSION INTRAVENOUS PRN
Status: DISCONTINUED | OUTPATIENT
Start: 2024-05-14 | End: 2024-05-14

## 2024-05-14 RX ORDER — LIDOCAINE HYDROCHLORIDE 20 MG/ML
INJECTION, SOLUTION INFILTRATION; PERINEURAL PRN
Status: DISCONTINUED | OUTPATIENT
Start: 2024-05-14 | End: 2024-05-14

## 2024-05-14 RX ORDER — ONDANSETRON 4 MG/1
4 TABLET, ORALLY DISINTEGRATING ORAL EVERY 30 MIN PRN
Status: DISCONTINUED | OUTPATIENT
Start: 2024-05-14 | End: 2024-05-14 | Stop reason: HOSPADM

## 2024-05-14 RX ORDER — DEXAMETHASONE SODIUM PHOSPHATE 4 MG/ML
4 INJECTION, SOLUTION INTRA-ARTICULAR; INTRALESIONAL; INTRAMUSCULAR; INTRAVENOUS; SOFT TISSUE
Status: DISCONTINUED | OUTPATIENT
Start: 2024-05-14 | End: 2024-05-14 | Stop reason: HOSPADM

## 2024-05-14 RX ORDER — ONDANSETRON 2 MG/ML
4 INJECTION INTRAMUSCULAR; INTRAVENOUS EVERY 30 MIN PRN
Status: DISCONTINUED | OUTPATIENT
Start: 2024-05-14 | End: 2024-05-14 | Stop reason: HOSPADM

## 2024-05-14 RX ORDER — FLUMAZENIL 0.1 MG/ML
0.2 INJECTION, SOLUTION INTRAVENOUS
Status: DISCONTINUED | OUTPATIENT
Start: 2024-05-14 | End: 2024-05-14 | Stop reason: HOSPADM

## 2024-05-14 RX ORDER — NALOXONE HYDROCHLORIDE 0.4 MG/ML
0.2 INJECTION, SOLUTION INTRAMUSCULAR; INTRAVENOUS; SUBCUTANEOUS
Status: DISCONTINUED | OUTPATIENT
Start: 2024-05-14 | End: 2024-05-14 | Stop reason: HOSPADM

## 2024-05-14 RX ORDER — LIDOCAINE 40 MG/G
CREAM TOPICAL
Status: DISCONTINUED | OUTPATIENT
Start: 2024-05-14 | End: 2024-05-14 | Stop reason: HOSPADM

## 2024-05-14 RX ORDER — NALOXONE HYDROCHLORIDE 0.4 MG/ML
0.4 INJECTION, SOLUTION INTRAMUSCULAR; INTRAVENOUS; SUBCUTANEOUS
Status: DISCONTINUED | OUTPATIENT
Start: 2024-05-14 | End: 2024-05-14 | Stop reason: HOSPADM

## 2024-05-14 RX ORDER — PROPOFOL 10 MG/ML
INJECTION, EMULSION INTRAVENOUS CONTINUOUS PRN
Status: DISCONTINUED | OUTPATIENT
Start: 2024-05-14 | End: 2024-05-14

## 2024-05-14 RX ORDER — SODIUM CHLORIDE, SODIUM LACTATE, POTASSIUM CHLORIDE, CALCIUM CHLORIDE 600; 310; 30; 20 MG/100ML; MG/100ML; MG/100ML; MG/100ML
INJECTION, SOLUTION INTRAVENOUS CONTINUOUS
Status: DISCONTINUED | OUTPATIENT
Start: 2024-05-14 | End: 2024-05-14 | Stop reason: HOSPADM

## 2024-05-14 RX ORDER — NALOXONE HYDROCHLORIDE 0.4 MG/ML
0.1 INJECTION, SOLUTION INTRAMUSCULAR; INTRAVENOUS; SUBCUTANEOUS
Status: DISCONTINUED | OUTPATIENT
Start: 2024-05-14 | End: 2024-05-14 | Stop reason: HOSPADM

## 2024-05-14 RX ADMIN — TOPICAL ANESTHETIC 1 SPRAY: 200 SPRAY DENTAL; PERIODONTAL at 09:52

## 2024-05-14 RX ADMIN — LIDOCAINE HYDROCHLORIDE 100 MG: 20 INJECTION, SOLUTION INFILTRATION; PERINEURAL at 09:52

## 2024-05-14 RX ADMIN — PROPOFOL 200 MCG/KG/MIN: 10 INJECTION, EMULSION INTRAVENOUS at 09:52

## 2024-05-14 RX ADMIN — PROPOFOL 50 MG: 10 INJECTION, EMULSION INTRAVENOUS at 09:52

## 2024-05-14 RX ADMIN — SODIUM CHLORIDE, POTASSIUM CHLORIDE, SODIUM LACTATE AND CALCIUM CHLORIDE: 600; 310; 30; 20 INJECTION, SOLUTION INTRAVENOUS at 09:43

## 2024-05-14 ASSESSMENT — ACTIVITIES OF DAILY LIVING (ADL)
ADLS_ACUITY_SCORE: 35
ADLS_ACUITY_SCORE: 35
ADLS_ACUITY_SCORE: 33

## 2024-05-14 NOTE — LETTER
Terese Merazbenevelyn Bell  [address]  [place] [address]  May 21, 2024    Dear Terese,   This letter is to inform you of the results of your pathology report on your upper endoscopy (EGD).    Your pathology report was:  Final Diagnosis   A. Esophagus, distal, biopsy:  -Squamous mucosa with reactive epithelial changes of the type seen in reflux esophagitis  -Negative for intestinal metaplasia.  -Negative for acute or eosinophilic esophagitis.  -Negative for dysplasia or malignancy.     Showed findings consistent with reflux (heartburn).  I recommend an over the counter omeprazole indefinitely.    If you have any questions or concerns please do not hesitate to call my office at [phone].  Sincerely,     [facility] DO Adam FACOS  [facility]

## 2024-05-14 NOTE — ANESTHESIA CARE TRANSFER NOTE
Patient: Terese Bell    Procedure: Procedure(s):  ESOPHAGOGASTRODUODENOSCOPY, WITH BIOPSY       Diagnosis: Oropharyngeal dysphagia [R13.12]  Diagnosis Additional Information: No value filed.    Anesthesia Type:   General     Note:      Level of Consciousness: drowsy  Oxygen Supplementation: room air        Vital Signs Stable: post-procedure vital signs reviewed and stable  Report to RN Given: handoff report given  Patient transferred to: Phase II    Handoff Report: Identifed the Patient, Identified the Reponsible Provider, Reviewed the pertinent medical history, Discussed the surgical course, Reviewed Intra-OP anesthesia mangement and issues during anesthesia, Set expectations for post-procedure period and Allowed opportunity for questions and acknowledgement of understanding  Vitals:  Vitals Value Taken Time   BP 97/56 05/14/24 1011   Temp     Pulse 77 05/14/24 1011   Resp     SpO2 92 % 05/14/24 1012   Vitals shown include unfiled device data.    Electronically Signed By: HERNÁN Duque CRNA  May 14, 2024  10:14 AM

## 2024-05-14 NOTE — PROGRESS NOTES
Dr. Adam at patient's bedside and assessed patient at 1055.  Dr. Adam states that it is normal for her to be coughing up pink or red tinged phlegm after this procedure and states that it is okay for the patient to be discharged to home at this time.

## 2024-05-14 NOTE — OR NURSING
Pt returned from upper endo after having procedure. VSS. Coughing up reddish tinged phlem. Taking few sips of water. C/o throat sore.

## 2024-05-14 NOTE — INTERVAL H&P NOTE
"I have reviewed the surgical (or preoperative) H&P that is linked to this encounter, and examined the patient. There are no significant changes    Hx of PEG (removed several years ago); dysphagia (solids only); hx of tongue cancer; no blood thinner    Clinical Conditions Present on Arrival:  Clinically Significant Risk Factors Present on Admission                  # Overweight: Estimated body mass index is 27.63 kg/m  as calculated from the following:    Height as of this encounter: 1.6 m (5' 3\").    Weight as of this encounter: 70.8 kg (156 lb).       "

## 2024-05-14 NOTE — ANESTHESIA POSTPROCEDURE EVALUATION
Patient: Terese Bell    Procedure: Procedure(s):  ESOPHAGOGASTRODUODENOSCOPY, WITH BIOPSY       Anesthesia Type:  General    Note:  Disposition: Outpatient   Postop Pain Control: Uneventful            Sign Out: Well controlled pain   PONV: No   Neuro/Psych: Uneventful            Sign Out: Acceptable/Baseline neuro status   Airway/Respiratory: Uneventful            Sign Out: Acceptable/Baseline resp. status   CV/Hemodynamics: Uneventful            Sign Out: Acceptable CV status; No obvious hypovolemia; No obvious fluid overload   Other NRE: NONE   DID A NON-ROUTINE EVENT OCCUR? No       Last vitals:  Vitals:    05/14/24 0900   BP: 134/83   Pulse: 68   Temp: 36.5  C (97.7  F)   SpO2: 98%       Electronically Signed By: HERNÁN Duque CRNA  May 14, 2024  10:15 AM

## 2024-05-15 LAB
PATH REPORT.COMMENTS IMP SPEC: NORMAL
PATH REPORT.COMMENTS IMP SPEC: NORMAL
PATH REPORT.FINAL DX SPEC: NORMAL
PATH REPORT.GROSS SPEC: NORMAL
PATH REPORT.MICROSCOPIC SPEC OTHER STN: NORMAL
PATH REPORT.RELEVANT HX SPEC: NORMAL
PHOTO IMAGE: NORMAL

## 2024-05-17 NOTE — TELEPHONE ENCOUNTER
PA Initiation    Medication: LIDOCAINE 5 % EX OINT  Insurance Company: [employer] Part D - Phone [phone] Fax [phone]  Pharmacy Filling the Rx: [id number] [place], [place] - [address] [place] [address]  Filling Pharmacy Phone: [phone]  Filling Pharmacy Fax: [phone]  Start Date: 5/19/2024

## 2024-05-19 NOTE — TELEPHONE ENCOUNTER
PA Initiation    Medication: LIDOCAINE 5 % EX OINT  Insurance Company: [employer] Part D - Phone [phone] Fax [phone]  Pharmacy Filling the Rx: [id number] [place], [place] - [address] [place] [address]  Filling Pharmacy Phone: [phone]  Filling Pharmacy Fax: [phone]  Start Date: 5/19/2024

## 2024-05-20 ENCOUNTER — OFFICE VISIT (OUTPATIENT)
Dept: RADIATION THERAPY | Facility: OUTPATIENT CENTER | Age: 74
End: 2024-05-20
Payer: MEDICARE

## 2024-05-20 VITALS
HEART RATE: 66 BPM | DIASTOLIC BLOOD PRESSURE: 77 MMHG | SYSTOLIC BLOOD PRESSURE: 151 MMHG | BODY MASS INDEX: 28.41 KG/M2 | OXYGEN SATURATION: 99 % | RESPIRATION RATE: 16 BRPM | WEIGHT: 160.4 LBS

## 2024-05-20 DIAGNOSIS — C02.9 MALIGNANT NEOPLASM OF TONGUE (H): Primary | ICD-10-CM

## 2024-05-20 NOTE — PROGRESS NOTES
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     TYPE OF RADIATION THERAPY ADMINISTERED: 6000 cGy in 30 fractions         Oncologic history : Mrs. 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 She had radiation therapy in our clinic to a total dose of 6000 cGy in 30 treatments at 200 cGy each fraction from 10/17/2016 to 2016.  She tolerated radiation therapy reasonably well.  The patient did have some significant sore throat and dysphagia toward the end of the therapy, which required a PEG tube for nutritional support.  She otherwise was reasonably stable at the end of the therapy.     Interval history:    The patient underwent exam by Dr. Ferris on 21 and has remained WILMA. Last scans on 21 were WILMA.    Patient otherwise reports she is doing fairly well today. She continues her daily mouth care. Continues to follow-up with dental team routinely. 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 Used salagen in the past with some help. Using biotene rinses and xylimelts with some alleviation.    She continues to use therabite for Trismus. She feels this is stable.  She also reports continued difficulty with swallowing certain foods which she has been avoiding.    She is doing her head and neck exercises routinely. Does notice some  intermittent lymphedema in neck at times. This fluctuates.     Experience mild dysphagia and underwent upper endoscopy in May 2024.  Stricture was noted, dilated which is since  improved dysphagia.  Pathology was negative for malignancy.    Last TSH on 22 was WNL.    The patient denies any hoarseness of voice, referred otalgia, dysphagia, facial paresthesias, bleeding, other pain, and recurrent/new lumps or bumps. Reported weight is stable.    ROS otherwise negative on a 12-system review.       IMAGIN/9/21  CT Neck    FINDINGS:  Postoperative change of tongue mass resection and  right-sided neck dissection. Oral cavity evaluation is limited due to  extensive streak artifact. No convincing tumor is identified. No  evidence of cervical lymphadenopathy. Posttreatment changes are  present. The oral cavity, oropharynx, hypopharynx, and larynx are  otherwise unremarkable. The left submandibular gland is small  consistent with posttreatment change. Right subareolar gland is  absent. Parotid glands are unremarkable. Thyroid gland is  unremarkable.     Major neck vasculature is patent. Cervical spine degenerative change  is present, worst at C4-C5 and C5-C6. Few areas of pulmonary  emphysema.                                                                      IMPRESSION:    1. Stable posttreatment change.  2. No convincing evidence of recurrent disease.    IMPRESSION:   Ms. 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      RECOMMENDATIONS:    1. Remains clinically and radi graphically WILMA.  Exam by Dr. Ferris on 21 and has remained WILMA. Last scans on 21 were WILMA.    2. Continue oral nutrition and continue swallowing and stretching exercises. Continue Therabite for Trismus.      3. Xerostomia: Discussed re-trial of salagen (feels produced too much saliva with full dose, discussed dose reduction). Continue products for dry mouth including Biotene and Xylimelts.    4. RTC in 12 months.       Juwan GRIER  MAHAD Zhang.  Department of Radiation Oncology  [facility]

## 2024-05-20 NOTE — NURSING NOTE
FOLLOW-UP VISIT    Patient Name: Terese Bell      : 1950     Age: 73 year old        ______________________________________________________________________________     Chief Complaint   Patient presents with    Radiation Therapy     Follow up with Dr. Zhang     BP (!) 151/77 (BP Location: Left arm, Patient Position: Sitting, Cuff Size: Adult Regular)   Pulse 66   Resp 16   Wt 72.8 kg (160 lb 6.4 oz)   SpO2 99%   BMI 28.41 kg/m       Date Radiation Completed: 2016    Pain  Denies oral pain.  Follows with PCP and ortho for B knee pain    Meds  Current Med List Reviewed: Yes  Medication Note: salagen 1/2 tab daily or daily prn.    Labs  TSH   Date Value Ref Range Status   2022 1.36 0.30 - 4.20 uIU/mL Final   2020 1.63 0.40 - 4.00 mU/L Final   ]    Imaging  None    Skin:Warm  Dry  Intact  Oral Products: salagen, biotene  Mucositis: No  Dry mouth: but managed with above products  Dental evaluation: Yes: she is having some dental changes and needing some tooth extractions. She has been able to have some dental implants and these have healed well and are working well.  PEG tube: No  Speech therapy: no clinic visits. Pt does own exercises faithfully  Lymphedema: No  Energy Level:normal  Appetite: can eat many foods but has to mash up veggies, fruits, and meats. Certain textures do not go well. Does eat and enjoy a variety of foods, but has also had to have surgical stretching of esophagus in past.   Intake: does both nutrition shakes and regular foods - does need to mash or puree some foods.  Weight:    Wt Readings from Last 5 Encounters:   24 72.8 kg (160 lb 6.4 oz)   24 70.8 kg (156 lb)   24 71.1 kg (156 lb 12.8 oz)   24 71.7 kg (158 lb)   23 65.3 kg (144 lb)       Appointments:     DATE  Oncologist: coleen    ENT:   PRN with Dr. Ferris     Primary: Dr. Rubio   Sees routinely     Other Notes:

## 2024-05-20 NOTE — TELEPHONE ENCOUNTER
Prior Authorization Approval    Medication: LIDOCAINE 5 % EX OINT  Authorization Effective Date: 1/1/2024  Authorization Expiration Date: 8/17/2024  Approved Dose/Quantity:   Reference #:     Insurance Company: [employer] Part D - Phone [phone] Fax [phone]  Expected CoPay: $    CoPay Card Available:      Financial Assistance Needed:   Which Pharmacy is filling the prescription: [id number] - [place], [place] - [address]  Pharmacy Notified: YES  Patient Notified: **Instructed pharmacy to notify patient when script is ready to /ship.**

## 2024-05-20 NOTE — LETTER
2024         RE: Terese Bell  [address]  [facility] [address]        Dear Colleague,    Thank you for referring your patient, Terese Bell, to the [facility] RADIATION THERAPY CLINIC. Please see a copy of my visit note below.       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     TYPE OF RADIATION THERAPY ADMINISTERED: 6000 cGy in 30 fractions         Oncologic history : Mrs. 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 She had radiation therapy in our clinic to a total dose of 6000 cGy in 30 treatments at 200 cGy each fraction from 10/17/2016 to 2016.  She tolerated radiation therapy reasonably well.  The patient did have some significant sore throat and dysphagia toward the end of the therapy, which required a PEG tube for nutritional support.  She otherwise was reasonably stable at the end of the therapy.     Interval history:    The patient underwent exam by Dr. Ferris on 21 and has remained WILMA. Last scans on 21 were WILMA.    Patient otherwise reports she is doing fairly well today. She continues her daily mouth care. Continues to follow-up with dental team routinely. 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 Used salagen in the past with some help. Using biotene rinses and xylimelts with some alleviation.    She continues to use therabite for Trismus. She feels this is stable.  She also reports continued difficulty with swallowing certain foods which  she has been avoiding.    She is doing her head and neck exercises routinely. Does notice some  intermittent lymphedema in neck at times. This fluctuates.     Experience mild dysphagia and underwent upper endoscopy in May 2024.  Stricture was noted, dilated which is since improved dysphagia.  Pathology was negative for malignancy.    Last TSH on 22 was WNL.    The patient denies any hoarseness of voice, referred otalgia, dysphagia, facial paresthesias, bleeding, other pain, and recurrent/new lumps or bumps. Reported weight is stable.    ROS otherwise negative on a 12-system review.       IMAGIN/9/21  CT Neck    FINDINGS:  Postoperative change of tongue mass resection and  right-sided neck dissection. Oral cavity evaluation is limited due to  extensive streak artifact. No convincing tumor is identified. No  evidence of cervical lymphadenopathy. Posttreatment changes are  present. The oral cavity, oropharynx, hypopharynx, and larynx are  otherwise unremarkable. The left submandibular gland is small  consistent with posttreatment change. Right subareolar gland is  absent. Parotid glands are unremarkable. Thyroid gland is  unremarkable.     Major neck vasculature is patent. Cervical spine degenerative change  is present, worst at C4-C5 and C5-C6. Few areas of pulmonary  emphysema.                                                                      IMPRESSION:    1. Stable posttreatment change.  2. No convincing evidence of recurrent disease.    IMPRESSION:   Ms. 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      RECOMMENDATIONS:    1. Remains clinically and radi graphically WILMA.  Exam by Dr. Ferris on 21 and has remained WILMA. Last scans on 21 were WILMA.    2. Continue oral nutrition and continue swallowing and  stretching exercises. Continue Therabite for Trismus.      3. Xerostomia: Discussed re-trial of salagen (feels produced too much saliva with full dose, discussed dose reduction). Continue products for dry mouth including Biotene and Xylimelts.    4. RTC in 12 months.       Juwan Zhang M.D.  Department of Radiation Oncology  [facility]

## 2024-06-17 PROBLEM — Z71.89 ADVANCED DIRECTIVES, COUNSELING/DISCUSSION: Status: RESOLVED | Noted: 2017-11-02 | Resolved: 2024-06-17

## 2025-02-18 ENCOUNTER — TELEPHONE (OUTPATIENT)
Dept: ORTHOPEDICS | Facility: CLINIC | Age: 75
End: 2025-02-18

## 2025-02-18 DIAGNOSIS — M17.0 BILATERAL PRIMARY OSTEOARTHRITIS OF KNEE: Primary | ICD-10-CM

## 2025-02-18 NOTE — TELEPHONE ENCOUNTER
Patient scheduled for appointment on 2/26/2025 @ [facility] for discussion of viscosupplementation injection vs steroid injection of bilateral knee.        Steroid  injection last completed 04/2024.  Patient reports relief for 6+ months    Patient has failed 3 month trial of Pharmacologic Approach (e.g., topical NSAIDs, oral NSAIDs with or without oral proton pump inhibitors, SOTO-2 inhibitors, topical capsaicin, acetaminophen, tramadol, duloxetine, etc.):  Yes     Patient has completed 3 month trial of Non-Pharmacologic treatments (i.e., physical, psychosocial, or mind-body approach (e.g., exercise-land based or aquatic, physical therapy, nallely chi, yoga, weight management, cognitive behavioral therapy, knee brace or cane, etc).  Yes    Has patient had prior reaction to Synvisc/SynviscOne or any alternative HA product?: No    Prior authorization referral for SynviscOne injection pended.    Please advise    Agustin Betancur ATC

## 2025-02-26 ENCOUNTER — OFFICE VISIT (OUTPATIENT)
Dept: ORTHOPEDICS | Facility: CLINIC | Age: 75
End: 2025-02-26
Payer: MEDICARE

## 2025-02-26 VITALS — WEIGHT: 159 LBS | HEIGHT: 63 IN | BODY MASS INDEX: 28.17 KG/M2

## 2025-02-26 DIAGNOSIS — R26.89 IMPAIRED GAIT AND MOBILITY: ICD-10-CM

## 2025-02-26 DIAGNOSIS — M25.562 CHRONIC PAIN OF BOTH KNEES: ICD-10-CM

## 2025-02-26 DIAGNOSIS — M25.561 CHRONIC PAIN OF BOTH KNEES: ICD-10-CM

## 2025-02-26 DIAGNOSIS — M25.461 EFFUSION OF BOTH KNEE JOINTS: ICD-10-CM

## 2025-02-26 DIAGNOSIS — M25.462 EFFUSION OF BOTH KNEE JOINTS: ICD-10-CM

## 2025-02-26 DIAGNOSIS — G89.29 CHRONIC PAIN OF BOTH KNEES: ICD-10-CM

## 2025-02-26 DIAGNOSIS — M17.0 BILATERAL PRIMARY OSTEOARTHRITIS OF KNEE: Primary | ICD-10-CM

## 2025-02-26 PROCEDURE — 20611 DRAIN/INJ JOINT/BURSA W/US: CPT | Mod: 50 | Performed by: FAMILY MEDICINE

## 2025-02-26 PROCEDURE — 99213 OFFICE O/P EST LOW 20 MIN: CPT | Mod: 25 | Performed by: FAMILY MEDICINE

## 2025-02-26 RX ORDER — TRIAMCINOLONE ACETONIDE 40 MG/ML
40 INJECTION, SUSPENSION INTRA-ARTICULAR; INTRAMUSCULAR
Status: COMPLETED | OUTPATIENT
Start: 2025-02-26 | End: 2025-02-26

## 2025-02-26 RX ORDER — ROPIVACAINE HYDROCHLORIDE 5 MG/ML
3 INJECTION, SOLUTION EPIDURAL; INFILTRATION; PERINEURAL
Status: COMPLETED | OUTPATIENT
Start: 2025-02-26 | End: 2025-02-26

## 2025-02-26 RX ADMIN — TRIAMCINOLONE ACETONIDE 40 MG: 40 INJECTION, SUSPENSION INTRA-ARTICULAR; INTRAMUSCULAR at 11:29

## 2025-02-26 RX ADMIN — ROPIVACAINE HYDROCHLORIDE 3 ML: 5 INJECTION, SOLUTION EPIDURAL; INFILTRATION; PERINEURAL at 11:29

## 2025-02-26 NOTE — PROGRESS NOTES
Terese Bell  :  1950  DOS: 25  MRN: [id number]    Sports Medicine Clinic Visit    PCP: Vanna Rubio    Terese Bell is a 72 year old female who is seen in follow up for chronic bilateral knee pain.    Interim History - 2025  Since last visit on 2024 patient has moderate-severe bilateral knee pain over the past 3 - 4 months with waxing & waning swelling.  Bilateral knee steroid injection completed on 2024 provided relief for ~ 6 months.  Patient notes worsening pain with walking & going from sit to stand.    No new injury in the interim.    Review of Systems  Musculoskeletal: as above  Remainder of review of systems is negative including constitutional, CV, pulmonary, GI, Skin and Neurologic except as noted in HPI or medical history.    Past Medical History:   Diagnosis Date    Allergic rhinitis     Arthritis         Cerebral infarction (H) .    Not sure but dealing with short term memory loss after     Complication of anesthesia     wakes up slow    Depressive disorder 2009    loss of  and brother -    Difficult intubation     needs a 'child's sized tube    Head injury Car and horse back riding    Hearing problem 2016    After surgery noticeable change    Hiatal hernia     History of radiation therapy     Hoarseness     Mild major depression (H) 2009    Moderate persistent asthma     Obesity, unspecified     Pure hypercholesterolemia     Rosacea 3/3/2010    Symptomatic menopausal or female climacteric states     Thrombosis of leg     left leg 30 yrs ago    Tinnitus     Tongue cancer (H)      Past Surgical History:   Procedure Laterality Date    ADENOIDECTOMY      1970    CHOLECYSTECTOMY      COLONOSCOPY  2012    Procedure: COLONOSCOPY;  Colonoscopy;  Surgeon: Yony Garcia MD;  Location: [facility]    COLONOSCOPY N/A 2017    Procedure: COLONOSCOPY;  colonoscopy;  Surgeon: Sterling Mckeon MD;  Location: [facility]    EXAM UNDER ANESTHESIA MOUTH N/A 8/15/2016    Procedure: EXAM UNDER ANESTHESIA MOUTH;  Surgeon: Thomas Ferris MD;  [facility]    GLOSSECTOMY Right 9/1/2016    Procedure: GLOSSECTOMY;  Surgeon: Thomas Ferris MD;  [facility]    GLOSSECTOMY PARTIAL      GRAFT FREE VASCULARIZED (LOCATION) Left 9/1/2016    Procedure: GRAFT FREE VASCULARIZED (LOCATION);  Surgeon: Moreno Leo MD;  [facility]    GRAFT SKIN SPLIT THICKNESS FROM EXTREMITY Left 9/1/2016    Procedure: GRAFT SKIN SPLIT THICKNESS FROM EXTREMITY;  Surgeon: Moreno Leo MD;  [facility]    [facility] UGI ENDOSCOPY W PLACEMENT GASTROSTOMY TUBE PERCUT N/A 11/7/2016    Procedure: COMBINED ESOPHAGOSCOPY, GASTROSCOPY, DUODENOSCOPY (EGD), PLACE PERCUTANEOUS ENDOSCOPIC GASTROSTOMY TUBE;  Surgeon: Sterling Mckeon MD;  Location: [facility]    HEAD & NECK SURGERY      KNEE SURGERY      bilat    LARYNGOSCOPY WITH BIOPSY(IES) N/A 8/15/2016    Procedure: LARYNGOSCOPY WITH BIOPSY(IES);  Surgeon: Thomas Ferris MD;  [facility]    NASAL/SINUS POLYPECTOMY      [facility]    ORTHOPEDIC SURGERY  1977 and 2010    knee repair (r) and clean out (l)    PAROTIDECTOMY, RADICAL NECK DISSECTION Right 9/1/2016    Procedure: PAROTIDECTOMY, RADICAL NECK DISSECTION;  Surgeon: Thomas Ferris MD;  [facility]    TONSILLECTOMY      1970    TRACHEOSTOMY Right 9/1/2016    Procedure: TRACHEOSTOMY;  Surgeon: Thomas Ferris MD;  [facility]    ZZC NONSPECIFIC PROCEDURE      CHOLECYSTECTOMY    ZZC NONSPECIFIC PROCEDURE      SINUS MASS REMOVED    ZZC NONSPECIFIC PROCEDURE      R KNEE SURGERY     Family History   Problem Relation Age of Onset    Neurologic Disorder Brother         migranes    Hypertension Mother     Arthritis Mother     Cerebrovascular Disease Mother     Hypertension Father     Prostate Cancer Father     Cancer Father     Alzheimer Disease Paternal Grandmother     Hypertension Brother     Arthritis Brother     Respiratory Brother         emphysema    Cancer -  "colorectal Brother     C.A.D. Maternal Uncle     Cancer Brother     Hypertension Brother     Cancer Brother     Diabetes No family hx of     Breast Cancer No family hx of        Objective  BP (!) 145/73   Pulse 89   Ht 1.607 m (5' 3.25\")   Wt 71.2 kg (157 lb)   BMI 27.59 kg/m      General: healthy, alert and in no distress    HEENT: no scleral icterus or conjunctival erythema   Skin: no suspicious lesions or rash. No jaundice.   CV: regular rhythm by palpation, 2+ distal pulses, no pedal edema    Resp: normal respiratory effort without conversational dyspnea   Psych: normal mood and affect    Gait: antalgic, appropriate coordination and balance   Neuro: normal light touch sensory exam of the extremities. Motor strength as noted below     Bilateral Knee exam    ROM:        baseline active and passive ROM with flexion and extension       Painful terminal flexion L>R, knees only bend to 90 deg    Inspection:       no visible ecchymosis        effusion noted moderate right, small left    Skin:       no visible deformities       well perfused       capillary refill brisk    Patellar Motion:        Normal patellar tracking noted through range of motion       Crepitus noted in the patellofemoral joint    Tender:        lateral patellar border       medial joint line most focal b/l       lateral joint line    Non Tender:         remainder of knee area    Special Tests:        neg (-) varus at 0 deg and 30 deg       neg (-) valgus at 0 deg and 30 deg       mild pain with forced extension R>L    Evaluation of ipsilateral kinetic chain       normal strength with hip extension and abduction       Modest baseline core stability      Radiology  Results for orders placed or performed in visit on 09/23/22   XR Knee Left 3 Views    Narrative    Examination:  XR KNEE LEFT 3 VIEWS    Date:  9/23/2022 11:42 AM     Clinical Information: Acute pain of left knee    Comparison: none.      Impression    Impression:    1.  " Moderate-severe tricompartmental degenerative arthritic changes in  the left knee, with joint space narrowing and osteophytes, greatest in  the medial compartment. Normal joint alignment. No fracture or bone  lesion. No significant joint effusion. Soft tissues are  radiographically unremarkable.    SUSANA CANDELARIO MD         SYSTEM ID:  [id number]     Bilateral knee XR 6/29/2010  Bilateral standing knees lateral left knee.   Knee pain   IMPRESSION:     Tricompartmental gonarthrosis bilaterally. Complete   obliteration medial joint space on the right.      Large Joint Injection/Arthocentesis: bilateral knee    Date/Time: 2/26/2025 11:29 AM    Performed by: Wing Londono DO  Authorized by: Wing Londono DO    Indications:  Pain, osteoarthritis and joint swelling  Needle Size:  21 G  Guidance: ultrasound    Approach:  Superolateral  Location:  Knee  Laterality:  Bilateral      Medications (Right):  40 mg triamcinolone 40 MG/ML; 3 mL ROPivacaine 5 MG/ML  Aspirate amount (mL):  28  Aspirate:  Serous and yellow  Medications (Left):  40 mg triamcinolone 40 MG/ML; 3 mL ROPivacaine 5 MG/ML  Aspirate amount (mL):  9  Aspirate:  Serous and yellow  Outcome:  Tolerated well, no immediate complications  Procedure discussed: discussed risks, benefits, and alternatives    Consent Given by:  Patient  Timeout: timeout called immediately prior to procedure    Prep: patient was prepped and draped in usual sterile fashion     Ultrasound images of procedure were permanently stored.       Assessment:  (M17.0) Bilateral primary osteoarthritis of knee  (primary encounter diagnosis)  (M25.461,  M25.462) Effusion of both knee joints  (M25.561,  M25.562,  G89.29) Chronic pain of both knees  (R26.89) Impaired gait and mobility    Plan:  Discussed the assessment with the patient.  Follow up: prn based on clinical progress  End-stage medial compartment DJD b/l, with acute flare of chronic pain, with recurrent  effusions  US guided CSI with aspirations repeated today after discussion of relative risks, goals and limitations, consider visco trial in the future based on progress, if/when pain returns, is now pre-approved, she deferred this option for today  Low impact activity strategies reviewed, as well as PT options  Bracing options reviewed  RICE reviewed  Reviewed activity modification and progressive increase in activity as tolerated and guided by pain  Reviewed options for potential steroid vs viscosupplementation injections and the possibility for future orthopedic referral prn  Surgical referral available for discussion anytime  Reviewed safe and appropriate OTC medication choices, try tylenol first  Up to 3000mg daily of tylenol is generally safe, NSAID dosing and duration limitations reviewed, consider topical voltaren gel  Discussed nature of degenerative arthrosis of the knee.   Discussed symptom treatment with ice or heat, topical treatments, and rest if needed.   XR images independently visualized and reviewed with patient today in clinic  Expectations and goals of CSI reviewed  Often 2-3 days for steroid effect, and can take up to two weeks for maximum effect  We discussed modified progressive pain-free activity as tolerated  Do not overuse in first two weeks if feeling better due to concern for vulnerability while steroid is working  Supportive care reviewed  All questions were answered today  Contact us with additional questions or concerns  Signs and sx of concern reviewed      Wing Londono DO, PADMINI  Sports Medicine Physician  [facility]            Disclaimer: This note consists of symbols derived from keyboarding, dictation and/or voice recognition software. As a result, there may be errors in the script that have gone undetected. Please consider this when interpreting information found in this chart.

## 2025-02-26 NOTE — LETTER
2025      Terese Bell  [address] [facility] [address]      Dear Colleague,    Thank you for referring your patient, Terese Bell, to the [facility]. Please see a copy of my visit note below.    Terese Bell  :  1950  DOS: 25  MRN: [id number]    Sports Medicine Clinic Visit    PCP: Vanna Rubio    Terese Bell is a 72 year old female who is seen in follow up for chronic bilateral knee pain.    Interim History - 2025  Since last visit on 2024 patient has moderate-severe bilateral knee pain over the past 3 - 4 months with waxing & waning swelling.  Bilateral knee steroid injection completed on 2024 provided relief for ~ 6 months.  Patient notes worsening pain with walking & going from sit to stand.    No new injury in the interim.    Review of Systems  Musculoskeletal: as above  Remainder of review of systems is negative including constitutional, CV, pulmonary, GI, Skin and Neurologic except as noted in HPI or medical history.    Past Medical History:   Diagnosis Date     Allergic rhinitis      Arthritis          Cerebral infarction (H) .    Not sure but dealing with short term memory loss after      Complication of anesthesia     wakes up slow     Depressive disorder 2009    loss of  and brother -     Difficult intubation     needs a 'child's sized tube     Head injury Car and horse back riding     Hearing problem 2016    After surgery noticeable change     Hiatal hernia      History of radiation therapy      Hoarseness      Mild major depression (H) 2009     Moderate persistent asthma      Obesity, unspecified      Pure hypercholesterolemia      Rosacea 3/3/2010     Symptomatic menopausal or female climacteric states      Thrombosis of leg     left leg 30 yrs ago     Tinnitus      Tongue cancer (H)      Past Surgical History:   Procedure  Laterality Date     ADENOIDECTOMY      1970     CHOLECYSTECTOMY       COLONOSCOPY  11/26/2012    Procedure: COLONOSCOPY;  Colonoscopy;  Surgeon: Yony Garcia MD;  Location: [facility]     COLONOSCOPY N/A 12/14/2017    Procedure: COLONOSCOPY;  colonoscopy;  Surgeon: Sterling Mckeon MD;  Location: [facility]     EXAM UNDER ANESTHESIA MOUTH N/A 8/15/2016    Procedure: EXAM UNDER ANESTHESIA MOUTH;  Surgeon: Thomas Ferris MD;  [facility]     GLOSSECTOMY Right 9/1/2016    Procedure: GLOSSECTOMY;  Surgeon: Thomas Ferris MD;  [facility]     GLOSSECTOMY PARTIAL       GRAFT FREE VASCULARIZED (LOCATION) Left 9/1/2016    Procedure: GRAFT FREE VASCULARIZED (LOCATION);  Surgeon: Moreno Leo MD;  [facility]     GRAFT SKIN SPLIT THICKNESS FROM EXTREMITY Left 9/1/2016    Procedure: GRAFT SKIN SPLIT THICKNESS FROM EXTREMITY;  Surgeon: Moreno Leo MD;  [facility]     [facility] ENDOSCOPY W PLACEMENT GASTROSTOMY TUBE PERCUT N/A 11/7/2016    Procedure: COMBINED ESOPHAGOSCOPY, GASTROSCOPY, DUODENOSCOPY (EGD), PLACE PERCUTANEOUS ENDOSCOPIC GASTROSTOMY TUBE;  Surgeon: Sterling Mckeon MD;  Location: [facility]     HEAD & NECK SURGERY       KNEE SURGERY      bilat     LARYNGOSCOPY WITH BIOPSY(IES) N/A 8/15/2016    Procedure: LARYNGOSCOPY WITH BIOPSY(IES);  Surgeon: Thomas Ferris MD;  [facility]     NASAL/SINUS POLYPECTOMY      1990 [facility]     ORTHOPEDIC SURGERY  1977 and 2010    knee repair (r) and clean out (l)     PAROTIDECTOMY, RADICAL NECK DISSECTION Right 9/1/2016    Procedure: PAROTIDECTOMY, RADICAL NECK DISSECTION;  Surgeon: Thomas Ferris MD;  [facility]     TONSILLECTOMY      1970     TRACHEOSTOMY Right 9/1/2016    Procedure: TRACHEOSTOMY;  Surgeon: Thomas Ferris MD;  [facility]     ZZC NONSPECIFIC PROCEDURE      CHOLECYSTECTOMY     ZZC NONSPECIFIC PROCEDURE      SINUS MASS REMOVED     ZZC NONSPECIFIC PROCEDURE      R KNEE SURGERY     Family History   Problem Relation Age of Onset     Neurologic  "Disorder Brother         migranes     Hypertension Mother      Arthritis Mother      Cerebrovascular Disease Mother      Hypertension Father      Prostate Cancer Father      Cancer Father      Alzheimer Disease Paternal Grandmother      Hypertension Brother      Arthritis Brother      Respiratory Brother         emphysema     Cancer - colorectal Brother      C.A.D. Maternal Uncle      Cancer Brother      Hypertension Brother      Cancer Brother      Diabetes No family hx of      Breast Cancer No family hx of        Objective  BP (!) 145/73   Pulse 89   Ht 1.607 m (5' 3.25\")   Wt 71.2 kg (157 lb)   BMI 27.59 kg/m      General: healthy, alert and in no distress    HEENT: no scleral icterus or conjunctival erythema   Skin: no suspicious lesions or rash. No jaundice.   CV: regular rhythm by palpation, 2+ distal pulses, no pedal edema    Resp: normal respiratory effort without conversational dyspnea   Psych: normal mood and affect    Gait: antalgic, appropriate coordination and balance   Neuro: normal light touch sensory exam of the extremities. Motor strength as noted below     Bilateral Knee exam    ROM:        baseline active and passive ROM with flexion and extension       Painful terminal flexion L>R, knees only bend to 90 deg    Inspection:       no visible ecchymosis        effusion noted moderate right, small left    Skin:       no visible deformities       well perfused       capillary refill brisk    Patellar Motion:        Normal patellar tracking noted through range of motion       Crepitus noted in the patellofemoral joint    Tender:        lateral patellar border       medial joint line most focal b/l       lateral joint line    Non Tender:         remainder of knee area    Special Tests:        neg (-) varus at 0 deg and 30 deg       neg (-) valgus at 0 deg and 30 deg       mild pain with forced extension R>L    Evaluation of ipsilateral kinetic chain       normal strength with hip extension and " abduction       Modest baseline core stability      Radiology  Results for orders placed or performed in visit on 09/23/22   XR Knee Left 3 Views    Narrative    Examination:  XR KNEE LEFT 3 VIEWS    Date:  9/23/2022 11:42 AM     Clinical Information: Acute pain of left knee    Comparison: none.      Impression    Impression:    1.  Moderate-severe tricompartmental degenerative arthritic changes in  the left knee, with joint space narrowing and osteophytes, greatest in  the medial compartment. Normal joint alignment. No fracture or bone  lesion. No significant joint effusion. Soft tissues are  radiographically unremarkable.    SUSANA CANDELARIO MD         SYSTEM ID:  [id number]     Bilateral knee XR 6/29/2010  Bilateral standing knees lateral left knee.   Knee pain   IMPRESSION:     Tricompartmental gonarthrosis bilaterally. Complete   obliteration medial joint space on the right.      Large Joint Injection/Arthocentesis: bilateral knee    Date/Time: 2/26/2025 11:29 AM    Performed by: Wing Londono DO  Authorized by: Wing Londono DO    Indications:  Pain, osteoarthritis and joint swelling  Needle Size:  21 G  Guidance: ultrasound    Approach:  Superolateral  Location:  Knee  Laterality:  Bilateral      Medications (Right):  40 mg triamcinolone 40 MG/ML; 3 mL ROPivacaine 5 MG/ML  Aspirate amount (mL):  28  Aspirate:  Serous and yellow  Medications (Left):  40 mg triamcinolone 40 MG/ML; 3 mL ROPivacaine 5 MG/ML  Aspirate amount (mL):  9  Aspirate:  Serous and yellow  Outcome:  Tolerated well, no immediate complications  Procedure discussed: discussed risks, benefits, and alternatives    Consent Given by:  Patient  Timeout: timeout called immediately prior to procedure    Prep: patient was prepped and draped in usual sterile fashion     Ultrasound images of procedure were permanently stored.       Assessment:  (M17.0) Bilateral primary osteoarthritis of knee  (primary encounter  diagnosis)  (M25.461,  M25.462) Effusion of both knee joints  (M25.561,  M25.562,  G89.29) Chronic pain of both knees  (R26.89) Impaired gait and mobility    Plan:  Discussed the assessment with the patient.  Follow up: prn based on clinical progress  End-stage medial compartment DJD b/l, with acute flare of chronic pain, with recurrent effusions  US guided CSI with aspirations repeated today after discussion of relative risks, goals and limitations, consider visco trial in the future based on progress, if/when pain returns, is now pre-approved, she deferred this option for today  Low impact activity strategies reviewed, as well as PT options  Bracing options reviewed  RICE reviewed  Reviewed activity modification and progressive increase in activity as tolerated and guided by pain  Reviewed options for potential steroid vs viscosupplementation injections and the possibility for future orthopedic referral prn  Surgical referral available for discussion anytime  Reviewed safe and appropriate OTC medication choices, try tylenol first  Up to 3000mg daily of tylenol is generally safe, NSAID dosing and duration limitations reviewed, consider topical voltaren gel  Discussed nature of degenerative arthrosis of the knee.   Discussed symptom treatment with ice or heat, topical treatments, and rest if needed.   XR images independently visualized and reviewed with patient today in clinic  Expectations and goals of CSI reviewed  Often 2-3 days for steroid effect, and can take up to two weeks for maximum effect  We discussed modified progressive pain-free activity as tolerated  Do not overuse in first two weeks if feeling better due to concern for vulnerability while steroid is working  Supportive care reviewed  All questions were answered today  Contact us with additional questions or concerns  Signs and sx of concern reviewed      Wing Londono DO, PADMINI  Sports Medicine Physician  [facility]  [facility]            Disclaimer: This note consists of symbols derived from keyboarding, dictation and/or voice recognition software. As a result, there may be errors in the script that have gone undetected. Please consider this when interpreting information found in this chart.      Again, thank you for allowing me to participate in the care of your patient.        Sincerely,        Wing Londono, DO    Electronically signed

## 2025-02-27 DIAGNOSIS — M10.071 ACUTE IDIOPATHIC GOUT OF RIGHT FOOT: ICD-10-CM

## 2025-02-27 DIAGNOSIS — G89.4 CHRONIC PAIN SYNDROME: ICD-10-CM

## 2025-02-27 NOTE — TELEPHONE ENCOUNTER
Medication Question or Refill        What medication are you calling about (include dose and sig)?: indomethacin (INDOCIN) 25 MG capsule   oxyCODONE (ROXICODONE) 5 MG tablet  (These are not on current med list)    Pt asking for refills of both these.  Will be traveling to [place] for 3-5 months.  Pt is having a gout attack right now and wonders if the Indomethacin can be refilled for that and to have some to bring to [place].  Has appt on 3/10 with Sandra Watson.      Preferred Pharmacy:   [id number] [place], [place] - [address] [place] [id number]  Phone: [phone] Fax: [phone]      Controlled Substance Agreement on file:   CSA -- Patient Level:    CSA: None found at the patient level.       Who prescribed the medication?: Ramiro    Do you need a refill? Yes      Could we send this information to you in [place] or would you prefer to receive a phone call?:   Patient would prefer a phone call   Okay to leave a detailed message?: Yes at Cell number on file:    [phone]    Arleth Aquino on 2/27/2025 at 10:05 AM

## 2025-03-04 RX ORDER — OXYCODONE HYDROCHLORIDE 5 MG/1
5 TABLET ORAL EVERY 6 HOURS PRN
Qty: 12 TABLET | Refills: 0 | Status: SHIPPED | OUTPATIENT
Start: 2025-03-04

## 2025-03-04 RX ORDER — INDOMETHACIN 25 MG/1
25 CAPSULE ORAL 2 TIMES DAILY WITH MEALS
Qty: 60 CAPSULE | Refills: 3 | Status: SHIPPED | OUTPATIENT
Start: 2025-03-04 | End: 2025-03-10

## 2025-03-10 ENCOUNTER — TELEPHONE (OUTPATIENT)
Dept: FAMILY MEDICINE | Facility: CLINIC | Age: 75
End: 2025-03-10

## 2025-03-10 ENCOUNTER — OFFICE VISIT (OUTPATIENT)
Dept: FAMILY MEDICINE | Facility: CLINIC | Age: 75
End: 2025-03-10
Payer: MEDICARE

## 2025-03-10 VITALS
SYSTOLIC BLOOD PRESSURE: 122 MMHG | DIASTOLIC BLOOD PRESSURE: 68 MMHG | HEIGHT: 63 IN | RESPIRATION RATE: 20 BRPM | BODY MASS INDEX: 28.1 KG/M2 | OXYGEN SATURATION: 95 % | TEMPERATURE: 98 F | WEIGHT: 158.6 LBS | HEART RATE: 79 BPM

## 2025-03-10 DIAGNOSIS — B02.9 HERPES ZOSTER WITHOUT COMPLICATION: ICD-10-CM
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
## 2025-03-10 DIAGNOSIS — L30.9 DERMATITIS: ICD-10-CM

## 2025-03-10 DIAGNOSIS — E78.5 HYPERLIPIDEMIA LDL GOAL <130: ICD-10-CM

## 2025-03-10 DIAGNOSIS — K11.7 XEROSTOMIA DUE TO RADIOTHERAPY: ICD-10-CM

## 2025-03-10 DIAGNOSIS — M1A.0710 IDIOPATHIC CHRONIC GOUT OF RIGHT FOOT WITHOUT TOPHUS: ICD-10-CM

## 2025-03-10 DIAGNOSIS — B02.29 POST HERPETIC NEURALGIA: ICD-10-CM

## 2025-03-10 DIAGNOSIS — Z76.0 ENCOUNTER FOR MEDICATION REFILL: Primary | ICD-10-CM

## 2025-03-10 LAB
ANION GAP SERPL CALCULATED.3IONS-SCNC: 10 MMOL/L (ref 7–15)
BUN SERPL-MCNC: 11.8 MG/DL (ref 8–23)
CALCIUM SERPL-MCNC: 9.6 MG/DL (ref 8.8–10.4)
CHLORIDE SERPL-SCNC: 103 MMOL/L (ref 98–107)
CHOLEST SERPL-MCNC: 228 MG/DL
CREAT SERPL-MCNC: 0.68 MG/DL (ref 0.51–0.95)
EGFRCR SERPLBLD CKD-EPI 2021: >90 ML/MIN/1.73M2
FASTING STATUS PATIENT QL REPORTED: NO
FASTING STATUS PATIENT QL REPORTED: NO
GLUCOSE SERPL-MCNC: 90 MG/DL (ref 70–99)
HCO3 SERPL-SCNC: 27 MMOL/L (ref 22–29)
HDLC SERPL-MCNC: 70 MG/DL
LDLC SERPL CALC-MCNC: 137 MG/DL
NONHDLC SERPL-MCNC: 158 MG/DL
POTASSIUM SERPL-SCNC: 4.3 MMOL/L (ref 3.4–5.3)
SODIUM SERPL-SCNC: 140 MMOL/L (ref 135–145)
TRIGL SERPL-MCNC: 107 MG/DL

## 2025-03-10 PROCEDURE — 80061 LIPID PANEL: CPT

## 2025-03-10 PROCEDURE — 80048 BASIC METABOLIC PNL TOTAL CA: CPT

## 2025-03-10 PROCEDURE — 1125F AMNT PAIN NOTED PAIN PRSNT: CPT

## 2025-03-10 PROCEDURE — 99213 OFFICE O/P EST LOW 20 MIN: CPT

## 2025-03-10 PROCEDURE — 3074F SYST BP LT 130 MM HG: CPT

## 2025-03-10 PROCEDURE — 3078F DIAST BP <80 MM HG: CPT

## 2025-03-10 PROCEDURE — 36415 COLL VENOUS BLD VENIPUNCTURE: CPT

## 2025-03-10 RX ORDER — VALACYCLOVIR HYDROCHLORIDE 500 MG/1
500 TABLET, FILM COATED ORAL DAILY
Qty: 90 TABLET | Refills: 3 | Status: SHIPPED | OUTPATIENT
Start: 2025-03-10

## 2025-03-10 RX ORDER — PILOCARPINE HYDROCHLORIDE 5 MG/1
5 TABLET, FILM COATED ORAL 3 TIMES DAILY
Qty: 90 TABLET | Refills: 5 | Status: SHIPPED | OUTPATIENT
Start: 2025-03-10

## 2025-03-10 RX ORDER — LIDOCAINE 50 MG/G
OINTMENT TOPICAL
Qty: 50 G | Refills: 11 | Status: SHIPPED | OUTPATIENT
Start: 2025-03-10

## 2025-03-10 RX ORDER — INDOMETHACIN 25 MG/1
25 CAPSULE ORAL 2 TIMES DAILY WITH MEALS
Qty: 60 CAPSULE | Refills: 5 | Status: SHIPPED | OUTPATIENT
Start: 2025-03-10

## 2025-03-10 RX ORDER — BETAMETHASONE DIPROPIONATE 0.5 MG/G
CREAM TOPICAL
Qty: 50 G | Refills: 11 | Status: SHIPPED | OUTPATIENT
Start: 2025-03-10

## 2025-03-10 RX ORDER — LIDOCAINE 50 MG/G
OINTMENT TOPICAL
Qty: 50 G | Refills: 11 | Status: CANCELLED | OUTPATIENT
Start: 2025-03-10

## 2025-03-10 ASSESSMENT — ASTHMA QUESTIONNAIRES
QUESTION_5 LAST FOUR WEEKS HOW WOULD YOU RATE YOUR ASTHMA CONTROL: WELL CONTROLLED
ACT_TOTALSCORE: 23
QUESTION_2 LAST FOUR WEEKS HOW OFTEN HAVE YOU HAD SHORTNESS OF BREATH: NOT AT ALL
QUESTION_1 LAST FOUR WEEKS HOW MUCH OF THE TIME DID YOUR ASTHMA KEEP YOU FROM GETTING AS MUCH DONE AT WORK, SCHOOL OR AT HOME: NONE OF THE TIME
QUESTION_3 LAST FOUR WEEKS HOW OFTEN DID YOUR ASTHMA SYMPTOMS (WHEEZING, COUGHING, SHORTNESS OF BREATH, CHEST TIGHTNESS OR PAIN) WAKE YOU UP AT NIGHT OR EARLIER THAN USUAL IN THE MORNING: ONCE OR TWICE
QUESTION_4 LAST FOUR WEEKS HOW OFTEN HAVE YOU USED YOUR RESCUE INHALER OR NEBULIZER MEDICATION (SUCH AS ALBUTEROL): NOT AT ALL
ACT_TOTALSCORE: 23

## 2025-03-10 ASSESSMENT — PAIN SCALES - GENERAL: PAINLEVEL_OUTOF10: MILD PAIN (2)

## 2025-03-10 NOTE — TELEPHONE ENCOUNTER
"  General Call    Contacts       Contact Date/Time Type Contact Phone/Fax    03/10/2025 02:57 PM CDT Fax (Incoming) [id number] - [place], [place] - [address] [facility] (Pharmacy) [phone]          Reason for Call:  Received fax from [facility] regarding pilocarpine (SALAGEN) 5 MG tablet     What are your questions or concerns:  \"Please clarify directions 1 TID or 1/2 daily\"    Nedra Woodruff on 3/10/2025 at 2:59 PM      "

## 2025-03-10 NOTE — PROGRESS NOTES
"  Assessment & Plan     Encounter for medication refill  Patient is leaving for [place] for 5 months and is asking for medication refills a she will be coming back for annual exam in the Fall.     Dermatitis  Patient has dermatitis from radiation treatments and reports Diprolene works well to manage. Refilled medication.     - augmented betamethasone dipropionate (DIPROLENE AF) 0.05 % external cream; Apply sparingly to affected area twice daily as needed.  Do not apply to face.    Hyperlipidemia LDL goal <130    - Lipid panel reflex to direct LDL Non-fasting; Future  - Basic metabolic panel  (Ca, Cl, CO2, Creat, Gluc, K, Na, BUN); Future  - Lipid panel reflex to direct LDL Non-fasting  - Basic metabolic panel  (Ca, Cl, CO2, Creat, Gluc, K, Na, BUN)    Post herpetic neuralgia  Patient reports she has left shoulder pain and lidocaine works well for comfort. Patient education regarding OTC Salon Paws 4% if needed.     - lidocaine (XYLOCAINE) 5 % external ointment; Apply topically 3 x day as needed to affected area on neck and face    Idiopathic chronic gout of right foot without tophus  Patient reports she does have episodes of gout and indomethacin works to manage and resolve. Refilled medication.     - indomethacin (INDOCIN) 25 MG capsule; Take 1 capsule (25 mg) by mouth 2 times daily (with meals).    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 Refilled medication.     - pilocarpine (SALAGEN) 5 MG tablet; Take 1 tablet (5 mg) by mouth 3 times daily. Take 0.5 tablet orally daily for dry mouth.    Herpes zoster without complication  Patient reports Valtrex works well to manage herpes outbreaks. Refilled medication.     - valACYclovir (VALTREX) 500 MG tablet; Take 1 tablet (500 mg) by mouth daily.          BMI  Estimated body mass index is 28.17 kg/m  as calculated from the following:    Height as of this encounter: 1.598 m (5' 2.91\").    Weight as of this " encounter: 71.9 kg (158 lb 9.6 oz).   Weight management plan: Discussed healthy diet and exercise guidelines      Regular exercise    Subjective   Sarah is a 74 year old, presenting for the following health issues:  Recheck Medication, Med Change Request (Would like to discuss something for dry mouth ), Neck Pain, and Shoulder Pain        3/10/2025     1:39 PM   Additional Questions   Roomed by Hiral GRIER LPN   Accompanied by self         3/10/2025     1:39 PM   Patient Reported Additional Medications   Patient reports taking the following new medications no new meds     Patient is leaving for [place] for 5 months and is asking for medication refills a she will be coming back for annual exam in the Fall. Patient has dermatitis from radiation treatments and reports Diprolene works well to manage. Refilled medication. Patient reports she has left shoulder pain and lidocaine works well for comfort. Patient education regarding OTC Salon Paws 4% if needed. Patient reports she does have episodes of gout and indomethacin works to manage and resolve. Refilled medication. Patient has continued dry mouth and reports Salagen works well to manage. Refilled medication. Patient reports Valtrex works well to manage herpes outbreaks. Refilled medication. Patient denies headache, pain, and nausea.              History of Present Illness       Reason for visit:  Meds   She is taking medications regularly.        Medication Followup of Valacyclovir 500 mg once daily  Taking Medication as prescribed: yes  Side Effects:  None  Medication Helping Symptoms:  yes  Medication Followup of Lidocaine 5% external ointment PRN  Taking Medication as prescribed: yes  Side Effects:  None  Medication Helping Symptoms:  yes    Medication Followup of Diprolene 0.05% cream topically PRN   Taking Medication as prescribed: yes  Side Effects:  None  Medication Helping Symptoms:  yes   Concern - Neck stretching  Onset: since radiation  Description:   Has  "a \"scrunching\" now since radiation and her jaw doesn't open very far. Has been doing her exercises but wants to know if she could get some thing to wear at night to help stretch. Has knots in her neck that she can feel with her fingers.   Intensity: mild, 2/10 currently on pain scale at it's worst 9/10   Progression of Symptoms:  same  Accompanying Signs & Symptoms:  Denies swelling, numbness, tingling or weakness   Previous history of similar problem:   Ongoing since radiation   Precipitating factors:   Worsened by: changing in the weather makes it worse  Alleviating factors:  Improved by: stretching exercises, lidocaine helps    Therapies Tried and outcome: stretching exercises and lidocaine helps   Joint Pain  Onset: 5-6 weeks ago  Description:   Location: left shoulder  Character: Dull ache, shooting pain up to the joint   Intensity: moderate, 7/10 currently on pain scale, at it's worst 9/10 and she feels like she will throw up   Progression of Symptoms: same  Accompanying Signs & Symptoms:  Other symptoms: weakness of the left arm, not a lot but she is left handed so it is bothersome   History:   Previous similar pain: no     Precipitating factors:   Trauma or overuse: no   Alleviating factors:  Improved by: ice and deep heating rub    Therapies Tried and outcome: topically pain reliever and has done some PT she has looked up online        Review of Systems  CONSTITUTIONAL: NEGATIVE for fever, chills, change in weight  ENT/MOUTH: NEGATIVE for ear, mouth and throat problems  RESP: NEGATIVE for significant cough or SOB  CV: NEGATIVE for chest pain, palpitations or peripheral edema      Objective    /68 (BP Location: Right arm, Patient Position: Sitting, Cuff Size: Adult Regular)   Pulse 79   Temp 98  F (36.7  C) (Tympanic)   Resp 20   Ht 1.598 m (5' 2.91\")   Wt 71.9 kg (158 lb 9.6 oz)   SpO2 95%   BMI 28.17 kg/m    Body mass index is 28.17 kg/m .    Physical Exam   GENERAL: alert and no " distress  NECK: no adenopathy, no asymmetry, masses, or scars  RESP: lungs clear to auscultation - no rales, rhonchi or wheezes  CV: regular rate and rhythm, normal S1 S2, no S3 or S4, no murmur, click or rub, no peripheral edema  MS: no gross musculoskeletal defects noted, no edema          Signed Electronically by: HERNÁN Monterroso CNP

## 2025-03-11 NOTE — TELEPHONE ENCOUNTER
Sandra Watson APRN CNP McPherson, Marla, RN; P [facility]  Caller: Unspecified (Yesterday,  2:57 PM)      Patient can take one tablet 5mg TID as needed for dry mouth.    Thank you for your help.    With Kind Regards,    HERNÁN Monterroso CNP

## 2025-03-11 NOTE — TELEPHONE ENCOUNTER
Retail Pharmacy Prior Authorization Team   Phone: [phone]    PRIOR AUTHORIZATION DENIED    Medication: LIDOCAINE 5 % EX OINT  Insurance Company: [employer] - Phone [phone] Fax [phone]  Denial Date: 3/10/2025  Denial Reason(s): EXCEEDS PLAN LIMITS - INSURANCE ALLOWS 35.44 GRAM PER 30 DAYS      Appeal Information: IF THE PROVIDER WOULD LIKE TO APPEAL THIS DECISION PLEASE PROVIDE THE PA TEAM WITH A LETTER OF MEDICAL NECESSITY        Patient Notified: NO

## 2025-03-11 NOTE — TELEPHONE ENCOUNTER
Patient is aware of possible decline in coverage for this medication. Patient education regarding Lidocaine 4% patches available over the counter called [facility].     Thank you     With Kind Regards,    HERNÁN Monterroso CNP

## 2025-03-24 ENCOUNTER — OFFICE VISIT (OUTPATIENT)
Dept: FAMILY MEDICINE | Facility: CLINIC | Age: 75
End: 2025-03-24
Payer: MEDICARE

## 2025-03-24 ENCOUNTER — ANCILLARY PROCEDURE (OUTPATIENT)
Dept: GENERAL RADIOLOGY | Facility: CLINIC | Age: 75
End: 2025-03-24
Attending: PHYSICIAN ASSISTANT
Payer: MEDICARE

## 2025-03-24 VITALS
HEIGHT: 63 IN | BODY MASS INDEX: 28 KG/M2 | WEIGHT: 158 LBS | SYSTOLIC BLOOD PRESSURE: 126 MMHG | HEART RATE: 84 BPM | TEMPERATURE: 98.4 F | RESPIRATION RATE: 20 BRPM | OXYGEN SATURATION: 99 % | DIASTOLIC BLOOD PRESSURE: 74 MMHG

## 2025-03-24 DIAGNOSIS — R05.3 CHRONIC COUGH: Primary | ICD-10-CM

## 2025-03-24 DIAGNOSIS — R05.3 CHRONIC COUGH: ICD-10-CM

## 2025-03-24 LAB
FLUAV AG SPEC QL IA: NEGATIVE
FLUBV AG SPEC QL IA: NEGATIVE

## 2025-03-24 PROCEDURE — 3078F DIAST BP <80 MM HG: CPT | Performed by: PHYSICIAN ASSISTANT

## 2025-03-24 PROCEDURE — 71046 X-RAY EXAM CHEST 2 VIEWS: CPT | Mod: TC | Performed by: RADIOLOGY

## 2025-03-24 PROCEDURE — 1126F AMNT PAIN NOTED NONE PRSNT: CPT | Performed by: PHYSICIAN ASSISTANT

## 2025-03-24 PROCEDURE — 87798 DETECT AGENT NOS DNA AMP: CPT | Performed by: PHYSICIAN ASSISTANT

## 2025-03-24 PROCEDURE — 87635 SARS-COV-2 COVID-19 AMP PRB: CPT | Performed by: PHYSICIAN ASSISTANT

## 2025-03-24 PROCEDURE — 99214 OFFICE O/P EST MOD 30 MIN: CPT | Performed by: PHYSICIAN ASSISTANT

## 2025-03-24 PROCEDURE — 3074F SYST BP LT 130 MM HG: CPT | Performed by: PHYSICIAN ASSISTANT

## 2025-03-24 PROCEDURE — 87804 INFLUENZA ASSAY W/OPTIC: CPT | Performed by: PHYSICIAN ASSISTANT

## 2025-03-24 RX ORDER — AZITHROMYCIN 250 MG/1
TABLET, FILM COATED ORAL
Qty: 6 TABLET | Refills: 0 | Status: SHIPPED | OUTPATIENT
Start: 2025-03-24 | End: 2025-03-29

## 2025-03-24 RX ORDER — BENZONATATE 100 MG/1
100 CAPSULE ORAL 3 TIMES DAILY PRN
Qty: 25 CAPSULE | Refills: 0 | Status: SHIPPED | OUTPATIENT
Start: 2025-03-24

## 2025-03-24 ASSESSMENT — ENCOUNTER SYMPTOMS: COUGH: 1

## 2025-03-24 ASSESSMENT — PAIN SCALES - GENERAL: PAINLEVEL_OUTOF10: NO PAIN (0)

## 2025-03-24 NOTE — PROGRESS NOTES
Assessment & Plan   Problem List Items Addressed This Visit    None  Visit Diagnoses       Chronic cough    -  Primary    Relevant Medications    azithromycin (ZITHROMAX) 250 MG tablet    benzonatate (TESSALON) 100 MG capsule    Other Relevant Orders    XR Chest 2 Views    Influenza A & B Antigen - Clinic Collect (Completed)    COVID-19 Virus (Coronavirus) by PCR Nose    B. pertussis/parapertussis PCR-NP           Impression is likely viral URI versus atypical pneumonia.  Influenza test negative.  COVID and pertussis PCR are pending. CXR shows no pneumonia, pneumothorax, pleural effusion, edema, or other worrisome process by my read with over read pending.  She denies any GERD symptoms or postnasal drip.  She is not on an ACE inhibitor.  She has no wheezing on exam.  Appears well and non-toxic and I have low suspicion for impending airway obstruction or respiratory distress at this point.  She will push p.o. fluids, use over-the-counter meds for symptoms, complete a course of azithromycin to cover for atypical CAP given the duration of her symptoms, benzonatate and follow-up with us in 1-2 weeks if not improving or urgent care/the ER if symptoms worsen/change at any time.    Complete history and physical exam as below. Afebrile with normal vital signs.    DDx and Dx discussed with and explained to the pt to their satisfaction.  All questions were answered at this time. Pt expressed understanding of and agreement with this dx, tx, and plan. No further workup warranted and standard medication warnings given. I have given the patient a list of pertinent indications for re-evaluation. Will go to the Emergency Department if symptoms worsen or new concerning symptoms arise. Patient left in no apparent distress.      See Patient Instructions      Serge Smith is a 74 year old, presenting for the following health issues:  Cough        3/24/2025     3:03 PM   Additional Questions   Roomed by Leona Marie CMA  "  Accompanied by N/A         3/24/2025     3:03 PM   Patient Reported Additional Medications   Patient reports taking the following new medications No new medications     History of Present Illness       Reason for visit:  Persistint  Symptom onset:  More than a month  Symptom intensity:  Moderate  Symptom progression:  Staying the same  Had these symptoms before:  Yes  Has tried/received treatment for these symptoms:  No  What makes it worse:  No cough is same  What makes it better:  Over counter cough meds hepls a lite  and hot shower   She is taking medications regularly.   - Sarah TAPIA Bettie Bell, 74-year-old female.  - Persistent cough since around December 2024, initially thought to be a winter allergen-related issue.  - Sensation of something sitting on the chest, non-productive cough.  - Exhaustion following coughing episodes.  - Cough worsens suddenly after periods of being manageable.  - Shortness of breath when engaging in activities like carrying boxes or being outside.  - No history of smoking or exposure to smoke.  - No use of lisinopril or blood pressure medications.  - No post-nasal drip or acid reflux reported.  Patient is coming in today due to a cough that has been ongoing since after xmas, and is getting worse with chest congestion.  No imaging has been done.  -She has an upcoming trip to [place] and is interested in viral testing to ensure that she will not be exposing her friends there to illness.    Review of Systems  Constitutional, HEENT, cardiovascular, pulmonary, gi and gu systems are negative, except as otherwise noted.      Objective    /74   Pulse 84   Temp 98.4  F (36.9  C) (Temporal)   Resp 20   Ht 1.598 m (5' 2.91\")   Wt 71.7 kg (158 lb)   SpO2 99%   BMI 28.07 kg/m    Body mass index is 28.07 kg/m .  Physical Exam  Vitals and nursing note reviewed.   Constitutional:       General: She is not in acute distress.     Appearance: She is not ill-appearing or diaphoretic. "   HENT:      Head: Normocephalic and atraumatic.      Nose:      Comments: Nasal septal deviation, baseline.     Mouth/Throat:      Mouth: Mucous membranes are moist.      Pharynx: Oropharynx is clear.      Comments: No trismus, voice abnormalities or asymmetry to the oropharynx.  Eyes:      Conjunctiva/sclera: Conjunctivae normal.   Cardiovascular:      Rate and Rhythm: Normal rate and regular rhythm.      Heart sounds: Normal heart sounds. No murmur heard.     No friction rub. No gallop.   Pulmonary:      Effort: Pulmonary effort is normal. No respiratory distress.      Breath sounds: Normal breath sounds. No stridor. No wheezing, rhonchi or rales.      Comments: Coughing at times during exam.  Musculoskeletal:      Cervical back: Neck supple. No rigidity.   Lymphadenopathy:      Cervical: No cervical adenopathy.   Skin:     General: Skin is warm and dry.   Neurological:      General: No focal deficit present.      Mental Status: She is alert. Mental status is at baseline.   Psychiatric:         Mood and Affect: Mood normal.         Behavior: Behavior normal.          Results for orders placed or performed in visit on 03/24/25   Influenza A & B Antigen - Clinic Collect     Status: Normal    Specimen: Nose; Swab   Result Value Ref Range    Influenza A antigen Negative Negative    Influenza B antigen Negative Negative    Narrative    Test results must be correlated with clinical data. If necessary, results should be confirmed by a molecular assay or viral culture.           Signed Electronically by: CUONG Felix

## 2025-03-24 NOTE — PATIENT INSTRUCTIONS
Fermin Smith,    Thank you for allowing [facility] to manage your care.    Based on our discussion, I have outlined the following instructions for you:    - Take azithromycin (Z Balta) for 5 days as prescribed.  - Use benzonatate as needed to help relieve your cough. Avoid driving if you feel drowsy after taking benzonatate.  - If you feel nauseous after taking azithromycin, try taking it with food.  - If your symptoms do not improve in a week, please contact us and let me know.    If you develop worsening/changing symptoms at any time such as shortness of breath, chest discomfort, or other worrisome symptoms, please be seen in clinic/urgent care or call 911/go to the emergency department for evaluation as we discussed.    I ordered some xrays. Please go to our radiology department to get your xrays.    I sent your prescriptions to your pharmacy. Take a probiotic pill, eat live culture yogurt (Greek yogurt or Activia), drink kefir daily for one week after finishing the antibiotics to encourage growth of good bacteria in your system.    For cough not controlled by over the counter medications, please use benzonatate as prescribed. Do not use this medication while driving, operating machinery, with other sedating medications, or while drinking alcohol as it will make you drowsy.    Please allow 1-2 business days for our office to contact you in regards to your laboratory/radiological studies.  If not done so, I encourage you to login into [email or web address] to review your results as well.     If you have any questions or concerns, please feel free to call us at [phone]    Sincerely,    Efra Hayden PA-C    Did you know?      You can schedule a video visit for follow-up appointments as well as future appointments for certain conditions.  Please see the below link.     [email or web address]    If you have not already done so,  I encourage you to  sign up for [facility] ([email or web address]).  This will allow you to review your results, securely communicate with a provider, and schedule virtual visits as well.

## 2025-03-25 LAB
B PARAPERT DNA SPEC QL NAA+PROBE: NOT DETECTED
B PERT DNA SPEC QL NAA+PROBE: NOT DETECTED
SARS-COV-2 RNA RESP QL NAA+PROBE: NEGATIVE

## 2025-03-27 ENCOUNTER — PATIENT OUTREACH (OUTPATIENT)
Dept: CARE COORDINATION | Facility: CLINIC | Age: 75
End: 2025-03-27
Payer: MEDICARE

## 2025-04-10 ENCOUNTER — PATIENT OUTREACH (OUTPATIENT)
Dept: CARE COORDINATION | Facility: CLINIC | Age: 75
End: 2025-04-10
Payer: MEDICARE

## 2025-04-27 NOTE — PATIENT INSTRUCTIONS
For the left facial /neck nerve pool see pain management     For the allergies  Did refer you back to allergist,     Ordered the MRI,     I did reorder the Nitro patch for the muscle pain .     Call back to the clinic with the name of the tube for  The skin        hide

## 2025-05-03 ENCOUNTER — HEALTH MAINTENANCE LETTER (OUTPATIENT)
Age: 75
End: 2025-05-03

## 2025-06-10 ENCOUNTER — TELEPHONE (OUTPATIENT)
Dept: FAMILY MEDICINE | Facility: CLINIC | Age: 75
End: 2025-06-10
Payer: MEDICARE

## 2025-06-10 ENCOUNTER — MYC MEDICAL ADVICE (OUTPATIENT)
Dept: FAMILY MEDICINE | Facility: CLINIC | Age: 75
End: 2025-06-10
Payer: MEDICARE

## 2025-06-10 NOTE — TELEPHONE ENCOUNTER
Patient Quality Outreach    Patient is due for the following:   Physical Annual Wellness Visit    Action(s) Taken:   Schedule a Annual Wellness Visit    Type of outreach:    Sent [employer] message.    Questions for provider review:    None         Debbie Hayden MA  Chart routed to None.

## 2025-06-14 ENCOUNTER — HEALTH MAINTENANCE LETTER (OUTPATIENT)
Age: 75
End: 2025-06-14

## 2025-06-25 NOTE — TELEPHONE ENCOUNTER
Patient Quality Outreach    Patient is due for the following:   Physical Annual Wellness Visit    Action(s) Taken:   Schedule a Annual Wellness Visit    Type of outreach:    Sent letter.    Questions for provider review:    None         Debbie Hayden MA  Chart routed to None.

## 2025-08-05 ENCOUNTER — HOSPITAL ENCOUNTER (OUTPATIENT)
Dept: MAMMOGRAPHY | Facility: CLINIC | Age: 75
Discharge: HOME | End: 2025-08-05
Attending: INTERNAL MEDICINE
Payer: MEDICARE

## 2025-08-05 DIAGNOSIS — Z12.31 VISIT FOR SCREENING MAMMOGRAM: ICD-10-CM

## 2025-08-05 PROCEDURE — 77063 BREAST TOMOSYNTHESIS BI: CPT

## 2025-09-03 ENCOUNTER — OFFICE VISIT (OUTPATIENT)
Dept: FAMILY MEDICINE | Facility: CLINIC | Age: 75
End: 2025-09-03
Payer: MEDICARE

## 2025-09-03 VITALS
HEIGHT: 63 IN | RESPIRATION RATE: 12 BRPM | DIASTOLIC BLOOD PRESSURE: 75 MMHG | HEART RATE: 58 BPM | OXYGEN SATURATION: 99 % | BODY MASS INDEX: 27.54 KG/M2 | SYSTOLIC BLOOD PRESSURE: 155 MMHG | WEIGHT: 155.4 LBS | TEMPERATURE: 97.4 F

## 2025-09-03 DIAGNOSIS — G89.4 CHRONIC PAIN SYNDROME: ICD-10-CM

## 2025-09-03 DIAGNOSIS — S46.002A INJURY OF LEFT ROTATOR CUFF, INITIAL ENCOUNTER: ICD-10-CM

## 2025-09-03 DIAGNOSIS — B02.29 POST HERPETIC NEURALGIA: ICD-10-CM

## 2025-09-03 DIAGNOSIS — M1A.0710 IDIOPATHIC CHRONIC GOUT OF RIGHT FOOT WITHOUT TOPHUS: ICD-10-CM

## 2025-09-03 DIAGNOSIS — Z00.00 ENCOUNTER FOR MEDICARE ANNUAL WELLNESS EXAM: Primary | ICD-10-CM

## 2025-09-03 DIAGNOSIS — Z85.810 HISTORY OF TONGUE CANCER: ICD-10-CM

## 2025-09-03 DIAGNOSIS — R13.12 OROPHARYNGEAL DYSPHAGIA: ICD-10-CM

## 2025-09-03 DIAGNOSIS — R05.3 CHRONIC COUGH: ICD-10-CM

## 2025-09-03 DIAGNOSIS — K22.2 ESOPHAGEAL STRICTURE: ICD-10-CM

## 2025-09-03 PROBLEM — B02.9 HERPES ZOSTER WITHOUT COMPLICATION: Status: RESOLVED | Noted: 2025-03-10 | Resolved: 2025-09-03

## 2025-09-03 RX ORDER — LIDOCAINE 50 MG/G
OINTMENT TOPICAL
Qty: 50 G | Refills: 11 | Status: SHIPPED | OUTPATIENT
Start: 2025-09-03

## 2025-09-03 RX ORDER — OXYCODONE HYDROCHLORIDE 5 MG/1
5 TABLET ORAL DAILY PRN
Qty: 30 TABLET | Refills: 0 | Status: SHIPPED | OUTPATIENT
Start: 2025-09-03

## 2025-09-03 RX ORDER — INDOMETHACIN 25 MG/1
25 CAPSULE ORAL 2 TIMES DAILY PRN
Qty: 10 CAPSULE | Refills: 0 | Status: SHIPPED | OUTPATIENT
Start: 2025-09-03

## 2025-09-03 RX ORDER — BENZONATATE 100 MG/1
100 CAPSULE ORAL 3 TIMES DAILY PRN
Qty: 25 CAPSULE | Refills: 0 | Status: SHIPPED | OUTPATIENT
Start: 2025-09-03

## 2025-09-03 SDOH — HEALTH STABILITY: PHYSICAL HEALTH: ON AVERAGE, HOW MANY DAYS PER WEEK DO YOU ENGAGE IN MODERATE TO STRENUOUS EXERCISE (LIKE A BRISK WALK)?: 7 DAYS

## 2025-09-03 SDOH — HEALTH STABILITY: PHYSICAL HEALTH: ON AVERAGE, HOW MANY MINUTES DO YOU ENGAGE IN EXERCISE AT THIS LEVEL?: 30 MIN

## 2025-09-03 ASSESSMENT — ASTHMA QUESTIONNAIRES: ACT_TOTALSCORE: 23

## 2025-09-03 ASSESSMENT — PAIN SCALES - GENERAL: PAINLEVEL_OUTOF10: SEVERE PAIN (7)

## (undated) DEVICE — ENDO INFLATION DEVICE ESOPHAGEAL BALLOON

## (undated) DEVICE — ENDO CATH BALLOON DILATATION ELMNTR 15MMX8X180CM 000344

## (undated) DEVICE — ENDO FORCEP ENDOJAW BIOPSY 2.8MMX230CM FB-220U

## (undated) RX ORDER — LIDOCAINE HYDROCHLORIDE 10 MG/ML
INJECTION, SOLUTION EPIDURAL; INFILTRATION; INTRACAUDAL; PERINEURAL
Status: DISPENSED
Start: 2017-12-14